# Patient Record
Sex: FEMALE | Race: WHITE | NOT HISPANIC OR LATINO | Employment: FULL TIME | URBAN - METROPOLITAN AREA
[De-identification: names, ages, dates, MRNs, and addresses within clinical notes are randomized per-mention and may not be internally consistent; named-entity substitution may affect disease eponyms.]

---

## 2020-01-23 ENCOUNTER — OFFICE VISIT (OUTPATIENT)
Dept: PEDIATRICS | Facility: CLINIC | Age: 20
End: 2020-01-23
Payer: COMMERCIAL

## 2020-01-23 ENCOUNTER — TELEPHONE (OUTPATIENT)
Dept: PEDIATRICS | Facility: CLINIC | Age: 20
End: 2020-01-23

## 2020-01-23 VITALS
DIASTOLIC BLOOD PRESSURE: 62 MMHG | WEIGHT: 115.2 LBS | BODY MASS INDEX: 19.67 KG/M2 | OXYGEN SATURATION: 97 % | HEART RATE: 95 BPM | HEIGHT: 64 IN | SYSTOLIC BLOOD PRESSURE: 118 MMHG | TEMPERATURE: 98.5 F

## 2020-01-23 DIAGNOSIS — F41.9 ANXIETY: ICD-10-CM

## 2020-01-23 DIAGNOSIS — Z23 NEED FOR PROPHYLACTIC VACCINATION AND INOCULATION AGAINST INFLUENZA: Primary | ICD-10-CM

## 2020-01-23 DIAGNOSIS — E10.9 TYPE 1 DIABETES, HBA1C GOAL < 8% (H): ICD-10-CM

## 2020-01-23 DIAGNOSIS — Z23 NEED FOR VACCINATION: ICD-10-CM

## 2020-01-23 LAB — HBA1C MFR BLD: 7.5 % (ref 0–5.6)

## 2020-01-23 PROCEDURE — 90732 PPSV23 VACC 2 YRS+ SUBQ/IM: CPT | Performed by: NURSE PRACTITIONER

## 2020-01-23 PROCEDURE — 90471 IMMUNIZATION ADMIN: CPT | Performed by: NURSE PRACTITIONER

## 2020-01-23 PROCEDURE — 83036 HEMOGLOBIN GLYCOSYLATED A1C: CPT | Performed by: NURSE PRACTITIONER

## 2020-01-23 PROCEDURE — 84443 ASSAY THYROID STIM HORMONE: CPT | Performed by: NURSE PRACTITIONER

## 2020-01-23 PROCEDURE — 99203 OFFICE O/P NEW LOW 30 MIN: CPT | Mod: 25 | Performed by: NURSE PRACTITIONER

## 2020-01-23 PROCEDURE — 90472 IMMUNIZATION ADMIN EACH ADD: CPT | Performed by: NURSE PRACTITIONER

## 2020-01-23 PROCEDURE — 80053 COMPREHEN METABOLIC PANEL: CPT | Performed by: NURSE PRACTITIONER

## 2020-01-23 PROCEDURE — 80061 LIPID PANEL: CPT | Performed by: NURSE PRACTITIONER

## 2020-01-23 PROCEDURE — 82043 UR ALBUMIN QUANTITATIVE: CPT | Performed by: NURSE PRACTITIONER

## 2020-01-23 PROCEDURE — 90686 IIV4 VACC NO PRSV 0.5 ML IM: CPT | Performed by: NURSE PRACTITIONER

## 2020-01-23 PROCEDURE — 90651 9VHPV VACCINE 2/3 DOSE IM: CPT | Performed by: NURSE PRACTITIONER

## 2020-01-23 PROCEDURE — 36415 COLL VENOUS BLD VENIPUNCTURE: CPT | Performed by: NURSE PRACTITIONER

## 2020-01-23 ASSESSMENT — MIFFLIN-ST. JEOR: SCORE: 1274.6

## 2020-01-23 NOTE — TELEPHONE ENCOUNTER
The pt is aware and scheduled for her upcoming appointment.   Sosa Flores on 1/23/2020 at 11:56 AM

## 2020-01-23 NOTE — TELEPHONE ENCOUNTER
Reason for Call:  Other - referral/appt    Detailed comments: Patient called requesting an urgent referral to [facility] for her type I diabetes.  Patient states the DMV sent her a letter stating they will revoke her license if she is not seen by 01/26/20.  Patient has not seen Dr Todd since 2011, but wondering if Dr. Todd is willing to place referral due to urgency?  Additionally, patient would like to re-establiish care with Dr. Todd if possible. If agreeable, referral to endocrinology can be faxed to [phone].  Please follow up with patient for scheduling and referral advisement.    Phone Number Patient can be reached at: Cell number on file:    Telephone Information:   Mobile [phone]       Best Time: asap    Can we leave a detailed message on this number? YES    Call taken on 1/23/2020 at 11:29 AM by Carlyn Garcias

## 2020-01-23 NOTE — LETTER
[facility]  [address] [address]                  [phone]   January 24, 2020    Margo Cruz  [address] [place] [address]      Dear Margo,    Here is a summary of your recent test results:    Your A1c is 7.5.     Cholesterol (LDL) is a bit high. We can discuss at your physical.     Your thyroid is normal.     Blood sugar was a bit low at 62. Please monitor carefully.    Your test results are enclosed.      Please contact me if you have any questions.           Thank you very much for choosing [facility]    Best regards,    Naomi Reynolds, YOLI        Results for orders placed or performed in visit on 01/23/20   Comprehensive metabolic panel     Status: Abnormal   Result Value Ref Range    Sodium 137 133 - 144 mmol/L    Potassium 3.8 3.4 - 5.3 mmol/L    Chloride 107 96 - 110 mmol/L    Carbon Dioxide 22 20 - 32 mmol/L    Anion Gap 8 3 - 14 mmol/L    Glucose 62 (L) 70 - 99 mg/dL    Urea Nitrogen 15 7 - 30 mg/dL    Creatinine 0.71 0.50 - 1.00 mg/dL    GFR Estimate >90 >60 mL/min/[1.73_m2]    GFR Estimate If Black >90 >60 mL/min/[1.73_m2]    Calcium 9.4 8.5 - 10.1 mg/dL    Bilirubin Total 0.7 0.2 - 1.3 mg/dL    Albumin 4.3 3.4 - 5.0 g/dL    Protein Total 8.0 6.8 - 8.8 g/dL    Alkaline Phosphatase 67 40 - 150 U/L    ALT 15 0 - 50 U/L    AST 12 0 - 35 U/L   Hemoglobin A1c     Status: Abnormal   Result Value Ref Range    Hemoglobin A1C 7.5 (H) 0 - 5.6 %   Lipid panel reflex to direct LDL Non-fasting     Status: Abnormal   Result Value Ref Range    Cholesterol 180 (H) <170 mg/dL    Triglycerides 79 <90 mg/dL    HDL Cholesterol 47 >45 mg/dL    LDL Cholesterol Calculated 117 (H) <110 mg/dL    Non HDL Cholesterol 133 (H) <120 mg/dL   TSH with free T4 reflex     Status: None   Result Value Ref Range    TSH 0.79 0.40 - 4.00 mU/L

## 2020-01-23 NOTE — PROGRESS NOTES
"Subjective     Margo Cruz is a 19 year old female who presents to clinic today for the following health issues:    History of Present Illness        Diabetes:   She presents for follow up of diabetes.  She is checking home blood glucose four or more times daily. She checks blood glucose before and after meals and at bedtime.  Blood glucose is sometimes over 200 and sometimes over 70. She is aware of hypoglycemia symptoms including shakiness, dizziness, lethargy and confusion. She is concerned about frequent infections. She is not experiencing numbness or burning in feet, excessive thirst, blurry vision, weight changes or redness, sores or blisters on feet. The patient has not had a diabetic eye exam in the last 12 months.         She eats 2-3 servings of fruits and vegetables daily.She consumes 0 sweetened beverage(s) daily.She exercises with enough effort to increase her heart rate 9 or less minutes per day.  She exercises with enough effort to increase her heart rate 3 or less days per week.   She is taking medications regularly.     New Patient/Transfer of Care  Referral to Endo.       Recently pregnant. Had an . Was only a few weeks along. Feeling well mentally and physically.    Hasn't been seen in over a year. Has been purchasing regular insulin OTC and managing her diabetes that way. Overall feeling well. Morning sugars are 130-180. After mealtime sugars as high as 300 rarely, but usually 180-200. Sugars were very high in pregnancy.    Basal:  12-6am: 1unit/hr  6am-12pm 1.25u/hr  12p-10p 0.55/hr  10p-12a 1.1u/hr    Units per carbs    12-10am  4.6 carbs per unit  10am-3pm 10 carbs/unit  3pm-12 8 carbs per unit.    Thinks she uses 100-200 units per day.     ROS: const/endo/cv/resp/derm/psych otherwise negative     OBJECTIVE:  /62 (BP Location: Right arm, Patient Position: Chair, Cuff Size: Adult Regular)   Pulse 95   Temp 98.5  F (36.9  C) (Tympanic)   Ht 1.613 m (5' 3.5\")   Wt 52.3 kg " (115 lb 3.2 oz)   SpO2 97%   BMI 20.09 kg/m    CONSTITUTIONAL: Alert, well-nourished, well-groomed, NAD  RESP: Lungs CTA. No wheeze, rhonchi, rales.  CV: HRRR S1 S2 No MRG. No peripheral edema  PSYCH: Bright affect. Appropriate mentation and speech.       ASSESSMENT/PLAN:  (Z23) Need for prophylactic vaccination and inoculation against influenza  (primary encounter diagnosis  Plan: INFLUENZA VACCINE IM > 6 MONTHS VALENT IIV4         [96930], Vaccine Administration, Initial         [69937]            (Z23) Need for vaccination  Plan: PPSV23, IM/SUBQ (2+ YRS) - [id number], HPV, IM        (9 - 26 YRS) - Gardasil 9, EA ADD'L VACCINE            (E10.9) Type 1 diabetes, HbA1c goal < 8% (H)  Comment: Fair control at 7.5, considering that she has been managing this without a provider for the last 18 months. Has been putting REGULAR insulin in her pump. See settings above. Denies having any lows. Was somewhat hyperglycemic in early pregnancy (terminated).   Plan: Albumin Random Urine Quantitative with Creat         Ratio, Comprehensive metabolic panel,         Hemoglobin A1c, Lipid panel reflex to direct         LDL Non-fasting, TSH with free T4 reflex,         ENDOCRINOLOGY ADULT REFERRAL, AMBULATORY ADULT         DIABETES EDUCATOR REFERRAL, CANCELED:         ENDOCRINOLOGY ADULT REFERRAL, CANCELED:         ENDOCRINOLOGY ADULT REFERRAL          -Referred to endo  -Will see diabetic ed tomorrow to get her switched over to the appropriate amount of rapid acting insulin  -Labs  -F/U with me asap.   -Discussed reasons to seek care urgently.       (F41.9) Anxiety  Comment: Mild. No SI/HI. No manic sx.   Plan: MENTAL HEALTH REFERRAL  - Adult; Outpatient         Treatment; Individual/Couples/Family/Group         Therapy/Health Psychology; [facility] [place]: [facility] [phone]; We will         contact you to schedule the appointment or         please call with any questions          -Start therapy  -F/U with  yuri Reynolds, KARTHIKEYAN-HERI.

## 2020-01-24 ENCOUNTER — TELEPHONE (OUTPATIENT)
Dept: EDUCATION SERVICES | Facility: CLINIC | Age: 20
End: 2020-01-24

## 2020-01-24 ENCOUNTER — ALLIED HEALTH/NURSE VISIT (OUTPATIENT)
Dept: EDUCATION SERVICES | Facility: CLINIC | Age: 20
End: 2020-01-24
Payer: COMMERCIAL

## 2020-01-24 DIAGNOSIS — E10.9 TYPE 1 DIABETES, HBA1C GOAL < 8% (H): Primary | ICD-10-CM

## 2020-01-24 LAB
ALBUMIN SERPL-MCNC: 4.3 G/DL (ref 3.4–5)
ALP SERPL-CCNC: 67 U/L (ref 40–150)
ALT SERPL W P-5'-P-CCNC: 15 U/L (ref 0–50)
ANION GAP SERPL CALCULATED.3IONS-SCNC: 8 MMOL/L (ref 3–14)
AST SERPL W P-5'-P-CCNC: 12 U/L (ref 0–35)
BILIRUB SERPL-MCNC: 0.7 MG/DL (ref 0.2–1.3)
BUN SERPL-MCNC: 15 MG/DL (ref 7–30)
CALCIUM SERPL-MCNC: 9.4 MG/DL (ref 8.5–10.1)
CHLORIDE SERPL-SCNC: 107 MMOL/L (ref 96–110)
CHOLEST SERPL-MCNC: 180 MG/DL
CO2 SERPL-SCNC: 22 MMOL/L (ref 20–32)
CREAT SERPL-MCNC: 0.71 MG/DL (ref 0.5–1)
CREAT UR-MCNC: 380 MG/DL
GFR SERPL CREATININE-BSD FRML MDRD: >90 ML/MIN/{1.73_M2}
GLUCOSE SERPL-MCNC: 62 MG/DL (ref 70–99)
HDLC SERPL-MCNC: 47 MG/DL
LDLC SERPL CALC-MCNC: 117 MG/DL
MICROALBUMIN UR-MCNC: 66 MG/L
MICROALBUMIN/CREAT UR: 17.29 MG/G CR (ref 0–25)
NONHDLC SERPL-MCNC: 133 MG/DL
POTASSIUM SERPL-SCNC: 3.8 MMOL/L (ref 3.4–5.3)
PROT SERPL-MCNC: 8 G/DL (ref 6.8–8.8)
SODIUM SERPL-SCNC: 137 MMOL/L (ref 133–144)
TRIGL SERPL-MCNC: 79 MG/DL
TSH SERPL DL<=0.005 MIU/L-ACNC: 0.79 MU/L (ref 0.4–4)

## 2020-01-24 PROCEDURE — G0108 DIAB MANAGE TRN  PER INDIV: HCPCS

## 2020-01-24 NOTE — PATIENT INSTRUCTIONS
Switch over to Novolog insulin in the pump. [place] BG's closely for first few hours after switching.    Try uploading pump to [facility] and then call or send us a message with your Username and Password. We can review pump settings and help with dose changes. Do this 1-2 weeks after switching.    Call [employer] to get a linking meter.    [place] [facility] for the [facility]:  For Your Diabetes Education and Nutrition Appointments Call:  [phone]   For Diabetes Education or Nutrition Related Questions:   Phone: [phone]  E-mail: [email or web address]  Fax: [phone]   If you need a medication refill please contact your pharmacy. Please allow 3 business days for your refills to be completed.

## 2020-01-24 NOTE — PROGRESS NOTES
Diabetes Self-Management Education & Support      Diabetes Self-Management Education & Support - Insulin Pump Review    SUBJECTIVE/OBJECTIVE  Presents for: Individual review  Accompanied by: Mother  Diabetes education in the past 24mo: No  Focus of Visit: Taking Medication, Insulin Pump  Diabetes type: Type 1  Disease course: Improving  How confident are you filling out medical forms by yourself:: Not Assessed  Cultural Influences/Ethnic Background:  American      Patient seen today for Insulin Pump Review:    Insulin Pump Information  Insulin Pump Type: Medtronic 523/723  Infusion Set: Medtronic  Medtronic Infusion Set: Silhouette    Insulin Pump Review  Insulin Pump Type: Medtronic 523/723  Taking other diabetes medications?: No  Problems taking diabetes medications regularly?: No  Diabetes medication side effects?: No  Changes made to pump settings: None  Education specific to insulin pump provided today: Other(Changing from NOVOLIN R to NOVOLOG in the pump.)    Healthy Eating  Healthy Eating Assessed Today: No  Cultural/Mandaen diet restrictions?: No  Meal planning: None  Meals include: Breakfast, Lunch, Dinner, Snacks  Beverages: Water, Coffee, Milk, Diet soda, Sports drinks  Has patient met with a dietitian in the past?: No    Being Active  Being Active Assessed Today: No  Barrier to exercise: Time, Access    Monitoring  Monitoring Assessed Today: Yes  Blood Glucose Meter: Reli-On  Home Glucose (Sugar) Monitorin+ times per day  Blood glucose trend: Fluctuating minimally  Low Glucose Range (mg/dL): <70  High Glucose Range (mg/dL): >200  Overall Range (mg/dL): 130-140                Taking Medications  Taking Medication Assessed Today: Yes  Current Treatments: Insulin Pump  Dose schedule: pre-breakfast, pre-lunch, pre-dinner  Given by: Patient  Injection/Infusion sites: Abdomen, Thighs  Problems taking diabetes medications regularly?: No  Diabetes medication side effects?: No  Treatment Compliance: Most of  the time    Problem Solving  Problem Solving Assessed Today: Yes  Hypoglycemia Frequency: Rarely  Hypoglycemia Treatment: Glucose (tablets or gel), Juice, Candy, Other food  Patient carries a carbohydrate source: Yes  Medical alert: Yes  Severe weather/disaster plan for diabetes management?: No  DKA prevention plan?: Yes  Sick day plan for diabetes management?: (P) No    Hypoglycemia symptoms  Confusion: Yes  Dizziness or Light-Headedness: No  Headaches: Yes  Hunger: No  Mood changes: Yes  Nervousness/Anxiety: Yes  Sleepiness: No  Speech difficulty: No  Sweats: Yes  Tremors: Yes    Hypoglycemia Complications  Blackouts: No  Hospitalization: No  Nocturnal hypoglycemia: Yes  Required assistance: No  Required glucagon injection: No  Seizures: No    Reducing Risks  Reducing Risks Assessed Today: No  CAD Risks: Family history, Hypertension, Stress  Has dilated eye exam at least once a year?: No  Sees dentist every 6 months?: No  Sees podiatrist (foot doctor)?: No    Healthy Coping  Healthy Coping Assessed Today: Yes  Emotional response to diabetes: Ready to learn, Confidence diabetes can be controlled, Concern for health and well-being  Informal Support system:: Family, Friends, Parent, Other  Stage of change: PREPARATION (Decided to change - considering how)  Difficulty affording diabetes management supplies?: No  Patient Activation Measure Survey Score:  JUAN C Score (Last Two) 1/23/2020   JUAN C Raw Score 37   Activation Score 79.2   JUAN C Level 4         ASSESSMENT  I met with Margo and her mom today to discuss changing over to Novolog insulin from the Novolin R insulin she has been using in her pump. She tells me that she has been using the Novolin R in her pump for 4-5 years. She recently was able to get on her mom's insurance. Both Margo's dad and step-dad have Type 1 Diabetes. We reviewed the insulin action time and the difference between the two types of insulin. She plans to do some extra BG testing when she makes  the switch in the next couple of days. She has never uploaded her pump at home, but we discussed how she could do this and how the diabetes educators could then review her reports remotely. She is interested in a new pump and maybe a CGM. She would like to wait on this until she is seen by Dr. Holly in April. I got her set-up with a new contour meter today (previuosly using Reli-On) and she will contact [employer] for a linking meter. I did review with her to watch her BG's closely during the switch in insulin. We discussed meeting again after her visit with Dr. Holly or earlier if needed. She will be following-up with Naomi Reynolds CNP in about 1 month.     INTERVENTION:   Diabetes knowledge and skills assessment:     Patient is knowledgeable in diabetes management concepts related to: Monitoring, Taking Medication, Problem Solving and Healthy Coping    Patient needs further education on the following diabetes management concepts: Healthy Eating, Being Active, Monitoring, Taking Medication, Problem Solving, Reducing Risks and Healthy Coping    Based on learning assessment above, most appropriate setting for further diabetes education would be: Group class or Individual setting.    Education provided today on:  AADE Self-Care Behaviors:  Monitoring: log and interpret results, individual blood glucose targets and frequency of monitoring  Taking Medication: action of prescribed medication, side effects of prescribed medications and when to take medications  Problem Solving: high blood glucose - causes, signs/symptoms, treatment and prevention, low blood glucose - causes, signs/symptoms, treatment and prevention and when to call health care provider    Opportunities for ongoing education and support in diabetes-self management were discussed.    Pt verbalized understanding of concepts discussed and recommendations provided today.       Education Materials Provided:  Contour Next One meter kit      PLAN  Switch  over to Novolog insulin in the pump. [place] BG's closely for first few hours after switching.    Try uploading pump to [employer] and then call or send us a message with your Username and Password. We can review pump settings and help with dose changes. Do this 1-2 weeks after switching.    Call [employer] to get a linking meter.    See Patient Instructions for co-developed, patient-stated behavior change goals.  AVS printed and provided to patient today. See Follow-Up section for recommended follow-up.    Юлия Segovia RN, CDE    Time Spent: 60 minutes  Encounter Type: Individual    Any diabetes medication dose changes were made via the CDE Protocol and Collaborative Practice Agreement with the patient's referring provider. A copy of this encounter was shared with the provider.

## 2020-02-16 ENCOUNTER — HEALTH MAINTENANCE LETTER (OUTPATIENT)
Age: 20
End: 2020-02-16

## 2020-03-30 ENCOUNTER — TELEPHONE (OUTPATIENT)
Dept: PEDIATRICS | Facility: CLINIC | Age: 20
End: 2020-03-30

## 2020-03-30 NOTE — TELEPHONE ENCOUNTER
LVM that appt is cancelled.  My Chart sent informing patient appt for 4/6/20 is CANCELLED.  Please call to reschedule.   Elena Rudolph, CMA

## 2020-06-04 ENCOUNTER — APPOINTMENT (OUTPATIENT)
Dept: GENERAL RADIOLOGY | Facility: CLINIC | Age: 20
End: 2020-06-04
Attending: EMERGENCY MEDICINE
Payer: COMMERCIAL

## 2020-06-04 ENCOUNTER — HOSPITAL ENCOUNTER (INPATIENT)
Facility: CLINIC | Age: 20
LOS: 1 days | Discharge: HOME | End: 2020-06-05
Attending: EMERGENCY MEDICINE | Admitting: INTERNAL MEDICINE
Payer: COMMERCIAL

## 2020-06-04 ENCOUNTER — TELEPHONE (OUTPATIENT)
Dept: PEDIATRICS | Facility: CLINIC | Age: 20
End: 2020-06-04

## 2020-06-04 DIAGNOSIS — E10.10 DIABETIC KETOACIDOSIS WITHOUT COMA ASSOCIATED WITH TYPE 1 DIABETES MELLITUS (H): ICD-10-CM

## 2020-06-04 PROBLEM — E11.10 DKA (DIABETIC KETOACIDOSES): Status: ACTIVE | Noted: 2020-06-04

## 2020-06-04 LAB
ALBUMIN SERPL-MCNC: 4.8 G/DL (ref 3.4–5)
ALBUMIN UR-MCNC: NEGATIVE MG/DL
ALP SERPL-CCNC: 99 U/L (ref 40–150)
ALT SERPL W P-5'-P-CCNC: 24 U/L (ref 0–50)
ANION GAP SERPL CALCULATED.3IONS-SCNC: 10 MMOL/L (ref 3–14)
ANION GAP SERPL CALCULATED.3IONS-SCNC: 15 MMOL/L (ref 3–14)
APPEARANCE UR: CLEAR
AST SERPL W P-5'-P-CCNC: 17 U/L (ref 0–35)
B-HCG FREE SERPL-ACNC: <5 IU/L
BASE DEFICIT BLDV-SCNC: 8.8 MMOL/L
BASOPHILS # BLD AUTO: 0.2 10E9/L (ref 0–0.2)
BASOPHILS NFR BLD AUTO: 0.8 %
BILIRUB SERPL-MCNC: 1.4 MG/DL (ref 0.2–1.3)
BILIRUB UR QL STRIP: NEGATIVE
BUN SERPL-MCNC: 17 MG/DL (ref 7–30)
BUN SERPL-MCNC: 23 MG/DL (ref 7–30)
CALCIUM SERPL-MCNC: 8.6 MG/DL (ref 8.5–10.1)
CALCIUM SERPL-MCNC: 9.9 MG/DL (ref 8.5–10.1)
CHLORIDE SERPL-SCNC: 100 MMOL/L (ref 96–110)
CHLORIDE SERPL-SCNC: 110 MMOL/L (ref 96–110)
CO2 SERPL-SCNC: 18 MMOL/L (ref 20–32)
CO2 SERPL-SCNC: 18 MMOL/L (ref 20–32)
COLOR UR AUTO: ABNORMAL
CREAT SERPL-MCNC: 0.62 MG/DL (ref 0.5–1)
CREAT SERPL-MCNC: 0.81 MG/DL (ref 0.5–1)
DIFFERENTIAL METHOD BLD: ABNORMAL
EOSINOPHIL # BLD AUTO: 0.7 10E9/L (ref 0–0.7)
EOSINOPHIL NFR BLD AUTO: 3.4 %
ERYTHROCYTE [DISTWIDTH] IN BLOOD BY AUTOMATED COUNT: 12 % (ref 10–15)
GFR SERPL CREATININE-BSD FRML MDRD: >90 ML/MIN/{1.73_M2}
GFR SERPL CREATININE-BSD FRML MDRD: >90 ML/MIN/{1.73_M2}
GLUCOSE BLDC GLUCOMTR-MCNC: 135 MG/DL (ref 70–99)
GLUCOSE BLDC GLUCOMTR-MCNC: 150 MG/DL (ref 70–99)
GLUCOSE BLDC GLUCOMTR-MCNC: 155 MG/DL (ref 70–99)
GLUCOSE BLDC GLUCOMTR-MCNC: 159 MG/DL (ref 70–99)
GLUCOSE BLDC GLUCOMTR-MCNC: 190 MG/DL (ref 70–99)
GLUCOSE BLDC GLUCOMTR-MCNC: 196 MG/DL (ref 70–99)
GLUCOSE BLDC GLUCOMTR-MCNC: 231 MG/DL (ref 70–99)
GLUCOSE BLDC GLUCOMTR-MCNC: 253 MG/DL (ref 70–99)
GLUCOSE BLDC GLUCOMTR-MCNC: 291 MG/DL (ref 70–99)
GLUCOSE BLDC GLUCOMTR-MCNC: 357 MG/DL (ref 70–99)
GLUCOSE BLDC GLUCOMTR-MCNC: 416 MG/DL (ref 70–99)
GLUCOSE BLDC GLUCOMTR-MCNC: 476 MG/DL (ref 70–99)
GLUCOSE BLDC GLUCOMTR-MCNC: 487 MG/DL (ref 70–99)
GLUCOSE SERPL-MCNC: 140 MG/DL (ref 70–99)
GLUCOSE SERPL-MCNC: 517 MG/DL (ref 70–99)
GLUCOSE UR STRIP-MCNC: >1000 MG/DL
HBA1C MFR BLD: 8.3 % (ref 0–5.6)
HCO3 BLDV-SCNC: 19 MMOL/L (ref 21–28)
HCT VFR BLD AUTO: 49.7 % (ref 35–47)
HGB BLD-MCNC: 16.4 G/DL (ref 11.7–15.7)
HGB UR QL STRIP: ABNORMAL
IMM GRANULOCYTES # BLD: 0.1 10E9/L (ref 0–0.4)
IMM GRANULOCYTES NFR BLD: 0.6 %
INTERPRETATION ECG - MUSE: NORMAL
KETONES BLD-SCNC: 3.8 MMOL/L (ref 0–0.6)
KETONES UR STRIP-MCNC: >150 MG/DL
LACTATE BLD-SCNC: 3.6 MMOL/L (ref 0.7–2)
LACTATE BLD-SCNC: 4.4 MMOL/L (ref 0.7–2)
LEUKOCYTE ESTERASE UR QL STRIP: NEGATIVE
LIPASE SERPL-CCNC: 62 U/L (ref 73–393)
LYMPHOCYTES # BLD AUTO: 4.2 10E9/L (ref 0.8–5.3)
LYMPHOCYTES NFR BLD AUTO: 20 %
MAGNESIUM SERPL-MCNC: 2 MG/DL (ref 1.6–2.3)
MCH RBC QN AUTO: 31.2 PG (ref 26.5–33)
MCHC RBC AUTO-ENTMCNC: 33 G/DL (ref 31.5–36.5)
MCV RBC AUTO: 95 FL (ref 78–100)
MONOCYTES # BLD AUTO: 0.8 10E9/L (ref 0–1.3)
MONOCYTES NFR BLD AUTO: 3.8 %
NEUTROPHILS # BLD AUTO: 15 10E9/L (ref 1.6–8.3)
NEUTROPHILS NFR BLD AUTO: 71.4 %
NITRATE UR QL: NEGATIVE
NRBC # BLD AUTO: 0 10*3/UL
NRBC BLD AUTO-RTO: 0 /100
O2/TOTAL GAS SETTING VFR VENT: ABNORMAL %
OSMOLALITY SERPL: 325 MMOL/KG (ref 275–295)
PCO2 BLDV: 44 MM HG (ref 40–50)
PH BLDV: 7.24 PH (ref 7.32–7.43)
PH UR STRIP: 5 PH (ref 5–7)
PHOSPHATE SERPL-MCNC: 3.8 MG/DL (ref 2.5–4.5)
PLATELET # BLD AUTO: 402 10E9/L (ref 150–450)
PO2 BLDV: 38 MM HG (ref 25–47)
POTASSIUM SERPL-SCNC: 4.1 MMOL/L (ref 3.4–5.3)
POTASSIUM SERPL-SCNC: 4.2 MMOL/L (ref 3.4–5.3)
PROT SERPL-MCNC: 9.1 G/DL (ref 6.8–8.8)
RBC # BLD AUTO: 5.25 10E12/L (ref 3.8–5.2)
RBC #/AREA URNS AUTO: 3 /HPF (ref 0–2)
SARS-COV-2 RNA SPEC QL NAA+PROBE: NOT DETECTED
SODIUM SERPL-SCNC: 133 MMOL/L (ref 133–144)
SODIUM SERPL-SCNC: 138 MMOL/L (ref 133–144)
SOURCE: ABNORMAL
SP GR UR STRIP: 1.03 (ref 1–1.03)
SPECIMEN SOURCE: NORMAL
SQUAMOUS #/AREA URNS AUTO: 3 /HPF (ref 0–1)
UROBILINOGEN UR STRIP-MCNC: NORMAL MG/DL (ref 0–2)
WBC # BLD AUTO: 21 10E9/L (ref 4–11)
WBC #/AREA URNS AUTO: 4 /HPF (ref 0–5)

## 2020-06-04 PROCEDURE — 82803 BLOOD GASES ANY COMBINATION: CPT | Performed by: EMERGENCY MEDICINE

## 2020-06-04 PROCEDURE — 80053 COMPREHEN METABOLIC PANEL: CPT | Performed by: EMERGENCY MEDICINE

## 2020-06-04 PROCEDURE — 84702 CHORIONIC GONADOTROPIN TEST: CPT

## 2020-06-04 PROCEDURE — 00000146 ZZHCL STATISTIC GLUCOSE BY METER IP

## 2020-06-04 PROCEDURE — 83735 ASSAY OF MAGNESIUM: CPT | Performed by: EMERGENCY MEDICINE

## 2020-06-04 PROCEDURE — 84100 ASSAY OF PHOSPHORUS: CPT | Performed by: EMERGENCY MEDICINE

## 2020-06-04 PROCEDURE — 99285 EMERGENCY DEPT VISIT HI MDM: CPT | Mod: 25

## 2020-06-04 PROCEDURE — 82010 KETONE BODYS QUAN: CPT | Performed by: EMERGENCY MEDICINE

## 2020-06-04 PROCEDURE — 83690 ASSAY OF LIPASE: CPT | Performed by: EMERGENCY MEDICINE

## 2020-06-04 PROCEDURE — 99223 1ST HOSP IP/OBS HIGH 75: CPT | Mod: AI | Performed by: PHYSICIAN ASSISTANT

## 2020-06-04 PROCEDURE — 25800030 ZZH RX IP 258 OP 636: Performed by: EMERGENCY MEDICINE

## 2020-06-04 PROCEDURE — 87635 SARS-COV-2 COVID-19 AMP PRB: CPT | Performed by: EMERGENCY MEDICINE

## 2020-06-04 PROCEDURE — 25000125 ZZHC RX 250: Performed by: PHYSICIAN ASSISTANT

## 2020-06-04 PROCEDURE — 25000131 ZZH RX MED GY IP 250 OP 636 PS 637: Performed by: INTERNAL MEDICINE

## 2020-06-04 PROCEDURE — 80048 BASIC METABOLIC PNL TOTAL CA: CPT | Performed by: INTERNAL MEDICINE

## 2020-06-04 PROCEDURE — 85025 COMPLETE CBC W/AUTO DIFF WBC: CPT | Performed by: EMERGENCY MEDICINE

## 2020-06-04 PROCEDURE — 83605 ASSAY OF LACTIC ACID: CPT | Performed by: EMERGENCY MEDICINE

## 2020-06-04 PROCEDURE — 36415 COLL VENOUS BLD VENIPUNCTURE: CPT | Performed by: INTERNAL MEDICINE

## 2020-06-04 PROCEDURE — 87040 BLOOD CULTURE FOR BACTERIA: CPT | Performed by: EMERGENCY MEDICINE

## 2020-06-04 PROCEDURE — 96365 THER/PROPH/DIAG IV INF INIT: CPT | Mod: 59

## 2020-06-04 PROCEDURE — 25000128 H RX IP 250 OP 636: Performed by: EMERGENCY MEDICINE

## 2020-06-04 PROCEDURE — 25000125 ZZHC RX 250: Performed by: EMERGENCY MEDICINE

## 2020-06-04 PROCEDURE — 93005 ELECTROCARDIOGRAM TRACING: CPT

## 2020-06-04 PROCEDURE — 81001 URINALYSIS AUTO W/SCOPE: CPT | Performed by: EMERGENCY MEDICINE

## 2020-06-04 PROCEDURE — 83930 ASSAY OF BLOOD OSMOLALITY: CPT | Performed by: EMERGENCY MEDICINE

## 2020-06-04 PROCEDURE — 71045 X-RAY EXAM CHEST 1 VIEW: CPT

## 2020-06-04 PROCEDURE — 96366 THER/PROPH/DIAG IV INF ADDON: CPT

## 2020-06-04 PROCEDURE — 25800030 ZZH RX IP 258 OP 636: Performed by: PHYSICIAN ASSISTANT

## 2020-06-04 PROCEDURE — 83036 HEMOGLOBIN GLYCOSYLATED A1C: CPT | Performed by: EMERGENCY MEDICINE

## 2020-06-04 PROCEDURE — 96361 HYDRATE IV INFUSION ADD-ON: CPT

## 2020-06-04 PROCEDURE — C9803 HOPD COVID-19 SPEC COLLECT: HCPCS

## 2020-06-04 PROCEDURE — 12000011 ZZH R&B MS OVERFLOW

## 2020-06-04 PROCEDURE — 96375 TX/PRO/DX INJ NEW DRUG ADDON: CPT

## 2020-06-04 RX ORDER — MAGNESIUM SULFATE HEPTAHYDRATE 40 MG/ML
4 INJECTION, SOLUTION INTRAVENOUS EVERY 4 HOURS PRN
Status: DISCONTINUED | OUTPATIENT
Start: 2020-06-04 | End: 2020-06-05 | Stop reason: HOSPADM

## 2020-06-04 RX ORDER — ONDANSETRON 2 MG/ML
4 INJECTION INTRAMUSCULAR; INTRAVENOUS EVERY 6 HOURS PRN
Status: DISCONTINUED | OUTPATIENT
Start: 2020-06-04 | End: 2020-06-05 | Stop reason: HOSPADM

## 2020-06-04 RX ORDER — ACETAMINOPHEN 325 MG/1
650 TABLET ORAL EVERY 4 HOURS PRN
Status: DISCONTINUED | OUTPATIENT
Start: 2020-06-04 | End: 2020-06-05 | Stop reason: HOSPADM

## 2020-06-04 RX ORDER — NALOXONE HYDROCHLORIDE 0.4 MG/ML
.1-.4 INJECTION, SOLUTION INTRAMUSCULAR; INTRAVENOUS; SUBCUTANEOUS
Status: DISCONTINUED | OUTPATIENT
Start: 2020-06-04 | End: 2020-06-05 | Stop reason: HOSPADM

## 2020-06-04 RX ORDER — POTASSIUM CL/LIDO/0.9 % NACL 10MEQ/0.1L
10 INTRAVENOUS SOLUTION, PIGGYBACK (ML) INTRAVENOUS ONCE
Status: COMPLETED | OUTPATIENT
Start: 2020-06-04 | End: 2020-06-04

## 2020-06-04 RX ORDER — MULTIVIT-MIN/IRON/FOLIC ACID/K 18-600-40
1000 CAPSULE ORAL DAILY
COMMUNITY
End: 2021-06-01

## 2020-06-04 RX ORDER — DEXTROSE MONOHYDRATE 25 G/50ML
25-50 INJECTION, SOLUTION INTRAVENOUS
Status: DISCONTINUED | OUTPATIENT
Start: 2020-06-04 | End: 2020-06-05

## 2020-06-04 RX ORDER — SODIUM CHLORIDE, SODIUM LACTATE, POTASSIUM CHLORIDE, CALCIUM CHLORIDE 600; 310; 30; 20 MG/100ML; MG/100ML; MG/100ML; MG/100ML
INJECTION, SOLUTION INTRAVENOUS CONTINUOUS
Status: DISCONTINUED | OUTPATIENT
Start: 2020-06-04 | End: 2020-06-05 | Stop reason: HOSPADM

## 2020-06-04 RX ORDER — POTASSIUM CHLORIDE 1.5 G/1.58G
20-40 POWDER, FOR SOLUTION ORAL
Status: DISCONTINUED | OUTPATIENT
Start: 2020-06-04 | End: 2020-06-05 | Stop reason: HOSPADM

## 2020-06-04 RX ORDER — LIDOCAINE 40 MG/G
CREAM TOPICAL
Status: DISCONTINUED | OUTPATIENT
Start: 2020-06-04 | End: 2020-06-05

## 2020-06-04 RX ORDER — POTASSIUM CHLORIDE 29.8 MG/ML
20 INJECTION INTRAVENOUS
Status: DISCONTINUED | OUTPATIENT
Start: 2020-06-04 | End: 2020-06-05 | Stop reason: HOSPADM

## 2020-06-04 RX ORDER — ONDANSETRON 2 MG/ML
INJECTION INTRAMUSCULAR; INTRAVENOUS
Status: DISCONTINUED
Start: 2020-06-04 | End: 2020-06-04 | Stop reason: HOSPADM

## 2020-06-04 RX ORDER — DEXTROSE MONOHYDRATE 100 MG/ML
INJECTION, SOLUTION INTRAVENOUS CONTINUOUS PRN
Status: DISCONTINUED | OUTPATIENT
Start: 2020-06-04 | End: 2020-06-05 | Stop reason: HOSPADM

## 2020-06-04 RX ORDER — IBUPROFEN 200 MG
600 TABLET ORAL EVERY 6 HOURS PRN
Status: DISCONTINUED | OUTPATIENT
Start: 2020-06-04 | End: 2020-06-05 | Stop reason: HOSPADM

## 2020-06-04 RX ORDER — NITROGLYCERIN 0.4 MG/1
0.4 TABLET SUBLINGUAL EVERY 5 MIN PRN
Status: DISCONTINUED | OUTPATIENT
Start: 2020-06-04 | End: 2020-06-05 | Stop reason: HOSPADM

## 2020-06-04 RX ORDER — ONDANSETRON 4 MG/1
4 TABLET, ORALLY DISINTEGRATING ORAL EVERY 6 HOURS PRN
Status: DISCONTINUED | OUTPATIENT
Start: 2020-06-04 | End: 2020-06-05 | Stop reason: HOSPADM

## 2020-06-04 RX ORDER — POTASSIUM CL/LIDO/0.9 % NACL 10MEQ/0.1L
10 INTRAVENOUS SOLUTION, PIGGYBACK (ML) INTRAVENOUS
Status: DISCONTINUED | OUTPATIENT
Start: 2020-06-04 | End: 2020-06-05 | Stop reason: HOSPADM

## 2020-06-04 RX ORDER — AMOXICILLIN 250 MG
1 CAPSULE ORAL 2 TIMES DAILY PRN
Status: DISCONTINUED | OUTPATIENT
Start: 2020-06-04 | End: 2020-06-05 | Stop reason: HOSPADM

## 2020-06-04 RX ORDER — NICOTINE POLACRILEX 4 MG
15-30 LOZENGE BUCCAL
Status: DISCONTINUED | OUTPATIENT
Start: 2020-06-04 | End: 2020-06-05

## 2020-06-04 RX ORDER — DEXTROSE MONOHYDRATE 25 G/50ML
25-50 INJECTION, SOLUTION INTRAVENOUS
Status: DISCONTINUED | OUTPATIENT
Start: 2020-06-04 | End: 2020-06-04

## 2020-06-04 RX ORDER — POTASSIUM CHLORIDE 1500 MG/1
20-40 TABLET, EXTENDED RELEASE ORAL
Status: DISCONTINUED | OUTPATIENT
Start: 2020-06-04 | End: 2020-06-05 | Stop reason: HOSPADM

## 2020-06-04 RX ORDER — LIDOCAINE 40 MG/G
CREAM TOPICAL
Status: DISCONTINUED | OUTPATIENT
Start: 2020-06-04 | End: 2020-06-05 | Stop reason: HOSPADM

## 2020-06-04 RX ORDER — POTASSIUM CHLORIDE 7.45 MG/ML
10 INJECTION INTRAVENOUS
Status: DISCONTINUED | OUTPATIENT
Start: 2020-06-04 | End: 2020-06-05 | Stop reason: HOSPADM

## 2020-06-04 RX ORDER — POLYETHYLENE GLYCOL 3350 17 G/17G
17 POWDER, FOR SOLUTION ORAL DAILY PRN
Status: DISCONTINUED | OUTPATIENT
Start: 2020-06-04 | End: 2020-06-05 | Stop reason: HOSPADM

## 2020-06-04 RX ORDER — AMOXICILLIN 250 MG
2 CAPSULE ORAL 2 TIMES DAILY PRN
Status: DISCONTINUED | OUTPATIENT
Start: 2020-06-04 | End: 2020-06-05 | Stop reason: HOSPADM

## 2020-06-04 RX ORDER — ONDANSETRON 2 MG/ML
4 INJECTION INTRAMUSCULAR; INTRAVENOUS ONCE
Status: COMPLETED | OUTPATIENT
Start: 2020-06-04 | End: 2020-06-04

## 2020-06-04 RX ORDER — PROCHLORPERAZINE MALEATE 5 MG
10 TABLET ORAL EVERY 6 HOURS PRN
Status: DISCONTINUED | OUTPATIENT
Start: 2020-06-04 | End: 2020-06-05 | Stop reason: HOSPADM

## 2020-06-04 RX ORDER — PROCHLORPERAZINE 25 MG
25 SUPPOSITORY, RECTAL RECTAL EVERY 12 HOURS PRN
Status: DISCONTINUED | OUTPATIENT
Start: 2020-06-04 | End: 2020-06-05 | Stop reason: HOSPADM

## 2020-06-04 RX ADMIN — Medication 10 MEQ: at 12:25

## 2020-06-04 RX ADMIN — Medication 8.5 UNITS/HR: at 22:20

## 2020-06-04 RX ADMIN — INSULIN ASPART: 100 INJECTION, SOLUTION INTRAVENOUS; SUBCUTANEOUS at 19:11

## 2020-06-04 RX ADMIN — SODIUM CHLORIDE, POTASSIUM CHLORIDE, SODIUM LACTATE AND CALCIUM CHLORIDE 1000 ML: 600; 310; 30; 20 INJECTION, SOLUTION INTRAVENOUS at 15:36

## 2020-06-04 RX ADMIN — SODIUM CHLORIDE, POTASSIUM CHLORIDE, SODIUM LACTATE AND CALCIUM CHLORIDE 250 ML: 600; 310; 30; 20 INJECTION, SOLUTION INTRAVENOUS at 12:06

## 2020-06-04 RX ADMIN — SODIUM CHLORIDE: 9 INJECTION, SOLUTION INTRAVENOUS at 11:57

## 2020-06-04 RX ADMIN — SODIUM CHLORIDE, POTASSIUM CHLORIDE, SODIUM LACTATE AND CALCIUM CHLORIDE 1000 ML: 600; 310; 30; 20 INJECTION, SOLUTION INTRAVENOUS at 10:52

## 2020-06-04 RX ADMIN — ONDANSETRON 4 MG: 2 INJECTION INTRAMUSCULAR; INTRAVENOUS at 10:52

## 2020-06-04 ASSESSMENT — ENCOUNTER SYMPTOMS
DIARRHEA: 1
FEVER: 0
COUGH: 0
HEADACHES: 1
SHORTNESS OF BREATH: 1
VOMITING: 1
MYALGIAS: 1
NAUSEA: 1

## 2020-06-04 ASSESSMENT — ACTIVITIES OF DAILY LIVING (ADL)
ADLS_ACUITY_SCORE: 10
ADLS_ACUITY_SCORE: 10

## 2020-06-04 NOTE — ED PROVIDER NOTES
History     Chief Complaint:  Nausea & Vomiting and Hyperglycemia    HPI   Margo Cruz is a 19 year old female with a history of type 1 diabetes with insulin pump as well as DKA  who presents with multiple symptoms.  The patient states that this morning she awoke with nausea and vomiting.  The patient states that she has had many episodes of emesis today.  She reports some mild diarrhea. The patient also reports a headache as well as chest pain, body aches and shortness of breath.  She states that she had one episode of urinary incontinence. The patient denies any fever, new cough, or chance of pregnancy. The patient states that her insulin pump is currently infusing and when she checked her blood sugar level at home it was 422.  She states that her symptoms feels similar to when she has experienced DKA before in the past. The patient states that her insulin pump itself is old but that she just got brand new generic brand insulin.     Allergies:  No Known Drug Allergies    Medications:    Novolog      Past Medical History:    Diabetes type 1   Celiac   DKA    Past Surgical History:    Upper endoscopy with biopsy     Family History:    Diabetes- father     Social History:  Smoking Status: Never Smoker - passive exposure mom smokes outside    Smokeless Tobacco: Never Used  Alcohol Use: Positive  Marital Status:  Single      Review of Systems   Constitutional: Negative for fever.   Respiratory: Positive for shortness of breath. Negative for cough.    Cardiovascular: Positive for chest pain.   Gastrointestinal: Positive for diarrhea, nausea and vomiting.   Musculoskeletal: Positive for myalgias.   Neurological: Positive for headaches.   All other systems reviewed and are negative.        Physical Exam     Patient Vitals for the past 24 hrs:   BP Temp Pulse Heart Rate Resp SpO2 Weight   06/04/20 1230 122/55 -- 59 -- -- 100 % --   06/04/20 1145 (!) 140/73 -- 106 112 -- 99 % --   06/04/20 1100 121/79 -- 75 76 16 99 %  --   06/04/20 1044 -- -- -- -- -- -- 55 kg (121 lb 4.1 oz)   06/04/20 1028 (!) 142/94 98.2  F (36.8  C) 111 -- 20 98 % 52.2 kg (115 lb)       Physical Exam  Gen: Uncomfortable appearing, retching  HEENT: Dry mucous membranes, no rhinorrhea  Neck: supple, no abnormal swelling  Lungs: No tachypnea lungs clear  CV: Tachycardic, no m/r/g, ppi  Abd: soft, no localizing tenderness palpation, nondistended, no rebound/masses/guarding/hsm  Ext: no peripheral edema  Skin: warm, dry, well perfused, no rashes/bruising/lesions on exposed skin  Neuro: alert, MAEE, no gross motor or sensory deficits, gait stable  Psych: Normal mood, normal affect      Emergency Department Course     ECG:  ECG taken at 1047, ECG read at 1047  Sinus rhythm with sinus arrhythmia  Left axis deviation  Incomplete right bundle branch block  abnormal ECG  Rate 89 bpm. IA interval 178 ms. QRS duration 100 ms. QT/QTc 372/452 ms. P-R-T axes 58 -44 57.    Imaging:  Radiology findings were communicated with the patient who voiced understanding of the findings.    Chest XR:  No acute airspace infiltrate.   Reading per radiology    Laboratory:  Laboratory findings were communicated with the patient  who voiced understanding of the findings.    Glucose by meter 1037: 487 (H)   Glucose by meter 1058: 517 (HH)   Glucose by meter 1148: 476 (H)   Glucose by meter 1235: 416 (H)   Glucose by meter 1304: 357 (H)     UA with micro: glucose >1000 (A) ketones >150 (H)  Blood small (A) RBC/HPF 3 (H) squamous epithelial/HPF 3 (H) o/w negative    CBC: WBC 21.0(H), HGB 16.4(L),     CMP: CO2 18 (L) anion gap 15 (H) bilirubin 1.4 (H) protein total 9.1 (H)glucose 517 (HH) o/w WNL (Creatinine 0.81)    Lipase: 62 (L)     Ketone Beta- hydroxybutyrate: 3.8 (HH)     Magnesium: 2.0     COVID-19 by PCR: pending     Hemoglobin A1c: 8.3 (H)     Phosphorus: 3.8     Osmolality: pending    Lactic acid 1057: 3.6 (H)    Lactic acid 1224: 4.4 (HH)     Blood gas venous: pH 7.24 (L)  bicarbonate 19 (L) o/w WNL     ISTAT HCG Quantitative: <5.0     Blood culture: pending X2     Procedures    Interventions:  1052 Zofran 4 mg IV   1052 LR 1 L IV   1157 insulin 1 unit/mL IV   1206  mL IV   1225 KCl 10 mEq IV     Emergency Department Course:     Nursing notes and vitals reviewed.    1034 I performed an exam of the patient as documented above.     1037 IV was inserted and blood was drawn for laboratory testing, results above.    1058 IV was inserted and blood was drawn for laboratory testing, results above.    1122 The patient provided a urine sample here in the emergency department. This was sent for laboratory testing, findings above.     1148 IV was inserted and blood was drawn for laboratory testing, results above.    1221 The patient was sent for a XR while in the emergency department, results above.     1224 IV was inserted and blood was drawn for laboratory testing, results above.    1236 I spoke with Dr. Michael of the Hospitalist service from [facility] regarding patient's presentation, findings, and plan of care.    Impression & Plan      Medical Decision Making:  Margo Cruz is a 19 year old female who presents to the emergency department today for evaluation of nausea vomiting abdominal pain glycemia in the setting of type I diabetic.  Found to have diabetic ketoacidosis.  No preceding symptoms concerning for concomitant infection.  Placed on insulin drip, IV fluids, potassium 4.1, EKG with normal intervals, antiemetics admit [place] [facility]    Diagnosis:    ICD-10-CM    1. Diabetic ketoacidosis without coma associated with type 1 diabetes mellitus (H)  E10.10 Glucose by meter     Glucose by meter     Disposition:   Findings and plan explained to the Patient who consents to admission. Discussed the patient with Dr. Vicente, who will admit the patient to a medical bed for further monitoring, evaluation, and treatment.    Scribe Disclosure:  IGeovanna, am serving as a scribe  at 10:33 AM on 6/4/2020 to document services personally performed by Alok Glasgow MD based on my observations and the provider's statements to me.  [facility] EMERGENCY DEPARTMENT       Alok Glasgow MD  06/04/20 [id number]

## 2020-06-04 NOTE — TELEPHONE ENCOUNTER
Please coordinate f/u with endo asap.    Please set up f/u with diabetic ed for ASAP as likely unable to get in with me.

## 2020-06-04 NOTE — ED NOTES
[facility]  ED Nurse Handoff Report    Margo Cruz is a 19 year old female   ED Chief complaint: Nausea & Vomiting and Hyperglycemia  . ED Diagnosis:   Final diagnoses:   Diabetic ketoacidosis without coma associated with type 1 diabetes mellitus (H)     Allergies:   Allergies   Allergen Reactions     No Known Drug Allergies        Code Status: Full Code  Activity level - Baseline/Home:  Independent. Activity Level - Current:   Stand by Assist. Lift room needed: No. Bariatric: No   Needed: No   Isolation: R/O Covid. Infection: Covid pending  Vital Signs:   Vitals:    06/04/20 1044 06/04/20 1100 06/04/20 1145 06/04/20 1230   BP:  121/79 (!) 140/73 122/55   Pulse:  75 106 59   Resp:  16     Temp:       SpO2:  99% 99% 100%   Weight: 55 kg (121 lb 4.1 oz)          Cardiac Rhythm:  ,      Pain level: 0-10 Pain Scale: 9(chest pain, body aches)  Patient confused: No. Patient Falls Risk: No.   Elimination Status: Has voided   Patient Report - Initial Complaint: Patient awoke with nausea, vomiting, headache, chest pain, body aches, and shortness of breath.  She is diabetic and has in insulin pump, which is currently infusing.  BG at home was 422.. Focused Assessment: Pt has insulin pump, turned off in ED due to MD asking her to turn it off. N/V, headache, heartburn, body aches.  Tests Performed: labs, CXR. Abnormal Results:   Labs Ordered and Resulted from Time of ED Arrival Up to the Time of Departure from the ED   CBC WITH PLATELETS DIFFERENTIAL - Abnormal; Notable for the following components:       Result Value    WBC 21.0 (*)     RBC Count 5.25 (*)     Hemoglobin 16.4 (*)     Hematocrit 49.7 (*)     Absolute Neutrophil 15.0 (*)     All other components within normal limits   COMPREHENSIVE METABOLIC PANEL - Abnormal; Notable for the following components:    Carbon Dioxide 18 (*)     Anion Gap 15 (*)     Glucose 517 (*)     Bilirubin Total 1.4 (*)     Protein Total 9.1 (*)     All other  components within normal limits   LIPASE - Abnormal; Notable for the following components:    Lipase 62 (*)     All other components within normal limits   LACTIC ACID WHOLE BLOOD - Abnormal; Notable for the following components:    Lactic Acid 3.6 (*)     All other components within normal limits   BLOOD GAS VENOUS - Abnormal; Notable for the following components:    Ph Venous 7.24 (*)     Bicarbonate Venous 19 (*)     All other components within normal limits   ROUTINE UA WITH MICROSCOPIC - Abnormal; Notable for the following components:    Glucose Urine >1000 (*)     Ketones Urine >150 (*)     Blood Urine Small (*)     RBC Urine 3 (*)     Squamous Epithelial /HPF Urine 3 (*)     All other components within normal limits   KETONE BETA-HYDROXYBUTYRATE QUANTITATIVE - Abnormal; Notable for the following components:    Ketone Quantitative 3.8 (*)     All other components within normal limits   HEMOGLOBIN A1C - Abnormal; Notable for the following components:    Hemoglobin A1C 8.3 (*)     All other components within normal limits   GLUCOSE BY METER - Abnormal; Notable for the following components:    Glucose 487 (*)     All other components within normal limits   GLUCOSE BY METER - Abnormal; Notable for the following components:    Glucose 476 (*)     All other components within normal limits   LACTIC ACID WHOLE BLOOD - Abnormal; Notable for the following components:    Lactic Acid 4.4 (*)     All other components within normal limits   GLUCOSE BY METER - Abnormal; Notable for the following components:    Glucose 416 (*)     All other components within normal limits   MAGNESIUM   COVID-19 VIRUS (CORONAVIRUS) BY PCR   PHOSPHORUS   OSMOLALITY   GLUCOSE MONITOR NURSING POCT   CARDIAC CONTINUOUS MONITORING   ISTAT HCG QUANTITATIVE PREGNANCY NURSING POCT   NOTIFY PHYSICIAN   PERIPHERAL IV CATHETER   IV ACCESS   ISTAT HCG QUANTITATIVE PREGNANCY POCT   GLUCOSE BY METER POCT   GLUCOSE BY METER POCT     XR Chest Port 1 View    Preliminary Result   IMPRESSION: No acute airspace infiltrate.        .   Treatments provided: See MAR  Family Comments: none  OBS brochure/video discussed/provided to patient:  N/A  ED Medications:   Medications   dextrose 5% and 0.45% NaCl infusion (has no administration in time range)   dextrose 50 % injection 25-50 mL (has no administration in time range)   potassium chloride 10 mEq in 100 mL intermittent infusion with 10 mg lidocaine (10 mEq Intravenous New Bag 6/4/20 1225)   insulin 1 unit/1 mL in NS (NovoLIN, HumuLIN Regular) drip -ED DKA algorithm ( Intravenous New Bag 6/4/20 1157)   ondansetron (ZOFRAN) injection 4 mg (4 mg Intravenous Given 6/4/20 1052)   lactated ringers BOLUS 1,000 mL (0 mLs Intravenous Stopped 6/4/20 1201)   lactated ringers BOLUS 250 mL (0 mLs Intravenous Stopped 6/4/20 1225)     Drips infusing:  Yes  For the majority of the shift, the patient's behavior Green. Interventions performed were none.    Sepsis treatment initiated: No       ED Nurse Name/Phone Number: Drea Jovel RN,   12:43 PM

## 2020-06-04 NOTE — ED TRIAGE NOTES
Patient awoke with nausea, vomiting, headache, chest pain, body aches, and shortness of breath.  She is diabetic and has in insulin pump, which is currently infusing.  BG at home was 422.

## 2020-06-04 NOTE — ED NOTES
[facility]  ED Nurse Handoff Report    Margo Cruz is a 19 year old female   ED Chief complaint: Nausea & Vomiting and Hyperglycemia  . ED Diagnosis:   Final diagnoses:   None     Allergies:   Allergies   Allergen Reactions     No Known Drug Allergies        Code Status: Full Code  Activity level - Baseline/Home:  Independent. Activity Level - Current:   Stand by Assist. Lift room needed: No. Bariatric: No   Needed: No   Isolation: No. Infection: Not Applicable.     Vital Signs:   Vitals:    06/04/20 1028 06/04/20 1044 06/04/20 1100 06/04/20 1145   BP: (!) 142/94  121/79 (!) 140/73   Pulse: 111  75 106   Resp: 20  16    Temp: 98.2  F (36.8  C)      SpO2: 98%  99% 99%   Weight: 52.2 kg (115 lb) 55 kg (121 lb 4.1 oz)         Cardiac Rhythm:  ,      Pain level: 0-10 Pain Scale: 9(chest pain, body aches)  Patient confused: No. Patient Falls Risk: Yes.   Elimination Status: Has voided   Patient Report - Initial Complaint: N/V headache, cp . Focused Assessment:    Tests Performed:   XR Chest Port 1 View    (Results Pending)     Labs Ordered and Resulted from Time of ED Arrival Up to the Time of Departure from the ED   CBC WITH PLATELETS DIFFERENTIAL - Abnormal; Notable for the following components:       Result Value    WBC 21.0 (*)     RBC Count 5.25 (*)     Hemoglobin 16.4 (*)     Hematocrit 49.7 (*)     Absolute Neutrophil 15.0 (*)     All other components within normal limits   COMPREHENSIVE METABOLIC PANEL - Abnormal; Notable for the following components:    Carbon Dioxide 18 (*)     Anion Gap 15 (*)     Glucose 517 (*)     Bilirubin Total 1.4 (*)     Protein Total 9.1 (*)     All other components within normal limits   LIPASE - Abnormal; Notable for the following components:    Lipase 62 (*)     All other components within normal limits   LACTIC ACID WHOLE BLOOD - Abnormal; Notable for the following components:    Lactic Acid 3.6 (*)     All other components within normal limits    BLOOD GAS VENOUS - Abnormal; Notable for the following components:    Ph Venous 7.24 (*)     Bicarbonate Venous 19 (*)     All other components within normal limits   ROUTINE UA WITH MICROSCOPIC - Abnormal; Notable for the following components:    Glucose Urine >1000 (*)     Ketones Urine >150 (*)     Blood Urine Small (*)     RBC Urine 3 (*)     Squamous Epithelial /HPF Urine 3 (*)     All other components within normal limits   KETONE BETA-HYDROXYBUTYRATE QUANTITATIVE - Abnormal; Notable for the following components:    Ketone Quantitative 3.8 (*)     All other components within normal limits   HEMOGLOBIN A1C - Abnormal; Notable for the following components:    Hemoglobin A1C 8.3 (*)     All other components within normal limits   GLUCOSE BY METER - Abnormal; Notable for the following components:    Glucose 487 (*)     All other components within normal limits   GLUCOSE BY METER - Abnormal; Notable for the following components:    Glucose 476 (*)     All other components within normal limits   MAGNESIUM   COVID-19 VIRUS (CORONAVIRUS) BY PCR   PHOSPHORUS   OSMOLALITY   GLUCOSE MONITOR NURSING POCT   CARDIAC CONTINUOUS MONITORING   ISTAT HCG QUANTITATIVE PREGNANCY NURSING POCT   NOTIFY PHYSICIAN   PERIPHERAL IV CATHETER   IV ACCESS   ISTAT HCG QUANTITATIVE PREGNANCY POCT   GLUCOSE BY METER POCT   GLUCOSE BY METER POCT     . Abnormal Results: see above.   Treatments provided: see mar  Family Comments: na  OBS brochure/video discussed/provided to patient:  N/A  ED Medications:   Medications   ondansetron (ZOFRAN) 2 MG/ML injection (has no administration in time range)   dextrose 5% and 0.45% NaCl infusion (has no administration in time range)   dextrose 50 % injection 25-50 mL (has no administration in time range)   potassium chloride 10 mEq in 100 mL intermittent infusion with 10 mg lidocaine (has no administration in time range)   insulin 1 unit/1 mL in NS (NovoLIN, HumuLIN Regular) drip -ED DKA algorithm (  Intravenous New Bag 6/4/20 1157)   lactated ringers BOLUS 250 mL (250 mLs Intravenous New Bag 6/4/20 1206)   ondansetron (ZOFRAN) injection 4 mg (4 mg Intravenous Given 6/4/20 1052)   lactated ringers BOLUS 1,000 mL (0 mLs Intravenous Stopped 6/4/20 1201)     Drips infusing:  Yes  For the majority of the shift, the patient's behavior Green. Interventions performed were see mar.    Sepsis treatment initiated:   Yes    Per the ED Provider, Time Zero for severe sepsis or septic shock is:  1121    3 Hour Severe Sepsis Bundle Completion:  1. Initial Lactic Acid Result:   Recent Labs   Lab Test 06/04/20  1057   LACT 3.6*     2. Blood Cultures before Antibiotics: No  3. Broad Spectrum Antibiotics Administered:     Anti-infectives (From now, onward)    None        4. 1000 ml of IV fluids have been given so far      6 Hour Severe Sepsis Bundle Completion:    1. Repeat Lactic Acid Level: Not drawn  2. Patient currently on Vasopressors =  No       ED Nurse Name/Phone Number: Kayla Mercedes RN,   12:09 PM

## 2020-06-04 NOTE — TELEPHONE ENCOUNTER
The pt is aware and scheduled for her Endo appointment but declined scheduling with a diab ed at this time.   Sosa Flores on 6/4/2020 at 3:53 PM

## 2020-06-04 NOTE — PLAN OF CARE
ICU End of Shift Summary.  For vital signs and complete assessments, please see documentation flowsheets.     Pertinent assessments: A&Ox4, very pleasant. VSS. Tele SR/ST. Afebrile. Denies pain, nausea, SOB. SBA to bathroom r/t lines only, otherwise independent. Cont. Bowel, bladder. Drinking well. Insulin gtt A1. POC reviewed with pt.   Major Shift Events: new admit.   Plan (Upcoming Events): continue to monitor closely   Discharge/Transfer Needs: tbd    Bedside Shift Report Completed : yes  Bedside Safety Check Completed: yes

## 2020-06-04 NOTE — H&P
History and Physical     Margo Cruz MRN# [id number]   YOB: 2000 Age: 19 year old      Date of Admission:  6/4/2020    Primary care provider: Luanne Todd          Assessment and Plan:   Margo Cruz is a 19 year old female with a PMH significant for DM I, celiac disease and marijuana use who presents with better blood sugar, nausea, vomiting and abdominal pain.    Patient was discussed with Dr. Glasgow, who was provider in ED. Chart review of ED work up was reviewed as well as chart review of Care Everywhere, previous visits and admissions.     #DKA  #Type 1 diabetes  Patient presents with feeling generally unwell for the last 3 days with nausea, vomiting and abdominal cramping that started today.  She recently started a new insulin in her pump that she cannot name for me.  She has noted her blood sugars have been in the 300-400 range and she has been urinating more frequently.  She is already feeling better after receiving IV fluids.  On admission temp is 98.2, heart rate 111 and pressure 142/94 breathing comfortably on room air.  Lab work is remarkable for a negative pregnancy test, lactic acid 3.6, glucose 517, quantitative ketones 3.8, A1c 8.3, elevated anion gap at 15 and VBG showing a pH of 7.24 with bicarbonate of 19.   She was given 1,250 ml of fluids and started on an insulin drip with improvement of symptoms.   -[facility] admit  -LR bolus  -Continue LR at 150 mL/h  -Monitor on tele  -Insulin drip ordered  -Replace electrolytes  -Zofran ordered for nausea    #COVID rule out  Due to complaints of shortness of breath COVID test was ordered but I have low suspicion that she has this.  -May be removed off isolation if this is negative    #Celiac disease  -We will order gluten-free diet    #Marijuana use  She reports regular use of marijuana without history of cyclic vomiting    #Likely right ankle sprain  She describes falling off her skateboard a couple of days ago injuring her right ankle.   It is swollen but she is still able to ambulate on it.  -Elevate and ice frequently  -Tylenol and ibuprofen as needed      Social: No concerns  Code: Discussed with patient and they have chosen full code  VTE prophylaxis: PCDs and ambulation  Disposition: [facility] admit                    Chief Complaint:   Abdominal pain, nausea and vomiting         History of Present Illness:   Margo Cruz is a 19 year old female who presents with nausea, vomiting and abdominal pain.  She states she has been feeling just generally unwell for the past 3 days.  She started vomiting this morning though.  She describes cramps in both her stomach and back with some small amount of diarrhea output.  She denies fever but has felt achy.  She denies shortness of breath and has a mild cough that she relates to smoking marijuana.  She has noticed increased urination with her elevated blood sugars.  She started a new insulin about a week ago.  She does not smoke cigarettes and does not drink alcohol daily.  She did fall off her skateboard recently injuring her right ankle which is swollen but she is still able to ambulate on it.             Past Medical History:     Past Medical History:   Diagnosis Date     Diabetes mellitus      Seborrheic infantile dermatitis                Past Surgical History:   No past surgical history on file.            Social History:     Social History     Socioeconomic History     Marital status: Single     Spouse name: Not on file     Number of children: Not on file     Years of education: Not on file     Highest education level: Not on file   Occupational History     Not on file   Social Needs     Financial resource strain: Not on file     Food insecurity     Worry: Not on file     Inability: Not on file     Transportation needs     Medical: Not on file     Non-medical: Not on file   Tobacco Use     Smoking status: Never Smoker     Smokeless tobacco: Never Used     Tobacco comment: mom smokes outside   Substance  and Sexual Activity     Alcohol use: No     Drug use: No     Sexual activity: Never   Lifestyle     Physical activity     Days per week: Not on file     Minutes per session: Not on file     Stress: Not on file   Relationships     Social connections     Talks on phone: Not on file     Gets together: Not on file     Attends Holiness service: Not on file     Active member of club or organization: Not on file     Attends meetings of clubs or organizations: Not on file     Relationship status: Not on file     Intimate partner violence     Fear of current or ex partner: Not on file     Emotionally abused: Not on file     Physically abused: Not on file     Forced sexual activity: Not on file   Other Topics Concern     Not on file   Social History Narrative     Not on file               Family History:     Family History   Problem Relation Age of Onset     Diabetes Father               Allergies:      Allergies   Allergen Reactions     No Known Drug Allergies                Medications:     Prior to Admission medications    Medication Sig Last Dose Taking? Auth Provider   blood glucose (CONTOUR NEXT TEST) test strip Use to test blood sugar 4-5 times daily or as directed.   Naomi Reynolds APRN CNP   blood glucose monitoring (TYE MICROLET) lancets Use to test blood sugar 4-5 times daily or as directed.   Naomi Reynolds APRN CNP   insulin aspart (NOVOLOG VIAL) 100 UNITS/ML vial Use up to 80 units daily in insulin pump. 90 day supply.   Naomi Reynolds APRN CNP              Review of Systems:   A Comprehensive greater than 10 system review of systems was carried out.  Pertinent positives and negatives are noted above.  Otherwise negative for contributory information.            Physical Exam:   Blood pressure 122/55, pulse 59, temperature 98.2  F (36.8  C), resp. rate 16, weight 55 kg (121 lb 4.1 oz), SpO2 100 %, not currently breastfeeding.    Due to the COVID pandemic physical exam was not  performed by me as she has a pending COVID swab.  Physical exam was performed by Dr. Glasgow in the emergency room on 6/4/2020.    I spoke with her over the iPad and she was alert and oriented and moving all limbs comfortably.  She was not complaining of any pain and breathing comfortably on room air.              Data:     Recent Labs   Lab 06/04/20  1058   WBC 21.0*   HGB 16.4*   HCT 49.7*   MCV 95        Recent Labs   Lab 06/04/20  1058      POTASSIUM 4.1   CHLORIDE 100   CO2 18*   ANIONGAP 15*   *   BUN 23   CR 0.81   GFRESTIMATED >90   GFRESTBLACK >90   BEN 9.9   MAG 2.0   PHOS 3.8   PROTTOTAL 9.1*   ALBUMIN 4.8   BILITOTAL 1.4*   ALKPHOS 99   AST 17   ALT 24     Recent Labs   Lab 06/04/20  1224 06/04/20  1057   LACT 4.4* 3.6*         Recent Results (from the past 24 hour(s))   XR Chest Port 1 View    Narrative    CHEST ONE VIEW PORTABLE   6/4/2020 12:22 PM     HISTORY: Diabetic ketoacidosis, cough.    COMPARISON: None available.      Impression    IMPRESSION: No acute airspace infiltrate.         Drea Rudolph PA-C    This patient was seen and discussed with Dr. Michael who agrees with the current plans as outlined above.

## 2020-06-04 NOTE — PHARMACY-ADMISSION MEDICATION HISTORY
Admission medication history interview status for this patient is complete. See [facility] admission navigator for allergy information, prior to admission medications and immunization status.     Medication history interview done via telephone during Covid-19 pandemic, indicate source(s): Patient  Medication history resources (including written lists, pill bottles, clinic record):epic list    Changes made to PTA medication list:  Added: Insulin pump and settings, Vitamin D  Deleted: -  Changed: -    Actions taken by pharmacist (provider contacted, etc):None     Additional medication history information: Pt indicated that she was switched to generic novolog insulin about 1 week ago. Her last episode for hospitalization for DKA was at diagnosis. Pt believes that her pump site got dislodged during sleep. Pt will have her friend bring in pump supplies later today.    Medication reconciliation/reorder completed by provider prior to medication history?  N   (Y/N)     For patients on insulin therapy: Y  (Y/N)  Do you have a baseline novolog pre-meal dose:   See pump settings  Do you eat three meals a day:  Yes and snacks  How many times do you check your blood glucose per day:  3-5 times per day  How many episodes of hypoglycemia do you have per week: 1-2  Do you have a Continuous glucose monitor (CGM) : N (remind pt that not approved for hospital use)  Any specific barriers to therapy? N  (cost, comfortable with injections, confident with current diabetes regimen?)      Prior to Admission medications    Medication Sig Last Dose Taking? Auth Provider   Insulin Aspart (INSULIN PUMP - OUTPATIENT) Inject Subcutaneous See Admin Instructions Type of pump: Medtronic  Type of insulin: Novolog (changed to generic about 1 week ago)  Basal rate(s)  0081-1395: 1 unit/hr  7909-1124: 1.1 units/hr  9094-6076: 0.55 units/hr  4573-4225: 1.1 units/hr  ISF: 35 (all day)  ICF (insulin to carb ratio)  9586-4374: 1unit/4.6g carbs  8408-0502: 1  unit/10g carbs  8376-2073: 1 unit/8g carbs  Active insulin time: 3 hrs  Target goal range:   Followed by endocrinology 6/4/2020 at Unknown time Yes Unknown, Entered By History   Vitamin D, Cholecalciferol, 25 MCG (1000 UT) TABS Take 1,000 Units by mouth daily Past Week at Unknown time Yes Unknown, Entered By History   blood glucose (CONTOUR NEXT TEST) test strip Use to test blood sugar 4-5 times daily or as directed.   Naomi Reynolds APRN CNP   blood glucose monitoring (TYE MICROLET) lancets Use to test blood sugar 4-5 times daily or as directed.   Naomi Reynolds APRN CNP   insulin aspart (NOVOLOG VIAL) 100 UNITS/ML vial Use up to 80 units daily in insulin pump. 90 day supply.   Naomi Reynolds APRN CNP

## 2020-06-05 ENCOUNTER — DOCUMENTATION ONLY (OUTPATIENT)
Dept: OTHER | Facility: CLINIC | Age: 20
End: 2020-06-05

## 2020-06-05 VITALS
WEIGHT: 121.25 LBS | RESPIRATION RATE: 18 BRPM | BODY MASS INDEX: 21.48 KG/M2 | DIASTOLIC BLOOD PRESSURE: 66 MMHG | SYSTOLIC BLOOD PRESSURE: 114 MMHG | OXYGEN SATURATION: 98 % | TEMPERATURE: 97.7 F | HEART RATE: 64 BPM | HEIGHT: 63 IN

## 2020-06-05 LAB
ALBUMIN SERPL-MCNC: 3.3 G/DL (ref 3.4–5)
ALP SERPL-CCNC: 69 U/L (ref 40–150)
ALT SERPL W P-5'-P-CCNC: 16 U/L (ref 0–50)
ANION GAP SERPL CALCULATED.3IONS-SCNC: 9 MMOL/L (ref 3–14)
AST SERPL W P-5'-P-CCNC: 14 U/L (ref 0–35)
BASOPHILS # BLD AUTO: 0.1 10E9/L (ref 0–0.2)
BASOPHILS NFR BLD AUTO: 0.8 %
BILIRUB SERPL-MCNC: 0.8 MG/DL (ref 0.2–1.3)
BUN SERPL-MCNC: 11 MG/DL (ref 7–30)
CALCIUM SERPL-MCNC: 8.1 MG/DL (ref 8.5–10.1)
CHLORIDE SERPL-SCNC: 110 MMOL/L (ref 96–110)
CO2 SERPL-SCNC: 19 MMOL/L (ref 20–32)
CREAT SERPL-MCNC: 0.64 MG/DL (ref 0.5–1)
DIFFERENTIAL METHOD BLD: ABNORMAL
EOSINOPHIL # BLD AUTO: 0.5 10E9/L (ref 0–0.7)
EOSINOPHIL NFR BLD AUTO: 3.7 %
ERYTHROCYTE [DISTWIDTH] IN BLOOD BY AUTOMATED COUNT: 12.2 % (ref 10–15)
GFR SERPL CREATININE-BSD FRML MDRD: >90 ML/MIN/{1.73_M2}
GLUCOSE BLDC GLUCOMTR-MCNC: 108 MG/DL (ref 70–99)
GLUCOSE BLDC GLUCOMTR-MCNC: 115 MG/DL (ref 70–99)
GLUCOSE BLDC GLUCOMTR-MCNC: 129 MG/DL (ref 70–99)
GLUCOSE BLDC GLUCOMTR-MCNC: 131 MG/DL (ref 70–99)
GLUCOSE BLDC GLUCOMTR-MCNC: 137 MG/DL (ref 70–99)
GLUCOSE BLDC GLUCOMTR-MCNC: 142 MG/DL (ref 70–99)
GLUCOSE BLDC GLUCOMTR-MCNC: 146 MG/DL (ref 70–99)
GLUCOSE BLDC GLUCOMTR-MCNC: 169 MG/DL (ref 70–99)
GLUCOSE BLDC GLUCOMTR-MCNC: 197 MG/DL (ref 70–99)
GLUCOSE BLDC GLUCOMTR-MCNC: 217 MG/DL (ref 70–99)
GLUCOSE BLDC GLUCOMTR-MCNC: 250 MG/DL (ref 70–99)
GLUCOSE BLDC GLUCOMTR-MCNC: 267 MG/DL (ref 70–99)
GLUCOSE BLDC GLUCOMTR-MCNC: 61 MG/DL (ref 70–99)
GLUCOSE BLDC GLUCOMTR-MCNC: 76 MG/DL (ref 70–99)
GLUCOSE SERPL-MCNC: 136 MG/DL (ref 70–99)
HCT VFR BLD AUTO: 40.3 % (ref 35–47)
HGB BLD-MCNC: 13.2 G/DL (ref 11.7–15.7)
IMM GRANULOCYTES # BLD: 0 10E9/L (ref 0–0.4)
IMM GRANULOCYTES NFR BLD: 0.2 %
LYMPHOCYTES # BLD AUTO: 4 10E9/L (ref 0.8–5.3)
LYMPHOCYTES NFR BLD AUTO: 33 %
MCH RBC QN AUTO: 30.8 PG (ref 26.5–33)
MCHC RBC AUTO-ENTMCNC: 32.8 G/DL (ref 31.5–36.5)
MCV RBC AUTO: 94 FL (ref 78–100)
MONOCYTES # BLD AUTO: 0.7 10E9/L (ref 0–1.3)
MONOCYTES NFR BLD AUTO: 5.7 %
NEUTROPHILS # BLD AUTO: 6.9 10E9/L (ref 1.6–8.3)
NEUTROPHILS NFR BLD AUTO: 56.6 %
NRBC # BLD AUTO: 0 10*3/UL
NRBC BLD AUTO-RTO: 0 /100
PLATELET # BLD AUTO: 281 10E9/L (ref 150–450)
POTASSIUM SERPL-SCNC: 3.9 MMOL/L (ref 3.4–5.3)
PROT SERPL-MCNC: 6.5 G/DL (ref 6.8–8.8)
RBC # BLD AUTO: 4.28 10E12/L (ref 3.8–5.2)
SODIUM SERPL-SCNC: 138 MMOL/L (ref 133–144)
WBC # BLD AUTO: 12.2 10E9/L (ref 4–11)

## 2020-06-05 PROCEDURE — 00000146 ZZHCL STATISTIC GLUCOSE BY METER IP

## 2020-06-05 PROCEDURE — 25000128 H RX IP 250 OP 636: Performed by: PHYSICIAN ASSISTANT

## 2020-06-05 PROCEDURE — 36415 COLL VENOUS BLD VENIPUNCTURE: CPT | Performed by: PHYSICIAN ASSISTANT

## 2020-06-05 PROCEDURE — 25800025 ZZH RX 258: Performed by: PHYSICIAN ASSISTANT

## 2020-06-05 PROCEDURE — 85025 COMPLETE CBC W/AUTO DIFF WBC: CPT | Performed by: PHYSICIAN ASSISTANT

## 2020-06-05 PROCEDURE — 99238 HOSP IP/OBS DSCHRG MGMT 30/<: CPT | Performed by: INTERNAL MEDICINE

## 2020-06-05 PROCEDURE — 80053 COMPREHEN METABOLIC PANEL: CPT | Performed by: PHYSICIAN ASSISTANT

## 2020-06-05 RX ORDER — NICOTINE POLACRILEX 4 MG
15-30 LOZENGE BUCCAL
Status: DISCONTINUED | OUTPATIENT
Start: 2020-06-05 | End: 2020-06-05 | Stop reason: HOSPADM

## 2020-06-05 RX ORDER — DEXTROSE MONOHYDRATE 25 G/50ML
25-50 INJECTION, SOLUTION INTRAVENOUS
Status: DISCONTINUED | OUTPATIENT
Start: 2020-06-05 | End: 2020-06-05 | Stop reason: HOSPADM

## 2020-06-05 RX ADMIN — DEXTROSE MONOHYDRATE 25 ML: 25 INJECTION, SOLUTION INTRAVENOUS at 02:15

## 2020-06-05 RX ADMIN — ONDANSETRON 4 MG: 2 INJECTION INTRAMUSCULAR; INTRAVENOUS at 06:18

## 2020-06-05 ASSESSMENT — ACTIVITIES OF DAILY LIVING (ADL)
ADLS_ACUITY_SCORE: 10

## 2020-06-05 NOTE — PROGRESS NOTES
CM aware of patient's admission for DKA. She has been using a new insulin pump. Insulin dripp stopped and patient's pump restarted. She is unfamiliar with new pump. BG now controlled. Patient has discharge orders in place. No additional information needed.     CM will continue to follow patient until discharge for any additional needs.     Leida Galeas RN, BSN, CPHN, CM  Inpatient Care Coordination  [facility]  [phone]

## 2020-06-05 NOTE — PLAN OF CARE
ICU End of Shift Summary.  For vital signs and complete assessments, please see documentation flowsheets.     Pertinent assessments:patient alert & oriented x 4, stable on feet, up in  Room, no complaints, on insulin drip, vss, cms intact abdullahi  Major Shift Events: insulin pump started with basal rate at 0845, insulin drip turned off at 09, had breakfast patient covered carb with pump  Plan (Upcoming Events): if accucheck stay in range, patient can be discharged to home  Discharge/Transfer Needs: tbd    Bedside Shift Report Completed : yes  Bedside Safety Check Completed: yes

## 2020-06-05 NOTE — PROVIDER NOTIFICATION
Covid results negative. Okay to discontinue isolation precautions per Dr. Hanna. Bedside RN updated.
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
## 2020-06-05 NOTE — PLAN OF CARE
ICU End of Shift Summary.  For vital signs and complete assessments, please see documentation flowsheets.     Pertinent assessments: Insulin gtt titrated all night; BS as low as 61  Major Shift Events: BS as low as 61, D50 given, continued to monitor and titrate gtt per protocol; Zofran x1 this AM for nausea; resting comfortably now  Plan (Upcoming Events): Continue to monitor  Discharge/Transfer Needs: discharge to to home    Bedside Shift Report Completed : yes  Bedside Safety Check Completed: yes

## 2020-06-05 NOTE — DISCHARGE SUMMARY
Admit Date:     06/04/2020   Discharge Date:     06/05/2020      FINAL DIAGNOSIS:  Mild diabetic ketoacidosis.      PAST MEDICAL HISTORY:   1.  Type 1 diabetes mellitus, normally well controlled.  The patient uses insulin pump.  She was diagnosed at 9 years of age and has had no recent admissions for diabetic ketoacidosis.   2.  History of celiac disease.      PRINCIPAL PROCEDURES THIS ADMISSION:   1.  Chest x-ray showing no acute findings.   2.  Insulin drip.   3.  IV fluid hydration:     4.  COVID-19 testing that was negative.      REASON FOR ADMISSION:  Please see dictated history.  In brief, Ms. Clemens is a 19-year-old female who presented to the hospital with elevated blood sugar, nausea, vomiting.  Please see dictated history and physical for details.      On presentation, the patient had hyperglycemia with blood sugars near the 500 range.  She was also acidotic with bicarbonate mildly low at 18.  The patient was admitted to the Hospitalist Service for mild DKA.      HOSPITAL COURSE:  Mild diabetic ketoacidosis:  The patient was treated with insulin drip and IV fluids.  DKA resolved.  It is thought that the likely problem with her injection sites from her insulin pump was kinked causing decreased insulin delivery.  After DKA resolved with insulin drip, insulin pump was resumed with good control of blood sugars prior to discharge from the hospital.  The patient was discharged home today after resumption of her insulin pump.  There were no dose changes made this admission.      DISCHARGE MEDICATIONS:   1.  Insulin pump.   2.  Vitamin D.      FOLLOWUP INSTRUCTIONS:  No specific followup is required after hospitalization unless blood sugars remain uncontrolled.  If that occurs, contact your endocrinologist.      I examined the patient on day of discharge.         ONIEL COY MD             D: 06/05/2020   T: 06/05/2020   MT: JOVI      Name:     BANDAR WELLINGTON   MRN:      [id number]        Account:         [id number]   :      2000           Admit Date:     2020                                  Discharge Date: 2020      Document: [id number]       cc: Luanne Todd MD

## 2020-06-05 NOTE — PLAN OF CARE
Pt ready for discharge. PIV removed. Discharge instructions reviewed. Pt has no questions. Last . Pt's mother will pick pt up.

## 2020-06-05 NOTE — PROGRESS NOTES
Pt seen and examined.  Feels well without complaints.  Insulin gtt stopped this AM and pump resumed at 0900.  Assuming adequate BS control with pump can discharge at noon today.  D/w pharmacy and bedside nurse along with patient.      Anticipate home today

## 2020-06-08 ENCOUNTER — TELEPHONE (OUTPATIENT)
Dept: PEDIATRICS | Facility: CLINIC | Age: 20
End: 2020-06-08

## 2020-06-08 NOTE — TELEPHONE ENCOUNTER
ED / Discharge Outreach Protocol    Patient Contact    Attempt # 1    Was call answered?  No.  Left message on voicemail with information to return call to clinic.    When pt calls back:    1. Has ENDO appt scheduled-6/22.   2. Schedule diab ed in meantime?  3. Check on BG readings since discharge  4. Pt has no-showed appts/not responded to [facility]- update MyChart info? Using mobile arik?  5. Schedule virtual follow up visit - who is PCP? SAE Vazquez or Dr. Todd?

## 2020-06-08 NOTE — TELEPHONE ENCOUNTER
Please contact patient for In-patient follow up.  [phone] (home)     Visit date: 060520  Diagnosis listed: Diabetic Ketoacidosis Without Coma Associated With Type 1 Diabetes Mellitus (H)  Number of visits in past 12 months: 1 ED / 1 IP

## 2020-06-08 NOTE — TELEPHONE ENCOUNTER
Spoke to pt. She does not want to make an appt with us or diabetic ed at this time. She will see Endo on 6/22. Did not have time to answer any other questions. Will call if she needs anything from us. Payton Jones RN on 6/8/2020 at 10:45 AM

## 2020-06-10 LAB
BACTERIA SPEC CULT: NO GROWTH
SPECIMEN SOURCE: NORMAL

## 2020-08-11 ENCOUNTER — VIRTUAL VISIT (OUTPATIENT)
Dept: ENDOCRINOLOGY | Facility: CLINIC | Age: 20
End: 2020-08-11
Payer: COMMERCIAL

## 2020-08-11 DIAGNOSIS — E10.9 TYPE 1 DIABETES, HBA1C GOAL < 8% (H): Primary | ICD-10-CM

## 2020-08-11 PROCEDURE — 99204 OFFICE O/P NEW MOD 45 MIN: CPT | Mod: 95 | Performed by: INTERNAL MEDICINE

## 2020-08-11 NOTE — PATIENT INSTRUCTIONS
[facility] & [place] locations   Dr Holly, Endocrinology Department      [facility]   [address]  [place] [address]  Appointment [phone]  Fax: [phone]  [place]: Monday and Tuesday         [facility]   [address]  [place] [address]  Appointment [phone]  Fax: [phone]  [place]: Wednesday and Thursday            Please check the cost coverage and copay with insurance before recommended tests, services and medications (especially if new medications are prescribed).     If ordered, please get blood work done 1 week prior to your next appointment so they will be available to Dr. Holly at your visit.    To provide the best diabetic care, please bring your blood glucose meter to each and every visit with your  Endocrinologist. Your blood glucose meter/insulin pump will be downloaded at every appointment.  Please arrive 15 minutes before your scheduled appointment. This will allow for your blood glucose meter/insulin pump  to be downloaded.  If you are wearing DEXCOM please bring  or sharing code from the Dexcom Clarity Appt so that it can be downloaded.  If you are using freestyle bob personal sensors please bring the reader.  If you are using TANDEM insulin pump please have your username and password to get info from Tandem website.      Check with [employer] about 630 G or 670 G pump.  Other option is Tandem insulin pump.  Check cost of Tandem insulin pump (that will go with Dexcom).  Your provider has referred you to Diabetes Education: For all [facility]:  Phone [phone]; Fax [phone]  Please call and make the appointment: for BG review, insulin titration and insulin pump information.  Labs and follow up in 3 months.    Recommend checking blood sugars before meals and at bedtime.    If Blood glucose are low more often-> 2-3 times/week- give us a  call.  The patient is advised to Make better food choices: reduce carbs, Reduce portion size, weight loss and exercise 3-4 times a week.  Discussed hypoglycemia signs and symptoms as well as management in detail.

## 2020-08-11 NOTE — LETTER
"    8/11/2020         RE: Margo Cruz  [address] [place] [address]        Dear Colleague,    Thank you for referring your patient, Margo Cruz, to the [facility]. Please see a copy of my visit note below.    THIS IS A VIDEO VISIT:    Phone call visit/virtual visit encounter:    Name of patient: Margo Cruz    Date of encounter: 8/11/2020    Time of start of video visit: 11:37    Video started: 11:45 ( was driving and asked me call her again in 5 min)    Video ended: 12:18    Time visit video ended: 12:37    Provider location: working from home/ [facility]    Patient location: patients home.    Mode of transmission: video/ Doximity    Verbal consent: obtained before starting visit. Pt is agreeable.      The patient has been notified of following:      \"This VIDEO visit will be conducted via a call between you and your physician/provider. We have found that certain health care needs can be provided without the need for a physical exam.  This service lets us provide the care you need with a short phone conversation.  If a prescription is necessary we can send it directly to your pharmacy.  If lab work is needed we can place an order for that and you can then stop by our lab to have the test done at a later time.     With new updates with corona virus patient might be billed as clinic visit.     If during the course of the call the physician/provider feels a telephone visit is not appropriate, you will not be charged for this service.\"      Past medical history, social history, family history, allergy and medications were reviewed and updated as appropriate.  Reviewed pertinent labs, notes, imaging studies personally.  In addition to clinic visit > 32 min were spent reviewing records from previous clinic, recenet hospitalization.    Endocrinology Clinic Note:  Name: Margo Cruz  Seen in consultation with Naomi QUEVEDO CNP for Diabetes.  HPI:  Margo Cruz is a 19 year " old female who presents for the evaluation/management of type 1 diabetes.   has a past medical history of Diabetes mellitus and Seborrheic infantile dermatitis.    Noted 6/2020 hospitalization for DKA-   It is thought that the likely problem with her injection sites from her insulin pump was kinked causing decreased insulin delivery.   Was followed by endocrinology at [facility] earlier.  Available records, labs and images from outside clinic were personally reviewed.    Is on insulin pump X 7 years.  Is interested in Dexcom and newer insulin pump.  Has more questions about it.  Is closely followed by BRAD Segovia.    Insulin Pump Information  Insulin Pump Type: Medtronic 523/723- Paradigm Reveal  Infusion Set: Medtronic  Medtronic Infusion Set: Silhouette  CGM: No  Insulin: using Insulin aspart in pump    Currently she is using Relion meter.  Is not using contour meter but needs test strips.     1. Type 1 DM:  Orginally diagnosed: 2011 (at age 9 years)  Current Regimen:   yes:     Diabetes Medication(s)     Insulin       Insulin Aspart (INSULIN PUMP - OUTPATIENT)    Inject Subcutaneous See Admin Instructions Type of pump: Medtronic  Type of insulin: Novolog (changed to generic about 1 week ago)  Basal rate(s)  6472-1809: 1 unit/hr  9568-8682: 1.1 units/hr  3532-9668: 0.55 units/hr  1538-4033: 1.1 units/hr  ISF: 35 (all day)  ICF (insulin to carb ratio)  3417-5004: 1unit/4.6g carbs  6881-6546: 1 unit/10g carbs  9920-3069: 1 unit/8g carbs  Active insulin time: 3 hrs  Target goal range:   Followed by endocrinology     insulin aspart (NOVOLOG VIAL) 100 UNITS/ML vial    Use up to 80 units daily in insulin pump. 90 day supply.          BS checks: 3-4 times  Average Meter Download: Blood glucose data reviewed personally. See nursing note from this encounter for details.  Few episodes of low Bg in the setting of overcorrection.  Exercise:  Last A1c: 8.4%  Symptoms of hypoglycemia (low blood sugar):    Using PUMP:  Current Regimen:   Insulin pump -   Time Rate (U/hr)   0000-  0.950   0600  1.0   1200  0.550   2200  1.00     Carbohydrate Ratio -    Time Ratio   0000-  4.6   1000  1500 10.0  8.0     Sensitivity   35   Active Insulin Time   hours   Basal   19 Units (38%)   Bolus   30 Units (62%)   Total Carbohydrates/day  190+60   Total Insulin/day   49+7   Average Blood Sugar  222+ 103                   DM Complications:   Complications:   Diabetes Complications  Description / Detail    Diabetic Retinopathy  No   CAD / PAD  No   Neuropathy  No   Nephropathy / Microalbuminuria  No  Lab Results   Component Value Date    UMALCR 17.29 01/23/2020         Gastroparesis  No   Hypoglycemia [place]  No          2. Hypertension:  Not on medication.  3. Hyperlipidemia: Not on medication.    PMH/PSH:  Past Medical History:   Diagnosis Date     Diabetes mellitus      Seborrheic infantile dermatitis      History reviewed. No pertinent surgical history.  Family Hx:  Family History   Problem Relation Age of Onset     Diabetes Father            DM1: Father.           Social Hx:  Social History     Socioeconomic History     Marital status: Single     Spouse name: Not on file     Number of children: Not on file     Years of education: Not on file     Highest education level: Not on file   Occupational History     Not on file   Social Needs     Financial resource strain: Not on file     Food insecurity     Worry: Not on file     Inability: Not on file     Transportation needs     Medical: Not on file     Non-medical: Not on file   Tobacco Use     Smoking status: Never Smoker     Smokeless tobacco: Never Used     Tobacco comment: mom smokes outside   Substance and Sexual Activity     Alcohol use: No     Drug use: No     Sexual activity: Never   Lifestyle     Physical activity     Days per week: Not on file     Minutes per session: Not on file     Stress: Not on file   Relationships     Social connections     Talks on phone: Not on  file     Gets together: Not on file     Attends Judaism service: Not on file     Active member of club or organization: Not on file     Attends meetings of clubs or organizations: Not on file     Relationship status: Not on file     Intimate partner violence     Fear of current or ex partner: Not on file     Emotionally abused: Not on file     Physically abused: Not on file     Forced sexual activity: Not on file   Other Topics Concern     Not on file   Social History Narrative     Not on file          MEDICATIONS:  has a current medication list which includes the following prescription(s): blood glucose, blood glucose monitoring, insulin aspart, insulin aspart, and vitamin d (cholecalciferol).    ROS     ROS: 10 point ROS neg other than the symptoms noted above in the HPI.    Physical Exam   VS: LMP 08/10/2020   Breastfeeding No   GENERAL: healthy, alert and no distress  EYES: Eyes grossly normal to inspection, conjunctivae and sclerae normal  RESP: no audible wheeze, cough, or visible cyanosis.  No visible retractions or increased work of breathing.  Able to speak fully in complete sentences.  NEURO: Cranial nerves grossly intact, mentation intact and speech normal  PSYCH: mentation appears normal, affect normal/bright, judgement and insight intact, normal speech and appearance well-groomed      LABS:  A1c:  Lab Results   Component Value Date    A1C 8.3 06/04/2020    A1C 7.5 01/23/2020    A1C 7.3 04/23/2015    A1C 7.9 06/18/2014    A1C 7.4 10/11/2013         BMP:   Creatinine   Date Value Ref Range Status   06/05/2020 0.64 0.50 - 1.00 mg/dL Final       Urine Micro:  Lab Results   Component Value Date    MICROL 66 01/23/2020     No results found for: MICROALBUMIN      LFTs/Lipids:  Recent Labs   Lab Test 01/23/20  1538 04/23/15   CHOL 180* 171   HDL 47 42   * 118   TRIG 79 114       Liver Function Studies -   Recent Labs   Lab Test 06/05/20  0537   PROTTOTAL 6.5*   ALBUMIN 3.3*   BILITOTAL 0.8   ALKPHOS  69   AST 14   ALT 16         TFTs:  Lab Results   Component Value Date    TSH 0.79 01/23/2020           Blood Glucose and pump data/ Meter reviewed.     All pertinent notes, labs, and images personally reviewed by me.       Glucometer/ insulin pump (if applicable)/ CGM data (if applicable) downloaded, Personally reviewed and interpreted.  All Blood sugar data reviewed personally and interpreted as well as discussed with pt.  See nursing note from 8/11/2020 for details of BG/CGM log.  Where applicable   All past medical, social and Family history reviewed and updated in Epic.    A/P  Ms.Alyssa JENNI Cruz is a 19 year old here for the evaluation/management of diabetes:    1. DM1 - Under Poor control.  A1c 8.4%.  No known complications from Diabetes.  Using 2 different meters.  Few episodes of low BG likely in the setting of over correction.  Basal < bolus.  Plan:  Discussed diagnosis, pathophysiology, management and treatment options of condition with pt.  I also discussed importance of strict blood sugar control to prevent complications associated with uncontrolled diabetes.  She is not comfortable in increasing  Basal rate at night ( reports h/o nocturanl hypoglycemia)  She is also not comfortable to increase basal rate > 0.1 at a given time.  She needs close follow up and careful titration.  Recommend CDE follow up in 2 weeks for BG review and different insulin pump infomration.  Labs and follow up in 3 months  Encouraged to use Contour meter.  Check with [employer] if she is eligible for pump upgrade though she is more interested in Tandem + dexcom use.    Time Rate (U/hr)   0000-  0.950   0600  1.0-->1.1   1200  0.550--> 0.6   2200  1.00       2. Hypertension -not on medication       3. Hyperlipidemia - Not on medication.  FLP in acceptable range.   4. Prevention  Ophthalmology- recommend annually.  ASA- NA 2/2 to age.  Smoking- No    Most Recent Immunizations   Administered Date(s) Administered     Comvax  (HIB/HepB) 01/06/2003     DTAP (<7y) 02/09/2006     DTaP, Unspecified 08/30/2013     HPV9 01/23/2020     Influenza (IIV3) PF 12/04/2006     Influenza Vaccine IM > 6 months Valent IIV4 01/23/2020     MMR 02/09/2006     Meningococcal (Menactra ) 08/30/2013     Meningococcal (Menveo ) 08/30/2013     Pneumococcal (PCV 7) 01/06/2003     Pneumococcal 23 valent 01/23/2020     Poliovirus, inactivated (IPV) 02/09/2006     TDAP Vaccine (Adacel) 08/30/2013     Varicella 08/30/2013         Recommend checking blood sugars before meals and at bedtime.    If Blood glucose are low more often-> 2-3 times/week- give us a call.  The patient is advised to Make better food choices: reduce carbs, Reduce portion size, weight loss and exercise 3-4 times a week.  Discussed hypoglycemia signs and symptoms as well as management in detail.      There is some variability among people, most will usually develop symptoms suggestive of hypoglycemia when blood glucose levels are lowered to the mid 60's. The first set of symptoms are called adrenergic. Patients may experience any of the following nervousness, sweating, intense hunger, trembling, weakness, palpitations, and difficulty speaking. When BS fall below 50 the patient is unable to talk and take oral therapy.  Would recommend Glucagon emergency kit for the patient and education for family and friends around the patient.   The acute management of hypoglycemia involves the rapid delivery of a source of easily absorbed sugar. Regular soda, juice, lifesavers, table sugar, are good options. 15 grams of glucose is the dose that is given, followed by an assessment of symptoms and a blood glucose check if possible. If after 10 minutes there is no improvement, another 10-15 grams should be given. This can be repeated up to three times. The equivalency of 10-15 grams of glucose (approximate servings) are: Four lifesavers, 4 teaspoons of sugar, or 1/2 cup or 4 oz of juice or regular  pop.    Follow-up:  3 months.    Karlee Holly M.D  Endocrinology  [facility]/[place] [place]  CC: Luanne Todd      All questions were answered.  The patient indicates understanding of the above issues and agrees with the plan set forth.     Disclaimer: This note consists of symbols derived from keyboarding, dictation and/or voice recognition software. As a result, there may be errors in the script that have gone undetected. Please consider this when interpreting information found in this chart.    Addendum to above note and clinic visit:    Labs reviewed.    See result note/telephone encounter.              Again, thank you for allowing me to participate in the care of your patient.        Sincerely,        Karlee Holly MD

## 2020-08-11 NOTE — PROGRESS NOTES
"THIS IS A VIDEO VISIT:    Phone call visit/virtual visit encounter:    Name of patient: Margo Cruz    Date of encounter: 8/11/2020    Time of start of video visit: 11:37    Video started: 11:45 ( was driving and asked me call her again in 5 min)    Video ended: 12:18    Time visit video ended: 12:37    Provider location: working from home/ [facility]    Patient location: patients home.    Mode of transmission: video/ Doximity    Verbal consent: obtained before starting visit. Pt is agreeable.      The patient has been notified of following:      \"This VIDEO visit will be conducted via a call between you and your physician/provider. We have found that certain health care needs can be provided without the need for a physical exam.  This service lets us provide the care you need with a short phone conversation.  If a prescription is necessary we can send it directly to your pharmacy.  If lab work is needed we can place an order for that and you can then stop by our lab to have the test done at a later time.     With new updates with corona virus patient might be billed as clinic visit.     If during the course of the call the physician/provider feels a telephone visit is not appropriate, you will not be charged for this service.\"      Past medical history, social history, family history, allergy and medications were reviewed and updated as appropriate.  Reviewed pertinent labs, notes, imaging studies personally.  In addition to clinic visit > 32 min were spent reviewing records from previous clinic, recenet hospitalization.    Endocrinology Clinic Note:  Name: Margo Cruz  Seen in consultation with Naomi QUEVEDO CNP for Diabetes.  HPI:  Margo Cruz is a 19 year old female who presents for the evaluation/management of type 1 diabetes.   has a past medical history of Diabetes mellitus and Seborrheic infantile dermatitis.    Noted 6/2020 hospitalization for DKA-   It is thought that the likely problem " with her injection sites from her insulin pump was kinked causing decreased insulin delivery.   Was followed by endocrinology at [facility] earlier.  Available records, labs and images from outside clinic were personally reviewed.    Is on insulin pump X 7 years.  Is interested in Dexcom and newer insulin pump.  Has more questions about it.  Is closely followed by BRAD Segovia.    Insulin Pump Information  Insulin Pump Type: Medtronic 523/723- Paradigm Reveal  Infusion Set: Medtronic  Medtronic Infusion Set: Silhouette  CGM: No  Insulin: using Insulin aspart in pump    Currently she is using Relion meter.  Is not using contour meter but needs test strips.     1. Type 1 DM:  Orginally diagnosed: 2011 (at age 9 years)  Current Regimen:   yes:     Diabetes Medication(s)     Insulin       Insulin Aspart (INSULIN PUMP - OUTPATIENT)    Inject Subcutaneous See Admin Instructions Type of pump: Medtronic  Type of insulin: Novolog (changed to generic about 1 week ago)  Basal rate(s)  7527-8666: 1 unit/hr  7350-9241: 1.1 units/hr  7166-2641: 0.55 units/hr  4448-2842: 1.1 units/hr  ISF: 35 (all day)  ICF (insulin to carb ratio)  2677-5173: 1unit/4.6g carbs  2473-8301: 1 unit/10g carbs  3715-2490: 1 unit/8g carbs  Active insulin time: 3 hrs  Target goal range:   Followed by endocrinology     insulin aspart (NOVOLOG VIAL) 100 UNITS/ML vial    Use up to 80 units daily in insulin pump. 90 day supply.          BS checks: 3-4 times  Average Meter Download: Blood glucose data reviewed personally. See nursing note from this encounter for details.  Few episodes of low Bg in the setting of overcorrection.  Exercise:  Last A1c: 8.4%  Symptoms of hypoglycemia (low blood sugar):   Using PUMP:  Current Regimen:   Insulin pump -   Time Rate (U/hr)   0000-  0.950   0600  1.0   1200  0.550   2200  1.00     Carbohydrate Ratio -    Time Ratio   0000-  4.6   1000  1500 10.0  8.0     Sensitivity   35   Active Insulin Time   hours    Basal   19 Units (38%)   Bolus   30 Units (62%)   Total Carbohydrates/day  190+60   Total Insulin/day   49+7   Average Blood Sugar  222+ 103                   DM Complications:   Complications:   Diabetes Complications  Description / Detail    Diabetic Retinopathy  No   CAD / PAD  No   Neuropathy  No   Nephropathy / Microalbuminuria  No  Lab Results   Component Value Date    UMALCR 17.29 01/23/2020         Gastroparesis  No   Hypoglycemia [place]  No          2. Hypertension:  Not on medication.  3. Hyperlipidemia: Not on medication.    PMH/PSH:  Past Medical History:   Diagnosis Date     Diabetes mellitus      Seborrheic infantile dermatitis      History reviewed. No pertinent surgical history.  Family Hx:  Family History   Problem Relation Age of Onset     Diabetes Father            DM1: Father.           Social Hx:  Social History     Socioeconomic History     Marital status: Single     Spouse name: Not on file     Number of children: Not on file     Years of education: Not on file     Highest education level: Not on file   Occupational History     Not on file   Social Needs     Financial resource strain: Not on file     Food insecurity     Worry: Not on file     Inability: Not on file     Transportation needs     Medical: Not on file     Non-medical: Not on file   Tobacco Use     Smoking status: Never Smoker     Smokeless tobacco: Never Used     Tobacco comment: mom smokes outside   Substance and Sexual Activity     Alcohol use: No     Drug use: No     Sexual activity: Never   Lifestyle     Physical activity     Days per week: Not on file     Minutes per session: Not on file     Stress: Not on file   Relationships     Social connections     Talks on phone: Not on file     Gets together: Not on file     Attends Hoahaoism service: Not on file     Active member of club or organization: Not on file     Attends meetings of clubs or organizations: Not on file     Relationship status: Not on file     Intimate  partner violence     Fear of current or ex partner: Not on file     Emotionally abused: Not on file     Physically abused: Not on file     Forced sexual activity: Not on file   Other Topics Concern     Not on file   Social History Narrative     Not on file          MEDICATIONS:  has a current medication list which includes the following prescription(s): blood glucose, blood glucose monitoring, insulin aspart, insulin aspart, and vitamin d (cholecalciferol).    ROS     ROS: 10 point ROS neg other than the symptoms noted above in the HPI.    Physical Exam   VS: LMP 08/10/2020   Breastfeeding No   GENERAL: healthy, alert and no distress  EYES: Eyes grossly normal to inspection, conjunctivae and sclerae normal  RESP: no audible wheeze, cough, or visible cyanosis.  No visible retractions or increased work of breathing.  Able to speak fully in complete sentences.  NEURO: Cranial nerves grossly intact, mentation intact and speech normal  PSYCH: mentation appears normal, affect normal/bright, judgement and insight intact, normal speech and appearance well-groomed      LABS:  A1c:  Lab Results   Component Value Date    A1C 8.3 06/04/2020    A1C 7.5 01/23/2020    A1C 7.3 04/23/2015    A1C 7.9 06/18/2014    A1C 7.4 10/11/2013         BMP:   Creatinine   Date Value Ref Range Status   06/05/2020 0.64 0.50 - 1.00 mg/dL Final       Urine Micro:  Lab Results   Component Value Date    MICROL 66 01/23/2020     No results found for: MICROALBUMIN      LFTs/Lipids:  Recent Labs   Lab Test 01/23/20  1538 04/23/15   CHOL 180* 171   HDL 47 42   * 118   TRIG 79 114       Liver Function Studies -   Recent Labs   Lab Test 06/05/20  0537   PROTTOTAL 6.5*   ALBUMIN 3.3*   BILITOTAL 0.8   ALKPHOS 69   AST 14   ALT 16         TFTs:  Lab Results   Component Value Date    TSH 0.79 01/23/2020           Blood Glucose and pump data/ Meter reviewed.     All pertinent notes, labs, and images personally reviewed by me.       Glucometer/ insulin  pump (if applicable)/ CGM data (if applicable) downloaded, Personally reviewed and interpreted.  All Blood sugar data reviewed personally and interpreted as well as discussed with pt.  See nursing note from 8/11/2020 for details of BG/CGM log.  Where applicable   All past medical, social and Family history reviewed and updated in [facility].    A/P  Ms.Alyssa JENNI Cruz is a 19 year old here for the evaluation/management of diabetes:    1. DM1 - Under Poor control.  A1c 8.4%.  No known complications from Diabetes.  Using 2 different meters.  Few episodes of low BG likely in the setting of over correction.  Basal < bolus.  Plan:  Discussed diagnosis, pathophysiology, management and treatment options of condition with pt.  I also discussed importance of strict blood sugar control to prevent complications associated with uncontrolled diabetes.  She is not comfortable in increasing  Basal rate at night ( reports h/o nocturanl hypoglycemia)  She is also not comfortable to increase basal rate > 0.1 at a given time.  She needs close follow up and careful titration.  Recommend CDE follow up in 2 weeks for BG review and different insulin pump infomration.  Labs and follow up in 3 months  Encouraged to use Contour meter.  Check with [employer] if she is eligible for pump upgrade though she is more interested in Tandem + dexcom use.    Time Rate (U/hr)   0000-  0.950   0600  1.0-->1.1   1200  0.550--> 0.6   2200  1.00       2. Hypertension -not on medication       3. Hyperlipidemia - Not on medication.  FLP in acceptable range.   4. Prevention  Ophthalmology- recommend annually.  ASA- NA 2/2 to age.  Smoking- No    Most Recent Immunizations   Administered Date(s) Administered     Comvax (HIB/HepB) 01/06/2003     DTAP (<7y) 02/09/2006     DTaP, Unspecified 08/30/2013     HPV9 01/23/2020     Influenza (IIV3) PF 12/04/2006     Influenza Vaccine IM > 6 months Valent IIV4 01/23/2020     MMR 02/09/2006     Meningococcal (Menactra )  08/30/2013     Meningococcal (Menveo ) 08/30/2013     Pneumococcal (PCV 7) 01/06/2003     Pneumococcal 23 valent 01/23/2020     Poliovirus, inactivated (IPV) 02/09/2006     TDAP Vaccine (Adacel) 08/30/2013     Varicella 08/30/2013         Recommend checking blood sugars before meals and at bedtime.    If Blood glucose are low more often-> 2-3 times/week- give us a call.  The patient is advised to Make better food choices: reduce carbs, Reduce portion size, weight loss and exercise 3-4 times a week.  Discussed hypoglycemia signs and symptoms as well as management in detail.      There is some variability among people, most will usually develop symptoms suggestive of hypoglycemia when blood glucose levels are lowered to the mid 60's. The first set of symptoms are called adrenergic. Patients may experience any of the following nervousness, sweating, intense hunger, trembling, weakness, palpitations, and difficulty speaking. When BS fall below 50 the patient is unable to talk and take oral therapy.  Would recommend Glucagon emergency kit for the patient and education for family and friends around the patient.   The acute management of hypoglycemia involves the rapid delivery of a source of easily absorbed sugar. Regular soda, juice, lifesavers, table sugar, are good options. 15 grams of glucose is the dose that is given, followed by an assessment of symptoms and a blood glucose check if possible. If after 10 minutes there is no improvement, another 10-15 grams should be given. This can be repeated up to three times. The equivalency of 10-15 grams of glucose (approximate servings) are: Four lifesavers, 4 teaspoons of sugar, or 1/2 cup or 4 oz of juice or regular pop.    Follow-up:  3 months.    Karlee Holly M.D  Endocrinology  [facility]  CC: Luanne Todd      All questions were answered.  The patient indicates understanding of the above issues and agrees with the plan set forth.     Disclaimer:  This note consists of symbols derived from keyboarding, dictation and/or voice recognition software. As a result, there may be errors in the script that have gone undetected. Please consider this when interpreting information found in this chart.    Addendum to above note and clinic visit:    Labs reviewed.    See result note/telephone encounter.

## 2020-11-16 ENCOUNTER — HEALTH MAINTENANCE LETTER (OUTPATIENT)
Age: 20
End: 2020-11-16

## 2020-11-16 ENCOUNTER — TELEPHONE (OUTPATIENT)
Dept: ENDOCRINOLOGY | Facility: CLINIC | Age: 20
End: 2020-11-16

## 2020-11-16 NOTE — TELEPHONE ENCOUNTER
Diabetes Education Scheduling Outreach #1:    Call to patient to schedule. Left message with phone number to call to schedule.    Plan for 2nd outreach attempt within 1 week.    Margot Sawyer  [facility]  Diabetes and Nutrition Scheduling

## 2020-12-09 NOTE — TELEPHONE ENCOUNTER
Diabetes Education Scheduling Outreach #2:    Call to patient to schedule. Call rang and rang. Then it stopped ringing. No answer. No voicemail.    Margot [facility]  Diabetes and Nutrition Scheduling

## 2020-12-19 ENCOUNTER — TELEPHONE (OUTPATIENT)
Dept: ENDOCRINOLOGY | Facility: CLINIC | Age: 20
End: 2020-12-19

## 2020-12-19 ENCOUNTER — NURSE TRIAGE (OUTPATIENT)
Dept: NURSING | Facility: CLINIC | Age: 20
End: 2020-12-19

## 2020-12-19 DIAGNOSIS — E10.9 TYPE 1 DIABETES, HBA1C GOAL < 8% (H): Primary | ICD-10-CM

## 2020-12-19 NOTE — TELEPHONE ENCOUNTER
"Patient states her insulin pump is not working as of this morning and has an appointment on 12/28/20 to get a new one.  Requesting insulin orders until gets new pump.  States she has \"short acting insulin\" and needs \"long acting insulin\".  Prefers insulin pen but does have syringes.  [facility] paged on call provider, Dr. DENIA Sheldon, via Smart Web at 2:37PM to contact FNA at [phone].  Dr. Sheldon returned call and will send prescriptions to preferred pharmacy as listed in chart.  Would like patient to take first dose of glargine and do correction scale when picks up prescriptions.  Also would like FNA to review correction scale with patient as follows:  Check blood glucose and subtract 100 from result; divide result by 18 and that result will be how much of the Novolog to administer.  Also asking that on call provider for Dr. JENNY Holly be notified of above in case would like to make changes.  FNA contacted patient with above information and reviewed correction scale with patient.  Patient requesting to review insulin to carb ratio and FNA provided information from ED notes of 6/4/20.  [facility] paged on call provider for Dr. JENNY Holly via page , Ivet, at 3:43PM; page will be sent to on call provider, Dr. KAREN Quinonez, to review chart and follow up with patient at contact number listed in chart as needed.  "

## 2020-12-19 NOTE — TELEPHONE ENCOUNTER
Returned call and went to voicemail.   From chart notes appears patient got a Rx already for lantus after her pump broke.   LM for patient to call back care line if any other issues occur.   It also appears she had some questions regarding her short acting doses. From her last visit with Dr Holly...  ISF: 35 (all day)  ICF (insulin to carb ratio)  9456-5892: 1unit/4.6g carbs  7866-7410: 1 unit/10g carbs  4249-9631: 1 unit/8g carbs    Tristan Quinonez MD on 12/19/2020 at 5:58 PM

## 2021-01-11 ENCOUNTER — TELEPHONE (OUTPATIENT)
Dept: PEDIATRICS | Facility: CLINIC | Age: 21
End: 2021-01-11

## 2021-01-11 NOTE — TELEPHONE ENCOUNTER
Patient called today.    Patient needs a new order for pump on diatetes machine.    States broke.    States contacted [employer] for a replacement and [employer] needs an order from patient's provider Naomi Reynolds CNP at [facility].    [employer] number is [phone] (M-F 8am-6pm)    Would like mailed to [facility] for pickup or mailed to patients home.    Please contact patient.    Thank you.    Central Scheduling  Reyna CATES

## 2021-01-12 NOTE — TELEPHONE ENCOUNTER
Spoke with Ramu at [employer] (ph# [phone]), they will fax over order for new insulin pump, await fax.

## 2021-01-12 NOTE — TELEPHONE ENCOUNTER
Left message for [employer] to call back to find out what is needed to get insulin pump replaced, await call back.

## 2021-01-13 ENCOUNTER — TELEPHONE (OUTPATIENT)
Dept: ENDOCRINOLOGY | Facility: CLINIC | Age: 21
End: 2021-01-13

## 2021-01-13 ENCOUNTER — MEDICAL CORRESPONDENCE (OUTPATIENT)
Dept: HEALTH INFORMATION MANAGEMENT | Facility: CLINIC | Age: 21
End: 2021-01-13

## 2021-01-13 NOTE — TELEPHONE ENCOUNTER
Forms/paperwork reviewed, completed and signed.  Please fax or send the papers as requested, document in chart and close the encounter.    Thank you.    Karlee Holly MD

## 2021-01-13 NOTE — TELEPHONE ENCOUNTER
Form was faxed and sent to scanning.      Cammie Flores, [facility]  [place]

## 2021-01-13 NOTE — TELEPHONE ENCOUNTER
CMN for replacement pump.    LAST OFFICE/VIRTUAL VISIT:  08/11/20    FUTURE OFFICE/VIRTUAL VISIT:  None    Lab Results   Component Value Date    A1C 8.3 06/04/2020    A1C 7.5 01/23/2020    A1C 7.3 04/23/2015    A1C 7.9 06/18/2014    A1C 7.4 10/11/2013         Cammie Flores CMA  [facility]  [place]

## 2021-01-14 NOTE — TELEPHONE ENCOUNTER
Spoke with [employer], they received needed paperwork to ship out new pump to patient from Dr. Holly, this writer left for patient that pump is being shipped out today and she should receive it in 3 business days, chart closed.

## 2021-01-30 ENCOUNTER — TRANSFERRED RECORDS (OUTPATIENT)
Dept: HEALTH INFORMATION MANAGEMENT | Facility: CLINIC | Age: 21
End: 2021-01-30

## 2021-01-30 DIAGNOSIS — E10.9 TYPE 1 DIABETES, HBA1C GOAL < 8% (H): ICD-10-CM

## 2021-02-02 NOTE — TELEPHONE ENCOUNTER
Endo staff- can you please take a look into this?  She is on insulin pump.  Does she need vials? Or pens?  Please check with pt and pend accordingly.  Please pend the orders with correct quantity, select pharmacy and then send for signature. Also associate with correct diagnosis.    Thank you.    Karlee Holyl MD

## 2021-02-02 NOTE — TELEPHONE ENCOUNTER
Routing refill request to provider for review/approval because:  Labs not current:    Lab Test 06/04/20  1058   A1C 8.3*

## 2021-02-02 NOTE — TELEPHONE ENCOUNTER
I left a message for the patient to return our call.     Cammie Flores, RAYMUNDO  [facility]  [place]

## 2021-04-04 ENCOUNTER — HEALTH MAINTENANCE LETTER (OUTPATIENT)
Age: 21
End: 2021-04-04

## 2021-05-07 ENCOUNTER — VIRTUAL VISIT (OUTPATIENT)
Dept: PEDIATRICS | Facility: CLINIC | Age: 21
End: 2021-05-07
Payer: COMMERCIAL

## 2021-05-07 DIAGNOSIS — R11.2 NAUSEA AND VOMITING, INTRACTABILITY OF VOMITING NOT SPECIFIED, UNSPECIFIED VOMITING TYPE: Primary | ICD-10-CM

## 2021-05-07 DIAGNOSIS — E10.65 TYPE 1 DIABETES MELLITUS WITH HYPERGLYCEMIA (H): ICD-10-CM

## 2021-05-07 PROCEDURE — 99214 OFFICE O/P EST MOD 30 MIN: CPT | Mod: 95 | Performed by: NURSE PRACTITIONER

## 2021-05-07 NOTE — PROGRESS NOTES
Margo is a 20 year old who is being evaluated via a billable video visit.      How would you like to obtain your AVS? MyChart  If the video visit is dropped, the invitation should be resent by: Text to cell phone: [phone]  Will anyone else be joining your video visit? No      Video Start Time: 11:40 AM    Assessment & Plan     Nausea and vomiting, intractability of vomiting not specified, unspecified vomiting type  - Suspect symptoms related to gastroenteritis, though must consider DKA and Covid. Advised needs to rule out Covid as she reports cough with addition of vomiting. I offered to order and she declined and has free testing site near her home. Reviewed quarantine instructions. Letter written to excuse recent absences from work  - Symptoms showing improvement over past 24 hours. Reviewed bland diet and advance as tolerate  - No fever or point abdominal tenderness to suggest appendicitis    Type 1 diabetes mellitus with hyperglycemia (H)  - DKA 1 year ago. Low threshold for her being seen, could be seen in UC if symptoms not continuing to show improvement or glucoses rising.   - Reviewed indications for being seen in clinic versus UC versus ED and she voiced understanding      SAE Rogers CNP  [facility] [place]    Subjective   Margo is a 20 year old who presents for the following health issues     HPI     Abdominal/Flank Pain  Onset/Duration: few days  Description:   Character: vomiting   Location: middle to lower regions  Radiation: None  Intensity: mild, moderate  Progression of Symptoms:  intermittent  Accompanying Signs & Symptoms:  Fever/Chills: no  Gas/Bloating: YES- slight cramping   Nausea: no  Vomitting: YES  Diarrhea: no  Constipation: YES   Dysuria or Hematuria: no  History:   Trauma: no  Previous similar pain: no  Previous tests done: none  Precipitating factors:   Does the pain change with:     Food: no    Bowel Movement: no    Urination: no   Other factors:   no  Therapies tried and outcome: None  No LMP recorded. (Menstrual status: IUD).    Developed N/V 2 days ago  Last emesis yesterday afternoon  No change in stools. Last BM 2 days ago, normal  No fevers  Has cramping in bilateral abdomen  She had a smoothie this morning and had some nausea afterwards, no emesis  Blood sugars have been a bit higher since she hasn't felt well  150 this morning, 300 yesterday  States she developed cough after throwing up, thought from dry heaving     Hospitalized for DKA June 2020  Does not feel that ill this time  Abdominal cramping is mild  Nausea better today      Review of Systems   Constitutional, HEENT, cardiovascular, pulmonary, gi and gu systems are negative, except as otherwise noted.      Objective           Vitals:  No vitals were obtained today due to virtual visit.    Physical Exam   GENERAL: Healthy, alert and no distress  EYES: Eyes grossly normal to inspection.  No discharge or erythema, or obvious scleral/conjunctival abnormalities.  RESP: No audible wheeze, cough, or visible cyanosis.  No visible retractions or increased work of breathing.    SKIN: Visible skin clear. No significant rash, abnormal pigmentation or lesions.  NEURO: Cranial nerves grossly intact.  Mentation and speech appropriate for age.  PSYCH: Mentation appears normal, affect normal/bright, judgement and insight intact, normal speech and appearance well-groomed.          Patient unable to log in for video visit. Done via phone. Conservation 12 minutes

## 2021-05-07 NOTE — LETTER
[facility]  [address]  [address]  [place] [address]  Phone: [phone]  Fax: [phone]    May 7, 2021        Margo Cruz  [address] [place] [address]          To whom it may concern:    RE: Margo Cruz    Patient was treated today at our clinic. Please excuse her absences from work 5/5/21-5/7/21. She will be tested for Covid and can return to work if test is negative.     Please contact me for questions or concerns.      Sincerely,        SAE Rogers CNP

## 2021-06-01 ENCOUNTER — OFFICE VISIT (OUTPATIENT)
Dept: PEDIATRICS | Facility: CLINIC | Age: 21
End: 2021-06-01
Payer: COMMERCIAL

## 2021-06-01 VITALS
WEIGHT: 103 LBS | DIASTOLIC BLOOD PRESSURE: 74 MMHG | HEIGHT: 63 IN | HEART RATE: 99 BPM | BODY MASS INDEX: 18.25 KG/M2 | OXYGEN SATURATION: 97 % | RESPIRATION RATE: 16 BRPM | SYSTOLIC BLOOD PRESSURE: 116 MMHG | TEMPERATURE: 98.6 F

## 2021-06-01 DIAGNOSIS — R45.851 SUICIDAL IDEATION: ICD-10-CM

## 2021-06-01 DIAGNOSIS — F32.A DEPRESSION, UNSPECIFIED DEPRESSION TYPE: ICD-10-CM

## 2021-06-01 DIAGNOSIS — F41.9 ANXIETY: Primary | ICD-10-CM

## 2021-06-01 PROCEDURE — 99214 OFFICE O/P EST MOD 30 MIN: CPT | Performed by: NURSE PRACTITIONER

## 2021-06-01 PROCEDURE — 96127 BRIEF EMOTIONAL/BEHAV ASSMT: CPT | Performed by: NURSE PRACTITIONER

## 2021-06-01 RX ORDER — HYDROXYZINE HYDROCHLORIDE 25 MG/1
25 TABLET, FILM COATED ORAL
Qty: 30 TABLET | Refills: 0 | Status: SHIPPED | OUTPATIENT
Start: 2021-06-01 | End: 2022-03-10

## 2021-06-01 RX ORDER — ESCITALOPRAM OXALATE 10 MG/1
TABLET ORAL
Qty: 90 TABLET | Refills: 0 | Status: SHIPPED | OUTPATIENT
Start: 2021-06-01 | End: 2021-08-31

## 2021-06-01 ASSESSMENT — ANXIETY QUESTIONNAIRES
2. NOT BEING ABLE TO STOP OR CONTROL WORRYING: NEARLY EVERY DAY
GAD7 TOTAL SCORE: 19
IF YOU CHECKED OFF ANY PROBLEMS ON THIS QUESTIONNAIRE, HOW DIFFICULT HAVE THESE PROBLEMS MADE IT FOR YOU TO DO YOUR WORK, TAKE CARE OF THINGS AT HOME, OR GET ALONG WITH OTHER PEOPLE: VERY DIFFICULT
6. BECOMING EASILY ANNOYED OR IRRITABLE: NEARLY EVERY DAY
1. FEELING NERVOUS, ANXIOUS, OR ON EDGE: NEARLY EVERY DAY
5. BEING SO RESTLESS THAT IT IS HARD TO SIT STILL: SEVERAL DAYS
7. FEELING AFRAID AS IF SOMETHING AWFUL MIGHT HAPPEN: NEARLY EVERY DAY
3. WORRYING TOO MUCH ABOUT DIFFERENT THINGS: NEARLY EVERY DAY

## 2021-06-01 ASSESSMENT — PATIENT HEALTH QUESTIONNAIRE - PHQ9
5. POOR APPETITE OR OVEREATING: NEARLY EVERY DAY
SUM OF ALL RESPONSES TO PHQ QUESTIONS 1-9: 23

## 2021-06-01 ASSESSMENT — MIFFLIN-ST. JEOR: SCORE: 1206.33

## 2021-06-01 NOTE — PROGRESS NOTES
Assessment & Plan   Anxiety  Depression, unspecified depression type  Suicidal ideation  Seems depression>anxiety, but both significant/severe. Also passive suicidal ideation but reassures me she feels safe and has no plans.  Interested in starting meds today: will try lexapro with slow dose increase.Hydroxyzine for sleep (she is hesitant to take during the day). Discussed possible s/e, proper use of meds and black box warning of increased suicidality (she feels she could talk to her parents if this worsened) To also establish with therapist. Follow-up in 6 weeks, sooner if needed.   - hydrOXYzine (ATARAX) 25 MG tablet; Take 1 tablet (25 mg) by mouth nightly as needed for anxiety (sleep)  - escitalopram (LEXAPRO) 10 MG tablet; Take 1 tablet (10 mg) by mouth daily for 7 days, THEN 2 tablets (20 mg) daily.  - MENTAL HEALTH REFERRAL  - Adult; Outpatient Treatment; Individual/Couples/Family/Group Therapy/Health Psychology; Other: [facility] [phone]; We will contact you to schedule the appointment or please call with any questions      Prescription drug management  No LOS data to display   28 Time in the room discussing with patient and mother and also spent doing chart review, history and exam, documentation and further activities per the note       Depression Screening Follow Up    PHQ 6/1/2021   PHQ-9 Total Score 23   Q9: Thoughts of better off dead/self-harm past 2 weeks More than half the days     Last PHQ-9 6/1/2021   1.  Little interest or pleasure in doing things 2   2.  Feeling down, depressed, or hopeless 3   3.  Trouble falling or staying asleep, or sleeping too much 3   4.  Feeling tired or having little energy 3   5.  Poor appetite or overeating 3   6.  Feeling bad about yourself 3   7.  Trouble concentrating 2   8.  Moving slowly or restless 2   Q9: Thoughts of better off dead/self-harm past 2 weeks 2   PHQ-9 Total Score 23   Difficulty at work, home, or with people Somewhat difficult          No flowsheet data found.      Follow Up      Follow Up Actions Taken  Patient to follow up with PCP.  Clinic staff to schedule appointment if able.  Mental Health Referral placed    Discussed the following ways the patient can remain in a safe environment:  be around others  Patient Instructions   -Start Lexapro 10 mg (1 tab) for the next week, then increase to 2 tabs (20 mg)  -Take the hydroxyzine as needed for sleep  -Schedule with the therapist  -Work on self-care  -Follow-up in 6-8 weeks  -Schedule with Dr. Holly for your diabetes      Return in about 6 weeks (around 7/13/2021) for Routine Visit.    Ольга Bustos NP  [facility] KESHA Aragon is a 20 year old who presents for the following health issues  accompanied by her mother:    HPI     Abnormal Mood Symptoms  Onset/Duration: few years  Description:   Depression (if yes, do PHQ-9): YES  Anxiety (if yes, do JUSTICE-7): YES  Accompanying Signs & Symptoms:  Still participating in activities that you used to enjoy: YES- some  Fatigue: YES  Irritability: YES  Difficulty concentrating: YES  Changes in appetite: YES  Problems with sleep: YES  Heart racing/beating fast: YES  Abnormally elevated, expansive, or irritable mood: no  Persistently increased activity or energy: YES  Thoughts of hurting yourself or others: YES- pinches herself when anxious   History:  Recent stress or major life event: YES  Prior depression or anxiety: None  Family history of depression or anxiety: YES  Alcohol/drug use: YES  Difficulty sleeping: YES  Precipitating or alleviating factors: None  Therapies tried and outcome: none and individual therapy  PHQ 6/1/2021   PHQ-9 Total Score 23   Q9: Thoughts of better off dead/self-harm past 2 weeks More than half the days     JUSTICE-7 SCORE 6/1/2021   Total Score 19     Has dealt with anxiety and depression for years, felt she could always deal with this/overcome it.  Had worked with therapist in the past  "after her brother passed away, somewhat helpful.  No previous meds, not currently doing therapy  Mood recently worsened after bad breakup, was in relationship for 6 years. Also had miscarriage last year.   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 Work is a good distraction for her.  Tried college, dropped out/wasnt interested in school  Has friends but usually wants to go home instead of spending time with friends  Spends a lot of time in bed. Doesn't want to get out of bed in the morning. Troubles falling asleep. Wakes early  Occasional panic attacks, sometimes vomits with this  Mind races  No active plans of suicide or self harm but sometimes feels would be easier.  Denies alcohol use. Occasional marijuana use, doesn't think this is a problem.     Review of Systems   Otherwise ROS is negative except as stated above.        Objective    /74 (BP Location: Right arm, Patient Position: Chair, Cuff Size: Adult Regular)   Pulse 99   Temp 98.6  F (37  C) (Tympanic)   Resp 16   Ht 1.6 m (5' 3\")   Wt 46.7 kg (103 lb)   SpO2 97%   BMI 18.25 kg/m    Body mass index is 18.25 kg/m .  Physical Exam   GENERAL: healthy, alert and no distress  PSYCH: mentation appears normal and affect flat              "

## 2021-06-01 NOTE — PROGRESS NOTES
"    {PROVIDER CHARTING PREFERENCE:397241}    Subjective   Margo is a 20 year old who presents for the following health issues     HPI     Diabetes Follow-up      How often are you checking your blood sugar? { :031137}    What concerns do you have today about your diabetes? { :031358::\"None\"}     Do you have any of these symptoms? (Select all that apply)  { :721460}    Have you had a diabetic eye exam in the last 12 months? { :564589}        BP Readings from Last 2 Encounters:   06/05/20 114/66   01/23/20 118/62     Hemoglobin A1C (%)   Date Value   06/04/2020 8.3 (H)   01/23/2020 7.5 (H)     LDL Cholesterol Calculated (mg/dL)   Date Value   01/23/2020 117 (H)   04/23/2015 118       {Reference  Diabetes Management Resources :738577}    {Reference  Diabetes Log - 7 days :417766}      How many servings of fruits and vegetables do you eat daily?  { :601769}    On average, how many sweetened beverages do you drink each day (Examples: soda, juice, sweet tea, etc.  Do NOT count diet or artificially sweetened beverages)?   { 1-11:814728}    How many days per week do you exercise enough to make your heart beat faster? { :074890}    How many minutes a day do you exercise enough to make your heart beat faster? { :651268}    How many days per week do you miss taking your medication? {0-7 :931769}    {additonal problems for provider to add (Optional):558995}    Review of Systems   {ROS COMP (Optional):450032}      Objective    There were no vitals taken for this visit.  There is no height or weight on file to calculate BMI.  Physical Exam   {Exam List (Optional):474422}    {Diagnostic Test Results (Optional):122607}    {AMBULATORY ATTESTATION (Optional):817038}        "

## 2021-06-01 NOTE — PATIENT INSTRUCTIONS
-Start Lexapro 10 mg (1 tab) for the next week, then increase to 2 tabs (20 mg)  -Take the hydroxyzine as needed for sleep  -Schedule with the therapist  -Work on self-care  -Follow-up in 6-8 weeks  -Schedule with Dr. Holly for your diabetes

## 2021-06-02 ASSESSMENT — ANXIETY QUESTIONNAIRES: GAD7 TOTAL SCORE: 19

## 2021-07-24 ENCOUNTER — HEALTH MAINTENANCE LETTER (OUTPATIENT)
Age: 21
End: 2021-07-24

## 2021-08-29 DIAGNOSIS — F32.A DEPRESSION, UNSPECIFIED DEPRESSION TYPE: ICD-10-CM

## 2021-08-29 DIAGNOSIS — F41.9 ANXIETY: ICD-10-CM

## 2021-08-29 NOTE — LETTER
Dear Margo,         We are concerned about your health. We received a refill request for your escitalopram (LEXAPRO) 20 MG tablet. Your care team did refill your medication for a short term refill but for future refills please complete the followin) Complete the included questionnaire. You can send it back in the envelope provided  2) Are you still doing counseling? Please respond to this message via [employer] or call one of the numbers included in the message.  3) Schedule a follow up / annual physical. To schedule, please call [phone] or call my personal extension listed at the bottom of this message or schedule via [employer]     Let us know if you have further questions or concerns. Thank you for choosing [facility]!         Sincerely,        Tristan Albarado, EMT at 1:51 PM on 2021   [facility] Health Guide   [phone]

## 2021-08-31 RX ORDER — ESCITALOPRAM OXALATE 20 MG/1
20 TABLET ORAL DAILY
Qty: 30 TABLET | Refills: 0 | Status: SHIPPED | OUTPATIENT
Start: 2021-08-31 | End: 2022-03-10

## 2021-08-31 NOTE — TELEPHONE ENCOUNTER
Please contact pt to update PHQ9, make sure is doing counseling and schedule follow-up and physical

## 2021-08-31 NOTE — TELEPHONE ENCOUNTER
Routing refill request to provider for review/approval because:  Elevated PHQ-9    Ratna Ramirez, RN   [facility]  -- Triage Nurse

## 2021-09-14 NOTE — TELEPHONE ENCOUNTER
Called pt. Attempt #2. No answer LVM to call clinic back or respond to [employer] message. Pt did not read [employer] message at the time of this documentation.     If pt calls back:  Update PHQ-9  Ask if pt is doing counseling  Schedule follow up/physical    Tristan Albarado, EMT at 10:58 AM on September 14, 2021   Clinic Health Guide   [phone]

## 2021-09-18 ENCOUNTER — HEALTH MAINTENANCE LETTER (OUTPATIENT)
Age: 21
End: 2021-09-18

## 2021-09-24 NOTE — TELEPHONE ENCOUNTER
Called pt. Attempt #3. No answer LVM to call clinic back or respond to [employer] message. Pt did not read [employer] message at the time of this documentation. Also sent letter. No further outreaches will be made at this time.     If pt calls back:  Update PHQ-9  Ask if pt is doing counseling  Schedule follow up/physical    Tristan Albarado, EMT at 1:49 PM on September 24, 2021   Clinic Health Guide   [phone]

## 2021-10-26 ENCOUNTER — HOSPITAL ENCOUNTER (INPATIENT)
Facility: CLINIC | Age: 21
LOS: 4 days | Discharge: HOME | End: 2021-10-30
Attending: PEDIATRICS | Admitting: HOSPITALIST
Payer: COMMERCIAL

## 2021-10-26 ENCOUNTER — APPOINTMENT (OUTPATIENT)
Dept: GENERAL RADIOLOGY | Facility: CLINIC | Age: 21
End: 2021-10-26
Attending: EMERGENCY MEDICINE
Payer: COMMERCIAL

## 2021-10-26 ENCOUNTER — HOSPITAL ENCOUNTER (EMERGENCY)
Facility: CLINIC | Age: 21
Discharge: ANOTHER HOSPITAL | End: 2021-10-26
Attending: EMERGENCY MEDICINE | Admitting: EMERGENCY MEDICINE
Payer: COMMERCIAL

## 2021-10-26 VITALS
HEIGHT: 63 IN | WEIGHT: 120 LBS | BODY MASS INDEX: 21.26 KG/M2 | OXYGEN SATURATION: 98 % | DIASTOLIC BLOOD PRESSURE: 72 MMHG | SYSTOLIC BLOOD PRESSURE: 126 MMHG | RESPIRATION RATE: 16 BRPM | TEMPERATURE: 96.9 F | HEART RATE: 112 BPM

## 2021-10-26 DIAGNOSIS — R65.10 SIRS (SYSTEMIC INFLAMMATORY RESPONSE SYNDROME) (H): ICD-10-CM

## 2021-10-26 DIAGNOSIS — R79.89 ELEVATED PROCALCITONIN: ICD-10-CM

## 2021-10-26 DIAGNOSIS — E10.10 DIABETIC KETOACIDOSIS WITHOUT COMA ASSOCIATED WITH TYPE 1 DIABETES MELLITUS (H): ICD-10-CM

## 2021-10-26 DIAGNOSIS — L02.91 CUTANEOUS ABSCESS, UNSPECIFIED SITE: ICD-10-CM

## 2021-10-26 DIAGNOSIS — E87.1 HYPONATREMIA: ICD-10-CM

## 2021-10-26 DIAGNOSIS — N28.9 RENAL INSUFFICIENCY: ICD-10-CM

## 2021-10-26 DIAGNOSIS — R11.2 NON-INTRACTABLE VOMITING WITH NAUSEA, UNSPECIFIED VOMITING TYPE: Primary | ICD-10-CM

## 2021-10-26 LAB
ALBUMIN SERPL-MCNC: 3.6 G/DL (ref 3.4–5)
ALBUMIN SERPL-MCNC: 5.1 G/DL (ref 3.4–5)
ALBUMIN UR-MCNC: 30 MG/DL
ALP SERPL-CCNC: 102 U/L (ref 40–150)
ALP SERPL-CCNC: 179 U/L (ref 40–150)
ALT SERPL W P-5'-P-CCNC: 26 U/L (ref 0–50)
ALT SERPL W P-5'-P-CCNC: 39 U/L (ref 0–50)
ANION GAP SERPL CALCULATED.3IONS-SCNC: 16 MMOL/L (ref 3–14)
ANION GAP SERPL CALCULATED.3IONS-SCNC: 26 MMOL/L (ref 3–14)
ANION GAP SERPL CALCULATED.3IONS-SCNC: 35 MMOL/L (ref 3–14)
APPEARANCE UR: CLEAR
AST SERPL W P-5'-P-CCNC: 15 U/L (ref 0–45)
AST SERPL W P-5'-P-CCNC: 36 U/L (ref 0–45)
BASE EXCESS BLDA CALC-SCNC: -15.9 MMOL/L (ref -9–1.8)
BASOPHILS # BLD AUTO: 0.1 10E3/UL (ref 0–0.2)
BASOPHILS # BLD AUTO: 0.3 10E3/UL (ref 0–0.2)
BASOPHILS NFR BLD AUTO: 0 %
BASOPHILS NFR BLD AUTO: 1 %
BILIRUB DIRECT SERPL-MCNC: 0.2 MG/DL (ref 0–0.2)
BILIRUB SERPL-MCNC: 0.8 MG/DL (ref 0.2–1.3)
BILIRUB SERPL-MCNC: 0.9 MG/DL (ref 0.2–1.3)
BILIRUB UR QL STRIP: NEGATIVE
BUN SERPL-MCNC: 14 MG/DL (ref 7–30)
BUN SERPL-MCNC: 23 MG/DL (ref 7–30)
BUN SERPL-MCNC: 27 MG/DL (ref 7–30)
CALCIUM SERPL-MCNC: 8.1 MG/DL (ref 8.5–10.1)
CALCIUM SERPL-MCNC: 8.5 MG/DL (ref 8.5–10.1)
CALCIUM SERPL-MCNC: 9.8 MG/DL (ref 8.5–10.1)
CHLORIDE BLD-SCNC: 100 MMOL/L (ref 94–109)
CHLORIDE BLD-SCNC: 109 MMOL/L (ref 94–109)
CHLORIDE BLD-SCNC: 89 MMOL/L (ref 94–109)
CO2 SERPL-SCNC: 10 MMOL/L (ref 20–32)
CO2 SERPL-SCNC: 6 MMOL/L (ref 20–32)
CO2 SERPL-SCNC: 7 MMOL/L (ref 20–32)
COLOR UR AUTO: ABNORMAL
CREAT SERPL-MCNC: 0.66 MG/DL (ref 0.52–1.04)
CREAT SERPL-MCNC: 0.82 MG/DL (ref 0.52–1.04)
CREAT SERPL-MCNC: 1.1 MG/DL (ref 0.52–1.04)
EOSINOPHIL # BLD AUTO: 0 10E3/UL (ref 0–0.7)
EOSINOPHIL # BLD AUTO: 0.1 10E3/UL (ref 0–0.7)
EOSINOPHIL NFR BLD AUTO: 0 %
EOSINOPHIL NFR BLD AUTO: 0 %
ERYTHROCYTE [DISTWIDTH] IN BLOOD BY AUTOMATED COUNT: 12 % (ref 10–15)
ERYTHROCYTE [DISTWIDTH] IN BLOOD BY AUTOMATED COUNT: 12.1 % (ref 10–15)
GFR SERPL CREATININE-BSD FRML MDRD: 72 ML/MIN/1.73M2
GFR SERPL CREATININE-BSD FRML MDRD: >90 ML/MIN/1.73M2
GFR SERPL CREATININE-BSD FRML MDRD: >90 ML/MIN/1.73M2
GLUCOSE BLD-MCNC: 274 MG/DL (ref 70–99)
GLUCOSE BLD-MCNC: 523 MG/DL (ref 70–99)
GLUCOSE BLD-MCNC: 761 MG/DL (ref 70–99)
GLUCOSE BLDC GLUCOMTR-MCNC: 166 MG/DL (ref 70–99)
GLUCOSE BLDC GLUCOMTR-MCNC: 225 MG/DL (ref 70–99)
GLUCOSE BLDC GLUCOMTR-MCNC: 245 MG/DL (ref 70–99)
GLUCOSE BLDC GLUCOMTR-MCNC: 250 MG/DL (ref 70–99)
GLUCOSE BLDC GLUCOMTR-MCNC: 262 MG/DL (ref 70–99)
GLUCOSE BLDC GLUCOMTR-MCNC: 263 MG/DL (ref 70–99)
GLUCOSE BLDC GLUCOMTR-MCNC: 308 MG/DL (ref 70–99)
GLUCOSE BLDC GLUCOMTR-MCNC: 382 MG/DL (ref 70–99)
GLUCOSE BLDC GLUCOMTR-MCNC: 488 MG/DL (ref 70–99)
GLUCOSE BLDC GLUCOMTR-MCNC: 558 MG/DL (ref 70–99)
GLUCOSE BLDC GLUCOMTR-MCNC: >600 MG/DL (ref 70–99)
GLUCOSE BLDC GLUCOMTR-MCNC: >600 MG/DL (ref 70–99)
GLUCOSE UR STRIP-MCNC: >=1000 MG/DL
HBA1C MFR BLD: 10.3 % (ref 0–5.6)
HCG SERPL QL: NEGATIVE
HCO3 BLD-SCNC: 9 MMOL/L (ref 21–28)
HCO3 BLDV-SCNC: 8 MMOL/L (ref 21–28)
HCT VFR BLD AUTO: 41.6 % (ref 35–47)
HCT VFR BLD AUTO: 57.5 % (ref 35–47)
HGB BLD-MCNC: 14.4 G/DL (ref 11.7–15.7)
HGB BLD-MCNC: 18.3 G/DL (ref 11.7–15.7)
HGB UR QL STRIP: NEGATIVE
HYALINE CASTS: 1 /LPF
IMM GRANULOCYTES # BLD: 0.3 10E3/UL
IMM GRANULOCYTES # BLD: 0.9 10E3/UL
IMM GRANULOCYTES NFR BLD: 1 %
IMM GRANULOCYTES NFR BLD: 2 %
KETONES BLD-SCNC: 4.4 MMOL/L (ref 0–0.6)
KETONES BLD-SCNC: 6.6 MMOL/L (ref 0–0.6)
KETONES UR STRIP-MCNC: >150 MG/DL
LACTATE BLD-SCNC: 7.2 MMOL/L
LACTATE SERPL-SCNC: 0.7 MMOL/L (ref 0.7–2)
LEUKOCYTE ESTERASE UR QL STRIP: NEGATIVE
LYMPHOCYTES # BLD AUTO: 2.6 10E3/UL (ref 0.8–5.3)
LYMPHOCYTES # BLD AUTO: 3.6 10E3/UL (ref 0.8–5.3)
LYMPHOCYTES NFR BLD AUTO: 9 %
LYMPHOCYTES NFR BLD AUTO: 9 %
MAGNESIUM SERPL-MCNC: 1.7 MG/DL (ref 1.6–2.3)
MAGNESIUM SERPL-MCNC: 2.8 MG/DL (ref 1.6–2.3)
MCH RBC QN AUTO: 30.8 PG (ref 26.5–33)
MCH RBC QN AUTO: 31.4 PG (ref 26.5–33)
MCHC RBC AUTO-ENTMCNC: 31.8 G/DL (ref 31.5–36.5)
MCHC RBC AUTO-ENTMCNC: 34.6 G/DL (ref 31.5–36.5)
MCV RBC AUTO: 91 FL (ref 78–100)
MCV RBC AUTO: 97 FL (ref 78–100)
MONOCYTES # BLD AUTO: 1.3 10E3/UL (ref 0–1.3)
MONOCYTES # BLD AUTO: 2.5 10E3/UL (ref 0–1.3)
MONOCYTES NFR BLD AUTO: 4 %
MONOCYTES NFR BLD AUTO: 6 %
MUCOUS THREADS #/AREA URNS LPF: PRESENT /LPF
NEUTROPHILS # BLD AUTO: 23.9 10E3/UL (ref 1.6–8.3)
NEUTROPHILS # BLD AUTO: 34.1 10E3/UL (ref 1.6–8.3)
NEUTROPHILS NFR BLD AUTO: 82 %
NEUTROPHILS NFR BLD AUTO: 86 %
NITRATE UR QL: NEGATIVE
NRBC # BLD AUTO: 0 10E3/UL
NRBC # BLD AUTO: 0 10E3/UL
NRBC BLD AUTO-RTO: 0 /100
NRBC BLD AUTO-RTO: 0 /100
O2/TOTAL GAS SETTING VFR VENT: 21 %
OXYHGB MFR BLD: 97 % (ref 92–100)
PCO2 BLD: 22 MM HG (ref 35–45)
PCO2 BLDV: 30 MM HG (ref 40–50)
PH BLD: 7.24 [PH] (ref 7.35–7.45)
PH BLDV: 7.04 [PH] (ref 7.32–7.43)
PH UR STRIP: 5 [PH] (ref 5–7)
PHOSPHATE SERPL-MCNC: 1.6 MG/DL (ref 2.5–4.5)
PHOSPHATE SERPL-MCNC: 7.8 MG/DL (ref 2.5–4.5)
PLATELET # BLD AUTO: 298 10E3/UL (ref 150–450)
PLATELET # BLD AUTO: 464 10E3/UL (ref 150–450)
PO2 BLD: 101 MM HG (ref 80–105)
PO2 BLDV: 41 MM HG (ref 25–47)
POTASSIUM BLD-SCNC: 3.7 MMOL/L (ref 3.4–5.3)
POTASSIUM BLD-SCNC: 4.3 MMOL/L (ref 3.4–5.3)
POTASSIUM BLD-SCNC: 5.2 MMOL/L (ref 3.4–5.3)
POTASSIUM BLD-SCNC: 5.5 MMOL/L (ref 3.4–5.3)
PROCALCITONIN SERPL-MCNC: 1.17 NG/ML
PROT SERPL-MCNC: 10.3 G/DL (ref 6.8–8.8)
PROT SERPL-MCNC: 7.2 G/DL (ref 6.8–8.8)
RBC # BLD AUTO: 4.58 10E6/UL (ref 3.8–5.2)
RBC # BLD AUTO: 5.94 10E6/UL (ref 3.8–5.2)
RBC URINE: 1 /HPF
SAO2 % BLDV: 56 % (ref 94–100)
SARS-COV-2 RNA RESP QL NAA+PROBE: NEGATIVE
SODIUM SERPL-SCNC: 131 MMOL/L (ref 133–144)
SODIUM SERPL-SCNC: 132 MMOL/L (ref 133–144)
SODIUM SERPL-SCNC: 135 MMOL/L (ref 133–144)
SP GR UR STRIP: 1.02 (ref 1–1.03)
SQUAMOUS EPITHELIAL: 1 /HPF
UROBILINOGEN UR STRIP-MCNC: NORMAL MG/DL
WBC # BLD AUTO: 28.2 10E3/UL (ref 4–11)
WBC # BLD AUTO: 41.3 10E3/UL (ref 4–11)
WBC URINE: 2 /HPF

## 2021-10-26 PROCEDURE — 87635 SARS-COV-2 COVID-19 AMP PRB: CPT | Performed by: EMERGENCY MEDICINE

## 2021-10-26 PROCEDURE — 87040 BLOOD CULTURE FOR BACTERIA: CPT | Performed by: HOSPITALIST

## 2021-10-26 PROCEDURE — 99207 PR NOT IN PERSON INPATIENT CONSULT STATISTICAL MARKER: CPT | Performed by: HOSPITALIST

## 2021-10-26 PROCEDURE — 258N000003 HC RX IP 258 OP 636: Performed by: EMERGENCY MEDICINE

## 2021-10-26 PROCEDURE — 84132 ASSAY OF SERUM POTASSIUM: CPT | Performed by: HOSPITALIST

## 2021-10-26 PROCEDURE — 82805 BLOOD GASES W/O2 SATURATION: CPT | Performed by: HOSPITALIST

## 2021-10-26 PROCEDURE — 87040 BLOOD CULTURE FOR BACTERIA: CPT | Performed by: EMERGENCY MEDICINE

## 2021-10-26 PROCEDURE — 85025 COMPLETE CBC W/AUTO DIFF WBC: CPT | Performed by: HOSPITALIST

## 2021-10-26 PROCEDURE — 250N000011 HC RX IP 250 OP 636: Performed by: HOSPITALIST

## 2021-10-26 PROCEDURE — 84450 TRANSFERASE (AST) (SGOT): CPT | Performed by: HOSPITALIST

## 2021-10-26 PROCEDURE — 83036 HEMOGLOBIN GLYCOSYLATED A1C: CPT | Performed by: EMERGENCY MEDICINE

## 2021-10-26 PROCEDURE — 71045 X-RAY EXAM CHEST 1 VIEW: CPT

## 2021-10-26 PROCEDURE — 84145 PROCALCITONIN (PCT): CPT | Performed by: EMERGENCY MEDICINE

## 2021-10-26 PROCEDURE — 83735 ASSAY OF MAGNESIUM: CPT | Performed by: HOSPITALIST

## 2021-10-26 PROCEDURE — 96376 TX/PRO/DX INJ SAME DRUG ADON: CPT

## 2021-10-26 PROCEDURE — 85025 COMPLETE CBC W/AUTO DIFF WBC: CPT | Performed by: EMERGENCY MEDICINE

## 2021-10-26 PROCEDURE — 96365 THER/PROPH/DIAG IV INF INIT: CPT | Mod: 59

## 2021-10-26 PROCEDURE — 84703 CHORIONIC GONADOTROPIN ASSAY: CPT | Performed by: EMERGENCY MEDICINE

## 2021-10-26 PROCEDURE — 258N000001 HC RX 258: Performed by: HOSPITALIST

## 2021-10-26 PROCEDURE — 96367 TX/PROPH/DG ADDL SEQ IV INF: CPT

## 2021-10-26 PROCEDURE — 82803 BLOOD GASES ANY COMBINATION: CPT

## 2021-10-26 PROCEDURE — 83605 ASSAY OF LACTIC ACID: CPT | Performed by: PEDIATRICS

## 2021-10-26 PROCEDURE — 84075 ASSAY ALKALINE PHOSPHATASE: CPT | Performed by: HOSPITALIST

## 2021-10-26 PROCEDURE — 93005 ELECTROCARDIOGRAM TRACING: CPT

## 2021-10-26 PROCEDURE — 36415 COLL VENOUS BLD VENIPUNCTURE: CPT | Performed by: HOSPITALIST

## 2021-10-26 PROCEDURE — 99223 1ST HOSP IP/OBS HIGH 75: CPT | Mod: AI | Performed by: HOSPITALIST

## 2021-10-26 PROCEDURE — 250N000011 HC RX IP 250 OP 636: Performed by: EMERGENCY MEDICINE

## 2021-10-26 PROCEDURE — 83930 ASSAY OF BLOOD OSMOLALITY: CPT | Performed by: HOSPITALIST

## 2021-10-26 PROCEDURE — 96375 TX/PRO/DX INJ NEW DRUG ADDON: CPT

## 2021-10-26 PROCEDURE — 200N000001 HC R&B ICU

## 2021-10-26 PROCEDURE — 36415 COLL VENOUS BLD VENIPUNCTURE: CPT | Performed by: EMERGENCY MEDICINE

## 2021-10-26 PROCEDURE — 82248 BILIRUBIN DIRECT: CPT | Performed by: HOSPITALIST

## 2021-10-26 PROCEDURE — 84132 ASSAY OF SERUM POTASSIUM: CPT | Performed by: EMERGENCY MEDICINE

## 2021-10-26 PROCEDURE — 81001 URINALYSIS AUTO W/SCOPE: CPT | Performed by: EMERGENCY MEDICINE

## 2021-10-26 PROCEDURE — 258N000001 HC RX 258: Performed by: EMERGENCY MEDICINE

## 2021-10-26 PROCEDURE — 80053 COMPREHEN METABOLIC PANEL: CPT | Performed by: EMERGENCY MEDICINE

## 2021-10-26 PROCEDURE — 258N000002 HC RX IP 258 OP 250: Performed by: HOSPITALIST

## 2021-10-26 PROCEDURE — 96366 THER/PROPH/DIAG IV INF ADDON: CPT

## 2021-10-26 PROCEDURE — 250N000012 HC RX MED GY IP 250 OP 636 PS 637: Performed by: HOSPITALIST

## 2021-10-26 PROCEDURE — 84100 ASSAY OF PHOSPHORUS: CPT | Performed by: EMERGENCY MEDICINE

## 2021-10-26 PROCEDURE — 83735 ASSAY OF MAGNESIUM: CPT | Performed by: EMERGENCY MEDICINE

## 2021-10-26 PROCEDURE — 80048 BASIC METABOLIC PNL TOTAL CA: CPT | Performed by: EMERGENCY MEDICINE

## 2021-10-26 PROCEDURE — 250N000009 HC RX 250: Performed by: EMERGENCY MEDICINE

## 2021-10-26 PROCEDURE — 258N000003 HC RX IP 258 OP 636: Performed by: HOSPITALIST

## 2021-10-26 PROCEDURE — 99285 EMERGENCY DEPT VISIT HI MDM: CPT | Mod: 25

## 2021-10-26 PROCEDURE — 84100 ASSAY OF PHOSPHORUS: CPT | Performed by: HOSPITALIST

## 2021-10-26 PROCEDURE — 82010 KETONE BODYS QUAN: CPT | Performed by: EMERGENCY MEDICINE

## 2021-10-26 PROCEDURE — 96361 HYDRATE IV INFUSION ADD-ON: CPT

## 2021-10-26 PROCEDURE — 87086 URINE CULTURE/COLONY COUNT: CPT | Performed by: EMERGENCY MEDICINE

## 2021-10-26 PROCEDURE — C9803 HOPD COVID-19 SPEC COLLECT: HCPCS

## 2021-10-26 PROCEDURE — 82010 KETONE BODYS QUAN: CPT | Performed by: HOSPITALIST

## 2021-10-26 RX ORDER — DEXTROSE MONOHYDRATE 25 G/50ML
25-50 INJECTION, SOLUTION INTRAVENOUS
Status: DISCONTINUED | OUTPATIENT
Start: 2021-10-26 | End: 2021-10-29

## 2021-10-26 RX ORDER — METOCLOPRAMIDE HYDROCHLORIDE 5 MG/ML
10 INJECTION INTRAMUSCULAR; INTRAVENOUS ONCE
Status: COMPLETED | OUTPATIENT
Start: 2021-10-26 | End: 2021-10-26

## 2021-10-26 RX ORDER — SODIUM CHLORIDE AND POTASSIUM CHLORIDE 150; 450 MG/100ML; MG/100ML
INJECTION, SOLUTION INTRAVENOUS CONTINUOUS
Status: DISCONTINUED | OUTPATIENT
Start: 2021-10-26 | End: 2021-10-28

## 2021-10-26 RX ORDER — SODIUM CHLORIDE, SODIUM LACTATE, POTASSIUM CHLORIDE, CALCIUM CHLORIDE 600; 310; 30; 20 MG/100ML; MG/100ML; MG/100ML; MG/100ML
INJECTION, SOLUTION INTRAVENOUS ONCE
Status: COMPLETED | OUTPATIENT
Start: 2021-10-26 | End: 2021-10-26

## 2021-10-26 RX ORDER — SODIUM CHLORIDE 450 MG/100ML
INJECTION, SOLUTION INTRAVENOUS CONTINUOUS
Status: DISCONTINUED | OUTPATIENT
Start: 2021-10-26 | End: 2021-10-26

## 2021-10-26 RX ORDER — DIPHENHYDRAMINE HYDROCHLORIDE 50 MG/ML
25 INJECTION INTRAMUSCULAR; INTRAVENOUS ONCE
Status: COMPLETED | OUTPATIENT
Start: 2021-10-26 | End: 2021-10-26

## 2021-10-26 RX ORDER — ONDANSETRON 2 MG/ML
4 INJECTION INTRAMUSCULAR; INTRAVENOUS ONCE
Status: DISCONTINUED | OUTPATIENT
Start: 2021-10-26 | End: 2021-10-26

## 2021-10-26 RX ORDER — HYDROMORPHONE HYDROCHLORIDE 1 MG/ML
0.5 INJECTION, SOLUTION INTRAMUSCULAR; INTRAVENOUS; SUBCUTANEOUS
Status: DISCONTINUED | OUTPATIENT
Start: 2021-10-26 | End: 2021-10-26 | Stop reason: HOSPADM

## 2021-10-26 RX ORDER — HYDROMORPHONE HYDROCHLORIDE 1 MG/ML
0.5 INJECTION, SOLUTION INTRAMUSCULAR; INTRAVENOUS; SUBCUTANEOUS ONCE
Status: DISCONTINUED | OUTPATIENT
Start: 2021-10-26 | End: 2021-10-26

## 2021-10-26 RX ORDER — ONDANSETRON 2 MG/ML
4 INJECTION INTRAMUSCULAR; INTRAVENOUS ONCE
Status: COMPLETED | OUTPATIENT
Start: 2021-10-26 | End: 2021-10-26

## 2021-10-26 RX ORDER — HYDROMORPHONE HYDROCHLORIDE 1 MG/ML
0.5 INJECTION, SOLUTION INTRAMUSCULAR; INTRAVENOUS; SUBCUTANEOUS ONCE
Status: COMPLETED | OUTPATIENT
Start: 2021-10-26 | End: 2021-10-26

## 2021-10-26 RX ORDER — DEXTROSE MONOHYDRATE 25 G/50ML
25-50 INJECTION, SOLUTION INTRAVENOUS
Status: DISCONTINUED | OUTPATIENT
Start: 2021-10-26 | End: 2021-10-26 | Stop reason: HOSPADM

## 2021-10-26 RX ORDER — NICOTINE POLACRILEX 4 MG
15-30 LOZENGE BUCCAL
Status: DISCONTINUED | OUTPATIENT
Start: 2021-10-26 | End: 2021-10-29

## 2021-10-26 RX ORDER — DEXTROSE MONOHYDRATE, SODIUM CHLORIDE, AND POTASSIUM CHLORIDE 50; 1.49; 4.5 G/1000ML; G/1000ML; G/1000ML
INJECTION, SOLUTION INTRAVENOUS CONTINUOUS
Status: DISCONTINUED | OUTPATIENT
Start: 2021-10-26 | End: 2021-10-28

## 2021-10-26 RX ORDER — HYDROXYZINE HYDROCHLORIDE 25 MG/1
25 TABLET, FILM COATED ORAL
Status: DISCONTINUED | OUTPATIENT
Start: 2021-10-26 | End: 2021-10-30 | Stop reason: HOSPADM

## 2021-10-26 RX ORDER — CEFAZOLIN SODIUM 1 G/50ML
1250 SOLUTION INTRAVENOUS
Status: DISCONTINUED | OUTPATIENT
Start: 2021-10-27 | End: 2021-10-27 | Stop reason: DRUGHIGH

## 2021-10-26 RX ORDER — ESCITALOPRAM OXALATE 10 MG/1
20 TABLET ORAL DAILY
Status: DISCONTINUED | OUTPATIENT
Start: 2021-10-26 | End: 2021-10-30 | Stop reason: HOSPADM

## 2021-10-26 RX ORDER — ONDANSETRON 2 MG/ML
8 INJECTION INTRAMUSCULAR; INTRAVENOUS ONCE
Status: COMPLETED | OUTPATIENT
Start: 2021-10-26 | End: 2021-10-26

## 2021-10-26 RX ADMIN — SODIUM CHLORIDE, POTASSIUM CHLORIDE, SODIUM LACTATE AND CALCIUM CHLORIDE: 600; 310; 30; 20 INJECTION, SOLUTION INTRAVENOUS at 13:27

## 2021-10-26 RX ADMIN — SODIUM CHLORIDE, POTASSIUM CHLORIDE, SODIUM LACTATE AND CALCIUM CHLORIDE 2000 ML: 600; 310; 30; 20 INJECTION, SOLUTION INTRAVENOUS at 11:16

## 2021-10-26 RX ADMIN — HYDROMORPHONE HYDROCHLORIDE 0.5 MG: 1 INJECTION, SOLUTION INTRAMUSCULAR; INTRAVENOUS; SUBCUTANEOUS at 11:18

## 2021-10-26 RX ADMIN — TAZOBACTAM SODIUM AND PIPERACILLIN SODIUM 4.5 G: 500; 4 INJECTION, SOLUTION INTRAVENOUS at 12:42

## 2021-10-26 RX ADMIN — METOCLOPRAMIDE HYDROCHLORIDE 10 MG: 5 INJECTION INTRAMUSCULAR; INTRAVENOUS at 13:55

## 2021-10-26 RX ADMIN — DEXTROSE AND SODIUM CHLORIDE: 5; 450 INJECTION, SOLUTION INTRAVENOUS at 23:07

## 2021-10-26 RX ADMIN — HYDROMORPHONE HYDROCHLORIDE 0.5 MG: 1 INJECTION, SOLUTION INTRAMUSCULAR; INTRAVENOUS; SUBCUTANEOUS at 13:51

## 2021-10-26 RX ADMIN — DIPHENHYDRAMINE HYDROCHLORIDE 25 MG: 50 INJECTION INTRAMUSCULAR; INTRAVENOUS at 13:55

## 2021-10-26 RX ADMIN — SODIUM CHLORIDE 10 UNITS/HR: 9 INJECTION, SOLUTION INTRAVENOUS at 21:10

## 2021-10-26 RX ADMIN — DEXTROSE AND SODIUM CHLORIDE: 5; 450 INJECTION, SOLUTION INTRAVENOUS at 18:23

## 2021-10-26 RX ADMIN — INSULIN HUMAN 5.5 UNITS/HR: 100 INJECTION, SOLUTION PARENTERAL at 13:33

## 2021-10-26 RX ADMIN — ENOXAPARIN SODIUM 40 MG: 40 INJECTION SUBCUTANEOUS at 21:26

## 2021-10-26 RX ADMIN — ONDANSETRON 4 MG: 2 INJECTION INTRAMUSCULAR; INTRAVENOUS at 13:50

## 2021-10-26 RX ADMIN — VANCOMYCIN HYDROCHLORIDE 1250 MG: 5 INJECTION, POWDER, LYOPHILIZED, FOR SOLUTION INTRAVENOUS at 12:42

## 2021-10-26 RX ADMIN — SODIUM CHLORIDE: 4.5 INJECTION, SOLUTION INTRAVENOUS at 21:16

## 2021-10-26 RX ADMIN — ONDANSETRON 8 MG: 2 INJECTION INTRAMUSCULAR; INTRAVENOUS at 11:18

## 2021-10-26 RX ADMIN — TAZOBACTAM SODIUM AND PIPERACILLIN SODIUM 3.38 G: 375; 3 INJECTION, SOLUTION INTRAVENOUS at 21:15

## 2021-10-26 ASSESSMENT — ACTIVITIES OF DAILY LIVING (ADL)
ADLS_ACUITY_SCORE: 5
ADLS_ACUITY_SCORE: 9
ADLS_ACUITY_SCORE: 9

## 2021-10-26 ASSESSMENT — ENCOUNTER SYMPTOMS
VOMITING: 1
BACK PAIN: 1
DIARRHEA: 0
COUGH: 0
FEVER: 0
SORE THROAT: 0
DYSURIA: 0

## 2021-10-26 ASSESSMENT — MIFFLIN-ST. JEOR: SCORE: 1283.45

## 2021-10-26 NOTE — ED PROVIDER NOTES
History     Chief Complaint:  Hyperglycemia     The history is provided by the patient and a parent.      Margo Cruz is a 20 year old female with history of type I diabetes mellitus who presents with concerns for hyperglycemia and vomiting which began approximately 8.5 hours ago. Her blood glucose has been elevated since the onset of the vomiting but the exact levels are unknown. She reports that there has been some spotted blood in the vomit as well. Here in the ED, she states that she is experiencing some back pain. Her mother reports that Margo has been hospitalized here in the past for diabetic emergencies; most recently one year ago. She has had back pain when she has had DKA in the past. Margo denies diarrhea, dysuria, rash, fever, cough, sore throat. No recent medication changes. The site of her insulin pump was changed yesterday, most recently.    Review of Systems   Constitutional: Negative for fever.   HENT: Negative for sore throat.    Respiratory: Negative for cough.    Gastrointestinal: Positive for vomiting. Negative for diarrhea.        Hematemesis +   Genitourinary: Negative for dysuria.   Musculoskeletal: Positive for back pain.   Skin: Negative for rash.   All other systems reviewed and are negative.    Allergies:  Gluten    Medications:  Lexapro  Hydroxyzine  Insulin     Past Medical History:     Type I diabetes mellitus  Seborrheic infantile dermatitis  Celiac sprue     Family History:    Father: diabetes mellitus    Social History:  Presents with her mother.  Presents via car.    Physical Exam     Patient Vitals for the past 24 hrs:   BP Temp Temp src Pulse Resp SpO2 Height Weight   10/26/21 1430 126/63 -- -- 120 21 98 % -- --   10/26/21 1245 130/80 -- -- 118 24 100 % -- --   10/26/21 1215 130/72 -- -- (!) 122 18 100 % -- --   10/26/21 1200 127/87 -- -- 114 21 100 % -- --   10/26/21 1145 124/65 -- -- 112 25 100 % -- --   10/26/21 1130 122/73 -- -- 101 26 100 % -- --   10/26/21 1053 (!)  Spoke with patient today over the phone regarding her concern of having diarrhea. She was recently treated for lymphocytic colitis in December 2019 with Budenoside. The diarrhea completely resolved with budesonide.  Two weeks ago the diarrhea returned. She reports having 2BMs/ day, sometimes stool is watery sometimes stool is loose but she has not had a good solid BM for a long time. No blood in stool or black stool.  She did have some lower abdominal cramping which happened twice last week with bowel movement yet no blood in stool seen.     Will check stool for infection, will also check CBC. Further plan follows lab results. Patient understood and agreed.      "133/90 96.9  F (36.1  C) Temporal (!) 138 (!) 36 100 % 1.6 m (5' 3\") 54.4 kg (120 lb)       Physical Exam    Constitutional:  Ill appearing  female  Eyes:    Conjunctiva normal  Neck:    Supple, no meningismus.     CV:     Tachycardic, regular rhythm.      No murmurs, rubs or gallops.     No lower extremity edema.  PULM:    Clear to auscultation bilateral.       No respiratory distress although tachypneic.      Good air exchange.     No rales or wheezing.  ABD:    Soft, non-distended.       Mild-moderate tenderness throughout the abdomen.     Bowel sounds normal.     No rebound, guarding or rigidity.     No CVA tenderness.      No hepatosplenomegaly.  MSK:     No gross deformity to all four extremities.   LYMPH:   No cervical lymphadenopathy.  NEURO:   Alert.  Good muscular tone, no atrophy.   Skin:    Warm, dry and intact.    Psych:    Mood is depressed and affect is appropriate.      Emergency Department Course     ECG  ECG taken at 1121, ECG read at 1133  Sinus tachycardia  Right atrial enlargement  Left axis deviation  Pulmonary disease pattern  Possible inferior infarct, age undetermined  Abnormal ECG  Rate 118 bpm. WA interval 140 ms. QRS duration 90 ms. QT/QTc 364/510 ms. P-R-T axes 77 -72 70.     Imaging:  XR Chest Port 1 View   Final Result   IMPRESSION: AP view of the chest was obtained. Cardiomediastinal   silhouette is within normal limits. No suspicious focal pulmonary   opacities. No significant pleural effusion or pneumothorax.       YOVANY MEJIA MD            SYSTEM ID:  [id number]        Report per radiology    Laboratory:  Labs Ordered and Resulted from Time of ED Arrival Up to the Time of Departure from the ED   COMPREHENSIVE METABOLIC PANEL - Abnormal; Notable for the following components:       Result Value    Sodium 131 (*)     Chloride 89 (*)     Carbon Dioxide (CO2) 7 (*)     Anion Gap 35 (*)     Creatinine 1.10 (*)     Glucose 761 (*)     Alkaline Phosphatase 179 (*)     Protein " Total 10.3 (*)     Albumin 5.1 (*)     All other components within normal limits   MAGNESIUM - Abnormal; Notable for the following components:    Magnesium 2.8 (*)     All other components within normal limits   KETONE BETA-HYDROXYBUTYRATE QUANTITATIVE, RAPID - Abnormal; Notable for the following components:    Ketone (Beta-Hydroxybutyrate) Quantitative 6.6 (*)     All other components within normal limits   GLUCOSE BY METER - Abnormal; Notable for the following components:    GLUCOSE BY METER POCT >600 (*)     All other components within normal limits   ROUTINE UA WITH MICROSCOPIC REFLEX TO CULTURE - Abnormal; Notable for the following components:    Glucose Urine >=1000 (*)     Ketones Urine >150 (*)     Protein Albumin Urine 30  (*)     Mucus Urine Present (*)     All other components within normal limits    Narrative:     Urine Culture not indicated   CBC WITH PLATELETS AND DIFFERENTIAL - Abnormal; Notable for the following components:    WBC Count 41.3 (*)     RBC Count 5.94 (*)     Hemoglobin 18.3 (*)     Hematocrit 57.5 (*)     Platelet Count 464 (*)     Absolute Neutrophils 34.1 (*)     Absolute Monocytes 2.5 (*)     Absolute Basophils 0.3 (*)     Absolute Immature Granulocytes 0.9 (*)     All other components within normal limits   ISTAT GASES LACTATE VENOUS POCT - Abnormal; Notable for the following components:    Lactic Acid POCT 7.2 (*)     Bicarbonate Venous POCT 8 (*)     O2 Sat, Venous POCT 56 (*)     pCO2V Venous POCT 30 (*)     pH Venous POCT 7.04 (*)     All other components within normal limits   PHOSPHORUS - Abnormal; Notable for the following components:    Phosphorus 7.8 (*)     All other components within normal limits   GLUCOSE BY METER - Abnormal; Notable for the following components:    GLUCOSE BY METER POCT >600 (*)     All other components within normal limits   GLUCOSE BY METER - Abnormal; Notable for the following components:    GLUCOSE BY METER POCT 488 (*)     All other components  within normal limits   HCG QUALITATIVE PREGNANCY - Normal   COVID-19 VIRUS (CORONAVIRUS) BY PCR - Normal    Narrative:     Testing was performed using the antolin  SARS-CoV-2 & Influenza A/B Assay on the antolin  Irene  System.  This test should be ordered for the detection of SARS-COV-2 in individuals who meet SARS-CoV-2 clinical and/or epidemiological criteria. Test performance is unknown in asymptomatic patients.  This test is for in vitro diagnostic use under the FDA EUA for laboratories certified under CLIA to perform moderate and/or high complexity testing. This test has not been FDA cleared or approved.  A negative test does not rule out the presence of PCR inhibitors in the specimen or target RNA in concentration below the limit of detection for the assay. The possibility of a false negative should be considered if the patient's recent exposure or clinical presentation suggests COVID-19.  [facility] are certified under the Clinical Laboratory Improvement Amendments of 1988 (CLIA-88) as qualified to perform moderate and/or high complexity laboratory testing.   POTASSIUM - Normal   GLUCOSE MONITOR NURSING POCT   GLUCOSE MONITOR NURSING POCT   PROCALCITONIN   HEMOGLOBIN A1C   BASIC METABOLIC PANEL   PERIPHERAL IV CATHETER   ISTAT CG4 GASES LACTATE BLAISE NURSING POCT   CARDIAC CONTINUOUS MONITORING   IV ACCESS   ISTAT CG4 GASES LACTATE BLAISE NURSING POCT   NOTIFY PHYSICIAN   BLOOD CULTURE   BLOOD CULTURE   CBC WITH PLATELETS & DIFFERENTIAL    Narrative:     The following orders were created for panel order CBC with platelets differential.  Procedure                               Abnormality         Status                     ---------                               -----------         ------                     CBC with platelets and d...[[id number]]  Abnormal            Final result                 Please view results for these tests on the individual orders.      Emergency Department  Course:  Reviewed:  I reviewed nursing notes, vitals, past medical history and Care Everywhere    Assessments:  1059 I obtained history and examined the patient as noted above.   1139 I rechecked the patient and reassessed her.  1214 I rechecked the patient and explained findings.  1319 I rechecked the patient.  1449 I reviewed second iStat VBG, and second BMP was ordered.    Consults:  1445 I spoke with Dr. Denise with [facility] ICU regarding the patient's presentation, findings here in the ED, and plan of care.    Interventions:  1116 Lactated ringers 2 L IV  1118 Zofran 8 mg IV  1118 Dilaudid 0.5 mg IV  1242 Vancomycin 1,250 mg IV  1242 Zosyn 4.5 g IV  1327 Lactated ringers IV infusion  1333 Insulin 5.5 units/hr IV  1350 Zofran 4 mg IV  1351 Dilaudid 0.5 mg IV  1355 Reglan 10 mg IV  1355 Benadryl 25 mg IV    Disposition:  The patient was transferred to [facility] ICU via EMS. Dr. Denise accepted the patient for transfer.     Impression & Plan     Medical Decision Makin-year-old female with history of type 1 diabetes on insulin pump presented to the ED with intractable nausea, vomiting and abdominal pain.  History and examination was most consistent with DKA.  This was confirmed by laboratory studies.  Patient was given 2 L of IV fluid followed by continuous infusion of LR.  Once potassium was confirmed to be within normal limits, patient was initiated on an insulin drip.  Her insulin pump was discontinued.  She is tachycardic but otherwise hemodynamically stable.    The exact cause of the DKA is uncertain as she reports that she has been compliant with her medications.  She had no focal infectious findings on examination but there was marked leukocytosis of 41,000 and elevated lactate at 7.  Patient was given broad-spectrum antibiotics in event of serious bacterial infection.  Work-up reveals a reassuring urinalysis, no pneumonia, no clinical signs of soft tissue  infection.  Blood and urine cultures pending.  Leukocytosis may be related to hemoconcentration and stress response.  Repeat VBG reveals improving pH and lactate although still abnormal.      She wiill require admission to the intensive care unit.  Unfortunately no ICU beds available at [facility] or in the metro area.  There is an ICU bed at [place].  Mother and patient comfortable with transfer to [place] given a limited ICU availability.  Patient graciously accepted by ICU provider.    Critical Care Time: was 60 minutes for this patient excluding procedures    Diagnosis:    ICD-10-CM    1. Diabetic ketoacidosis without coma associated with type 1 diabetes mellitus (H)  E10.10    2. Renal insufficiency  N28.9    3. Hyponatremia  E87.1        Scribe Disclosure:  I, Bernarda Tovar, am serving as a scribe at 10:57 AM on 10/26/2021 to document services personally performed by Jason Phillips MD based on my observations and the provider's statements to me.      Jason Phillips MD  10/26/21 [id number]

## 2021-10-26 NOTE — ED TRIAGE NOTES
A&O x4.  ABC's intact.      Pt arrives with c/o feeling like she is DKA, which she has been in before vomiting and not feeling well. Type 1 diabetic and has an insulin pump.     Blood Glucose in Triage- Critical high.

## 2021-10-27 ENCOUNTER — APPOINTMENT (OUTPATIENT)
Dept: ULTRASOUND IMAGING | Facility: CLINIC | Age: 21
End: 2021-10-27
Attending: PEDIATRICS
Payer: COMMERCIAL

## 2021-10-27 PROBLEM — R10.9 FLANK PAIN: Status: ACTIVE | Noted: 2021-10-27

## 2021-10-27 PROBLEM — E87.0: Status: ACTIVE | Noted: 2021-10-27

## 2021-10-27 PROBLEM — E10.69: Status: ACTIVE | Noted: 2021-10-27

## 2021-10-27 PROBLEM — R65.10 SIRS (SYSTEMIC INFLAMMATORY RESPONSE SYNDROME) (H): Status: ACTIVE | Noted: 2021-10-27

## 2021-10-27 PROBLEM — E10.65: Status: ACTIVE | Noted: 2021-10-27

## 2021-10-27 PROBLEM — R79.89 ELEVATED PROCALCITONIN: Status: ACTIVE | Noted: 2021-10-27

## 2021-10-27 PROBLEM — R10.32 LLQ ABDOMINAL PAIN: Status: ACTIVE | Noted: 2021-10-27

## 2021-10-27 PROBLEM — R11.2 NAUSEA WITH VOMITING: Status: ACTIVE | Noted: 2021-10-27

## 2021-10-27 PROBLEM — E83.39 HYPOPHOSPHATEMIA: Status: ACTIVE | Noted: 2021-10-27

## 2021-10-27 PROBLEM — Z96.41 INSULIN PUMP STATUS: Status: ACTIVE | Noted: 2021-10-27

## 2021-10-27 PROBLEM — Z97.5 IUD (INTRAUTERINE DEVICE) IN PLACE: Status: ACTIVE | Noted: 2021-10-27

## 2021-10-27 LAB
ANION GAP SERPL CALCULATED.3IONS-SCNC: 3 MMOL/L (ref 3–14)
ANION GAP SERPL CALCULATED.3IONS-SCNC: 4 MMOL/L (ref 3–14)
ANION GAP SERPL CALCULATED.3IONS-SCNC: 5 MMOL/L (ref 3–14)
ANION GAP SERPL CALCULATED.3IONS-SCNC: 6 MMOL/L (ref 3–14)
ANION GAP SERPL CALCULATED.3IONS-SCNC: 9 MMOL/L (ref 3–14)
ATRIAL RATE - MUSE: 118 BPM
BACTERIA UR CULT: NORMAL
BASE EXCESS BLDV CALC-SCNC: -6.2 MMOL/L (ref -7.7–1.9)
BASE EXCESS BLDV CALC-SCNC: -8 MMOL/L (ref -7.7–1.9)
BUN SERPL-MCNC: 10 MG/DL (ref 7–30)
BUN SERPL-MCNC: 10 MG/DL (ref 7–30)
BUN SERPL-MCNC: 7 MG/DL (ref 7–30)
BUN SERPL-MCNC: 8 MG/DL (ref 7–30)
BUN SERPL-MCNC: 8 MG/DL (ref 7–30)
CALCIUM SERPL-MCNC: 7.6 MG/DL (ref 8.5–10.1)
CALCIUM SERPL-MCNC: 7.9 MG/DL (ref 8.5–10.1)
CALCIUM SERPL-MCNC: 8.1 MG/DL (ref 8.5–10.1)
CALCIUM SERPL-MCNC: 8.1 MG/DL (ref 8.5–10.1)
CALCIUM SERPL-MCNC: 8.3 MG/DL (ref 8.5–10.1)
CHLORIDE BLD-SCNC: 115 MMOL/L (ref 94–109)
CHLORIDE BLD-SCNC: 116 MMOL/L (ref 94–109)
CHLORIDE BLD-SCNC: 117 MMOL/L (ref 94–109)
CO2 SERPL-SCNC: 11 MMOL/L (ref 20–32)
CO2 SERPL-SCNC: 17 MMOL/L (ref 20–32)
CO2 SERPL-SCNC: 18 MMOL/L (ref 20–32)
CO2 SERPL-SCNC: 19 MMOL/L (ref 20–32)
CO2 SERPL-SCNC: 19 MMOL/L (ref 20–32)
CREAT SERPL-MCNC: 0.56 MG/DL (ref 0.52–1.04)
CREAT SERPL-MCNC: 0.65 MG/DL (ref 0.52–1.04)
CREAT SERPL-MCNC: 0.67 MG/DL (ref 0.52–1.04)
CREAT SERPL-MCNC: 0.67 MG/DL (ref 0.52–1.04)
CREAT SERPL-MCNC: 0.72 MG/DL (ref 0.52–1.04)
DIASTOLIC BLOOD PRESSURE - MUSE: NORMAL MMHG
GFR SERPL CREATININE-BSD FRML MDRD: >90 ML/MIN/1.73M2
GLUCOSE BLD-MCNC: 134 MG/DL (ref 70–99)
GLUCOSE BLD-MCNC: 163 MG/DL (ref 70–99)
GLUCOSE BLD-MCNC: 168 MG/DL (ref 70–99)
GLUCOSE BLD-MCNC: 177 MG/DL (ref 70–99)
GLUCOSE BLD-MCNC: 177 MG/DL (ref 70–99)
GLUCOSE BLDC GLUCOMTR-MCNC: 123 MG/DL (ref 70–99)
GLUCOSE BLDC GLUCOMTR-MCNC: 132 MG/DL (ref 70–99)
GLUCOSE BLDC GLUCOMTR-MCNC: 136 MG/DL (ref 70–99)
GLUCOSE BLDC GLUCOMTR-MCNC: 137 MG/DL (ref 70–99)
GLUCOSE BLDC GLUCOMTR-MCNC: 139 MG/DL (ref 70–99)
GLUCOSE BLDC GLUCOMTR-MCNC: 144 MG/DL (ref 70–99)
GLUCOSE BLDC GLUCOMTR-MCNC: 145 MG/DL (ref 70–99)
GLUCOSE BLDC GLUCOMTR-MCNC: 153 MG/DL (ref 70–99)
GLUCOSE BLDC GLUCOMTR-MCNC: 155 MG/DL (ref 70–99)
GLUCOSE BLDC GLUCOMTR-MCNC: 160 MG/DL (ref 70–99)
GLUCOSE BLDC GLUCOMTR-MCNC: 165 MG/DL (ref 70–99)
GLUCOSE BLDC GLUCOMTR-MCNC: 166 MG/DL (ref 70–99)
GLUCOSE BLDC GLUCOMTR-MCNC: 169 MG/DL (ref 70–99)
GLUCOSE BLDC GLUCOMTR-MCNC: 177 MG/DL (ref 70–99)
GLUCOSE BLDC GLUCOMTR-MCNC: 178 MG/DL (ref 70–99)
GLUCOSE BLDC GLUCOMTR-MCNC: 181 MG/DL (ref 70–99)
GLUCOSE BLDC GLUCOMTR-MCNC: 182 MG/DL (ref 70–99)
GLUCOSE BLDC GLUCOMTR-MCNC: 185 MG/DL (ref 70–99)
GLUCOSE BLDC GLUCOMTR-MCNC: 187 MG/DL (ref 70–99)
GLUCOSE BLDC GLUCOMTR-MCNC: 190 MG/DL (ref 70–99)
GLUCOSE BLDC GLUCOMTR-MCNC: 193 MG/DL (ref 70–99)
GLUCOSE BLDC GLUCOMTR-MCNC: 199 MG/DL (ref 70–99)
HCO3 BLDV-SCNC: 18 MMOL/L (ref 21–28)
HCO3 BLDV-SCNC: 19 MMOL/L (ref 21–28)
HOLD SPECIMEN: NORMAL
HOLD SPECIMEN: NORMAL
INTERPRETATION ECG - MUSE: NORMAL
KETONES BLD-SCNC: 0.3 MMOL/L (ref 0–0.6)
KETONES BLD-SCNC: 0.4 MMOL/L (ref 0–0.6)
KETONES BLD-SCNC: 0.7 MMOL/L (ref 0–0.6)
KETONES BLD-SCNC: 1.4 MMOL/L (ref 0–0.6)
KETONES BLD-SCNC: 1.5 MMOL/L (ref 0–0.6)
LACTATE BLD-SCNC: 3.5 MMOL/L
LACTATE SERPL-SCNC: 0.5 MMOL/L (ref 0.7–2)
O2/TOTAL GAS SETTING VFR VENT: 21 %
O2/TOTAL GAS SETTING VFR VENT: 21 %
OSMOLALITY SERPL: 305 MMOL/KG (ref 275–295)
P AXIS - MUSE: 77 DEGREES
PCO2 BLDV: 35 MM HG (ref 40–50)
PCO2 BLDV: 35 MM HG (ref 40–50)
PCO2 BLDV: <15 MM HG (ref 40–50)
PH BLDV: 7.12 [PH] (ref 7.32–7.43)
PH BLDV: 7.31 [PH] (ref 7.32–7.43)
PH BLDV: 7.34 [PH] (ref 7.32–7.43)
PHOSPHATE SERPL-MCNC: 1.2 MG/DL (ref 2.5–4.5)
PHOSPHATE SERPL-MCNC: 1.6 MG/DL (ref 2.5–4.5)
PO2 BLDV: 35 MM HG (ref 25–47)
PO2 BLDV: 74 MM HG (ref 25–47)
PO2 BLDV: 79 MM HG (ref 25–47)
POTASSIUM BLD-SCNC: 3.5 MMOL/L (ref 3.4–5.3)
POTASSIUM BLD-SCNC: 3.9 MMOL/L (ref 3.4–5.3)
POTASSIUM BLD-SCNC: 3.9 MMOL/L (ref 3.4–5.3)
POTASSIUM BLD-SCNC: 4 MMOL/L (ref 3.4–5.3)
POTASSIUM BLD-SCNC: 4.1 MMOL/L (ref 3.4–5.3)
POTASSIUM BLD-SCNC: 4.4 MMOL/L (ref 3.4–5.3)
PR INTERVAL - MUSE: 140 MS
QRS DURATION - MUSE: 90 MS
QT - MUSE: 364 MS
QTC - MUSE: 510 MS
R AXIS - MUSE: -72 DEGREES
SARS-COV-2 RNA RESP QL NAA+PROBE: NEGATIVE
SODIUM SERPL-SCNC: 136 MMOL/L (ref 133–144)
SODIUM SERPL-SCNC: 138 MMOL/L (ref 133–144)
SODIUM SERPL-SCNC: 138 MMOL/L (ref 133–144)
SODIUM SERPL-SCNC: 139 MMOL/L (ref 133–144)
SODIUM SERPL-SCNC: 140 MMOL/L (ref 133–144)
SYSTOLIC BLOOD PRESSURE - MUSE: NORMAL MMHG
T AXIS - MUSE: 70 DEGREES
VENTRICULAR RATE- MUSE: 118 BPM

## 2021-10-27 PROCEDURE — 250N000011 HC RX IP 250 OP 636

## 2021-10-27 PROCEDURE — 82010 KETONE BODYS QUAN: CPT | Performed by: HOSPITALIST

## 2021-10-27 PROCEDURE — 250N000011 HC RX IP 250 OP 636: Performed by: HOSPITALIST

## 2021-10-27 PROCEDURE — 250N000011 HC RX IP 250 OP 636: Performed by: PEDIATRICS

## 2021-10-27 PROCEDURE — 250N000013 HC RX MED GY IP 250 OP 250 PS 637: Performed by: HOSPITALIST

## 2021-10-27 PROCEDURE — 258N000003 HC RX IP 258 OP 636: Performed by: PEDIATRICS

## 2021-10-27 PROCEDURE — 200N000001 HC R&B ICU

## 2021-10-27 PROCEDURE — 84100 ASSAY OF PHOSPHORUS: CPT | Performed by: HOSPITALIST

## 2021-10-27 PROCEDURE — 84100 ASSAY OF PHOSPHORUS: CPT | Performed by: PEDIATRICS

## 2021-10-27 PROCEDURE — 83605 ASSAY OF LACTIC ACID: CPT | Performed by: HOSPITALIST

## 2021-10-27 PROCEDURE — 36415 COLL VENOUS BLD VENIPUNCTURE: CPT | Performed by: HOSPITALIST

## 2021-10-27 PROCEDURE — 80048 BASIC METABOLIC PNL TOTAL CA: CPT | Performed by: PEDIATRICS

## 2021-10-27 PROCEDURE — 99233 SBSQ HOSP IP/OBS HIGH 50: CPT | Performed by: PEDIATRICS

## 2021-10-27 PROCEDURE — 36415 COLL VENOUS BLD VENIPUNCTURE: CPT | Performed by: PEDIATRICS

## 2021-10-27 PROCEDURE — 76770 US EXAM ABDO BACK WALL COMP: CPT

## 2021-10-27 PROCEDURE — 258N000003 HC RX IP 258 OP 636: Performed by: HOSPITALIST

## 2021-10-27 PROCEDURE — 82803 BLOOD GASES ANY COMBINATION: CPT | Performed by: PEDIATRICS

## 2021-10-27 PROCEDURE — 250N000012 HC RX MED GY IP 250 OP 636 PS 637: Performed by: HOSPITALIST

## 2021-10-27 PROCEDURE — 87635 SARS-COV-2 COVID-19 AMP PRB: CPT | Performed by: HOSPITALIST

## 2021-10-27 PROCEDURE — 250N000013 HC RX MED GY IP 250 OP 250 PS 637: Performed by: PEDIATRICS

## 2021-10-27 PROCEDURE — 82010 KETONE BODYS QUAN: CPT | Performed by: PEDIATRICS

## 2021-10-27 PROCEDURE — 80048 BASIC METABOLIC PNL TOTAL CA: CPT | Performed by: HOSPITALIST

## 2021-10-27 PROCEDURE — 250N000009 HC RX 250: Performed by: PEDIATRICS

## 2021-10-27 RX ORDER — CALCIUM CARBONATE 500 MG/1
1000 TABLET, CHEWABLE ORAL 3 TIMES DAILY PRN
Status: DISCONTINUED | OUTPATIENT
Start: 2021-10-27 | End: 2021-10-30 | Stop reason: HOSPADM

## 2021-10-27 RX ORDER — METOCLOPRAMIDE HYDROCHLORIDE 5 MG/ML
5 INJECTION INTRAMUSCULAR; INTRAVENOUS EVERY 6 HOURS PRN
Status: DISCONTINUED | OUTPATIENT
Start: 2021-10-27 | End: 2021-10-30 | Stop reason: HOSPADM

## 2021-10-27 RX ORDER — PROCHLORPERAZINE 25 MG
25 SUPPOSITORY, RECTAL RECTAL EVERY 12 HOURS PRN
Status: DISCONTINUED | OUTPATIENT
Start: 2021-10-27 | End: 2021-10-30 | Stop reason: HOSPADM

## 2021-10-27 RX ORDER — SUCRALFATE ORAL 1 G/10ML
1 SUSPENSION ORAL
Status: DISCONTINUED | OUTPATIENT
Start: 2021-10-27 | End: 2021-10-28

## 2021-10-27 RX ORDER — ONDANSETRON 2 MG/ML
4 INJECTION INTRAMUSCULAR; INTRAVENOUS EVERY 6 HOURS PRN
Status: DISCONTINUED | OUTPATIENT
Start: 2021-10-27 | End: 2021-10-30 | Stop reason: HOSPADM

## 2021-10-27 RX ORDER — VANCOMYCIN HYDROCHLORIDE 1 G/200ML
1000 INJECTION, SOLUTION INTRAVENOUS EVERY 12 HOURS
Status: DISCONTINUED | OUTPATIENT
Start: 2021-10-27 | End: 2021-10-28

## 2021-10-27 RX ORDER — PROCHLORPERAZINE MALEATE 5 MG
10 TABLET ORAL EVERY 6 HOURS PRN
Status: DISCONTINUED | OUTPATIENT
Start: 2021-10-27 | End: 2021-10-30 | Stop reason: HOSPADM

## 2021-10-27 RX ADMIN — POTASSIUM CHLORIDE, DEXTROSE MONOHYDRATE AND SODIUM CHLORIDE: 150; 5; 450 INJECTION, SOLUTION INTRAVENOUS at 00:03

## 2021-10-27 RX ADMIN — ONDANSETRON 4 MG: 2 INJECTION INTRAMUSCULAR; INTRAVENOUS at 16:03

## 2021-10-27 RX ADMIN — SODIUM PHOSPHATE, MONOBASIC, MONOHYDRATE AND SODIUM PHOSPHATE, DIBASIC, ANHYDROUS 15 MMOL: 276; 142 INJECTION, SOLUTION INTRAVENOUS at 21:24

## 2021-10-27 RX ADMIN — ONDANSETRON 4 MG: 2 INJECTION INTRAMUSCULAR; INTRAVENOUS at 00:34

## 2021-10-27 RX ADMIN — POTASSIUM CHLORIDE, DEXTROSE MONOHYDRATE AND SODIUM CHLORIDE: 150; 5; 450 INJECTION, SOLUTION INTRAVENOUS at 21:06

## 2021-10-27 RX ADMIN — TAZOBACTAM SODIUM AND PIPERACILLIN SODIUM 3.38 G: 375; 3 INJECTION, SOLUTION INTRAVENOUS at 23:01

## 2021-10-27 RX ADMIN — POTASSIUM CHLORIDE, DEXTROSE MONOHYDRATE AND SODIUM CHLORIDE: 150; 5; 450 INJECTION, SOLUTION INTRAVENOUS at 16:03

## 2021-10-27 RX ADMIN — SUCRALFATE 1 G: 1 SUSPENSION ORAL at 13:24

## 2021-10-27 RX ADMIN — SODIUM PHOSPHATE, MONOBASIC, MONOHYDRATE AND SODIUM PHOSPHATE, DIBASIC, ANHYDROUS 15 MMOL: 276; 142 INJECTION, SOLUTION INTRAVENOUS at 10:07

## 2021-10-27 RX ADMIN — VANCOMYCIN HYDROCHLORIDE 1250 MG: 1 INJECTION, POWDER, LYOPHILIZED, FOR SOLUTION INTRAVENOUS at 00:03

## 2021-10-27 RX ADMIN — PROCHLORPERAZINE EDISYLATE 10 MG: 5 INJECTION INTRAMUSCULAR; INTRAVENOUS at 22:06

## 2021-10-27 RX ADMIN — TAZOBACTAM SODIUM AND PIPERACILLIN SODIUM 3.38 G: 375; 3 INJECTION, SOLUTION INTRAVENOUS at 17:20

## 2021-10-27 RX ADMIN — SODIUM CHLORIDE 4 UNITS/HR: 9 INJECTION, SOLUTION INTRAVENOUS at 05:56

## 2021-10-27 RX ADMIN — TAZOBACTAM SODIUM AND PIPERACILLIN SODIUM 3.38 G: 375; 3 INJECTION, SOLUTION INTRAVENOUS at 11:11

## 2021-10-27 RX ADMIN — TAZOBACTAM SODIUM AND PIPERACILLIN SODIUM 3.38 G: 375; 3 INJECTION, SOLUTION INTRAVENOUS at 05:01

## 2021-10-27 RX ADMIN — POTASSIUM CHLORIDE, DEXTROSE MONOHYDRATE AND SODIUM CHLORIDE: 150; 5; 450 INJECTION, SOLUTION INTRAVENOUS at 10:45

## 2021-10-27 RX ADMIN — ESCITALOPRAM OXALATE 20 MG: 10 TABLET ORAL at 09:24

## 2021-10-27 RX ADMIN — VANCOMYCIN HYDROCHLORIDE 1000 MG: 1 INJECTION, SOLUTION INTRAVENOUS at 13:24

## 2021-10-27 RX ADMIN — POTASSIUM CHLORIDE, DEXTROSE MONOHYDRATE AND SODIUM CHLORIDE: 150; 5; 450 INJECTION, SOLUTION INTRAVENOUS at 05:44

## 2021-10-27 RX ADMIN — ENOXAPARIN SODIUM 40 MG: 40 INJECTION SUBCUTANEOUS at 20:03

## 2021-10-27 RX ADMIN — SODIUM CHLORIDE 4 UNITS/HR: 9 INJECTION, SOLUTION INTRAVENOUS at 23:25

## 2021-10-27 ASSESSMENT — ACTIVITIES OF DAILY LIVING (ADL)
ADLS_ACUITY_SCORE: 7
ADLS_ACUITY_SCORE: 5
ADLS_ACUITY_SCORE: 9
ADLS_ACUITY_SCORE: 5
ADLS_ACUITY_SCORE: 7

## 2021-10-27 NOTE — PROGRESS NOTES
Patient was somnolent and lethargic upon arrival to ICU. AOx4, flat affect, COLBY, PERRLA, tracking, LSC auscultation, abdomen soft/tender in LLQ, denies pain/numbness/tingling, intermittent nausea w/o vomiting - NPO ok for ice, no diarrhea, making urine, afebrile T, ST/SR, normotensive, no difficulty breathing or SOB. No skin issues, pale, warm, pulses palpable. Steady on feet. On insulin gtt and D5/0.45 NS c/ KCL. Vanco and zosyn for elevated WBC. Q2 potassium x4 per DKA protocol, Q4 BMP and ketones.

## 2021-10-27 NOTE — PROGRESS NOTES
[facility]    Medicine Progress Note - Hospitalist Service       Date of Admission:  10/26/2021    Assessment & Plan         20-year-old woman with uncontrolled type 1 diabetes normally treated with insulin pump admitted yesterday due to severe diabetic ketoacidosis.  There continues to be concern for possible bacterial infection and sepsis which may have served as trigger for DKA.  Malfunction of insulin pump is not suspected so far.  DKA is gradually resolving and she is clinically improving.  She is newly hypophosphatemic today likely due to electrolyte shifts from DKA.  Systemic inflammatory response syndrome is improving and lactic acidosis has resolved.  Lactic acidosis may have been due to sepsis, but is also probably due to diabetes mellitus.  Culture results are pending.  Clinical source for infection is not yet evident, but presentation with acute flank pain and nausea with vomiting raises concern for possible pyelonephritis.  Her chronic celiac sprue is stable clinically.    Principal Problem:    DKA, type 1 (H)  Active Problems:    SIRS (systemic inflammatory response syndrome) (H)-tachycardia, leukocytosis    Elevated procalcitonin    Diabetes mellitus type 1 with hyperosmolarity (H)    Lactic acidosis    Hypophosphatemia    Flank pain    Nausea with vomiting    Celiac sprue    Insulin pump status    IUD (intrauterine device) in place    Reviewed criteria for patient use of her insulin pump during hospitalization with the patient and her mother today, see completed form in chart, anticipate patient will be able to resume use of her insulin pump once medically appropriate    Continue IV insulin infusion per DKA protocol along with IV fluids today in the ICU until DKA has completely resolved, do not anticipate transition to use of her insulin pump or alternative subcutaneous insulin treatments until early tomorrow    Add prandial dosing of subcutaneous NovoLog as she starts  to advance diet today using carb ratios that she normally uses with her insulin pump    Continue empiric parenteral antibiotics with vancomycin and Zosyn while awaiting culture results, follow serial WBC and procalcitonin  Check renal ultrasound today looking for radiographic signs of possible pyelonephritis    Replace phosphorus according to standard protocol    Anticipate patient will follow-up as an outpatient with endocrinology and diabetic nurse educator and they indicate that they are in the process of switching her diabetic care to [facility] in [place]       Diet: NPO for Medical/Clinical Reasons Except for: Ice Chips    DVT Prophylaxis: Enoxaparin (Lovenox) SQ  Johnson Catheter: Not present  Central Lines: None  Code Status: Full Code      Disposition Plan   Expected discharge:  2 days recommended to prior living arrangement once antibiotic plan established and DKA has resolved.     The patient's care was discussed with the Bedside Nurse, Patient and Patient's mother.  Total floor time 55 minutes today including 35 minutes spent in direct face-to-face counseling and discussion with the patient and her mother today from 11:50 AM till 12:25 PM with particular emphasis on evaluation of her capacity to manage her insulin pump on her own.    Chico Denise MD  Hospitalist Service  [facility]  Securely message with the Vocera Web Console (learn more here)  Text page via [employer] Paging/Directory        Clinically Significant Risk Factors Present on Admission               ______________________________________________________________________    Interval History   She is starting to feel better.  Nausea has subsided and she is no longer having any flank pain.  She denies any abdominal pain.  She feels thirsty although does not have much appetite yet.  She has some heartburn and sore throat that she attributes to repeated vomiting.  She has not had fever.  Tachycardia has  resolved and blood pressure has been stable.  Respiratory status has been stable with normal oxygenation.  Urine output has been adequate.  She denies any dysuria, frequency, urgency, or incontinence.  She has a chronic IUD that was placed about 2 years ago and no longer has any menstrual bleeding.  She reports that she suddenly became ill in the early morning on October 26 awaking at about 2:30 AM with a combination of bilateral severe flank pain and nausea with vomiting that was unrelenting.  She had symptoms that reminded her of DKA, so she called her family and was transported to an outside emergency room.  At that time, her mother reports that the patient seemed to be behaving normally.  They have not had concerns for confusion.  Patient has mild headache today.    Data reviewed today: I reviewed all medications, new labs and imaging results over the last 24 hours. I personally reviewed cardiac monitor which has demonstrated sinus tachycardia and normal sinus rhythm but no dysrhythmias.    Physical Exam   Vital Signs: Temp: 98.2  F (36.8  C) Temp src: Oral BP: 105/67 Pulse: 81   Resp: 21 SpO2: 98 % O2 Device: None (Room air)    Weight: 114 lbs 9.6 oz  General Appearance: Tired appearing young woman without signs of acute distress  Respiratory: Normal respiratory effort, clear lungs  Cardiovascular: Regular rate and rhythm, good radial pulse, brisk capillary refill  GI: Hypoactive bowel sounds, nondistended abdomen, soft, no abdominal tenderness  Skin: Somewhat pale color, no rash  Other: No midline back or CVA tenderness    Data   Recent Labs   Lab 10/27/21  1209 10/27/21  1110 10/27/21  1022 10/27/21  0917 10/27/21  0806 10/27/21  0558 10/27/21  0536 10/26/21  2209 10/26/21  2139 10/26/21  2138 10/26/21  1212 10/26/21  1109   WBC  --   --   --   --   --   --   --   --  28.2*  --   --  41.3*   HGB  --   --   --   --   --   --   --   --  14.4  --   --  18.3*   MCV  --   --   --   --   --   --   --   --  91  --    --  97   PLT  --   --   --   --   --   --   --   --  298  --   --  464*   NA  --   --  138  --  139  --  140   < >  --  135   < > 131*   POTASSIUM  --   --  4.0  --  4.1  --  3.9   < >  --  3.7   < > 4.3   CHLORIDE  --   --  116*  --  117*  --  116*   < >  --  109   < > 89*   CO2  --   --  17*  --  19*  --  18*   < >  --  10*   < > 7*   BUN  --   --  8  --  8  --  10   < >  --  14   < > 27   CR  --   --  0.67  --  0.72  --  0.56   < >  --  0.66   < > 1.10*   ANIONGAP  --   --  5  --  3  --  6   < >  --  16*   < > 35*   BEN  --   --  8.1*  --  8.3*  --  8.1*   < >  --  8.1*   < > 9.8   * 190* 163*   < > 134*   < > 177*   < >  --  274*   < > 761*   ALBUMIN  --   --   --   --   --   --   --   --   --  3.6  --  5.1*   PROTTOTAL  --   --   --   --   --   --   --   --   --  7.2  --  10.3*   BILITOTAL  --   --   --   --   --   --   --   --   --  0.8  --  0.9   ALKPHOS  --   --   --   --   --   --   --   --   --  102  --  179*   ALT  --   --   --   --   --   --   --   --   --  26  --  39   AST  --   --   --   --   --   --   --   --   --  15  --  36    < > = values in this interval not displayed.     Blood sugars ranged 155-225 overnight, hemoglobin A1c was 10.3    Serum osmolality at admission was 305 with 295 the upper limit of normal    Lactic acid 0.5 this morning, was 0.7 last night, and had been 7.2 in the ER    Phosphorus 1.6 today    Ketones remain elevated this morning at 1.4 with 0.6 the upper limit of normal    Venous blood gas pH 7.31 with PCO2 35 and bicarbonate 18 this morning    Medications     0.45% sodium chloride + KCl 20 mEq/L Stopped (10/26/21 3534)     dextrose 5% and 0.45% NaCl + KCl 20 mEq/L 200 mL/hr at 10/27/21 1045     insulin (regular) 4 Units/hr (10/27/21 1210)       enoxaparin ANTICOAGULANT  40 mg Subcutaneous Q24H     escitalopram  20 mg Oral Daily     piperacillin-tazobactam  3.375 g Intravenous Q6H     sodium phosphate  15 mmol Intravenous Once     vancomycin  1,000 mg Intravenous Q12H

## 2021-10-27 NOTE — H&P
[facility]    History and Physical - Hospitalist Service       Date of Admission:  10/26/2021    Assessment & Plan      Margo Cruz is a 20 year old female admitted on 10/26/2021. She has a history of diabetes mellitus type 1, celiac disease and marijuana use presented with nausea and vomiting and abdominal discomfort.  The patient presented to the emergency where she was found to be in DKA and leukocytosis.  Since there was no bed in the other hospital the patient was transferred here to be admitted to the ICU for DKA with metabolic acidosis.    DKA  Type 1 diabetes  Metabolic acidosis  Electrolyte abnormalities  Patient is very lethargic not saying much.  Did receive IV fluids blood sugar 250  We will continue half-normal saline at 200 cc an hour switch to D5 half-normal saline at 200 cc an hour once blood sugar is less than 200.  We will start the patient on insulin drip.  Continue as per protocol.  Check electrolytes, beta hydroxybutyrate, CBC, every 4 hours including now.  Watch patient's potassium and add potassium if needed.  Admit to ICU, monitor on telemetry  Zofran as needed nausea.    COVID-19 negative    Leukocytosis of 41k white count with a left shift  Patient was briefly started on antibiotics of vancomycin and Zosyn in the emergency.  Given high white count I will continue this  We will follow blood cultures I will reorder this as well.  UA was negative, chest x-ray was negative.    History of celiac disease  Order gluten-free diet, was able to currently get n.p.o. okay with ice chips    Marijuana use  She reports regular use of marijuana with a history of cyclical vomiting           Diet: NPO for Medical/Clinical Reasons Except for: Ice Chips    DVT Prophylaxis: Enoxaparin (Lovenox) SQ  Johnson Catheter: Not present  Central Lines: None  Code Status:  Full    Clinically Significant Risk Factors Present on Admission        # Hyperkalemia: K = 5.5 mmol/L (Ref range:  3.4 - 5.3 mmol/L) on admission, will monitor as appropriate  # Hyponatremia: Na = 132 mmol/L (Ref range: 133 - 144 mmol/L) on admission, will monitor as appropriate           Disposition Plan   Expected discharge:      The patient's care was discussed with the Bedside Nurse.    Jose M Day MD  [facility]  Securely message with the Vocera Web Console (learn more here)  Text page via [employer] Paging/Directory        ______________________________________________________________________    Chief Complaint   Nausea and vomiting, lethargic    History is obtained from the electronic health record and emergency physician    History of Present Illness   Margo Cruz is a 20 year old female who .  The patient states that she was not feeling well for the last 2 to 3 days.  Presented to the emergency with nausea vomiting and abdominal pain and not feeling well and lethargic and weakness.  She has noticed elevated blood sugar, in the emergency the patient's blood sugar was in the 700s, white count 41K, lactate 7 potassium 5.5 sodium 135, chest x-ray negative, UA clear, Covid negative.  The patient was given insulin and IV fluid boluses with some improvement in the blood patient's blood sugar.  Patient was also given vancomycin and Zosyn given very high white count.  No signs of infection.  Given 41,000 WBC patient blood cultures were taken and started on antibiotics as well.  Could not get any review of system as the patient was very lethargic.  During my exam the patient is moaning and moving but not answering questions she seems very lethargic.  Patient's current blood sugar was 250, have reordered labs.    Review of Systems    Review of systems not obtained due to patient factors - confusion and lethargic and weak    Past Medical History    I have reviewed this patient's medical history and updated it with pertinent information if needed.   Past Medical History:   Diagnosis Date     Diabetes  mellitus      Seborrheic infantile dermatitis    Celiac disease  Marijuana abuse    Past Surgical History   I have reviewed this patient's surgical history and updated it with pertinent information if needed.  No past surgical history on file.    Social History   I have reviewed this patient's social history and updated it with pertinent information if needed.  Social History     Tobacco Use     Smoking status: Never Smoker     Smokeless tobacco: Never Used     Tobacco comment: mom smokes outside   Substance Use Topics     Alcohol use: No     Drug use: No       Family History   I have reviewed this patient's family history and updated it with pertinent information if needed.  Family History   Problem Relation Age of Onset     Diabetes Father        Prior to Admission Medications   Prior to Admission Medications   Prescriptions Last Dose Informant Patient Reported? Taking?   Insulin Aspart (INSULIN PUMP - OUTPATIENT)   Yes No   Sig: Inject Subcutaneous See Admin Instructions Type of pump: Medtronic  Type of insulin: Novolog (changed to generic about 1 week ago)  Basal rate(s)  0745-4538: 1 unit/hr  0615-6031: 1.1 units/hr  4761-5710: 0.55 units/hr  0339-4382: 1.1 units/hr  ISF: 35 (all day)  ICF (insulin to carb ratio)  3888-8094: 1unit/4.6g carbs  7042-4069: 1 unit/10g carbs  4931-2665: 1 unit/8g carbs  Active insulin time: 3 hrs  Target goal range:   Followed by endocrinology   blood glucose (CONTOUR NEXT TEST) test strip   No No   Sig: Use to test blood sugar 5-6 times daily or as directed.   blood glucose monitoring (TYE MICROLET) lancets   No No   Sig: Use to test blood sugar 4-5 times daily or as directed.   escitalopram (LEXAPRO) 20 MG tablet   No No   Sig: Take 1 tablet (20 mg) by mouth daily   hydrOXYzine (ATARAX) 25 MG tablet   No No   Sig: Take 1 tablet (25 mg) by mouth nightly as needed for anxiety (sleep)   insulin aspart (NOVOLOG PEN) 100 UNIT/ML pen   No No   Sig: Use 3x/day with meals (carb  correction scale). Also correction scale with correction factor 1:18   insulin aspart (NOVOLOG VIAL) 100 UNITS/ML vial   No No   Sig: Use up to 80 units daily in insulin pump. 90 day supply.   insulin glargine (LANTUS PEN) 100 UNIT/ML pen   No No   Sig: Inject 20 Units Subcutaneous 2 times daily      Facility-Administered Medications: None     Allergies   Allergies   Allergen Reactions     No Known Drug Allergies        Physical Exam   Vital Signs: Temp: 97.1  F (36.2  C) Temp src: Oral BP: 112/66 Pulse: 110   Resp: 20 SpO2: 97 % O2 Device: None (Room air)    Weight: 114 lbs 9.6 oz    General: Weak and lethargic, somnolent, maintaining airways, moving her head back and forth but not in distress  HENT:  Normocephalic.  Dry oral mucosa  Respiratory:  Lungs are clear to auscultation, Respirations are non-labored.    Cardiovascular: Sinus tachycardia, Regular rhythm.    Gastrointestinal:  Soft, Non-tender, Non-distended, Normal bowel sounds.    Neurologic: Lethargic and somnolent with weakness generalized    Data   Data reviewed today: I reviewed all medications, new labs and imaging results over the last 24 hours. I personally reviewed .    Recent Labs   Lab 10/26/21  2018 10/26/21  1809 10/26/21  1725 10/26/21  1518 10/26/21  1455 10/26/21  1339 10/26/21  1212 10/26/21  1109   WBC  --   --   --   --   --   --   --  41.3*   HGB  --   --   --   --   --   --   --  18.3*   MCV  --   --   --   --   --   --   --  97   PLT  --   --   --   --   --   --   --  464*   NA  --   --   --   --  132*  --   --  131*   POTASSIUM  --   --   --   --  5.5*  --  5.2 4.3   CHLORIDE  --   --   --   --  100  --   --  89*   CO2  --   --   --   --  6*  --   --  7*   BUN  --   --   --   --  23  --   --  27   CR  --   --   --   --  0.82  --   --  1.10*   ANIONGAP  --   --   --   --  26*  --   --  35*   BEN  --   --   --   --  8.5  --   --  9.8   * 245* 262*   < > 523*   < >  --  761*   ALBUMIN  --   --   --   --   --   --   --  5.1*    PROTTOTAL  --   --   --   --   --   --   --  10.3*   BILITOTAL  --   --   --   --   --   --   --  0.9   ALKPHOS  --   --   --   --   --   --   --  179*   ALT  --   --   --   --   --   --   --  39   AST  --   --   --   --   --   --   --  36    < > = values in this interval not displayed.     Recent Results (from the past 24 hour(s))   XR Chest Port 1 View    Narrative    CHEST PORTABLE ONE VIEW   10/26/2021 12:06 PM     HISTORY: DKA, leukocytosis.    COMPARISON: Chest x-ray on 6/4/2020.      Impression    IMPRESSION: AP view of the chest was obtained. Cardiomediastinal  silhouette is within normal limits. No suspicious focal pulmonary  opacities. No significant pleural effusion or pneumothorax.     YOVANY MEJIA MD         SYSTEM ID:  [id number]       Visit/Communication Style   Virtual (Video) communication was used to evaluate Margo.  Margo consented to the use of video communication: Patient cannot give any consent given her clinical status patient very lethargic and weak primary RN at bedside  Video START time: 09:45 pm EST, 10/26/2021  Video STOP time: 10:00pm EST, 10/26/2021   Patient's location: [facility]   Provider's location during the visit: [facility] Tele-medicine site

## 2021-10-27 NOTE — PLAN OF CARE
Problem: Diabetic Ketoacidosis  Goal: Fluid and Electrolyte Balance with Absence of Ketosis  Intervention: Monitor and Manage Ketoacidosis  Recent Flowsheet Documentation  Taken 10/27/2021 1700 by Onur Olmos RN  Glycemic Management:   blood glucose monitored   insulin infusion adjusted  Taken 10/27/2021 1200 by Onur Olmos RN  Glycemic Management:   blood glucose monitored   insulin infusion adjusted  Taken 10/27/2021 0818 by Onur Olmos RN  Glycemic Management:   blood glucose monitored   insulin infusion adjusted   Patient lethargic, but orientated x4.  Slept most of day.  VSS. Complaints of chest/throat pain this am, resolved after carafate.  Intermittent nausea.  Zofran x1 with no relief.  Insulin infusion Algorithm 2.  -227.  NSR.  Phosphorus replaced.  Onur Olmos RN

## 2021-10-27 NOTE — PHARMACY-VANCOMYCIN DOSING SERVICE
"Pharmacy Vancomycin Initial Note  Date of Service 2021  Patient's  2000  20 year old, female    Indication: Sepsis    Current estimated CrCl = Estimated Creatinine Clearance: 89.8 mL/min (based on SCr of 0.82 mg/dL).    Creatinine for last 3 days  10/26/2021: 11:09 AM Creatinine 1.10 mg/dL;  2:55 PM Creatinine 0.82 mg/dL    Recent Vancomycin Level(s) for last 3 days  No results found for requested labs within last 72 hours.      Vancomycin IV Administrations (past 72 hours)                   vancomycin 1250 mg in 0.9% NaCl 250 mL intermittent infusion 1,250 mg (mg) 1,250 mg New Bag 10/26/21 1242                Nephrotoxins and other renal medications (From now, onward)    Start     Dose/Rate Route Frequency Ordered Stop    10/27/21 0000  vancomycin (VANCOCIN) 1,250 mg in sodium chloride 0.9 % 250 mL intermittent infusion      1,250 mg  over 90 Minutes Intravenous EVERY 18 HOURS 10/26/21 2136      10/26/21 2100  piperacillin-tazobactam (ZOSYN) infusion 3.375 g     Note to Pharmacy: For [facility], [facility] and [facility]: For Zosyn-naive patients, use the \"Zosyn initial dose + extended infusion\" order panel.    3.375 g  100 mL/hr over 30 Minutes Intravenous EVERY 8 HOURS 10/26/21 2050            Contrast Orders - past 72 hours (72h ago, onward)    None        Loading dose: N/A  Regimen: 1250 mg IV every 18 hours.  Start time: 21:34 on 10/26/2021  Exposure target: AUC24 (range)400-600 mg/L.hr   AUC24,ss: 467 mg/L.hr  Probability of AUC24 > 400: 66 %  Ctrough,ss: 12.1 mg/L  Probability of Ctrough,ss > 20: 17 %  Probability of nephrotoxicity (Lodise TORITO ): 7 %            Plan:  1. Start vancomycin  1250 mg IV q18h.   2. Vancomycin monitoring method: AUC  3. Vancomycin therapeutic monitoring goal: 400-600 mg*h/L  4. Pharmacy will check vancomycin levels as appropriate in 1-3 Days.    5. Serum creatinine levels will be ordered daily for the first week of therapy and at least twice weekly for subsequent weeks.  "     Marcos Maier, [facility]

## 2021-10-28 LAB
ANION GAP SERPL CALCULATED.3IONS-SCNC: 6 MMOL/L (ref 3–14)
BASE EXCESS BLDV CALC-SCNC: -1.6 MMOL/L (ref -7.7–1.9)
BASE EXCESS BLDV CALC-SCNC: -4.5 MMOL/L (ref -7.7–1.9)
BUN SERPL-MCNC: 3 MG/DL (ref 7–30)
CALCIUM SERPL-MCNC: 7.7 MG/DL (ref 8.5–10.1)
CHLORIDE BLD-SCNC: 119 MMOL/L (ref 94–109)
CO2 SERPL-SCNC: 19 MMOL/L (ref 20–32)
CREAT SERPL-MCNC: 0.63 MG/DL (ref 0.52–1.04)
GFR SERPL CREATININE-BSD FRML MDRD: >90 ML/MIN/1.73M2
GLUCOSE BLD-MCNC: 157 MG/DL (ref 70–99)
GLUCOSE BLDC GLUCOMTR-MCNC: 131 MG/DL (ref 70–99)
GLUCOSE BLDC GLUCOMTR-MCNC: 132 MG/DL (ref 70–99)
GLUCOSE BLDC GLUCOMTR-MCNC: 134 MG/DL (ref 70–99)
GLUCOSE BLDC GLUCOMTR-MCNC: 137 MG/DL (ref 70–99)
GLUCOSE BLDC GLUCOMTR-MCNC: 145 MG/DL (ref 70–99)
GLUCOSE BLDC GLUCOMTR-MCNC: 160 MG/DL (ref 70–99)
GLUCOSE BLDC GLUCOMTR-MCNC: 162 MG/DL (ref 70–99)
GLUCOSE BLDC GLUCOMTR-MCNC: 165 MG/DL (ref 70–99)
GLUCOSE BLDC GLUCOMTR-MCNC: 171 MG/DL (ref 70–99)
GLUCOSE BLDC GLUCOMTR-MCNC: 172 MG/DL (ref 70–99)
GLUCOSE BLDC GLUCOMTR-MCNC: 185 MG/DL (ref 70–99)
GLUCOSE BLDC GLUCOMTR-MCNC: 186 MG/DL (ref 70–99)
GLUCOSE BLDC GLUCOMTR-MCNC: 207 MG/DL (ref 70–99)
GLUCOSE BLDC GLUCOMTR-MCNC: 214 MG/DL (ref 70–99)
GLUCOSE BLDC GLUCOMTR-MCNC: 271 MG/DL (ref 70–99)
GLUCOSE BLDC GLUCOMTR-MCNC: 291 MG/DL (ref 70–99)
GLUCOSE BLDC GLUCOMTR-MCNC: 318 MG/DL (ref 70–99)
GLUCOSE BLDC GLUCOMTR-MCNC: 355 MG/DL (ref 70–99)
GLUCOSE BLDC GLUCOMTR-MCNC: 370 MG/DL (ref 70–99)
GLUCOSE BLDC GLUCOMTR-MCNC: 370 MG/DL (ref 70–99)
HCO3 BLDV-SCNC: 19 MMOL/L (ref 21–28)
HCO3 BLDV-SCNC: 21 MMOL/L (ref 21–28)
KETONES BLD-SCNC: 0.1 MMOL/L (ref 0–0.6)
KETONES BLD-SCNC: 2.1 MMOL/L (ref 0–0.6)
MRSA DNA SPEC QL NAA+PROBE: NEGATIVE
O2/TOTAL GAS SETTING VFR VENT: 21 %
O2/TOTAL GAS SETTING VFR VENT: 21 %
PCO2 BLDV: 31 MM HG (ref 40–50)
PCO2 BLDV: 32 MM HG (ref 40–50)
PH BLDV: 7.4 [PH] (ref 7.32–7.43)
PH BLDV: 7.45 [PH] (ref 7.32–7.43)
PHOSPHATE SERPL-MCNC: 1.7 MG/DL (ref 2.5–4.5)
PHOSPHATE SERPL-MCNC: 2 MG/DL (ref 2.5–4.5)
PHOSPHATE SERPL-MCNC: 2.4 MG/DL (ref 2.5–4.5)
PO2 BLDV: 104 MM HG (ref 25–47)
PO2 BLDV: 98 MM HG (ref 25–47)
POTASSIUM BLD-SCNC: 3.5 MMOL/L (ref 3.4–5.3)
PROCALCITONIN SERPL-MCNC: 1.7 NG/ML
SA TARGET DNA: NEGATIVE
SODIUM SERPL-SCNC: 144 MMOL/L (ref 133–144)
VANCOMYCIN SERPL-MCNC: 9.8 MG/L
WBC # BLD AUTO: 11.4 10E3/UL (ref 4–11)

## 2021-10-28 PROCEDURE — 87641 MR-STAPH DNA AMP PROBE: CPT | Performed by: PEDIATRICS

## 2021-10-28 PROCEDURE — 200N000001 HC R&B ICU

## 2021-10-28 PROCEDURE — 80048 BASIC METABOLIC PNL TOTAL CA: CPT | Performed by: PEDIATRICS

## 2021-10-28 PROCEDURE — 250N000013 HC RX MED GY IP 250 OP 250 PS 637: Performed by: HOSPITALIST

## 2021-10-28 PROCEDURE — 250N000011 HC RX IP 250 OP 636: Performed by: PEDIATRICS

## 2021-10-28 PROCEDURE — 82010 KETONE BODYS QUAN: CPT | Performed by: PEDIATRICS

## 2021-10-28 PROCEDURE — 250N000011 HC RX IP 250 OP 636: Performed by: HOSPITALIST

## 2021-10-28 PROCEDURE — 99233 SBSQ HOSP IP/OBS HIGH 50: CPT | Performed by: PEDIATRICS

## 2021-10-28 PROCEDURE — 250N000011 HC RX IP 250 OP 636

## 2021-10-28 PROCEDURE — 250N000009 HC RX 250: Performed by: PEDIATRICS

## 2021-10-28 PROCEDURE — 85048 AUTOMATED LEUKOCYTE COUNT: CPT | Performed by: PEDIATRICS

## 2021-10-28 PROCEDURE — 258N000003 HC RX IP 258 OP 636: Performed by: HOSPITALIST

## 2021-10-28 PROCEDURE — 84132 ASSAY OF SERUM POTASSIUM: CPT | Performed by: PEDIATRICS

## 2021-10-28 PROCEDURE — 84100 ASSAY OF PHOSPHORUS: CPT | Performed by: PEDIATRICS

## 2021-10-28 PROCEDURE — 82803 BLOOD GASES ANY COMBINATION: CPT | Performed by: PEDIATRICS

## 2021-10-28 PROCEDURE — 258N000003 HC RX IP 258 OP 636: Performed by: PEDIATRICS

## 2021-10-28 PROCEDURE — 250N000013 HC RX MED GY IP 250 OP 250 PS 637: Performed by: PEDIATRICS

## 2021-10-28 PROCEDURE — 80202 ASSAY OF VANCOMYCIN: CPT

## 2021-10-28 PROCEDURE — 36415 COLL VENOUS BLD VENIPUNCTURE: CPT | Performed by: PEDIATRICS

## 2021-10-28 PROCEDURE — 84145 PROCALCITONIN (PCT): CPT | Performed by: PEDIATRICS

## 2021-10-28 RX ORDER — CEFTRIAXONE 2 G/1
2 INJECTION, POWDER, FOR SOLUTION INTRAMUSCULAR; INTRAVENOUS EVERY 24 HOURS
Status: DISCONTINUED | OUTPATIENT
Start: 2021-10-28 | End: 2021-10-29

## 2021-10-28 RX ADMIN — CEFTRIAXONE SODIUM 2 G: 2 INJECTION, POWDER, FOR SOLUTION INTRAMUSCULAR; INTRAVENOUS at 15:05

## 2021-10-28 RX ADMIN — VANCOMYCIN HYDROCHLORIDE 1000 MG: 1 INJECTION, SOLUTION INTRAVENOUS at 01:05

## 2021-10-28 RX ADMIN — ESCITALOPRAM OXALATE 20 MG: 10 TABLET ORAL at 11:13

## 2021-10-28 RX ADMIN — POTASSIUM CHLORIDE, DEXTROSE MONOHYDRATE AND SODIUM CHLORIDE: 150; 5; 450 INJECTION, SOLUTION INTRAVENOUS at 12:52

## 2021-10-28 RX ADMIN — ENOXAPARIN SODIUM 40 MG: 40 INJECTION SUBCUTANEOUS at 21:42

## 2021-10-28 RX ADMIN — TAZOBACTAM SODIUM AND PIPERACILLIN SODIUM 3.38 G: 375; 3 INJECTION, SOLUTION INTRAVENOUS at 11:14

## 2021-10-28 RX ADMIN — SODIUM PHOSPHATE, MONOBASIC, MONOHYDRATE 9 MMOL: 276; 142 INJECTION, SOLUTION INTRAVENOUS at 23:59

## 2021-10-28 RX ADMIN — TAZOBACTAM SODIUM AND PIPERACILLIN SODIUM 3.38 G: 375; 3 INJECTION, SOLUTION INTRAVENOUS at 04:57

## 2021-10-28 RX ADMIN — SODIUM PHOSPHATE, MONOBASIC, MONOHYDRATE AND SODIUM PHOSPHATE, DIBASIC, ANHYDROUS 15 MMOL: 276; 142 INJECTION, SOLUTION INTRAVENOUS at 12:51

## 2021-10-28 RX ADMIN — POTASSIUM CHLORIDE, DEXTROSE MONOHYDRATE AND SODIUM CHLORIDE: 150; 5; 450 INJECTION, SOLUTION INTRAVENOUS at 02:10

## 2021-10-28 RX ADMIN — POTASSIUM CHLORIDE, DEXTROSE MONOHYDRATE AND SODIUM CHLORIDE: 150; 5; 450 INJECTION, SOLUTION INTRAVENOUS at 07:19

## 2021-10-28 ASSESSMENT — ACTIVITIES OF DAILY LIVING (ADL)
ADLS_ACUITY_SCORE: 5

## 2021-10-28 NOTE — PLAN OF CARE
Major shift events: transitioned off of insulin drip  Neuro: alert and oriented. Sleeps between cares  CMS: intact, denies any changes  Pulmonary: lung sounds clear throughout on room air  CV: SR blood pressures stable  GI: very poor appetite, reported diarrhea x2  : adequate urine output  Skin: intact  Lines/Tubes/Drains: PIV x2, insulin pump  Drips: insulin, phosphorus    Plan: check blood sugars hourly until 2300 to watch glucose trend. Labs will be drawn at 2300, notify MD of any abnormals. May start sub-q novolog for coverage.

## 2021-10-28 NOTE — PROGRESS NOTES
[facility]    Medicine Progress Note - Hospitalist Service       Date of Admission:  10/26/2021    Assessment & Plan              20-year-old woman with uncontrolled type 1 diabetes normally treated with insulin pump admitted due to severe diabetic ketoacidosis.  She was also hyperosmolar.  There continues to be concern for possible bacterial infection and sepsis which may have served as trigger for DKA.  Malfunction of insulin pump is not suspected so far.  DKA has resolved and she is clinically improving.  As DKA resolved, she became moderately hypophosphatemic likely due to electrolyte shifts from DKA, and hypophosphatemia persists and may contribute to ongoing symptoms suspicious for ileus.  Systemic inflammatory response syndrome is resolving and lactic acidosis has resolved.  Lactic acidosis may have been due to sepsis, but is also probably due to diabetes mellitus.  Culture results are negative so far and clinical source for infection has not been evident.  Results of renal ultrasound were essentially normal arguing against acute pyelonephritis.  Nevertheless, her otherwise unexplained DKA with initial WBC 41,000, lactic acid 7.2 and elevated procalcitonin remain worrisome for bacterial infection as possible trigger.  Her chronic celiac sprue is stable clinically.    Principal Problem:    DKA, type 1 (H)  Active Problems:    SIRS (systemic inflammatory response syndrome) (H)-tachycardia, leukocytosis    Elevated procalcitonin    Diabetes mellitus type 1 with hyperosmolarity (H)    Lactic acidosis    Hypophosphatemia    Celiac sprue    Insulin pump status    Flank pain    Nausea with vomiting    IUD (intrauterine device) in place      Criteria for patient use of her insulin pump during hospitalization had been reviewed with the patient yesterday, see completed form in chart, resume use of her insulin pump today now that DKA has resolved and will use her normal insulin pump  settings    Discontinue IV insulin infusion and IV fluids as she starts her insulin pump    Discontinue prandial dosing of subcutaneous NovoLog as she resumes use of her insulin pump    Monitor ketones, acid-base status, and electrolytes closely over the next 24 hours while restarting insulin pump because it is not completely clear whether insulin pump malfunction could have been the trigger for DKA and if there is concern for evolving DKA may need to switch to subcutaneous insulin therapy until such time as her insulin pump can be better investigated as that service is not available on an inpatient basis at this hospital    Continue empiric parenteral antibiotics but switch to Rocephin today, continue to follow serial WBC and procalcitonin, anticipate recommending that she complete a course of antibiotic treatment after discharge empirically for bacterial infection    Continue to replace phosphorus according to standard protocol    Anticipate outpatient follow-up with endocrinology and diabetic nurse educator after discharge, they indicate intent to switch her diabetic care to [facility] in [place] where her father who is a diabetic is currently receiving his diabetic care and they are in the process of making those follow-up arrangements       Diet: Combination Diet Gluten Free Diet; Consistent Carb 60 Grams CHO per Meal Diet    DVT Prophylaxis: Enoxaparin (Lovenox) SQ  Johnson Catheter: Not present  Central Lines: None  Code Status: Full Code      Disposition Plan   Expected discharge: 10/30/2021   recommended to prior living arrangement once DKA remains resolved after transition to insulin pump or if necessary switch to subcutaneous insulin therapy and after antibiotic plan has been established, phosphorus level has normalized, and she is able to tolerate adequate oral intake.     The patient's care was discussed with the Bedside Nurse, Care Coordinator/, Patient and Patient's  mother.    Chico Denise MD  Hospitalist Service  [facility]  Securely message with the [employer] Web Console (learn more here)  Text page via [employer] Paging/Directory        Clinically Significant Risk Factors Present on Admission               ______________________________________________________________________    Interval History   She continues to feel tired.  She is not nauseated but really does not have any appetite.  She has tolerated a liquid diet without much difficulty.  She has not had any diarrhea.  She denies any abdominal pain.  She has been afebrile and hemodynamically stable.  Oxygenation is normal.  Urine output has been good.  She is feeling better enough to resume use of her insulin pump today according to the way she normally uses it at home.    Data reviewed today: I reviewed all medications, new labs and imaging results over the last 24 hours. I personally reviewed cardiac monitor which has not demonstrated any dysrhythmias.    Physical Exam   Vital Signs: Temp: 98.2  F (36.8  C) Temp src: Oral BP: 115/64 Pulse: 81   Resp: 13 SpO2: 97 % O2 Device: None (Room air)    Weight: 114 lbs 3.2 oz  General Appearance: No acute distress resting in bed, appears tired  Respiratory: Normal respiratory effort, easily speaking full sentences  Cardiovascular: Normal capillary refill  GI: Hypoactive bowel sounds, nondistended abdomen, soft, no significant abdominal tenderness  Skin: No rash  Other: Alert and oriented    Data   Recent Labs   Lab 10/28/21  1336 10/28/21  1223 10/28/21  1113 10/28/21  0654 10/28/21  0616 10/27/21  1411 10/27/21  1353 10/27/21  1110 10/27/21  1022 10/27/21  0917 10/27/21  0806 10/26/21  2209 10/26/21  2139 10/26/21  2138 10/26/21  1212 10/26/21  1109   WBC  --   --   --   --  11.4*  --   --   --   --   --   --   --  28.2*  --   --  41.3*   HGB  --   --   --   --   --   --   --   --   --   --   --   --  14.4  --   --  18.3*   MCV  --   --   --   --    --   --   --   --   --   --   --   --  91  --   --  97   PLT  --   --   --   --   --   --   --   --   --   --   --   --  298  --   --  464*   NA  --   --   --   --   --   --  138  --  138  --  139   < >  --  135   < > 131*   POTASSIUM  --   --   --   --   --   --  3.5  --  4.0  --  4.1   < >  --  3.7   < > 4.3   CHLORIDE  --   --   --   --   --   --  115*  --  116*  --  117*   < >  --  109   < > 89*   CO2  --   --   --   --   --   --  19*  --  17*  --  19*   < >  --  10*   < > 7*   BUN  --   --   --   --   --   --  7  --  8  --  8   < >  --  14   < > 27   CR  --   --   --   --   --   --  0.67  --  0.67  --  0.72   < >  --  0.66   < > 1.10*   ANIONGAP  --   --   --   --   --   --  4  --  5  --  3   < >  --  16*   < > 35*   BEN  --   --   --   --   --   --  7.9*  --  8.1*  --  8.3*   < >  --  8.1*   < > 9.8   * 132* 145*   < >  --    < > 168*   < > 163*   < > 134*   < >  --  274*   < > 761*   ALBUMIN  --   --   --   --   --   --   --   --   --   --   --   --   --  3.6  --  5.1*   PROTTOTAL  --   --   --   --   --   --   --   --   --   --   --   --   --  7.2  --  10.3*   BILITOTAL  --   --   --   --   --   --   --   --   --   --   --   --   --  0.8  --  0.9   ALKPHOS  --   --   --   --   --   --   --   --   --   --   --   --   --  102  --  179*   ALT  --   --   --   --   --   --   --   --   --   --   --   --   --  26  --  39   AST  --   --   --   --   --   --   --   --   --   --   --   --   --  15  --  36    < > = values in this interval not displayed.     Ketones negative this morning  Phosphorus 1.7 this morning, improved from 1.2 overnight    Venous Blood Gas  Recent Labs   Lab 10/28/21  0616 10/27/21  1353 10/27/21  1022 10/26/21  2154 10/26/21  1119   PHV 7.40 7.34 7.31*  --   --    PCO2V 32* 35* 35*  --   --    PO2V 98* 35 74*  --   --    HCO3V 19* 19* 18*  --  8*   NIMA -4.5 -6.2 -8.0*  --   --    O2PER 21 21 21 21  --      Procalcitonin 1.70 today, increased from 1.17 on October 26 at admission  Urine  culture was negative  All 4 blood cultures continue to be negative so far    Renal ultrasound yesterday was essentially normal aside from mild distention of the left renal pelvis of unclear clinical significance    Medications     insulin basal rate for inpatient ambulatory pump         cefTRIAXone  2 g Intravenous Q24H     enoxaparin ANTICOAGULANT  40 mg Subcutaneous Q24H     escitalopram  20 mg Oral Daily     insulin aspart   Device See Admin Instructions     insulin bolus from AMBULATORY PUMP   Subcutaneous TID AC     [START ON 10/29/2021] insulin bolus from AMBULATORY PUMP   Subcutaneous QAM AC     [START ON 10/29/2021] insulin bolus from AMBULATORY PUMP   Subcutaneous Daily with lunch     insulin bolus from AMBULATORY PUMP   Subcutaneous Daily with supper     sodium phosphate  15 mmol Intravenous Once

## 2021-10-28 NOTE — PROGRESS NOTES
Antimicrobial Stewardship Team Note    Antimicrobial Stewardship Program - A joint venture between [facility] and  Physicians to optimize antibiotic management.  NOT a formal consult - Restricted Antimicrobial Review     Patient: Margo Cruz  MRN: [id number]  Allergies: No known drug allergies    Brief Summary: Margo Cruz is a 19 yo F with a PMH of T1DM, celiac disease, and regular marijuana use with history of cyclical vomiting who was admitted to [facility] on 10/26/21 for DKA.    HPI: Patient presented to an OSH ED earlier on 10/26 with nausea, vomiting, abdominal pain, and not feeling well with lethargy and weakness over the prior 2-3 days. Patient reported that her blood glucose had been elevated since the onset of vomiting. She also endorsed some spotted blood in her vomit and bilateral flank pain. She denied diarrhea, dysuria, rash, fever, cough, and sore throat. Patient's mother reports that she has had back/flank pain when she has had DKA in the past. Patient's ambulatory insulin pump appeared to be functioning normally. In the ED, patient was tachycardic but otherwise hemodynamically stable, afebrile, satting well on room air, had a WBC 41.3, procalcitonin 1.17, lactate 7.2, and blood glucose of 761. UA was collected that was unremarkable, 10/26 urine culture with urogenital virgen, 10/26 blood cultures with no growth to date x1 days, COVID negative. Chest x-ray on 10/26 was unremarkable and patient did not have any clinical signs of SSTI. Given significant leukocytosis, concern for an infectious source and so patient was started on empiric IV vancomycin and Zosyn, as well as an insulin drip and fluids, prior to transfer to [facility] ICU.    On 10/26 at [facility], patient's WBC began to downtrend quickly (28.2 later in the day), lactic acidosis and tachycardia resolved, and patient remained afebrile and hemodynamically stable. On 10/27, patient reported that her nausea,  flank pain, and abdominal pain had subsided. She is not having any diarrhea. A renal ultrasound was obtained and showed mild prominence of the left renal pelvis that could be due to obstructive process distal to this location although no liz left hydronephrosis is seen. A left ureteral jet is identified indicating no complete obstruction of the left urinary collecting system. Today, patient remains afebrile and hemodynamically stable, WBC 11.4, and continues on vancomycin and Zosyn. Of note, patient was admitted for DKA in June 2020, and her presenting WBC 21 during this episode.         Active Anti-infective Medications   (From admission, onward)                 Start     Stop    10/27/21 0000  vancomycin (VANCOCIN) injection  1,250 mg,   Intravenous,   EVERY 18 HOURS     Sepsis        --    10/26/21 2100  piperacillin-tazobactam  3.375 g,   Intravenous,   100 mL/hr,   EVERY 8 HOURS     DKA with Leukocytosis 41k and lactic acidosis        --                  Assessment: DKA with leukocytosis 2/2 possible unclear infectious source  Patient presented with nausea, vomiting, and abdominal pain found to be in DKA with a significant leukocytosis. Concern for possible infectious component given the degree of WBC elevation on presentation, although no clear source has been identified. Chest imaging is negative with no signs of a respiratory infection, no concerns for SSTI, no diarrhea to suggest C. difficle infection, no persistent abdominal pain to suggest an intra-abdominal process. Patient did report flank pain, which is concerning for possible pyelonephritis, although her UA and urine culture were negative, and renal ultrasound did not show any hydronephrosis. Additionally, patient reported bilateral flank pain, and family endorses that patient has had back/flank pain with prior DKA episodes, making pyelonephritis seem less likely. DKA can cause a leukocytosis, although not typically to the degree that this patient  presented with. Additionally, patient has presented with a leukocytosis during her last admission for DKA, but it was not as elevated as her WBC was on this admission, suggesting a possible infectious process. It is interesting, though, that the patient's WBC decreased so quickly after only one dose of vancomycin and Zosyn. Although a source is unclear at this time, patient likely does not warrant such broad coverage with vancomycin and Zosyn. Patient does not have a history of MRSA nor does she have other risk factors for MRSA infection, such as recent IV antibiotic use, so vancomycin can be discontinued. It is a little bit more difficult to narrow Zosyn as we don't know the possible infectious source, however, patient does not have risk factors for Pseudomonas either. Zosyn can be narrowed to ceftriaxone to cover for possible urinary bacteria, as this seems to be the source highest on the differential. If no source is identified in the next 24 hours, cultures remain negative, and patient continues to improve, can consider stopping antibiotics.    Recommendations:  Stop vancomycin and Zosyn  Start ceftriaxone 2 g every 24 hours  Consider stopping antibiotics after an additional 24 hours if cultures remain negative, no source is identified, and patient continues to improve    Discussed with ID Staff Zoraida Saunders MD, and Idalia Roca, PharmD, MPH, BCIDP     Ratna Gallegos, PGY-2 ID Pharmacy Resident  Pager: [phone]    Vital Signs/Clinical Features:  Vitals         10/26 0700  -  10/27 0659 10/27 0700  -  10/28 0659 10/28 0700  -  10/28 1150   Most Recent    Temp ( F) 97.1 -  97.4    97.2 -  98.7      97.6     97.6 (36.4)    Pulse 88 -  124    65 -  104    73 -  76     73    Resp 8 -  23    9 -  35    10 -  22     22    BP 96/52 -  112/66    88/53 -  123/89    108/60 -  115/77     115/77    SpO2 (%) 97 -  99    95 -  100      97     97            Labs  Estimated Creatinine Clearance: 109.5 mL/min (based  on SCr of 0.67 mg/dL).  Recent Labs   Lab Test 10/26/21  2138 10/27/21  0200 10/27/21  0536 10/27/21  0806 10/27/21  1022 10/27/21  1353   CR 0.66 0.65 0.56 0.72 0.67 0.67       Recent Labs   Lab Test 06/04/20  1058 06/05/20  0537 10/26/21  1109 10/26/21  2139 10/28/21  0616   WBC 21.0* 12.2* 41.3* 28.2* 11.4*   ANEU 15.0* 6.9  --   --   --    ALYM 4.2 4.0  --   --   --    ISMA 0.8 0.7  --   --   --    AEOS 0.7 0.5  --   --   --    HGB 16.4* 13.2 18.3* 14.4  --    HCT 49.7* 40.3 57.5* 41.6  --    MCV 95 94 97 91  --     281 464* 298  --        Recent Labs   Lab Test 01/23/20  1538 06/04/20  1058 06/05/20  0537 10/26/21  1109 10/26/21  2138   BILITOTAL 0.7 1.4* 0.8 0.9 0.8   ALKPHOS 67 99 69 179* 102   ALBUMIN 4.3 4.8 3.3* 5.1* 3.6   AST 12 17 14 36 15   ALT 15 24 16 39 26       Recent Labs   Lab Test 06/04/20  1057 06/04/20  1224 10/26/21  1109 10/26/21  1119 10/26/21  1448 10/26/21  2139 10/27/21  0536 10/28/21  0616   PCAL  --   --  1.17*  --   --   --   --  1.70*   LACT 3.6* 4.4*  --  7.2* 3.5* 0.7 0.5*  --        Recent Labs   Lab Test 10/28/21  1048   VANCOMYCIN 9.8       Culture Results:  7-Day Micro Results       Procedure Component Value Units Date/Time    MRSA MSSA PCR, Nasal Swab     Order Status: Sent Lab Status: No result     Specimen: Swab     Blood Culture Hand, Right [[id number]]  (Normal) Collected: 10/26/21 2144    Order Status: Completed Lab Status: Preliminary result Updated: 10/28/21 0431    Specimen: Blood from Hand, Right      Culture No growth after 1 day    Blood Culture Hand, Left [[id number]]  (Normal) Collected: 10/26/21 2139    Order Status: Completed Lab Status: Preliminary result Updated: 10/28/21 0431    Specimen: Blood from Hand, Left      Culture No growth after 1 day    Blood Culture Peripheral Blood [[id number]]  (Normal) Collected: 10/26/21 1238    Order Status: Completed Lab Status: Preliminary result Updated: 10/27/21 1546    Specimen: Peripheral Blood      Culture  No growth after 1 day    Urine Culture [[id number]] Collected: 10/26/21 1238    Order Status: Completed Lab Status: Final result Updated: 10/27/21 2128    Specimen: Urine, Midstream      Culture <10,000 CFU/mL Mixture of urogenital virgen    Blood Culture Arm, Left [[id number]]  (Normal) Collected: 10/26/21 1212    Order Status: Completed Lab Status: Preliminary result Updated: 10/27/21 1546    Specimen: Blood from Arm, Left      Culture No growth after 1 day            Recent Labs   Lab Test 06/04/20  1122 10/26/21  1238   URINEPH 5.0 5.0   NITRITE Negative Negative   LEUKEST Negative Negative   WBCU 4 2                         Imaging: US Renal Complete    Result Date: 10/27/2021  RENAL ULTRASOUND   10/27/2021 1:27 PM HISTORY: DKA, SIRS/sepsis, bilateral flank pain, nausea/vomiting, question pyelonephritis. COMPARISON: None. FINDINGS:  The kidneys are normal in size and cortical thickness.  No renal masses are seen.  Left renal pelvis is mildly distended measuring up to approximately 1.6 cm. No liz hydronephrosis is noted. Right renal pelvis is normal in appearance. Kidneys are otherwise normal in appearance. The visualized portions of the urinary bladder appear normal. Prevoid urinary bladder volume is 230 mL. No postvoid imaging was performed. Bilateral ureteral jets are present.      IMPRESSION:  1. Mild prominence of the left renal pelvis could be due to obstructive process distal to this location although no liz left hydronephrosis is seen. A left ureteral jet is identified indicating no complete obstruction of the left urinary collecting system 2. Otherwise negative renal and urinary bladder ultrasound. PRETTY LYNCH MD   SYSTEM ID:  [id number]    XR Chest Port 1 View    Result Date: 10/26/2021  CHEST PORTABLE ONE VIEW   10/26/2021 12:06 PM HISTORY: DKA, leukocytosis. COMPARISON: Chest x-ray on 6/4/2020.     IMPRESSION: AP view of the chest was obtained. Cardiomediastinal silhouette is within normal  limits. No suspicious focal pulmonary opacities. No significant pleural effusion or pneumothorax. YOVANY MEJIA MD   SYSTEM ID:  [id number]

## 2021-10-29 LAB
ANION GAP SERPL CALCULATED.3IONS-SCNC: 3 MMOL/L (ref 3–14)
ANION GAP SERPL CALCULATED.3IONS-SCNC: 4 MMOL/L (ref 3–14)
ANION GAP SERPL CALCULATED.3IONS-SCNC: 5 MMOL/L (ref 3–14)
ANION GAP SERPL CALCULATED.3IONS-SCNC: 7 MMOL/L (ref 3–14)
BASE EXCESS BLDV CALC-SCNC: 2.8 MMOL/L (ref -7.7–1.9)
BUN SERPL-MCNC: 4 MG/DL (ref 7–30)
BUN SERPL-MCNC: 5 MG/DL (ref 7–30)
C DIFF TOX B STL QL: NEGATIVE
CALCIUM SERPL-MCNC: 8 MG/DL (ref 8.5–10.1)
CALCIUM SERPL-MCNC: 8 MG/DL (ref 8.5–10.1)
CALCIUM SERPL-MCNC: 8.3 MG/DL (ref 8.5–10.1)
CALCIUM SERPL-MCNC: 8.5 MG/DL (ref 8.5–10.1)
CHLORIDE BLD-SCNC: 109 MMOL/L (ref 94–109)
CHLORIDE BLD-SCNC: 109 MMOL/L (ref 94–109)
CHLORIDE BLD-SCNC: 110 MMOL/L (ref 94–109)
CHLORIDE BLD-SCNC: 112 MMOL/L (ref 94–109)
CO2 SERPL-SCNC: 23 MMOL/L (ref 20–32)
CO2 SERPL-SCNC: 26 MMOL/L (ref 20–32)
CO2 SERPL-SCNC: 26 MMOL/L (ref 20–32)
CO2 SERPL-SCNC: 27 MMOL/L (ref 20–32)
CREAT SERPL-MCNC: 0.39 MG/DL (ref 0.52–1.04)
CREAT SERPL-MCNC: 0.44 MG/DL (ref 0.52–1.04)
CREAT SERPL-MCNC: 0.5 MG/DL (ref 0.52–1.04)
CREAT SERPL-MCNC: 0.52 MG/DL (ref 0.52–1.04)
GFR SERPL CREATININE-BSD FRML MDRD: >90 ML/MIN/1.73M2
GLUCOSE BLD-MCNC: 105 MG/DL (ref 70–99)
GLUCOSE BLD-MCNC: 188 MG/DL (ref 70–99)
GLUCOSE BLD-MCNC: 229 MG/DL (ref 70–99)
GLUCOSE BLD-MCNC: 273 MG/DL (ref 70–99)
GLUCOSE BLDC GLUCOMTR-MCNC: 105 MG/DL (ref 70–99)
GLUCOSE BLDC GLUCOMTR-MCNC: 116 MG/DL (ref 70–99)
GLUCOSE BLDC GLUCOMTR-MCNC: 141 MG/DL (ref 70–99)
GLUCOSE BLDC GLUCOMTR-MCNC: 144 MG/DL (ref 70–99)
GLUCOSE BLDC GLUCOMTR-MCNC: 148 MG/DL (ref 70–99)
GLUCOSE BLDC GLUCOMTR-MCNC: 179 MG/DL (ref 70–99)
GLUCOSE BLDC GLUCOMTR-MCNC: 199 MG/DL (ref 70–99)
GLUCOSE BLDC GLUCOMTR-MCNC: 209 MG/DL (ref 70–99)
GLUCOSE BLDC GLUCOMTR-MCNC: 211 MG/DL (ref 70–99)
GLUCOSE BLDC GLUCOMTR-MCNC: 217 MG/DL (ref 70–99)
GLUCOSE BLDC GLUCOMTR-MCNC: 222 MG/DL (ref 70–99)
GLUCOSE BLDC GLUCOMTR-MCNC: 226 MG/DL (ref 70–99)
GLUCOSE BLDC GLUCOMTR-MCNC: 255 MG/DL (ref 70–99)
GLUCOSE BLDC GLUCOMTR-MCNC: 274 MG/DL (ref 70–99)
GLUCOSE BLDC GLUCOMTR-MCNC: 289 MG/DL (ref 70–99)
GLUCOSE BLDC GLUCOMTR-MCNC: 318 MG/DL (ref 70–99)
HCO3 BLDV-SCNC: 26 MMOL/L (ref 21–28)
KETONES BLD-SCNC: 0.1 MMOL/L (ref 0–0.6)
O2/TOTAL GAS SETTING VFR VENT: 21 %
OSMOLALITY SERPL: 287 MMOL/KG (ref 275–295)
PCO2 BLDV: 37 MM HG (ref 40–50)
PH BLDV: 7.47 [PH] (ref 7.32–7.43)
PHOSPHATE SERPL-MCNC: 2.3 MG/DL (ref 2.5–4.5)
PLATELET # BLD AUTO: 173 10E3/UL (ref 150–450)
PO2 BLDV: 75 MM HG (ref 25–47)
POTASSIUM BLD-SCNC: 3.4 MMOL/L (ref 3.4–5.3)
POTASSIUM BLD-SCNC: 3.5 MMOL/L (ref 3.4–5.3)
POTASSIUM BLD-SCNC: 3.6 MMOL/L (ref 3.4–5.3)
POTASSIUM BLD-SCNC: 3.8 MMOL/L (ref 3.4–5.3)
POTASSIUM BLD-SCNC: 3.8 MMOL/L (ref 3.4–5.3)
POTASSIUM BLD-SCNC: 4.2 MMOL/L (ref 3.4–5.3)
PROCALCITONIN SERPL-MCNC: 0.39 NG/ML
PROCALCITONIN SERPL-MCNC: 0.61 NG/ML
SODIUM SERPL-SCNC: 139 MMOL/L (ref 133–144)
SODIUM SERPL-SCNC: 140 MMOL/L (ref 133–144)
SODIUM SERPL-SCNC: 140 MMOL/L (ref 133–144)
SODIUM SERPL-SCNC: 142 MMOL/L (ref 133–144)
WBC # BLD AUTO: 8.1 10E3/UL (ref 4–11)

## 2021-10-29 PROCEDURE — 99233 SBSQ HOSP IP/OBS HIGH 50: CPT | Performed by: FAMILY MEDICINE

## 2021-10-29 PROCEDURE — 84145 PROCALCITONIN (PCT): CPT | Performed by: FAMILY MEDICINE

## 2021-10-29 PROCEDURE — 250N000013 HC RX MED GY IP 250 OP 250 PS 637: Performed by: FAMILY MEDICINE

## 2021-10-29 PROCEDURE — 200N000001 HC R&B ICU

## 2021-10-29 PROCEDURE — 250N000011 HC RX IP 250 OP 636: Performed by: PEDIATRICS

## 2021-10-29 PROCEDURE — 82803 BLOOD GASES ANY COMBINATION: CPT | Performed by: PEDIATRICS

## 2021-10-29 PROCEDURE — 250N000013 HC RX MED GY IP 250 OP 250 PS 637: Performed by: HOSPITALIST

## 2021-10-29 PROCEDURE — 85048 AUTOMATED LEUKOCYTE COUNT: CPT | Performed by: PEDIATRICS

## 2021-10-29 PROCEDURE — 258N000003 HC RX IP 258 OP 636: Performed by: INTERNAL MEDICINE

## 2021-10-29 PROCEDURE — 36415 COLL VENOUS BLD VENIPUNCTURE: CPT | Performed by: INTERNAL MEDICINE

## 2021-10-29 PROCEDURE — 36415 COLL VENOUS BLD VENIPUNCTURE: CPT | Performed by: HOSPITALIST

## 2021-10-29 PROCEDURE — 82310 ASSAY OF CALCIUM: CPT | Performed by: FAMILY MEDICINE

## 2021-10-29 PROCEDURE — 250N000011 HC RX IP 250 OP 636: Performed by: FAMILY MEDICINE

## 2021-10-29 PROCEDURE — 84100 ASSAY OF PHOSPHORUS: CPT | Performed by: FAMILY MEDICINE

## 2021-10-29 PROCEDURE — 80048 BASIC METABOLIC PNL TOTAL CA: CPT | Performed by: FAMILY MEDICINE

## 2021-10-29 PROCEDURE — 84145 PROCALCITONIN (PCT): CPT | Performed by: PEDIATRICS

## 2021-10-29 PROCEDURE — 83930 ASSAY OF BLOOD OSMOLALITY: CPT | Performed by: PEDIATRICS

## 2021-10-29 PROCEDURE — 85049 AUTOMATED PLATELET COUNT: CPT | Performed by: HOSPITALIST

## 2021-10-29 PROCEDURE — 36415 COLL VENOUS BLD VENIPUNCTURE: CPT | Performed by: FAMILY MEDICINE

## 2021-10-29 PROCEDURE — 80048 BASIC METABOLIC PNL TOTAL CA: CPT | Performed by: INTERNAL MEDICINE

## 2021-10-29 PROCEDURE — 82010 KETONE BODYS QUAN: CPT | Performed by: PEDIATRICS

## 2021-10-29 PROCEDURE — 87493 C DIFF AMPLIFIED PROBE: CPT | Performed by: INTERNAL MEDICINE

## 2021-10-29 RX ORDER — CEFDINIR 300 MG/1
300 CAPSULE ORAL EVERY 12 HOURS SCHEDULED
Status: DISCONTINUED | OUTPATIENT
Start: 2021-10-30 | End: 2021-10-30 | Stop reason: HOSPADM

## 2021-10-29 RX ORDER — DEXTROSE MONOHYDRATE 25 G/50ML
25-50 INJECTION, SOLUTION INTRAVENOUS
Status: DISCONTINUED | OUTPATIENT
Start: 2021-10-29 | End: 2021-10-30 | Stop reason: HOSPADM

## 2021-10-29 RX ORDER — NICOTINE POLACRILEX 4 MG
15-30 LOZENGE BUCCAL
Status: DISCONTINUED | OUTPATIENT
Start: 2021-10-29 | End: 2021-10-30 | Stop reason: HOSPADM

## 2021-10-29 RX ORDER — DEXTROSE MONOHYDRATE, SODIUM CHLORIDE, AND POTASSIUM CHLORIDE 50; 1.49; 4.5 G/1000ML; G/1000ML; G/1000ML
INJECTION, SOLUTION INTRAVENOUS CONTINUOUS
Status: DISCONTINUED | OUTPATIENT
Start: 2021-10-29 | End: 2021-10-29

## 2021-10-29 RX ORDER — POTASSIUM CHLORIDE 1500 MG/1
20 TABLET, EXTENDED RELEASE ORAL ONCE
Status: COMPLETED | OUTPATIENT
Start: 2021-10-29 | End: 2021-10-29

## 2021-10-29 RX ADMIN — POTASSIUM CHLORIDE, DEXTROSE MONOHYDRATE AND SODIUM CHLORIDE: 150; 5; 450 INJECTION, SOLUTION INTRAVENOUS at 02:12

## 2021-10-29 RX ADMIN — CEFTRIAXONE SODIUM 2 G: 2 INJECTION, POWDER, FOR SOLUTION INTRAMUSCULAR; INTRAVENOUS at 14:41

## 2021-10-29 RX ADMIN — POTASSIUM CHLORIDE, DEXTROSE MONOHYDRATE AND SODIUM CHLORIDE: 150; 5; 450 INJECTION, SOLUTION INTRAVENOUS at 09:06

## 2021-10-29 RX ADMIN — ENOXAPARIN SODIUM 40 MG: 40 INJECTION SUBCUTANEOUS at 20:41

## 2021-10-29 RX ADMIN — POTASSIUM & SODIUM PHOSPHATES POWDER PACK 280-160-250 MG 1 PACKET: 280-160-250 PACK at 20:42

## 2021-10-29 RX ADMIN — ESCITALOPRAM OXALATE 20 MG: 10 TABLET ORAL at 09:06

## 2021-10-29 RX ADMIN — POTASSIUM CHLORIDE 20 MEQ: 1500 TABLET, EXTENDED RELEASE ORAL at 09:06

## 2021-10-29 RX ADMIN — POTASSIUM & SODIUM PHOSPHATES POWDER PACK 280-160-250 MG 1 PACKET: 280-160-250 PACK at 14:41

## 2021-10-29 ASSESSMENT — ACTIVITIES OF DAILY LIVING (ADL)
ADLS_ACUITY_SCORE: 3
ADLS_ACUITY_SCORE: 5
ADLS_ACUITY_SCORE: 5
ADLS_ACUITY_SCORE: 3

## 2021-10-29 NOTE — PROVIDER NOTIFICATION
Pt reports having diarrhea 4x today moderate amounts.  Pt is afebrile but elevated WBC and p-wendy, pt on antibiotic therapy.  Dr. Scruggs updated via text page.    Orders received for c-diff testing

## 2021-10-29 NOTE — PLAN OF CARE
A/Ox4. VSS. Was transitioned off insulin drip today and was restarted on home insulin pump at 1215. Pt has been managing her own pump and giving herself insulin as she would at home. Did give self 2.4units with lunch meal, rechecked blood sugar at 1430 and was 226. Pt stated at home she would give herself more insulin, so gave herself and additional 1.6units at this time. Lung sounds clear, bowel sounds active. Pt reports still having loose stools, but Cdiff was negative. Adequate urine output. Skin intact. Replaced potassium and phosphorus, potassium will be rechecked 10/30 am and phos will be replaced and rechecked on 10/31 am. Tolerating a regular carb gluten free diet, some nausea with eating, but declined any medication for this. Mom at bedside. Will continue to monitor.

## 2021-10-29 NOTE — PROGRESS NOTES
[facility]    Medicine Progress Note - Hospitalist Service       Date of Admission:  10/26/2021    Assessment & Plan         Patient is a 20-year-old female with known uncontrolled type 1 diabetes on an insulin pump at baseline with hemoglobin A1c during this stay of 10.3 who presented with severe diabetic ketoacidosis and hyperosmolar reaction.  There was high level of concern for bacterial infection and sepsis given white blood cell count of 41,000, lactic acid of 7.2, and elevated procalcitonin of 1.17 which worsened despite treatment of DKA up to a peak of 1.70 before now starting to improve.  Patient was started on broad-spectrum antibiotics including Zosyn and vancomycin and admitted to the ICU for management of DKA.  She has had progressive improvement and resolution of her DKA along with improvement of all of her infectious labs, although work-up has not revealed any obvious etiology for infection.  Antibiotics have been narrowed to Rocephin per antibiotic stewardship recommendations and patient continues to clinically improve.  Attempted to transition patient to her home insulin pump on 10/28 however blood sugars increased rapidly into the 300 range and patient once more became ketotic.  Her insulin pump tubing was removed and it was found to be not functioning appropriately with concern for purulent discharge coming from the skin noted by nursing staff upon removal of the tubing.  She once more has been placed on DKA protocol insulin drip with ketones normalizing and blood sugars once more under control this morning.  Patient's mother has brought in a new set of insulin pump tubing.  We will once more attempt reinitiation of home insulin regimen and monitor closely overnight with hopeful discharge on oral antibiotics and with continuation of home insulin pump tomorrow morning.    Principal Problem:    DKA, type 1 (H)    Assessment: Resolved with DKA protocol management  however blood sugars once more increased following transition patient's home insulin pump with concerns for pump tubing not appropriately functioning and allowing for possible infection at the source of the insulin pump site under the skin.  Now on IV insulin drip with DKA once more resolved    Plan: We will proceed with transition to home insulin pump with new tubing kit and monitor blood sugars closely, repeat blood testing this evening to ensure ongoing resolution of DKA.    Active Problems:    SIRS (systemic inflammatory response syndrome) (H)-tachycardia, leukocytosis    Assessment: Suspected sepsis based on clinical presentation and lab work on initial presentation.  White blood cell count is now normalized, procalcitonin trending downward, lactic acid has normalized.  No obvious source of infection has been identified but there was possible abscess at the site of the recent insulin pump insertion site for the tubing    Plan: Continue with IV Rocephin today, transition to oral Omnicef tomorrow and patient will complete a 10-day antibiotic course going forward      Elevated procalcitonin    Assessment: Thought secondary to bacterial infection of unclear source, possible abscess as above.  Is now trending downward rapidly    Plan: Continue with Rocephin and transition to Omnicef as above      Lactic acidosis    Assessment: Present initially and may be secondary to infection versus DKA versus combination of both.  Lactic acid is now normalized    Plan: No further routine monitoring is needed      Hypophosphatemia    Assessment: Present at baseline, likely secondary to nutritional depletion    Plan: Continue to supplement as appropriate      Flank pain    Assessment: Present initially but investigation did not show any concern for pyelonephritis or other renal impairment.  Patient has had flank pain as part of her DKA episodes in the past and symptoms have now resolved as DKA resolved    Plan: Continue to monitor  for ongoing resolution      Nausea with vomiting    Assessment: Present initially and thought secondary to DKA, now overall resolved with patient only having minimal nausea upon eating for the first time but she feels this was secondary to her eating too much after not eating for many days    Plan: Continue with as needed antiemetics, monitor for ongoing resolution      Diabetes mellitus type 1 with hyperosmolarity (H) with Insulin pump at baseline    Assessment: On an insulin pump with hemoglobin A1c 10.3    Plan: Proceed with plan as above.  Patient is planning on establishing care with a Kasia Silver endocrinologist in [place] following discharge      Celiac sprue    Assessment: Present at baseline, no acute flare    Plan: Continue appropriate diet, outpatient follow-up      IUD (intrauterine device) in place    Assessment: Noted    Plan: No acute intervention is needed       Diet: Combination Diet Gluten Free Diet; Consistent Carb 60 Grams CHO per Meal Diet    DVT Prophylaxis: Enoxaparin (Lovenox) SQ  Johnson Catheter: Not present  Central Lines: None  Code Status: Full Code      Disposition Plan   Expected discharge: 10/30/2021   recommended to prior living arrangement once transition to home insulin pump goes smoothly, patient continues to improve on Rocephin antibiotic.     The patient's care was discussed with the Bedside Nurse, Care Coordinator/, Patient and Patient's Family.    Magaly Kong MD  Hospitalist Service  [facility]  Securely message with the Vocera Web Console (learn more here)  Text page via [employer] Paging/Directory        Clinically Significant Risk Factors Present on Admission               ______________________________________________________________________    Interval History   Patient has remained vitally stable overnight. Patient did attempt transition to home insulin pump but with escalation rapidly of blood sugars and repeat  elevation of ketones with resolution following discontinuation of home pump and restarting of IV insulin. On removal of the tubing for the home pump it was noted that patient did appear to have an small abscess with purulent discharge following needle removal but without any surrounding erythema concerning for cellulitis. Culture was not obtained. Patient did have nausea x1 after eating a large meal which she feels was secondary to the volume of food she ate after not eating for so many days. Denies any current nausea and is feeling hungry for lunch. Mom has brought a new tubing kit this morning. No new patient concerns. No new nursing concerns.    Data reviewed today: I reviewed all medications, new labs and imaging results over the last 24 hours.    Physical Exam   Vital Signs: Temp: 98.3  F (36.8  C) Temp src: Oral BP: 119/88 Pulse: 80   Resp: 12 SpO2: 100 % O2 Device: None (Room air)    Weight: 114 lbs 3.2 oz  Constitutional: awake, alert, cooperative, no apparent distress, and appears stated age  Respiratory: No increased work of breathing, good air exchange, clear to auscultation bilaterally, no crackles or wheezing  Cardiovascular: Normal apical impulse, regular rate and rhythm, normal S1 and S2, no S3 or S4, and no murmur noted  GI: Bowel sounds present, abdomen is soft and nondistended, nontender to palpation  Skin: no redness, warmth, or swelling and no rashes  Musculoskeletal: no lower extremity pitting edema present  Neurologic: Awake, alert, oriented to name, place and time and situation    Data   Recent Labs   Lab 10/29/21  1813 10/29/21  1650 10/29/21  1433 10/29/21  1319 10/29/21  1221 10/29/21  1058 10/29/21  1015 10/29/21  0759 10/29/21  0705 10/28/21  0654 10/28/21  0616 10/26/21  2209 10/26/21  2139 10/26/21  2138 10/26/21  1212 10/26/21  1109 10/26/21  1109   WBC  --   --   --   --   --   --   --   --  8.1  --  11.4*  --  28.2*  --   --    < > 41.3*   HGB  --   --   --   --   --   --   --   --    --   --   --   --  14.4  --   --   --  18.3*   MCV  --   --   --   --   --   --   --   --   --   --   --   --  91  --   --   --  97   PLT  --   --   --   --   --   --   --   --  173  --   --   --  298  --   --   --  464*     --   --   --   --   --  140  --  142   < > 144   < >  --  135   < >  --  131*   POTASSIUM 3.8  --   --  4.2  --   --  3.6   < > 3.4   < > 3.5   < >  --  3.7   < >  --  4.3   CHLORIDE 110*  --   --   --   --   --  109  --  112*   < > 119*   < >  --  109   < >  --  89*   CO2 26  --   --   --   --   --  26  --  27   < > 19*   < >  --  10*   < >  --  7*   BUN 5*  --   --   --   --   --  5*  --  4*   < > 3*   < >  --  14   < >  --  27   CR 0.39*  --   --   --   --   --  0.44*  --  0.50*   < > 0.63   < >  --  0.66   < >  --  1.10*   ANIONGAP 4  --   --   --   --   --  5  --  3   < > 6   < >  --  16*   < >  --  35*   BEN 8.5  --   --   --   --   --  8.0*  --  8.0*   < > 7.7*   < >  --  8.1*   < >  --  9.8   * 179* 226*  --    < >   < > 273*   < > 105*   < > 157*   < >  --  274*   < >  --  761*   ALBUMIN  --   --   --   --   --   --   --   --   --   --   --   --   --  3.6  --   --  5.1*   PROTTOTAL  --   --   --   --   --   --   --   --   --   --   --   --   --  7.2  --   --  10.3*   BILITOTAL  --   --   --   --   --   --   --   --   --   --   --   --   --  0.8  --   --  0.9   ALKPHOS  --   --   --   --   --   --   --   --   --   --   --   --   --  102  --   --  179*   ALT  --   --   --   --   --   --   --   --   --   --   --   --   --  26  --   --  39   AST  --   --   --   --   --   --   --   --   --   --   --   --   --  15  --   --  36    < > = values in this interval not displayed.

## 2021-10-29 NOTE — PROGRESS NOTES
"Insulin pump removed at 2350.  Writer asked when the pump site was inserted and she stated, \"the day before I came in.\"  Roughly inserted 10/25.  Site was removed.  Site skin is slightly bruised. There is a small bubble of pus from the site.  Appeared to be a small opaque substance, maybe pus, that is in the cannula.  This may possibly be obstructing insulin flow.  "

## 2021-10-29 NOTE — PROVIDER NOTIFICATION
"Text page to Dr. Boyer: critical lab: Ketones 2.1. VB.45/31/104/21 on RA. blood sugars still uncontrolled on ambulatory pump, last sugar 318 (previously 291 and 370). Dr. Denise requested hourly blood sugars until 2300 lab draw, please advise how you would like to proceed with overnight management.     Response: \"Please help me take a verbal to restart insulin drip\"  Kiran Boyer - 10:59 pm   Also please let the patient know to stop the insulin pump we will deal with this tomorrow I'll let doctor Dilan know about this    Charge RN updated.      "

## 2021-10-29 NOTE — PROGRESS NOTES
Neuro: alert oriented.  CV: SR, BP WNL  Pulm: LS clear, RA  GI: Loose stool x 1 sent for CDIFF, no or minimal nausea, Zofran declined  : Voiding well.  Insulin gtt is Alg. 2.  Glucoses trending down.  Denies pain  Offers no complaints.

## 2021-10-29 NOTE — PROGRESS NOTES
Patient's home insulin pump has been reinitiated and blood sugars have been in the 140-220 range and responding appropriately to patient's typical home regimen without concern that it is malfunctioning again.  Will transition patient to med/surg status for tonight, monitor blood sugars at bedtime, 0200 and in the morning.  Transition to oral Omnicef tomorrow morning with anticipated discharge home after breakfast tomorrow as long as her blood sugars remain stable and she can tolerate the oral antibiotic without difficulty.    Electronically Signed:  Magaly Kong MD

## 2021-10-30 VITALS
TEMPERATURE: 98 F | SYSTOLIC BLOOD PRESSURE: 116 MMHG | OXYGEN SATURATION: 97 % | WEIGHT: 114.2 LBS | DIASTOLIC BLOOD PRESSURE: 86 MMHG | RESPIRATION RATE: 16 BRPM | HEART RATE: 76 BPM | BODY MASS INDEX: 20.23 KG/M2

## 2021-10-30 LAB
ANION GAP SERPL CALCULATED.3IONS-SCNC: 4 MMOL/L (ref 3–14)
BUN SERPL-MCNC: 7 MG/DL (ref 7–30)
CALCIUM SERPL-MCNC: 8.4 MG/DL (ref 8.5–10.1)
CHLORIDE BLD-SCNC: 109 MMOL/L (ref 94–109)
CO2 SERPL-SCNC: 28 MMOL/L (ref 20–32)
CREAT SERPL-MCNC: 0.55 MG/DL (ref 0.52–1.04)
GFR SERPL CREATININE-BSD FRML MDRD: >90 ML/MIN/1.73M2
GLUCOSE BLD-MCNC: 130 MG/DL (ref 70–99)
GLUCOSE BLDC GLUCOMTR-MCNC: 129 MG/DL (ref 70–99)
GLUCOSE BLDC GLUCOMTR-MCNC: 144 MG/DL (ref 70–99)
KETONES BLD-SCNC: 0.3 MMOL/L (ref 0–0.6)
POTASSIUM BLD-SCNC: 3.6 MMOL/L (ref 3.4–5.3)
SODIUM SERPL-SCNC: 141 MMOL/L (ref 133–144)

## 2021-10-30 PROCEDURE — 36415 COLL VENOUS BLD VENIPUNCTURE: CPT | Performed by: FAMILY MEDICINE

## 2021-10-30 PROCEDURE — 99239 HOSP IP/OBS DSCHRG MGMT >30: CPT | Performed by: FAMILY MEDICINE

## 2021-10-30 PROCEDURE — 82010 KETONE BODYS QUAN: CPT | Performed by: FAMILY MEDICINE

## 2021-10-30 PROCEDURE — 82374 ASSAY BLOOD CARBON DIOXIDE: CPT | Performed by: FAMILY MEDICINE

## 2021-10-30 PROCEDURE — 250N000013 HC RX MED GY IP 250 OP 250 PS 637: Performed by: FAMILY MEDICINE

## 2021-10-30 RX ORDER — ONDANSETRON 4 MG/1
4 TABLET, ORALLY DISINTEGRATING ORAL EVERY 6 HOURS PRN
Qty: 10 TABLET | Refills: 0 | Status: SHIPPED | OUTPATIENT
Start: 2021-10-30 | End: 2022-06-01

## 2021-10-30 RX ORDER — CEFDINIR 300 MG/1
300 CAPSULE ORAL EVERY 12 HOURS
Qty: 14 CAPSULE | Refills: 0 | Status: SHIPPED | OUTPATIENT
Start: 2021-10-30 | End: 2021-11-06

## 2021-10-30 RX ADMIN — CEFDINIR 300 MG: 300 CAPSULE ORAL at 08:07

## 2021-10-30 RX ADMIN — POTASSIUM & SODIUM PHOSPHATES POWDER PACK 280-160-250 MG 1 PACKET: 280-160-250 PACK at 08:07

## 2021-10-30 RX ADMIN — ESCITALOPRAM OXALATE 20 MG: 10 TABLET ORAL at 08:07

## 2021-10-30 ASSESSMENT — ACTIVITIES OF DAILY LIVING (ADL)
ADLS_ACUITY_SCORE: 3

## 2021-10-30 NOTE — DISCHARGE SUMMARY
[facility]  Hospitalist Discharge Summary      Date of Admission:  10/26/2021  Date of Discharge:  10/30/2021  Discharging Provider: Magaly Kong MD  Discharge Diagnoses   Principal Problem:    DKA, type 1 (H)  Active Problems:    Celiac sprue    Insulin pump status    SIRS (systemic inflammatory response syndrome) (H)-tachycardia, leukocytosis    Elevated procalcitonin    Diabetes mellitus type 1 with hyperosmolarity (H)    Lactic acidosis    Hypophosphatemia    Flank pain    Nausea with vomiting    IUD (intrauterine device) in place    Follow-ups Needed After Discharge   Follow-up Appointments     Follow-up and recommended labs and tests       Follow up with primary care provider, Naomi Reynolds, within 7   days for hospital follow- up.    Please establish care with an endocrinologist as planned going forward.           Discharge Disposition   Discharged to home  Condition at discharge: Stable    Hospital Course           Patient is a 20-year-old female with known uncontrolled type 1 diabetes on an insulin pump at baseline with hemoglobin A1c during this stay of 10.3 who presented with severe diabetic ketoacidosis and hyperosmolar reaction.  There was high level of concern for bacterial infection and sepsis given white blood cell count of 41,000, lactic acid of 7.2, and elevated procalcitonin of 1.17 which worsened despite treatment of DKA up to a peak of 1.70 before now starting to improve.  Patient was started on broad-spectrum antibiotics including Zosyn and vancomycin and admitted to the ICU for management of DKA.  She has had progressive improvement and resolution of her DKA along with improvement of all of her infectious labs, although work-up has not revealed any obvious etiology for infection.  Antibiotics have been narrowed to Rocephin per antibiotic stewardship recommendations and patient continues to clinically improve.  Attempted to transition  patient to her home insulin pump on 10/28 however blood sugars increased rapidly into the 300 range and patient once more became ketotic.  Her insulin pump tubing was removed and it was found to be not functioning appropriately with concern for purulent discharge coming from the skin noted by nursing staff upon removal of the tubing.  She was more has been placed on DKA protocol insulin drip with ketones normalizing and blood sugars once more under control. Home insulin pump with new tubing kit was restarted on 10/29 with insulin pump functioning appropriately and blood sugars in the 1 20-1 80 range. Patient has transition to oral Omnicef with adequate tolerance and ongoing clinical improvement. She is discharged home in stable condition with close clinical follow-up.    Principal Problem:    DKA, type 1 (H)    Assessment: Resolved with DKA protocol management however blood sugars once more increased following transition patient's home insulin pump with concerns for pump tubing not appropriately functioning and allowing for possible infection at the source of the insulin pump site under the skin. Now home insulin pump is functioning appropriately with new tubing kit    Plan: Patient to discharge home with ongoing management of her home insulin pump as previously recommended. She will establish care with endocrinology in the near future as she was previously planning.    Active Problems:    SIRS (systemic inflammatory response syndrome) (H)-tachycardia, leukocytosis    Assessment: Suspected sepsis based on clinical presentation and lab work on initial presentation.  White blood cell count is now normalized, procalcitonin trending downward, lactic acid has normalized.  No obvious source of infection has been identified but there was possible abscess at the site of the recent insulin pump insertion site for the tubing    Plan: Patient will discharged with ongoing Omnicef for total of 10-day antibiotic course  completion.      Elevated procalcitonin    Assessment: Thought secondary to bacterial infection of unclear source, possible abscess as above.  Is now trending downward rapidly at time of discharge    Plan: Discharged with Omnicef as above      Lactic acidosis    Assessment: Present initially and may be secondary to infection versus DKA versus combination of both.  Lactic acid is now normalized    Plan: No further routine monitoring is needed following discharge      Hypophosphatemia    Assessment: Present at baseline, likely secondary to nutritional depletion and has been replaced during this hospital stay    Plan: Could consider repeat testing in several weeks to ensure normalization with adequate oral intake.      Flank pain    Assessment: Present initially but investigation did not show any concern for pyelonephritis or other renal impairment.  Patient has had flank pain as part of her DKA episodes in the past and symptoms have now resolved as DKA resolved    Plan: No further evaluation needed at time of discharge      Nausea with vomiting    Assessment: Present initially and thought secondary to DKA, now resolved    Plan: Discharged with small quantity of Zofran in case patient has significant nausea going forward      Diabetes mellitus type 1 with hyperosmolarity (H) with Insulin pump at baseline    Assessment: On an insulin pump with hemoglobin A1c 10.3    Plan: Proceed with plan as above at time of discharge.  Patient is planning on establishing care with a Kasia Silver endocrinologist in [place] following discharge      Celiac sprue    Assessment: Present at baseline, no acute flare    Plan: Continue appropriate diet, outpatient follow-up as previously recommended      IUD (intrauterine device) in place    Assessment: Noted    Plan: No acute intervention is needed      Consultations This Hospital Stay   PHARMACY TO DOSE VANCO    Code Status   Full Code    Time Spent on this Encounter   Magaly BUNCH  Germaine Kong MD, personally saw the patient today and spent greater than 30 minutes discharging this patient.       Magaly Kong MD  [facility]ENS[facility]  [address] [facility]   [place] [address]  Phone: [phone]  ______________________________________________________________________    Physical Exam   Vital Signs: Temp: 98.3  F (36.8  C) Temp src: Oral BP: 120/85 Pulse: 77   Resp: 16 SpO2: 98 % O2 Device: None (Room air)    Weight: 114 lbs 3.2 oz  Constitutional: awake, alert, cooperative, no apparent distress, and appears stated age  Respiratory: No increased work of breathing, good air exchange, clear to auscultation bilaterally, no crackles or wheezing  Cardiovascular: Normal apical impulse, regular rate and rhythm, normal S1 and S2, no S3 or S4, and no murmur noted  GI: Bowel sounds are present, abdomen soft and nondistended, nontender to palpation  Skin: no redness, warmth, or swelling and no rashes  Musculoskeletal: no lower extremity pitting edema present  Neurologic: Awake, alert, oriented to name, place and situation       Primary Care Physician   Naomi Reynolds    Discharge Orders      Reason for your hospital stay    1.  Diabetic ketoacidosis - likely caused by a combination of your insulin pump tubing malfunctioning and an underlying infection.  Ketoacidosis is now resolved and your insulin pump is functioning well.  2.  Infection of unknown source - may have been related to the small abscess and infection noted in your skin as your pump tubing was removed.  Signs of infection continue to improve.  Please continue the Omnicef (cefdinir) antibiotic as you go home to ensure this infection completely resolves.     Follow-up and recommended labs and tests     Follow up with primary care provider, Naomi Reynolds, within 7 days for hospital follow- up.    Please establish care with an endocrinologist as planned going forward.     Activity    Your  activity upon discharge: activity as tolerated     Diet    Follow this diet upon discharge:   Combination Diet Gluten Free Diet; Consistent Carb 60 Grams CHO per Meal Diet       Significant Results and Procedures   Most Recent 3 CBC's:Recent Labs   Lab Test 10/29/21  0705 10/28/21  0616 10/26/21  2139 10/26/21  1109 10/26/21  1109 06/05/20  0537   WBC 8.1 11.4* 28.2*   < > 41.3* 12.2*   HGB  --   --  14.4  --  18.3* 13.2   MCV  --   --  91  --  97 94     --  298  --  464* 281    < > = values in this interval not displayed.     Most Recent 3 BMP's:Recent Labs   Lab Test 10/30/21  0542 10/30/21  0236 10/29/21  2045 10/29/21  1813 10/29/21  1650 10/29/21  1433 10/29/21  1319 10/29/21  1058 10/29/21  1015     --   --  140  --   --   --   --  140   POTASSIUM 3.6  --   --  3.8  --   --  4.2   < > 3.6   CHLORIDE 109  --   --  110*  --   --   --   --  109   CO2 28  --   --  26  --   --   --   --  26   BUN 7  --   --  5*  --   --   --   --  5*   CR 0.55  --   --  0.39*  --   --   --   --  0.44*   ANIONGAP 4  --   --  4  --   --   --   --  5   BEN 8.4*  --   --  8.5  --   --   --   --  8.0*   * 129* 144* 188*   < >   < >  --    < > 273*    < > = values in this interval not displayed.   ,   Results for orders placed or performed during the hospital encounter of 10/26/21   US Renal Complete    Narrative    RENAL ULTRASOUND   10/27/2021 1:27 PM     HISTORY: DKA, SIRS/sepsis, bilateral flank pain, nausea/vomiting,  question pyelonephritis.    COMPARISON: None.    FINDINGS:  The kidneys are normal in size and cortical thickness.  No  renal masses are seen.  Left renal pelvis is mildly distended  measuring up to approximately 1.6 cm. No liz hydronephrosis is  noted. Right renal pelvis is normal in appearance. Kidneys are  otherwise normal in appearance.    The visualized portions of the urinary bladder appear normal. Prevoid  urinary bladder volume is 230 mL. No postvoid imaging was performed.   Bilateral  ureteral jets are present.        Impression    IMPRESSION:    1. Mild prominence of the left renal pelvis could be due to  obstructive process distal to this location although no liz left  hydronephrosis is seen. A left ureteral jet is identified indicating  no complete obstruction of the left urinary collecting system  2. Otherwise negative renal and urinary bladder ultrasound.     PRETTY LYNCH MD         SYSTEM ID:  [id number]       Discharge Medications   Current Discharge Medication List      START taking these medications    Details   cefdinir (OMNICEF) 300 MG capsule Take 1 capsule (300 mg) by mouth every 12 hours for 7 days  Qty: 14 capsule, Refills: 0    Associated Diagnoses: SIRS (systemic inflammatory response syndrome) (H); Elevated procalcitonin; Cutaneous abscess, unspecified site      ondansetron (ZOFRAN-ODT) 4 MG ODT tab Take 1 tablet (4 mg) by mouth every 6 hours as needed for nausea  Qty: 10 tablet, Refills: 0    Associated Diagnoses: Non-intractable vomiting with nausea, unspecified vomiting type         CONTINUE these medications which have NOT CHANGED    Details   blood glucose (CONTOUR NEXT TEST) test strip Use to test blood sugar 5-6 times daily or as directed.  Qty: 150 each, Refills: 4    Associated Diagnoses: Type 1 diabetes, HbA1c goal < 8% (H)      blood glucose monitoring (TYE MICROLET) lancets Use to test blood sugar 4-5 times daily or as directed.  Qty: 100 each, Refills: 4    Associated Diagnoses: Type 1 diabetes, HbA1c goal < 8% (H)      escitalopram (LEXAPRO) 20 MG tablet Take 1 tablet (20 mg) by mouth daily  Qty: 30 tablet, Refills: 0    Comments: Due for follow-up  Associated Diagnoses: Anxiety; Depression, unspecified depression type      hydrOXYzine (ATARAX) 25 MG tablet Take 1 tablet (25 mg) by mouth nightly as needed for anxiety (sleep)  Qty: 30 tablet, Refills: 0    Associated Diagnoses: Anxiety; Depression, unspecified depression type      Insulin Aspart (INSULIN PUMP -  OUTPATIENT) Inject Subcutaneous See Admin Instructions Type of pump: Medtronic  Type of insulin: Novolog (changed to generic about 1 week ago)  Basal rate(s)  0688-8244: 1 unit/hr  3821-0026: 1.1 units/hr  1132-7614: 0.55 units/hr  4690-8838: 1.1 units/hr  ISF: 35 (all day)  ICF (insulin to carb ratio)  6207-4780: 1unit/4.6g carbs  2278-4848: 1 unit/10g carbs  6806-6949: 1 unit/8g carbs  Active insulin time: 3 hrs  Target goal range:   Followed by endocrinology      insulin aspart (NOVOLOG PEN) 100 UNIT/ML pen Use 3x/day with meals (carb correction scale). Also correction scale with correction factor 1:18  Qty: 15 mL, Refills: 0    Associated Diagnoses: Type 1 diabetes, HbA1c goal < 8% (H)      insulin aspart (NOVOLOG VIAL) 100 UNITS/ML vial Use up to 80 units daily in insulin pump. 90 day supply.  Qty: 70 mL, Refills: 4    Associated Diagnoses: Type 1 diabetes, HbA1c goal < 8% (H)      insulin glargine (LANTUS PEN) 100 UNIT/ML pen Inject 20 Units Subcutaneous 2 times daily  Qty: 12 mL, Refills: 0    Comments: If Lantus is not covered by insurance, may substitute Basaglar at same dose and frequency.    Associated Diagnoses: Type 1 diabetes, HbA1c goal < 8% (H)           Allergies   Allergies   Allergen Reactions     No Known Drug Allergies

## 2021-10-30 NOTE — PLAN OF CARE
Problem: Diabetic Ketoacidosis  Goal: Fluid and Electrolyte Balance with Absence of Ketosis  Outcome: Improving     Problem: Electrolyte Imbalance  Goal: Electrolyte Balance  Outcome: Improving     Problem: Adult Inpatient Plan of Care  Goal: Plan of Care Review  Outcome: Improving  Goal: Patient-Specific Goal (Individualized)  Description: Patient able to tolerate regular diet with no nausea by 10/28/2021.  Outcome: Improving  Goal: Absence of Hospital-Acquired Illness or Injury  Outcome: Improving  Intervention: Identify and Manage Fall Risk  Recent Flowsheet Documentation  Taken 10/29/2021 2300 by Kaveh Del Rio RN  Safety Promotion/Fall Prevention:   nonskid shoes/slippers when out of bed   room near nurse's station  Intervention: Prevent Skin Injury  Recent Flowsheet Documentation  Taken 10/29/2021 2300 by Kaveh Del Rio RN  Body Position: position changed independently  Intervention: Prevent and Manage VTE (Venous Thromboembolism) Risk  Recent Flowsheet Documentation  Taken 10/29/2021 2300 by Kaveh Del Rio RN  VTE Prevention/Management: anticoagulant therapy maintained  Goal: Optimal Comfort and Wellbeing  Outcome: Improving  Goal: Readiness for Transition of Care  Outcome: Improving     Blood glucose levels remain stable this shift.   Pt has been up independent and remains vitally stable.   /85 (BP Location: Left arm)   Pulse 77   Temp 98.3  F (36.8  C) (Oral)   Resp 16   Wt 51.8 kg (114 lb 3.2 oz)   SpO2 98%   BMI 20.23 kg/m    No complaints of nausea, pain, and or any other complaints.  Will continue to monitor blood glucose levels, and follow plan of care.

## 2021-10-31 LAB
BACTERIA BLD CULT: NO GROWTH
BACTERIA BLD CULT: NO GROWTH

## 2021-11-01 ENCOUNTER — PATIENT OUTREACH (OUTPATIENT)
Dept: CARE COORDINATION | Facility: CLINIC | Age: 21
End: 2021-11-01

## 2021-11-01 DIAGNOSIS — Z71.89 OTHER SPECIFIED COUNSELING: ICD-10-CM

## 2021-11-01 LAB
BACTERIA BLD CULT: NO GROWTH
BACTERIA BLD CULT: NO GROWTH

## 2021-11-01 NOTE — PROGRESS NOTES
Clinic Care Coordination Contact  [facility]/Voicemail    Clinical Data: Care Coordinator Outreach  Reason for referral: TCM outreach  Outreach attempted x 1.  Left message on patient's voicemail with call back information and requested return call.  Plan:. Care Coordinator will try to reach patient again in 1-2 business days.    Jessie Galeas   Community Health Worker   [facility]

## 2021-11-02 NOTE — PROGRESS NOTES
Clinic Care Coordination Contact  [facility]/Voicemail    Clinical Data: Care Coordinator Outreach  Reason for referral: TCM outreach  Outreach attempted x 2.  Left message on patient's voicemail with call back information and requested return call.  Plan Care Coordinator will do no further outreaches at this time.    Jessie Galeas   Community Health Worker   Connected Care Resource [facility]

## 2022-01-11 ENCOUNTER — APPOINTMENT (OUTPATIENT)
Dept: CT IMAGING | Facility: CLINIC | Age: 22
End: 2022-01-11
Attending: EMERGENCY MEDICINE
Payer: COMMERCIAL

## 2022-01-11 ENCOUNTER — APPOINTMENT (OUTPATIENT)
Dept: GENERAL RADIOLOGY | Facility: CLINIC | Age: 22
End: 2022-01-11
Attending: INTERNAL MEDICINE
Payer: COMMERCIAL

## 2022-01-11 ENCOUNTER — APPOINTMENT (OUTPATIENT)
Dept: GENERAL RADIOLOGY | Facility: CLINIC | Age: 22
End: 2022-01-11
Attending: EMERGENCY MEDICINE
Payer: COMMERCIAL

## 2022-01-11 ENCOUNTER — HOSPITAL ENCOUNTER (INPATIENT)
Facility: CLINIC | Age: 22
LOS: 4 days | Discharge: HOME | End: 2022-01-15
Attending: EMERGENCY MEDICINE | Admitting: INTERNAL MEDICINE
Payer: COMMERCIAL

## 2022-01-11 DIAGNOSIS — U07.1 INFECTION DUE TO 2019 NOVEL CORONAVIRUS: ICD-10-CM

## 2022-01-11 DIAGNOSIS — E10.10 DIABETIC KETOACIDOSIS WITHOUT COMA ASSOCIATED WITH TYPE 1 DIABETES MELLITUS (H): Primary | ICD-10-CM

## 2022-01-11 DIAGNOSIS — R65.20 SEVERE SEPSIS (H): ICD-10-CM

## 2022-01-11 DIAGNOSIS — E87.5 HYPERKALEMIA: ICD-10-CM

## 2022-01-11 DIAGNOSIS — R11.2 NAUSEA AND VOMITING, INTRACTABILITY OF VOMITING NOT SPECIFIED, UNSPECIFIED VOMITING TYPE: ICD-10-CM

## 2022-01-11 DIAGNOSIS — E10.9 TYPE 1 DIABETES, HBA1C GOAL < 8% (H): ICD-10-CM

## 2022-01-11 DIAGNOSIS — N17.9 AKI (ACUTE KIDNEY INJURY) (H): ICD-10-CM

## 2022-01-11 DIAGNOSIS — E87.20 LACTIC ACIDOSIS: ICD-10-CM

## 2022-01-11 DIAGNOSIS — A41.9 SEVERE SEPSIS (H): ICD-10-CM

## 2022-01-11 DIAGNOSIS — E08.10 DIABETIC KETOACIDOSIS WITHOUT COMA ASSOCIATED WITH DIABETES MELLITUS DUE TO UNDERLYING CONDITION (H): ICD-10-CM

## 2022-01-11 DIAGNOSIS — E87.29 HIGH ANION GAP METABOLIC ACIDOSIS: ICD-10-CM

## 2022-01-11 DIAGNOSIS — K52.9 COLITIS: ICD-10-CM

## 2022-01-11 DIAGNOSIS — E83.41 HYPERMAGNESEMIA: ICD-10-CM

## 2022-01-11 PROBLEM — E11.10 DKA (DIABETIC KETOACIDOSIS) (H): Status: ACTIVE | Noted: 2022-01-11

## 2022-01-11 LAB
ALBUMIN SERPL-MCNC: 4.1 G/DL (ref 3.4–5)
ALBUMIN UR-MCNC: 20 MG/DL
ALP SERPL-CCNC: 190 U/L (ref 40–150)
ALT SERPL W P-5'-P-CCNC: 43 U/L (ref 0–50)
AMPHETAMINES UR QL SCN: ABNORMAL
ANION GAP SERPL CALCULATED.3IONS-SCNC: 16 MMOL/L (ref 3–14)
ANION GAP SERPL CALCULATED.3IONS-SCNC: 18 MMOL/L (ref 3–14)
ANION GAP SERPL CALCULATED.3IONS-SCNC: 20 MMOL/L (ref 3–14)
ANION GAP SERPL CALCULATED.3IONS-SCNC: 21 MMOL/L (ref 3–14)
ANION GAP SERPL CALCULATED.3IONS-SCNC: 22 MMOL/L (ref 3–14)
ANION GAP SERPL CALCULATED.3IONS-SCNC: 22 MMOL/L (ref 3–14)
ANION GAP SERPL CALCULATED.3IONS-SCNC: 26 MMOL/L (ref 3–14)
ANION GAP SERPL CALCULATED.3IONS-SCNC: 31 MMOL/L (ref 3–14)
APAP SERPL-MCNC: <2 MG/L (ref 10–30)
APPEARANCE UR: CLEAR
AST SERPL W P-5'-P-CCNC: 46 U/L (ref 0–45)
ATRIAL RATE - MUSE: 117 BPM
ATRIAL RATE - MUSE: 124 BPM
ATRIAL RATE - MUSE: 127 BPM
BACTERIA #/AREA URNS HPF: ABNORMAL /HPF
BARBITURATES UR QL: ABNORMAL
BASE EXCESS BLDV CALC-SCNC: -11.6 MMOL/L (ref -7.7–1.9)
BASE EXCESS BLDV CALC-SCNC: -12.7 MMOL/L (ref -7.7–1.9)
BASE EXCESS BLDV CALC-SCNC: -16 MMOL/L (ref -7.7–1.9)
BASOPHILS # BLD AUTO: 0.2 10E3/UL (ref 0–0.2)
BASOPHILS # BLD MANUAL: 0 10E3/UL (ref 0–0.2)
BASOPHILS # BLD MANUAL: 0.7 10E3/UL (ref 0–0.2)
BASOPHILS NFR BLD AUTO: 1 %
BASOPHILS NFR BLD MANUAL: 0 %
BASOPHILS NFR BLD MANUAL: 1 %
BENZODIAZ UR QL: ABNORMAL
BILIRUB SERPL-MCNC: 0.5 MG/DL (ref 0.2–1.3)
BILIRUB UR QL STRIP: NEGATIVE
BUN SERPL-MCNC: 19 MG/DL (ref 7–30)
BUN SERPL-MCNC: 23 MG/DL (ref 7–30)
BUN SERPL-MCNC: 26 MG/DL (ref 7–30)
BUN SERPL-MCNC: 28 MG/DL (ref 7–30)
BUN SERPL-MCNC: 28 MG/DL (ref 7–30)
BUN SERPL-MCNC: 30 MG/DL (ref 7–30)
BUN SERPL-MCNC: 32 MG/DL (ref 7–30)
BUN SERPL-MCNC: 33 MG/DL (ref 7–30)
CALCIUM SERPL-MCNC: 7.8 MG/DL (ref 8.5–10.1)
CALCIUM SERPL-MCNC: 8 MG/DL (ref 8.5–10.1)
CALCIUM SERPL-MCNC: 8.4 MG/DL (ref 8.5–10.1)
CALCIUM SERPL-MCNC: 8.7 MG/DL (ref 8.5–10.1)
CALCIUM SERPL-MCNC: 8.8 MG/DL (ref 8.5–10.1)
CALCIUM SERPL-MCNC: 8.9 MG/DL (ref 8.5–10.1)
CALCIUM SERPL-MCNC: 9 MG/DL (ref 8.5–10.1)
CALCIUM SERPL-MCNC: 9.1 MG/DL (ref 8.5–10.1)
CANNABINOIDS UR QL SCN: ABNORMAL
CHLORIDE BLD-SCNC: 102 MMOL/L (ref 94–109)
CHLORIDE BLD-SCNC: 108 MMOL/L (ref 94–109)
CHLORIDE BLD-SCNC: 109 MMOL/L (ref 94–109)
CHLORIDE BLD-SCNC: 110 MMOL/L (ref 94–109)
CHLORIDE BLD-SCNC: 111 MMOL/L (ref 94–109)
CHLORIDE BLD-SCNC: 113 MMOL/L (ref 94–109)
CHLORIDE BLD-SCNC: 116 MMOL/L (ref 94–109)
CHLORIDE BLD-SCNC: 90 MMOL/L (ref 94–109)
CO2 SERPL-SCNC: 10 MMOL/L (ref 20–32)
CO2 SERPL-SCNC: 13 MMOL/L (ref 20–32)
CO2 SERPL-SCNC: 4 MMOL/L (ref 20–32)
CO2 SERPL-SCNC: 5 MMOL/L (ref 20–32)
CO2 SERPL-SCNC: 7 MMOL/L (ref 20–32)
CO2 SERPL-SCNC: 9 MMOL/L (ref 20–32)
COCAINE UR QL: ABNORMAL
COLOR UR AUTO: ABNORMAL
CREAT SERPL-MCNC: 0.82 MG/DL (ref 0.52–1.04)
CREAT SERPL-MCNC: 0.89 MG/DL (ref 0.52–1.04)
CREAT SERPL-MCNC: 0.94 MG/DL (ref 0.52–1.04)
CREAT SERPL-MCNC: 0.96 MG/DL (ref 0.52–1.04)
CREAT SERPL-MCNC: 0.99 MG/DL (ref 0.52–1.04)
CREAT SERPL-MCNC: 0.99 MG/DL (ref 0.52–1.04)
CREAT SERPL-MCNC: 1.01 MG/DL (ref 0.52–1.04)
CREAT SERPL-MCNC: 1.29 MG/DL (ref 0.52–1.04)
D DIMER PPP FEU-MCNC: <0.27 UG/ML FEU (ref 0–0.5)
DIASTOLIC BLOOD PRESSURE - MUSE: NORMAL MMHG
EOSINOPHIL # BLD AUTO: 0.1 10E3/UL (ref 0–0.7)
EOSINOPHIL # BLD MANUAL: 0 10E3/UL (ref 0–0.7)
EOSINOPHIL # BLD MANUAL: 0 10E3/UL (ref 0–0.7)
EOSINOPHIL NFR BLD AUTO: 0 %
EOSINOPHIL NFR BLD MANUAL: 0 %
EOSINOPHIL NFR BLD MANUAL: 0 %
ERYTHROCYTE [DISTWIDTH] IN BLOOD BY AUTOMATED COUNT: 11.9 % (ref 10–15)
ERYTHROCYTE [DISTWIDTH] IN BLOOD BY AUTOMATED COUNT: 12.1 % (ref 10–15)
ERYTHROCYTE [DISTWIDTH] IN BLOOD BY AUTOMATED COUNT: 12.3 % (ref 10–15)
ETHANOL SERPL-MCNC: <0.01 G/DL
FLUAV RNA SPEC QL NAA+PROBE: NEGATIVE
FLUBV RNA RESP QL NAA+PROBE: NEGATIVE
GFR SERPL CREATININE-BSD FRML MDRD: 60 ML/MIN/1.73M2
GFR SERPL CREATININE-BSD FRML MDRD: 81 ML/MIN/1.73M2
GFR SERPL CREATININE-BSD FRML MDRD: 83 ML/MIN/1.73M2
GFR SERPL CREATININE-BSD FRML MDRD: 83 ML/MIN/1.73M2
GFR SERPL CREATININE-BSD FRML MDRD: 86 ML/MIN/1.73M2
GFR SERPL CREATININE-BSD FRML MDRD: 88 ML/MIN/1.73M2
GFR SERPL CREATININE-BSD FRML MDRD: >90 ML/MIN/1.73M2
GFR SERPL CREATININE-BSD FRML MDRD: >90 ML/MIN/1.73M2
GLUCOSE BLD-MCNC: 1194 MG/DL (ref 70–99)
GLUCOSE BLD-MCNC: 254 MG/DL (ref 70–99)
GLUCOSE BLD-MCNC: 319 MG/DL (ref 70–99)
GLUCOSE BLD-MCNC: 397 MG/DL (ref 70–99)
GLUCOSE BLD-MCNC: 474 MG/DL (ref 70–99)
GLUCOSE BLD-MCNC: 571 MG/DL (ref 70–99)
GLUCOSE BLD-MCNC: 575 MG/DL (ref 70–99)
GLUCOSE BLD-MCNC: 706 MG/DL (ref 70–99)
GLUCOSE BLD-MCNC: 807 MG/DL (ref 70–99)
GLUCOSE BLD-MCNC: 977 MG/DL (ref 70–99)
GLUCOSE BLDC GLUCOMTR-MCNC: 193 MG/DL (ref 70–99)
GLUCOSE BLDC GLUCOMTR-MCNC: 197 MG/DL (ref 70–99)
GLUCOSE BLDC GLUCOMTR-MCNC: 210 MG/DL (ref 70–99)
GLUCOSE BLDC GLUCOMTR-MCNC: 214 MG/DL (ref 70–99)
GLUCOSE BLDC GLUCOMTR-MCNC: 219 MG/DL (ref 70–99)
GLUCOSE BLDC GLUCOMTR-MCNC: 221 MG/DL (ref 70–99)
GLUCOSE BLDC GLUCOMTR-MCNC: 222 MG/DL (ref 70–99)
GLUCOSE BLDC GLUCOMTR-MCNC: 233 MG/DL (ref 70–99)
GLUCOSE BLDC GLUCOMTR-MCNC: 252 MG/DL (ref 70–99)
GLUCOSE BLDC GLUCOMTR-MCNC: 254 MG/DL (ref 70–99)
GLUCOSE BLDC GLUCOMTR-MCNC: 270 MG/DL (ref 70–99)
GLUCOSE BLDC GLUCOMTR-MCNC: 293 MG/DL (ref 70–99)
GLUCOSE BLDC GLUCOMTR-MCNC: 339 MG/DL (ref 70–99)
GLUCOSE BLDC GLUCOMTR-MCNC: 357 MG/DL (ref 70–99)
GLUCOSE BLDC GLUCOMTR-MCNC: >600 MG/DL (ref 70–99)
GLUCOSE UR STRIP-MCNC: >=1000 MG/DL
GRANULAR CAST: 4 /LPF
HBA1C MFR BLD: 9.9 % (ref 0–5.6)
HCG SERPL QL: NEGATIVE
HCO3 BLDV-SCNC: 11 MMOL/L (ref 21–28)
HCO3 BLDV-SCNC: 13 MMOL/L (ref 21–28)
HCO3 BLDV-SCNC: 14 MMOL/L (ref 21–28)
HCO3 BLDV-SCNC: 5 MMOL/L (ref 21–28)
HCO3 BLDV-SCNC: 5 MMOL/L (ref 21–28)
HCO3 BLDV-SCNC: 6 MMOL/L (ref 21–28)
HCT VFR BLD AUTO: 43.3 % (ref 35–47)
HCT VFR BLD AUTO: 45.6 % (ref 35–47)
HCT VFR BLD AUTO: 55.2 % (ref 35–47)
HGB BLD-MCNC: 14.1 G/DL (ref 11.7–15.7)
HGB BLD-MCNC: 14.4 G/DL (ref 11.7–15.7)
HGB BLD-MCNC: 15.9 G/DL (ref 11.7–15.7)
HGB UR QL STRIP: ABNORMAL
HOLD SPECIMEN: NORMAL
HYALINE CASTS: 1 /LPF
IMM GRANULOCYTES # BLD: 1.3 10E3/UL
IMM GRANULOCYTES NFR BLD: 3 %
INTERPRETATION ECG - MUSE: NORMAL
KETONES BLD-SCNC: 4.7 MMOL/L (ref 0–0.6)
KETONES BLD-SCNC: 5.8 MMOL/L (ref 0–0.6)
KETONES BLD-SCNC: 6.1 MMOL/L (ref 0–0.6)
KETONES BLD-SCNC: 6.1 MMOL/L (ref 0–0.6)
KETONES BLD-SCNC: 6.6 MMOL/L (ref 0–0.6)
KETONES UR STRIP-MCNC: 80 MG/DL
LACTATE BLD-SCNC: 3 MMOL/L
LACTATE BLD-SCNC: 4 MMOL/L
LACTATE BLD-SCNC: 4.4 MMOL/L
LEUKOCYTE ESTERASE UR QL STRIP: NEGATIVE
LYMPHOCYTES # BLD AUTO: 4.4 10E3/UL (ref 0.8–5.3)
LYMPHOCYTES # BLD MANUAL: 12.7 10E3/UL (ref 0.8–5.3)
LYMPHOCYTES # BLD MANUAL: 6 10E3/UL (ref 0.8–5.3)
LYMPHOCYTES NFR BLD AUTO: 10 %
LYMPHOCYTES NFR BLD MANUAL: 10 %
LYMPHOCYTES NFR BLD MANUAL: 19 %
MAGNESIUM SERPL-MCNC: 3.4 MG/DL (ref 1.6–2.3)
MCH RBC QN AUTO: 31.4 PG (ref 26.5–33)
MCH RBC QN AUTO: 31.7 PG (ref 26.5–33)
MCH RBC QN AUTO: 31.8 PG (ref 26.5–33)
MCHC RBC AUTO-ENTMCNC: 28.8 G/DL (ref 31.5–36.5)
MCHC RBC AUTO-ENTMCNC: 30.9 G/DL (ref 31.5–36.5)
MCHC RBC AUTO-ENTMCNC: 33.3 G/DL (ref 31.5–36.5)
MCV RBC AUTO: 103 FL (ref 78–100)
MCV RBC AUTO: 109 FL (ref 78–100)
MCV RBC AUTO: 95 FL (ref 78–100)
METAMYELOCYTES # BLD MANUAL: 1.3 10E3/UL
METAMYELOCYTES NFR BLD MANUAL: 2 %
MONOCYTES # BLD AUTO: 4.2 10E3/UL (ref 0–1.3)
MONOCYTES # BLD MANUAL: 3.6 10E3/UL (ref 0–1.3)
MONOCYTES # BLD MANUAL: 8.7 10E3/UL (ref 0–1.3)
MONOCYTES NFR BLD AUTO: 10 %
MONOCYTES NFR BLD MANUAL: 13 %
MONOCYTES NFR BLD MANUAL: 6 %
MYELOCYTES # BLD MANUAL: 0.6 10E3/UL
MYELOCYTES # BLD MANUAL: 0.7 10E3/UL
MYELOCYTES NFR BLD MANUAL: 1 %
MYELOCYTES NFR BLD MANUAL: 1 %
NEUTROPHILS # BLD AUTO: 33.1 10E3/UL (ref 1.6–8.3)
NEUTROPHILS # BLD MANUAL: 42.8 10E3/UL (ref 1.6–8.3)
NEUTROPHILS # BLD MANUAL: 49.7 10E3/UL (ref 1.6–8.3)
NEUTROPHILS NFR BLD AUTO: 76 %
NEUTROPHILS NFR BLD MANUAL: 64 %
NEUTROPHILS NFR BLD MANUAL: 83 %
NITRATE UR QL: NEGATIVE
NRBC # BLD AUTO: 0 10E3/UL
NRBC BLD AUTO-RTO: 0 /100
O2/TOTAL GAS SETTING VFR VENT: 0 %
OPIATES UR QL SCN: ABNORMAL
OSMOLALITY SERPL: 355 MMOL/KG (ref 275–295)
P AXIS - MUSE: 63 DEGREES
P AXIS - MUSE: 64 DEGREES
P AXIS - MUSE: 86 DEGREES
PCO2 BLDV: 18 MM HG (ref 40–50)
PCO2 BLDV: 19 MM HG (ref 40–50)
PCO2 BLDV: 24 MM HG (ref 40–50)
PCO2 BLDV: 27 MM HG (ref 40–50)
PCO2 BLDV: 29 MM HG (ref 40–50)
PCO2 BLDV: 30 MM HG (ref 40–50)
PH BLDV: 7 [PH] (ref 7.32–7.43)
PH BLDV: 7.02 [PH] (ref 7.32–7.43)
PH BLDV: 7.02 [PH] (ref 7.32–7.43)
PH BLDV: 7.2 [PH] (ref 7.32–7.43)
PH BLDV: 7.25 [PH] (ref 7.32–7.43)
PH BLDV: 7.27 [PH] (ref 7.32–7.43)
PH UR STRIP: 5 [PH] (ref 5–7)
PHOSPHATE SERPL-MCNC: 6.9 MG/DL (ref 2.5–4.5)
PLAT MORPH BLD: ABNORMAL
PLAT MORPH BLD: ABNORMAL
PLATELET # BLD AUTO: 317 10E3/UL (ref 150–450)
PLATELET # BLD AUTO: 364 10E3/UL (ref 150–450)
PLATELET # BLD AUTO: 472 10E3/UL (ref 150–450)
PO2 BLDV: 17 MM HG (ref 25–47)
PO2 BLDV: 19 MM HG (ref 25–47)
PO2 BLDV: 25 MM HG (ref 25–47)
PO2 BLDV: 33 MM HG (ref 25–47)
PO2 BLDV: 44 MM HG (ref 25–47)
PO2 BLDV: 65 MM HG (ref 25–47)
POTASSIUM BLD-SCNC: 4 MMOL/L (ref 3.4–5.3)
POTASSIUM BLD-SCNC: 4.3 MMOL/L (ref 3.4–5.3)
POTASSIUM BLD-SCNC: 4.7 MMOL/L (ref 3.4–5.3)
POTASSIUM BLD-SCNC: 5.3 MMOL/L (ref 3.4–5.3)
POTASSIUM BLD-SCNC: 5.4 MMOL/L (ref 3.4–5.3)
POTASSIUM BLD-SCNC: 5.4 MMOL/L (ref 3.4–5.3)
POTASSIUM BLD-SCNC: 5.7 MMOL/L (ref 3.4–5.3)
POTASSIUM BLD-SCNC: 6.1 MMOL/L (ref 3.4–5.3)
PR INTERVAL - MUSE: 144 MS
PR INTERVAL - MUSE: 148 MS
PR INTERVAL - MUSE: 160 MS
PROCALCITONIN SERPL-MCNC: 78.9 NG/ML
PROT SERPL-MCNC: 8.7 G/DL (ref 6.8–8.8)
QRS DURATION - MUSE: 102 MS
QRS DURATION - MUSE: 108 MS
QRS DURATION - MUSE: 88 MS
QT - MUSE: 294 MS
QT - MUSE: 304 MS
QT - MUSE: 346 MS
QTC - MUSE: 427 MS
QTC - MUSE: 436 MS
QTC - MUSE: 482 MS
R AXIS - MUSE: -14 DEGREES
R AXIS - MUSE: -31 DEGREES
R AXIS - MUSE: 2 DEGREES
RBC # BLD AUTO: 4.44 10E6/UL (ref 3.8–5.2)
RBC # BLD AUTO: 4.54 10E6/UL (ref 3.8–5.2)
RBC # BLD AUTO: 5.06 10E6/UL (ref 3.8–5.2)
RBC MORPH BLD: ABNORMAL
RBC MORPH BLD: ABNORMAL
RBC URINE: 1 /HPF
SALICYLATES SERPL-MCNC: 5 MG/DL
SAO2 % BLDV: 15 % (ref 94–100)
SAO2 % BLDV: 60 % (ref 94–100)
SAO2 % BLDV: 81 % (ref 94–100)
SARS-COV-2 RNA RESP QL NAA+PROBE: POSITIVE
SODIUM SERPL-SCNC: 126 MMOL/L (ref 133–144)
SODIUM SERPL-SCNC: 132 MMOL/L (ref 133–144)
SODIUM SERPL-SCNC: 137 MMOL/L (ref 133–144)
SODIUM SERPL-SCNC: 138 MMOL/L (ref 133–144)
SODIUM SERPL-SCNC: 138 MMOL/L (ref 133–144)
SODIUM SERPL-SCNC: 139 MMOL/L (ref 133–144)
SODIUM SERPL-SCNC: 141 MMOL/L (ref 133–144)
SODIUM SERPL-SCNC: 142 MMOL/L (ref 133–144)
SP GR UR STRIP: 1.02 (ref 1–1.03)
SQUAMOUS EPITHELIAL: 1 /HPF
SYSTOLIC BLOOD PRESSURE - MUSE: NORMAL MMHG
T AXIS - MUSE: 69 DEGREES
T AXIS - MUSE: 69 DEGREES
T AXIS - MUSE: 70 DEGREES
TROPONIN I SERPL HS-MCNC: 6 NG/L
UROBILINOGEN UR STRIP-MCNC: NORMAL MG/DL
VENTRICULAR RATE- MUSE: 117 BPM
VENTRICULAR RATE- MUSE: 124 BPM
VENTRICULAR RATE- MUSE: 127 BPM
WBC # BLD AUTO: 43.4 10E3/UL (ref 4–11)
WBC # BLD AUTO: 59.9 10E3/UL (ref 4–11)
WBC # BLD AUTO: 66.8 10E3/UL (ref 4–11)
WBC URINE: 7 /HPF

## 2022-01-11 PROCEDURE — 82947 ASSAY GLUCOSE BLOOD QUANT: CPT | Performed by: INTERNAL MEDICINE

## 2022-01-11 PROCEDURE — 82310 ASSAY OF CALCIUM: CPT | Performed by: INTERNAL MEDICINE

## 2022-01-11 PROCEDURE — 71045 X-RAY EXAM CHEST 1 VIEW: CPT

## 2022-01-11 PROCEDURE — 250N000009 HC RX 250: Performed by: EMERGENCY MEDICINE

## 2022-01-11 PROCEDURE — 82947 ASSAY GLUCOSE BLOOD QUANT: CPT | Performed by: EMERGENCY MEDICINE

## 2022-01-11 PROCEDURE — 250N000011 HC RX IP 250 OP 636: Performed by: EMERGENCY MEDICINE

## 2022-01-11 PROCEDURE — 87040 BLOOD CULTURE FOR BACTERIA: CPT | Performed by: EMERGENCY MEDICINE

## 2022-01-11 PROCEDURE — C9113 INJ PANTOPRAZOLE SODIUM, VIA: HCPCS | Performed by: INTERNAL MEDICINE

## 2022-01-11 PROCEDURE — 84100 ASSAY OF PHOSPHORUS: CPT | Performed by: EMERGENCY MEDICINE

## 2022-01-11 PROCEDURE — 70450 CT HEAD/BRAIN W/O DYE: CPT

## 2022-01-11 PROCEDURE — 36415 COLL VENOUS BLD VENIPUNCTURE: CPT | Performed by: INTERNAL MEDICINE

## 2022-01-11 PROCEDURE — 82077 ASSAY SPEC XCP UR&BREATH IA: CPT | Performed by: EMERGENCY MEDICINE

## 2022-01-11 PROCEDURE — 84484 ASSAY OF TROPONIN QUANT: CPT | Performed by: EMERGENCY MEDICINE

## 2022-01-11 PROCEDURE — 82010 KETONE BODYS QUAN: CPT | Performed by: INTERNAL MEDICINE

## 2022-01-11 PROCEDURE — 258N000003 HC RX IP 258 OP 636: Performed by: EMERGENCY MEDICINE

## 2022-01-11 PROCEDURE — 96368 THER/DIAG CONCURRENT INF: CPT

## 2022-01-11 PROCEDURE — 85027 COMPLETE CBC AUTOMATED: CPT | Performed by: EMERGENCY MEDICINE

## 2022-01-11 PROCEDURE — 83735 ASSAY OF MAGNESIUM: CPT | Performed by: EMERGENCY MEDICINE

## 2022-01-11 PROCEDURE — 36415 COLL VENOUS BLD VENIPUNCTURE: CPT | Performed by: EMERGENCY MEDICINE

## 2022-01-11 PROCEDURE — 82010 KETONE BODYS QUAN: CPT | Performed by: EMERGENCY MEDICINE

## 2022-01-11 PROCEDURE — 80179 DRUG ASSAY SALICYLATE: CPT | Performed by: EMERGENCY MEDICINE

## 2022-01-11 PROCEDURE — 87636 SARSCOV2 & INF A&B AMP PRB: CPT | Performed by: EMERGENCY MEDICINE

## 2022-01-11 PROCEDURE — 96376 TX/PRO/DX INJ SAME DRUG ADON: CPT

## 2022-01-11 PROCEDURE — 999N000065 XR ABDOMEN PORT 1 VIEWS

## 2022-01-11 PROCEDURE — 258N000002 HC RX IP 258 OP 250: Performed by: INTERNAL MEDICINE

## 2022-01-11 PROCEDURE — 99233 SBSQ HOSP IP/OBS HIGH 50: CPT | Performed by: INTERNAL MEDICINE

## 2022-01-11 PROCEDURE — 85018 HEMOGLOBIN: CPT | Performed by: INTERNAL MEDICINE

## 2022-01-11 PROCEDURE — 83930 ASSAY OF BLOOD OSMOLALITY: CPT | Performed by: EMERGENCY MEDICINE

## 2022-01-11 PROCEDURE — 81001 URINALYSIS AUTO W/SCOPE: CPT | Performed by: EMERGENCY MEDICINE

## 2022-01-11 PROCEDURE — 82803 BLOOD GASES ANY COMBINATION: CPT

## 2022-01-11 PROCEDURE — 96375 TX/PRO/DX INJ NEW DRUG ADDON: CPT

## 2022-01-11 PROCEDURE — 83605 ASSAY OF LACTIC ACID: CPT

## 2022-01-11 PROCEDURE — C9803 HOPD COVID-19 SPEC COLLECT: HCPCS

## 2022-01-11 PROCEDURE — 258N000001 HC RX 258: Performed by: INTERNAL MEDICINE

## 2022-01-11 PROCEDURE — 250N000011 HC RX IP 250 OP 636: Performed by: INTERNAL MEDICINE

## 2022-01-11 PROCEDURE — 96365 THER/PROPH/DIAG IV INF INIT: CPT | Mod: 59

## 2022-01-11 PROCEDURE — 74177 CT ABD & PELVIS W/CONTRAST: CPT

## 2022-01-11 PROCEDURE — 84145 PROCALCITONIN (PCT): CPT | Performed by: EMERGENCY MEDICINE

## 2022-01-11 PROCEDURE — 96366 THER/PROPH/DIAG IV INF ADDON: CPT

## 2022-01-11 PROCEDURE — 83036 HEMOGLOBIN GLYCOSYLATED A1C: CPT | Performed by: EMERGENCY MEDICINE

## 2022-01-11 PROCEDURE — 82803 BLOOD GASES ANY COMBINATION: CPT | Performed by: INTERNAL MEDICINE

## 2022-01-11 PROCEDURE — 82947 ASSAY GLUCOSE BLOOD QUANT: CPT | Performed by: OTOLARYNGOLOGY

## 2022-01-11 PROCEDURE — 84703 CHORIONIC GONADOTROPIN ASSAY: CPT | Performed by: INTERNAL MEDICINE

## 2022-01-11 PROCEDURE — 250N000009 HC RX 250: Performed by: INTERNAL MEDICINE

## 2022-01-11 PROCEDURE — 99222 1ST HOSP IP/OBS MODERATE 55: CPT | Performed by: SURGERY

## 2022-01-11 PROCEDURE — 96367 TX/PROPH/DG ADDL SEQ IV INF: CPT

## 2022-01-11 PROCEDURE — 85379 FIBRIN DEGRADATION QUANT: CPT | Performed by: EMERGENCY MEDICINE

## 2022-01-11 PROCEDURE — 96361 HYDRATE IV INFUSION ADD-ON: CPT

## 2022-01-11 PROCEDURE — 93005 ELECTROCARDIOGRAM TRACING: CPT | Mod: 76

## 2022-01-11 PROCEDURE — 93005 ELECTROCARDIOGRAM TRACING: CPT

## 2022-01-11 PROCEDURE — 99285 EMERGENCY DEPT VISIT HI MDM: CPT | Mod: 25

## 2022-01-11 PROCEDURE — 85027 COMPLETE CBC AUTOMATED: CPT | Performed by: HOSPITALIST

## 2022-01-11 PROCEDURE — 80053 COMPREHEN METABOLIC PANEL: CPT | Performed by: EMERGENCY MEDICINE

## 2022-01-11 PROCEDURE — 200N000001 HC R&B ICU

## 2022-01-11 PROCEDURE — 99223 1ST HOSP IP/OBS HIGH 75: CPT | Performed by: INTERNAL MEDICINE

## 2022-01-11 PROCEDURE — 80143 DRUG ASSAY ACETAMINOPHEN: CPT | Performed by: EMERGENCY MEDICINE

## 2022-01-11 PROCEDURE — 80307 DRUG TEST PRSMV CHEM ANLYZR: CPT | Performed by: EMERGENCY MEDICINE

## 2022-01-11 PROCEDURE — 258N000002 HC RX IP 258 OP 250: Performed by: EMERGENCY MEDICINE

## 2022-01-11 RX ORDER — NICOTINE POLACRILEX 4 MG
15-30 LOZENGE BUCCAL
Status: DISCONTINUED | OUTPATIENT
Start: 2022-01-11 | End: 2022-01-15 | Stop reason: HOSPADM

## 2022-01-11 RX ORDER — ACETAMINOPHEN 325 MG/1
650 TABLET ORAL EVERY 4 HOURS PRN
Status: DISCONTINUED | OUTPATIENT
Start: 2022-01-11 | End: 2022-01-15 | Stop reason: HOSPADM

## 2022-01-11 RX ORDER — ONDANSETRON 2 MG/ML
4 INJECTION INTRAMUSCULAR; INTRAVENOUS EVERY 6 HOURS PRN
Status: DISCONTINUED | OUTPATIENT
Start: 2022-01-11 | End: 2022-01-15 | Stop reason: HOSPADM

## 2022-01-11 RX ORDER — ONDANSETRON 2 MG/ML
4 INJECTION INTRAMUSCULAR; INTRAVENOUS ONCE
Status: COMPLETED | OUTPATIENT
Start: 2022-01-11 | End: 2022-01-11

## 2022-01-11 RX ORDER — DEXTROSE MONOHYDRATE 25 G/50ML
25-50 INJECTION, SOLUTION INTRAVENOUS
Status: DISCONTINUED | OUTPATIENT
Start: 2022-01-11 | End: 2022-01-11

## 2022-01-11 RX ORDER — PROCHLORPERAZINE 25 MG
25 SUPPOSITORY, RECTAL RECTAL EVERY 12 HOURS PRN
Status: DISCONTINUED | OUTPATIENT
Start: 2022-01-11 | End: 2022-01-15 | Stop reason: HOSPADM

## 2022-01-11 RX ORDER — VANCOMYCIN HYDROCHLORIDE 125 MG/1
125 CAPSULE ORAL 4 TIMES DAILY
Status: DISCONTINUED | OUTPATIENT
Start: 2022-01-11 | End: 2022-01-11

## 2022-01-11 RX ORDER — PROCHLORPERAZINE MALEATE 10 MG
10 TABLET ORAL EVERY 6 HOURS PRN
Status: DISCONTINUED | OUTPATIENT
Start: 2022-01-11 | End: 2022-01-15 | Stop reason: HOSPADM

## 2022-01-11 RX ORDER — NICOTINE POLACRILEX 4 MG
15-30 LOZENGE BUCCAL
Status: DISCONTINUED | OUTPATIENT
Start: 2022-01-11 | End: 2022-01-11

## 2022-01-11 RX ORDER — SODIUM CHLORIDE 450 MG/100ML
INJECTION, SOLUTION INTRAVENOUS CONTINUOUS
Status: DISCONTINUED | OUTPATIENT
Start: 2022-01-11 | End: 2022-01-11

## 2022-01-11 RX ORDER — FENTANYL CITRATE 50 UG/ML
50 INJECTION, SOLUTION INTRAMUSCULAR; INTRAVENOUS ONCE
Status: COMPLETED | OUTPATIENT
Start: 2022-01-11 | End: 2022-01-11

## 2022-01-11 RX ORDER — ONDANSETRON 4 MG/1
4 TABLET, ORALLY DISINTEGRATING ORAL EVERY 6 HOURS PRN
Status: DISCONTINUED | OUTPATIENT
Start: 2022-01-11 | End: 2022-01-15 | Stop reason: HOSPADM

## 2022-01-11 RX ORDER — CALCIUM GLUCONATE 94 MG/ML
1 INJECTION, SOLUTION INTRAVENOUS ONCE
Status: COMPLETED | OUTPATIENT
Start: 2022-01-11 | End: 2022-01-11

## 2022-01-11 RX ORDER — DEXTROSE MONOHYDRATE 25 G/50ML
25-50 INJECTION, SOLUTION INTRAVENOUS
Status: DISCONTINUED | OUTPATIENT
Start: 2022-01-11 | End: 2022-01-15 | Stop reason: HOSPADM

## 2022-01-11 RX ORDER — IOPAMIDOL 755 MG/ML
500 INJECTION, SOLUTION INTRAVASCULAR ONCE
Status: COMPLETED | OUTPATIENT
Start: 2022-01-11 | End: 2022-01-11

## 2022-01-11 RX ORDER — SODIUM CHLORIDE 450 MG/100ML
INJECTION, SOLUTION INTRAVENOUS CONTINUOUS
Status: DISCONTINUED | OUTPATIENT
Start: 2022-01-11 | End: 2022-01-12

## 2022-01-11 RX ADMIN — TAZOBACTAM SODIUM AND PIPERACILLIN SODIUM 3.38 G: 375; 3 INJECTION, SOLUTION INTRAVENOUS at 22:28

## 2022-01-11 RX ADMIN — Medication 3 UNITS/HR: at 19:43

## 2022-01-11 RX ADMIN — SODIUM CHLORIDE: 4.5 INJECTION, SOLUTION INTRAVENOUS at 06:14

## 2022-01-11 RX ADMIN — ENOXAPARIN SODIUM 40 MG: 40 INJECTION SUBCUTANEOUS at 10:14

## 2022-01-11 RX ADMIN — TAZOBACTAM SODIUM AND PIPERACILLIN SODIUM 3.38 G: 375; 3 INJECTION, SOLUTION INTRAVENOUS at 16:07

## 2022-01-11 RX ADMIN — SODIUM CHLORIDE 1000 ML: 9 INJECTION, SOLUTION INTRAVENOUS at 02:34

## 2022-01-11 RX ADMIN — SODIUM CHLORIDE 1000 ML: 9 INJECTION, SOLUTION INTRAVENOUS at 05:28

## 2022-01-11 RX ADMIN — IOPAMIDOL 57 ML: 755 INJECTION, SOLUTION INTRAVENOUS at 04:09

## 2022-01-11 RX ADMIN — CALCIUM GLUCONATE 1 G: 98 INJECTION, SOLUTION INTRAVENOUS at 04:37

## 2022-01-11 RX ADMIN — ONDANSETRON 4 MG: 2 INJECTION INTRAMUSCULAR; INTRAVENOUS at 02:01

## 2022-01-11 RX ADMIN — PANTOPRAZOLE SODIUM 40 MG: 40 INJECTION, POWDER, FOR SOLUTION INTRAVENOUS at 16:56

## 2022-01-11 RX ADMIN — DEXTROSE AND SODIUM CHLORIDE: 5; 450 INJECTION, SOLUTION INTRAVENOUS at 17:02

## 2022-01-11 RX ADMIN — SODIUM BICARBONATE 50 MEQ: 84 INJECTION, SOLUTION INTRAVENOUS at 06:24

## 2022-01-11 RX ADMIN — TOPICAL ANESTHETIC 0.5 ML: 200 SPRAY DENTAL; PERIODONTAL at 10:13

## 2022-01-11 RX ADMIN — Medication: at 09:24

## 2022-01-11 RX ADMIN — TAZOBACTAM SODIUM AND PIPERACILLIN SODIUM 4.5 G: 500; 4 INJECTION, SOLUTION INTRAVENOUS at 03:51

## 2022-01-11 RX ADMIN — SODIUM CHLORIDE 1000 ML: 9 INJECTION, SOLUTION INTRAVENOUS at 02:01

## 2022-01-11 RX ADMIN — SODIUM CHLORIDE: 4.5 INJECTION, SOLUTION INTRAVENOUS at 04:36

## 2022-01-11 RX ADMIN — TAZOBACTAM SODIUM AND PIPERACILLIN SODIUM 3.38 G: 375; 3 INJECTION, SOLUTION INTRAVENOUS at 10:13

## 2022-01-11 RX ADMIN — FENTANYL CITRATE 50 MCG: 50 INJECTION, SOLUTION INTRAMUSCULAR; INTRAVENOUS at 05:29

## 2022-01-11 RX ADMIN — Medication 5.5 UNITS/HR: at 03:19

## 2022-01-11 RX ADMIN — Medication 5.5 UNITS/HR: at 06:54

## 2022-01-11 RX ADMIN — SODIUM BICARBONATE: 84 INJECTION, SOLUTION INTRAVENOUS at 06:46

## 2022-01-11 RX ADMIN — SODIUM CHLORIDE 51 ML: 9 INJECTION, SOLUTION INTRAVENOUS at 04:09

## 2022-01-11 ASSESSMENT — ACTIVITIES OF DAILY LIVING (ADL)
ADLS_ACUITY_SCORE: 12
ADLS_ACUITY_SCORE: 22
ADLS_ACUITY_SCORE: 26
ADLS_ACUITY_SCORE: 24
ADLS_ACUITY_SCORE: 12
ADLS_ACUITY_SCORE: 12
ADLS_ACUITY_SCORE: 26
ADLS_ACUITY_SCORE: 12
ADLS_ACUITY_SCORE: 26
ADLS_ACUITY_SCORE: 12
ADLS_ACUITY_SCORE: 16
ADLS_ACUITY_SCORE: 12
ADLS_ACUITY_SCORE: 22
ADLS_ACUITY_SCORE: 12
ADLS_ACUITY_SCORE: 22
ADLS_ACUITY_SCORE: 12
ADLS_ACUITY_SCORE: 22
ADLS_ACUITY_SCORE: 12

## 2022-01-11 ASSESSMENT — ENCOUNTER SYMPTOMS
DIARRHEA: 1
ACTIVITY CHANGE: 1
FATIGUE: 1
VOMITING: 1
BACK PAIN: 1
NAUSEA: 1

## 2022-01-11 ASSESSMENT — MIFFLIN-ST. JEOR: SCORE: 1253.49

## 2022-01-11 NOTE — PLAN OF CARE
ICU End of Shift Summary.  For vital signs and complete assessments, please see documentation flowsheets.     Pertinent assessments: Lethargic, disoriented to time. Quiet and withdrawn. BG improving. Remains tachycardic 110-120's. BP stable. T max 99.7 axillary. C/o throat discomfort related to NG tube. LIS with dark brown output. Incontinent of urine upon arrival from ED. PW in place. No BM.  Major Shift Events: Transferred from ED. Labs improving. ID and Surgery consulted while pt in ED  Plan (Upcoming Events): Continue to treat DKA and monitor labs. Continue with IV antibiotics. ID following.  Discharge/Transfer Needs: TBD    Bedside Shift Report Completed : Y  Bedside Safety Check Completed: Y

## 2022-01-11 NOTE — ED NOTES
Care Management Note    Additional Information:  Patient has [place] PCP.         Mercedes Baires

## 2022-01-11 NOTE — CONSULTS
[facility] Surgery Consultation    Margo Cruz MRN# [id number]   Age: 21 year old YOB: 2000     Date of Admission:  1/11/2022    Reason for consult: Abdominal pain, epigastric       Requesting physician: Ayaan       Level of consult: Consult, follow and place orders           Assessment and Plan:   Assessment:   Gastric dilitation - likely multifactorial (gastroparesis, stress, possible SMA syndrome)  Patient Active Problem List    Diagnosis Date Noted     DKA (diabetic ketoacidosis) (H) 01/11/2022     Priority: Medium     Hypermagnesemia 01/11/2022     Priority: Medium     LESLIE (acute kidney injury) (H) 01/11/2022     Priority: Medium     High anion gap metabolic acidosis 01/11/2022     Priority: Medium     Insulin pump status 10/27/2021     Priority: Medium     SIRS (systemic inflammatory response syndrome) (H)-tachycardia, leukocytosis 10/27/2021     Priority: Medium     Elevated procalcitonin 10/27/2021     Priority: Medium     Diabetes mellitus type 1 with hyperosmolarity (H) 10/27/2021     Priority: Medium     Lactic acidosis 10/27/2021     Priority: Medium     Hypophosphatemia 10/27/2021     Priority: Medium     Flank pain 10/27/2021     Priority: Medium     Nausea with vomiting 10/27/2021     Priority: Medium     IUD (intrauterine device) in place 10/27/2021     Priority: Medium     DKA, type 1 (H) 10/26/2021     Priority: Medium     Celiac sprue 07/30/2012     Priority: Medium     Type 1 diabetes, HbA1c goal < 8% (H) 02/06/2011     Priority: Medium     Hospitalized with DKA, 2/11 at diagnosis.  [facility]  Started on pump 4/2013.           Plan:   NG decompression of stomach - ordered, discussed with RN  Consider addition of antibiotics for possibility of C diff (given pre-admission diarrhea)  Medical management of DKA, COVID infection            Chief Complaint:   N,V,D     History is obtained from the patient, electronic health record and emergency department  physician         History of Present Illness:   This patient is a 21 year old female with a significant past medical history of DM1 who presents with the following condition requiring a hospital admission: DKA, gatsric dilitation. Presents with 2 days of N,V,D and insulin pump failure. Was admitted 10/21 with DKA (WBC at that time was 41,000). CT abdomen shows a dilated stomach and duodenum.          Past Medical History:     Past Medical History:   Diagnosis Date     Diabetes mellitus      Seborrheic infantile dermatitis              Past Surgical History:   No past surgical history on file.          Social History:     Social History     Tobacco Use     Smoking status: Never Smoker     Smokeless tobacco: Never Used     Tobacco comment: mom smokes outside   Substance Use Topics     Alcohol use: No             Family History:     Family History   Problem Relation Age of Onset     Diabetes Father              Immunizations:     VACCINE/DOSE   Diptheria   DPT   DTAP   HBIG   Hepatitis A   Hepatitis B   HIB   Influenza   Measles   Meningococcal   MMR   Mumps   Pneumococcal   Polio   Rubella   Small Pox   TDAP   Varicella   Zoster             Allergies:     Allergies   Allergen Reactions     No Known Drug Allergies              Medications:     Current Facility-Administered Medications   Medication     0.45% sodium chloride infusion     acetaminophen (TYLENOL) tablet 650 mg    Or     acetaminophen (TYLENOL) solution 650 mg     dextrose 5% and 0.45% NaCl infusion     glucose gel 15-30 g    Or     dextrose 50 % injection 25-50 mL    Or     glucagon injection 1 mg     enoxaparin ANTICOAGULANT (LOVENOX) injection 40 mg     insulin 1 unit/1mL in saline (NovoLIN, HumuLIN Regular) drip - DKA algorithm     ondansetron (ZOFRAN-ODT) ODT tab 4 mg    Or     ondansetron (ZOFRAN) injection 4 mg     Patient is already receiving anticoagulation with heparin, enoxaparin (LOVENOX), warfarin (COUMADIN)  or other anticoagulant medication      piperacillin-tazobactam (ZOSYN) infusion 3.375 g     prochlorperazine (COMPAZINE) injection 10 mg    Or     prochlorperazine (COMPAZINE) tablet 10 mg    Or     prochlorperazine (COMPAZINE) suppository 25 mg     Current Outpatient Medications   Medication Sig     blood glucose (CONTOUR NEXT TEST) test strip Use to test blood sugar 5-6 times daily or as directed.     blood glucose monitoring (TYE MICROLET) lancets Use to test blood sugar 4-5 times daily or as directed.     escitalopram (LEXAPRO) 20 MG tablet Take 1 tablet (20 mg) by mouth daily     hydrOXYzine (ATARAX) 25 MG tablet Take 1 tablet (25 mg) by mouth nightly as needed for anxiety (sleep)     Insulin Aspart (INSULIN PUMP - OUTPATIENT) Inject Subcutaneous See Admin Instructions Type of pump: [employer]  Type of insulin: Novolog (changed to generic about 1 week ago)  Basal rate(s)  9896-4915: 1 unit/hr  8620-6578: 1.1 units/hr  6190-4506: 0.55 units/hr  7735-9258: 1.1 units/hr  ISF: 35 (all day)  ICF (insulin to carb ratio)  1540-8244: 1unit/4.6g carbs  5839-6648: 1 unit/10g carbs  7247-9268: 1 unit/8g carbs  Active insulin time: 3 hrs  Target goal range:   Followed by endocrinology     insulin aspart (NOVOLOG PEN) 100 UNIT/ML pen Use 3x/day with meals (carb correction scale). Also correction scale with correction factor 1:18     insulin aspart (NOVOLOG VIAL) 100 UNITS/ML vial Use up to 80 units daily in insulin pump. 90 day supply.     insulin glargine (LANTUS PEN) 100 UNIT/ML pen Inject 20 Units Subcutaneous 2 times daily     ondansetron (ZOFRAN-ODT) 4 MG ODT tab Take 1 tablet (4 mg) by mouth every 6 hours as needed for nausea             Review of Systems:   CV: NEGATIVE for chest pain, palpitations or peripheral edema  C: NEGATIVE for fever, chills, change in weight  E/M: NEGATIVE for ear, mouth and throat problems  R: POS for cough           Physical Exam:   All vitals have been reviewed  Patient Vitals for the past 24 hrs:   BP Temp Pulse  Resp SpO2 Weight   01/11/22 0818 93/62 -- 120 28 -- --   01/11/22 0800 98/64 -- (!) 122 (!) 36 (!) 79 % --   01/11/22 0645 104/70 -- (!) 128 21 98 % --   01/11/22 0630 102/74 -- (!) 130 25 98 % --   01/11/22 0615 104/76 -- (!) 128 16 96 % --   01/11/22 0600 108/73 -- 117 24 98 % --   01/11/22 0545 99/68 -- 117 22 -- --   01/11/22 0530 110/78 -- (!) 121 29 90 % --   01/11/22 0515 113/78 -- (!) 122 (!) 33 98 % --   01/11/22 0500 120/78 -- (!) 122 30 100 % --   01/11/22 0445 (!) 142/70 -- (!) 123 29 100 % --   01/11/22 0430 (!) 156/77 -- (!) 126 25 100 % --   01/11/22 0345 106/65 -- 114 -- 100 % --   01/11/22 0330 -- -- -- -- 100 % --   01/11/22 0315 100/56 -- 117 -- -- --   01/11/22 0300 113/58 -- 117 -- -- --   01/11/22 0249 -- -- -- -- -- 52.2 kg (115 lb)   01/11/22 0245 112/56 -- 110 -- 100 % --   01/11/22 0230 -- -- -- -- 100 % --   01/11/22 0215 117/68 -- 120 -- 100 % --   01/11/22 0145 110/75 98  F (36.7  C) (!) 125 26 100 % --     No intake or output data in the 24 hours ending 01/11/22 0833  Sleepy    Neck:   skin normal and no stridor     Chest / Breast:   Nl resp effort     Abdomen:   firm, Mild/mod distended, mild/min tenderness noted diffusely (had had pain medication), voluntary guarding absent and no masses palpated             Data:   All laboratory data reviewed  Results for orders placed or performed during the hospital encounter of 01/11/22   CT Abdomen Pelvis w Contrast     Status: None    Narrative    EXAM: CT ABDOMEN PELVIS W CONTRAST  LOCATION: [facility]  DATE/TIME: 1/11/2022 4:15 AM    INDICATION: Abdominal pain, acute, nonlocalized.  COMPARISON: None.  TECHNIQUE: CT scan of the abdomen and pelvis was performed following injection of IV contrast. Multiplanar reformats were obtained. Dose reduction techniques were used.  CONTRAST: 57 mL Isovue-370.    FINDINGS:   LOWER CHEST: Normal.    HEPATOBILIARY: Fatty liver.    PANCREAS: Normal.    SPLEEN: Normal.    ADRENAL GLANDS:  Normal.    KIDNEYS/BLADDER: Normal.    BOWEL: Fluid distended stomach and proximal duodenum with transition point seen at the duodenal sweep, in a very thin patient with SMA takeoff at acute angle, correlate to exclude SMA syndrome.    Normal appendix. Prostate: Versus mural thickening. No diverticulitis.    LYMPH NODES: Normal.    VASCULATURE: The aorta and branch vessels are widely patent including the superior mesenteric artery, celiac artery, inferior mesenteric artery and renal arteries. Patent portal and hepatic veins.    PELVIC ORGANS: IUD.    MUSCULOSKELETAL: Normal.      Impression    IMPRESSION:   1.  Fluid distended stomach and proximal duodenum with transition point seen at the duodenal sweep, correlate to exclude SMA syndrome.  2.  Widely patent aorta and branch vessels including the superior mesenteric artery.  3.  Decompressed press colon versus mural thickening, correlate to exclude colitis.  4.  Fatty liver.  5.  Normal appendix. No colitis, or diverticulitis.  6.  IUD.              Head CT w/o contrast     Status: None    Narrative    EXAM: CT HEAD W/O CONTRAST  LOCATION: [facility]  DATE/TIME: 1/11/2022 4:03 AM    INDICATION: Delirium  COMPARISON: None.  TECHNIQUE: Routine CT Head without IV contrast. Multiplanar reformats. Dose reduction techniques were used.    FINDINGS:  INTRACRANIAL CONTENTS: No intracranial hemorrhage, extraaxial collection, or mass effect.  No CT evidence of acute infarct. Normal parenchymal attenuation. Normal ventricles and sulci.     VISUALIZED ORBITS/SINUSES/MASTOIDS: No intraorbital abnormality. Mild mucosal thickening in the left sphenoid sinus. No middle ear or mastoid effusion.    BONES/SOFT TISSUES: No acute abnormality.      Impression    IMPRESSION:  1.  No acute intracranial pathology.   XR Chest 1 View     Status: None    Narrative    EXAM: XR CHEST 1 VIEW  LOCATION: [facility]  DATE/TIME: 1/11/2022 4:17  AM    INDICATION: sepsis  COMPARISON: 10/26/2021      Impression    IMPRESSION: Negative chest.   Comprehensive metabolic panel     Status: Abnormal   Result Value Ref Range    Sodium 126 (L) 133 - 144 mmol/L    Potassium 5.7 (H) 3.4 - 5.3 mmol/L    Chloride 90 (L) 94 - 109 mmol/L    Carbon Dioxide (CO2) 5 (LL) 20 - 32 mmol/L    Anion Gap 31 (H) 3 - 14 mmol/L    Urea Nitrogen 33 (H) 7 - 30 mg/dL    Creatinine 1.29 (H) 0.52 - 1.04 mg/dL    Calcium 9.1 8.5 - 10.1 mg/dL    Glucose 1,194 (HH) 70 - 99 mg/dL    Alkaline Phosphatase 190 (H) 40 - 150 U/L    AST 46 (H) 0 - 45 U/L    ALT 43 0 - 50 U/L    Protein Total 8.7 6.8 - 8.8 g/dL    Albumin 4.1 3.4 - 5.0 g/dL    Bilirubin Total 0.5 0.2 - 1.3 mg/dL    GFR Estimate 60 (L) >60 mL/min/1.73m2   Glucose by meter     Status: Abnormal   Result Value Ref Range    GLUCOSE BY METER POCT >600 (HH) 70 - 99 mg/dL   CBC with platelets and differential     Status: Abnormal   Result Value Ref Range    WBC Count 66.8 (HH) 4.0 - 11.0 10e3/uL    RBC Count 5.06 3.80 - 5.20 10e6/uL    Hemoglobin 15.9 (H) 11.7 - 15.7 g/dL    Hematocrit 55.2 (H) 35.0 - 47.0 %     (H) 78 - 100 fL    MCH 31.4 26.5 - 33.0 pg    MCHC 28.8 (L) 31.5 - 36.5 g/dL    RDW 12.3 10.0 - 15.0 %    Platelet Count 472 (H) 150 - 450 10e3/uL   Extra Red Top Tube     Status: None   Result Value Ref Range    Hold Specimen JIC    Ketone Beta-Hydroxybutyrate Quantitative     Status: Abnormal   Result Value Ref Range    Ketone (Beta-Hydroxybutyrate) Quantitative 4.7 (HH) 0.0 - 0.6 mmol/L   Acetaminophen level     Status: Abnormal   Result Value Ref Range    Acetaminophen <2 (L) 10 - 30 mg/L   Salicylate level     Status: Normal   Result Value Ref Range    Salicylate 5 <20 mg/dL   Alcohol level blood     Status: Normal   Result Value Ref Range    Alcohol ethyl <0.01 <=0.01 g/dL   UA with Microscopic reflex to Culture     Status: Abnormal    Specimen: Urine, Catheter   Result Value Ref Range    Color Urine Straw Colorless,  Straw, Light Yellow, Yellow    Appearance Urine Clear Clear    Glucose Urine >=1000 (A) Negative mg/dL    Bilirubin Urine Negative Negative    Ketones Urine 80  (A) Negative mg/dL    Specific Gravity Urine 1.023 1.003 - 1.035    Blood Urine Trace (A) Negative    pH Urine 5.0 5.0 - 7.0    Protein Albumin Urine 20  (A) Negative mg/dL    Urobilinogen Urine Normal Normal, 2.0 mg/dL    Nitrite Urine Negative Negative    Leukocyte Esterase Urine Negative Negative    Bacteria Urine Few (A) None Seen /HPF    RBC Urine 1 <=2 /HPF    WBC Urine 7 (H) <=5 /HPF    Squamous Epithelials Urine 1 <=1 /HPF    Hyaline Casts Urine 1 <=2 /LPF    Granular Casts Urine 4 (H) None Seen /LPF    Narrative    Urine Culture not indicated   Troponin I (now)     Status: Normal   Result Value Ref Range    Troponin I High Sensitivity 6 <54 ng/L   D dimer quantitative     Status: Normal   Result Value Ref Range    D-Dimer Quantitative <0.27 0.00 - 0.50 ug/mL FEU    Narrative    This D-dimer assay is intended for use in conjunction with a clinical pretest probability assessment model to exclude pulmonary embolism (PE) and deep venous thrombosis (DVT) in outpatients suspected of PE or DVT. The cut-off value is 0.50 ug/mL FEU.   Drug abuse screen 1 urine (ED)     Status: Abnormal   Result Value Ref Range    Amphetamines Urine Screen Negative Screen Negative    Barbiturates Urine Screen Negative Screen Negative    Benzodiazepines Urine Screen Negative Screen Negative    Cannabinoids Urine Screen Positive (A) Screen Negative    Cocaine Urine Screen Negative Screen Negative    Opiates Urine Screen Negative Screen Negative   iStat Gases (lactate) venous, POCT     Status: Abnormal   Result Value Ref Range    Lactic Acid POCT 4.4 (HH) <=2.0 mmol/L    Bicarbonate Venous POCT 5 (LL) 21 - 28 mmol/L    O2 Sat, Venous POCT 81 (L) 94 - 100 %    pCO2V Venous POCT 19 (LL) 40 - 50 mm Hg    pH Venous POCT 7.02 (LL) 7.32 - 7.43    pO2 Venous POCT 65 (H) 25 - 47 mm Hg    Basic metabolic panel     Status: Abnormal   Result Value Ref Range    Sodium 132 (L) 133 - 144 mmol/L    Potassium 6.1 (HH) 3.4 - 5.3 mmol/L    Chloride 102 94 - 109 mmol/L    Carbon Dioxide (CO2) 4 (LL) 20 - 32 mmol/L    Anion Gap 26 (H) 3 - 14 mmol/L    Urea Nitrogen 32 (H) 7 - 30 mg/dL    Creatinine 0.99 0.52 - 1.04 mg/dL    Calcium 7.8 (L) 8.5 - 10.1 mg/dL    Glucose 977 (HH) 70 - 99 mg/dL    GFR Estimate 83 >60 mL/min/1.73m2   Phosphorus     Status: Abnormal   Result Value Ref Range    Phosphorus 6.9 (H) 2.5 - 4.5 mg/dL   Magnesium     Status: Abnormal   Result Value Ref Range    Magnesium 3.4 (H) 1.6 - 2.3 mg/dL   Hemoglobin A1c     Status: Abnormal   Result Value Ref Range    Hemoglobin A1C 9.9 (H) 0.0 - 5.6 %   CBC with platelets and differential     Status: Abnormal   Result Value Ref Range    WBC Count 59.9 (HH) 4.0 - 11.0 10e3/uL    RBC Count 4.44 3.80 - 5.20 10e6/uL    Hemoglobin 14.1 11.7 - 15.7 g/dL    Hematocrit 45.6 35.0 - 47.0 %     (H) 78 - 100 fL    MCH 31.8 26.5 - 33.0 pg    MCHC 30.9 (L) 31.5 - 36.5 g/dL    RDW 12.1 10.0 - 15.0 %    Platelet Count 364 150 - 450 10e3/uL   Ketone Beta-Hydroxybutyrate Quantitative     Status: Abnormal   Result Value Ref Range    Ketone (Beta-Hydroxybutyrate) Quantitative 4.7 (HH) 0.0 - 0.6 mmol/L   iStat Gases (lactate) venous, POCT     Status: Abnormal   Result Value Ref Range    Lactic Acid POCT 4.0 (HH) <=2.0 mmol/L    Bicarbonate Venous POCT 5 (LL) 21 - 28 mmol/L    O2 Sat, Venous POCT 60 (L) 94 - 100 %    pCO2V Venous POCT 18 (LL) 40 - 50 mm Hg    pH Venous POCT 7.02 (LL) 7.32 - 7.43    pO2 Venous POCT 44 25 - 47 mm Hg   Manual Differential     Status: Abnormal   Result Value Ref Range    % Neutrophils 64 %    % Lymphocytes 19 %    % Monocytes 13 %    % Eosinophils 0 %    % Basophils 1 %    % Metamyelocytes 2 %    % Myelocytes 1 %    Absolute Neutrophils 42.8 (H) 1.6 - 8.3 10e3/uL    Absolute Lymphocytes 12.7 (H) 0.8 - 5.3 10e3/uL    Absolute  Monocytes 8.7 (H) 0.0 - 1.3 10e3/uL    Absolute Eosinophils 0.0 0.0 - 0.7 10e3/uL    Absolute Basophils 0.7 (H) 0.0 - 0.2 10e3/uL    Absolute Metamyelocytes 1.3 (H) <=0.0 10e3/uL    Absolute Myelocytes 0.7 (H) <=0.0 10e3/uL    RBC Morphology Confirmed RBC Indices     Platelet Assessment  Automated Count Confirmed. Platelet morphology is normal.     Automated Count Confirmed. Platelet morphology is normal.   Glucose     Status: Abnormal   Result Value Ref Range    Glucose 807 (HH) 70 - 99 mg/dL   Ketone Beta-Hydroxybutyrate Quantitative     Status: Abnormal   Result Value Ref Range    Ketone (Beta-Hydroxybutyrate) Quantitative 4.7 (HH) 0.0 - 0.6 mmol/L   Manual Differential     Status: Abnormal   Result Value Ref Range    % Neutrophils 83 %    % Lymphocytes 10 %    % Monocytes 6 %    % Eosinophils 0 %    % Basophils 0 %    % Myelocytes 1 %    Absolute Neutrophils 49.7 (H) 1.6 - 8.3 10e3/uL    Absolute Lymphocytes 6.0 (H) 0.8 - 5.3 10e3/uL    Absolute Monocytes 3.6 (H) 0.0 - 1.3 10e3/uL    Absolute Eosinophils 0.0 0.0 - 0.7 10e3/uL    Absolute Basophils 0.0 0.0 - 0.2 10e3/uL    Absolute Myelocytes 0.6 (H) <=0.0 10e3/uL    RBC Morphology Confirmed RBC Indices     Platelet Assessment  Automated Count Confirmed. Platelet morphology is normal.     Automated Count Confirmed. Platelet morphology is normal.   iStat Gases (lactate) venous, POCT     Status: Abnormal   Result Value Ref Range    Lactic Acid POCT 3.0 (H) <=2.0 mmol/L    Bicarbonate Venous POCT 6 (LL) 21 - 28 mmol/L    O2 Sat, Venous POCT 15 (L) 94 - 100 %    pCO2V Venous POCT 24 (L) 40 - 50 mm Hg    pH Venous POCT 7.00 (LL) 7.32 - 7.43    pO2 Venous POCT 19 (L) 25 - 47 mm Hg   Basic metabolic panel (BMP)     Status: Abnormal   Result Value Ref Range    Sodium 137 133 - 144 mmol/L    Potassium 5.3 3.4 - 5.3 mmol/L    Chloride 110 (H) 94 - 109 mmol/L    Carbon Dioxide (CO2) 5 (LL) 20 - 32 mmol/L    Anion Gap 22 (H) 3 - 14 mmol/L    Urea Nitrogen 30 7 - 30 mg/dL     Creatinine 0.96 0.52 - 1.04 mg/dL    Calcium 8.0 (L) 8.5 - 10.1 mg/dL    Glucose 706 (HH) 70 - 99 mg/dL    GFR Estimate 86 >60 mL/min/1.73m2   Symptomatic; Unknown Influenza A/B & SARS-CoV2 (COVID-19) Virus PCR Multiplex Nasopharyngeal     Status: Abnormal    Specimen: Nasopharyngeal; Swab   Result Value Ref Range    Influenza A PCR Negative Negative    Influenza B PCR Negative Negative    SARS CoV2 PCR Positive (A) Negative    Narrative    Testing was performed using the antolin SARS-CoV-2 & Influenza A/B Assay on the antolin [employer] System. This test should be ordered for the detection of SARS-CoV-2 and influenza viruses in individuals who meet clinical and/or epidemiological criteria. Test performance is unknown in asymptomatic patients. This test is for in vitro diagnostic use under the FDA EUA for laboratories certified under CLIA to perform moderate and/or high complexity testing. This test has not been FDA cleared or approved. A negative result does not rule out the presence of PCR inhibitors in the specimen or target RNA in concentration below the limit of detection for the assay. If only one viral target is positive but coinfection with multiple targets is suspected, the sample should be re-tested with another FDA cleared, approved or authorized test, if coinfection would change clinical management. [facility] are certified under the Clinical Laboratory Improvement Amendments of 1988 (CLIA-88) as  qualified to perform moderate and/or high complexity laboratory testing.   Glucose     Status: Abnormal   Result Value Ref Range    Glucose 571 (HH) 70 - 99 mg/dL   Ketone Beta-Hydroxybutyrate Quantitative     Status: Abnormal   Result Value Ref Range    Ketone (Beta-Hydroxybutyrate) Quantitative 6.1 (HH) 0.0 - 0.6 mmol/L   Blood gas venous     Status: Abnormal   Result Value Ref Range    pH Venous 7.20 (L) 7.32 - 7.43    pCO2 Venous 27 (L) 40 - 50 mm Hg    pO2 Venous 17 (L) 25 - 47 mm Hg     Bicarbonate Venous 11 (L) 21 - 28 mmol/L    Base Excess/Deficit (+/-) -16.0 (L) -7.7 - 1.9 mmol/L    FIO2 0    EKG 12 lead     Status: None   Result Value Ref Range    Systolic Blood Pressure  mmHg    Diastolic Blood Pressure  mmHg    Ventricular Rate 117 BPM    Atrial Rate 117 BPM    NH Interval 148 ms    QRS Duration 108 ms     ms    QTc 482 ms    P Axis 63 degrees    R AXIS -31 degrees    T Axis 69 degrees    Interpretation ECG       Sinus tachycardia  Left axis deviation  Abnormal ECG  When compared with ECG of 26-OCT-2021 11:21,  Questionable change in QRS duration  Borderline criteria for Inferior infarct are no longer Present     EKG 12 lead     Status: None   Result Value Ref Range    Systolic Blood Pressure  mmHg    Diastolic Blood Pressure  mmHg    Ventricular Rate 124 BPM    Atrial Rate 124 BPM    NH Interval 144 ms    QRS Duration 102 ms     ms    QTc 436 ms    P Axis 86 degrees    R AXIS -14 degrees    T Axis 69 degrees    Interpretation ECG       Sinus tachycardia  Otherwise normal ECG  When compared with ECG of 11-JAN-2022 02:17, (unconfirmed)  No significant change was found     EKG 12-lead, tracing only - DKA     Status: None   Result Value Ref Range    Systolic Blood Pressure  mmHg    Diastolic Blood Pressure  mmHg    Ventricular Rate 127 BPM    Atrial Rate 127 BPM    NH Interval 160 ms    QRS Duration 88 ms     ms    QTc 427 ms    P Axis 64 degrees    R AXIS 2 degrees    T Axis 70 degrees    Interpretation ECG       Sinus tachycardia  Otherwise normal ECG  When compared with ECG of 11-JAN-2022 04:31,  No significant change was found  Confirmed by - EMERGENCY ROOM, PHYSICIAN ([id number]),  PATRICK FLORES ([id number]) on 1/11/2022 7:48:33 AM     CBC + differential     Status: Abnormal    Narrative    The following orders were created for panel order CBC + differential.  Procedure                               Abnormality         Status                     ---------                                -----------         ------                     CBC with platelets and d...[[id number]]  Abnormal            Final result               Manual Differential[[id number]]          Abnormal            Final result                 Please view results for these tests on the individual orders.   [place] Draw     Status: None    Narrative    The following orders were created for panel order [place] Draw.  Procedure                               Abnormality         Status                     ---------                               -----------         ------                     Extra Red Top Tube[[id number]]                               Final result                 Please view results for these tests on the individual orders.   Urine Drugs of Abuse Screen     Status: Abnormal    Narrative    The following orders were created for panel order Urine Drugs of Abuse Screen.  Procedure                               Abnormality         Status                     ---------                               -----------         ------                     Drug abuse screen 1 urin...[[id number]]  Abnormal            Final result                 Please view results for these tests on the individual orders.   CBC + differential     Status: Abnormal    Narrative    The following orders were created for panel order CBC + differential.  Procedure                               Abnormality         Status                     ---------                               -----------         ------                     CBC with platelets and d...[[id number]]  Abnormal            Final result               Manual Differential[[id number]]          Abnormal            Final result                 Please view results for these tests on the individual orders.     CT scan of the abdomen:   Appendix: normal  Colon: decompressed, slight mural thickening due to decompression but no colitis (reviewed with another radiologist)  Small bowel: nl  dilated stomach  Nl  nasculature  CT scan interpreted by radiologist        Attestation:  I have reviewed today's vital signs, notes, medications, labs and imaging.  Amount of time performed on this consult: 60 minutes.    Kam Wilson MD

## 2022-01-11 NOTE — PROGRESS NOTES
Updated Dr. Kuo via web based paging about critical result of Ketones 6.1, which was elevated from previous value of 4.7. Continuing fluids, insulin and close monitoring

## 2022-01-11 NOTE — PROGRESS NOTES
[facility]    Hospitalist Progress Note  Name: Margo Cruz    MRN: [id number]  Provider: Doris Kuo MD  Date of Service: 01/11/2022    Assessment & Plan   Summary of Stay: Margo Cruz is a 21 year old female who was admitted on 1/11/2022 for severe DKA, lactic acidosis possible severe sepsis and COVID-19 infection.    Past medical history is significant for insulin diabetes mellitus type 1, history of DKA, MDD, anxiety and celiac sprue.      Severe DKA in IDDM type I, lactic acidosis:   -Patient has an insulin pump.    -History of DKA 6/20/2020 and 10/20/2021, at that time likely pump failure and possible sepsis.    -Presenting with 2 days of nausea, vomiting, diarrhea, myalgias, headaches and has had some sick family members.    -itially profoundly acidotic initially with pH 7.02, PCO2 19, PO2 65, HCO3 5 with anion gap 26, ketones 4.7, and lactic acid 4.4 in the setting of a blood glucose of 1,194.  She thinks her pump has been working, but perhaps she has been rationing her insulin.  Her A1c is 9.9.    -Has received 3 L IV normal saline and started on insulin drip at about 3:30 AM.  Bicarbonate remains 5 and pH is still 7.0.  Given 50 meq IV bicarbonate now over 5 minutes. REPEAT BLOOD GAS pending Ph upto 7.2  -Continue insulin infusion and DKA protocol with IV fluids based off that.  Blood glucose now down to 807.  Likely will fall fairly quickly, but necessary to continue insulin infusion due to severe acidosis.  Mental status is currently intact.  -follow BMP and Ketones q2hr       Possible severe sepsis, source unclear, profound leukocytosis:   Acute COVID-19 infection  Colitis on CT abdomen pelvis  Possible bowel obstruction ?  SMA syndrome with transition point seen in duodenum    -Leukocytosis to 66.8, tachycardic, and GI symptoms of nausea, vomiting, diarrhea along with some myalgias and headache.   - Lactic acid was elevated at 4.4 which is improving with IV fluids.  She is not  hypotensive or febrile.    -She did receive empiric antibiotics during hospitalization in October for similar presentation and no source was found.    -She does report a cough yesterday and has been around multiple sick family members.    -No definitive COVID19 exposures, she is not vaccinated.    -No urinary symptoms and urinalysis not suspicious for infection.  Chest x-ray without infiltrate.      -Continue empiric IV Zosyn  -Check enteric panel and C. difficile PCR  -Influenza A/B NEGATIVE  -2 blood cultures obtained  -Enteric panel and C. difficile ordered  -Stool lactoferrin  -Consult to general surgery  -We will check procalcitonin  -ID consulted    Acute COVID-19 infection  -Unvaccinated  -GI symptoms likely associated with COVID-19 infection  -Cannot exclude superimposed bacterial infection versus obstruction  -No hypoxia, no elevation of D-dimer, no chest x-ray findings  -We will hold off remdesivir  -Can consider steroids but will try to first stabilize hyperglycemia and DKA and have further assessment of underlying possible obstruction versus infection  -COVID-19 precautions  -ID consulted to help with treatment of COVID-19 team in setting of possible bacterial infection colitis with elevated procalcitonin and lactic acidosis     LESLIE, hyperkalemia, hyperphosphatemia:   - on admision Creatinine 1.29 with initial potassium 5.7 and 6.1 on repeat check.  Phosphorus level 6.9.    -LESLIE secondary to prerenal etiology with GI losses vomiting and diarrhea.  Mild hyperkalemia secondary to severe acidosis.    -Received 1 g IV calcium gluconate.  -Give 1 amp sodium bicarb as above  -BMP every 2 hours x3  -Anticipate eventually will need potassium replacement  -Recheck electrolytes including magnesium and phosphorus tomorrow morning  -telemetry     Pseudohyponatremia: Sodium 126 initially in the setting of blood glucose 1100, corrects to normal.     MDD, anxiety: Unclear if taking any medications at this time.  Resume  tomorrow if taking Lexapro and hydroxyzine.        DVT Prophylaxis: Enoxaparin (Lovenox) SQ  Code Status: Full Code  FEN: IV fluids per DKA protocol.  Clear liquid diet  Discharge Dispo: Home  Estimated Disch Date / # of Days until Disch: Admit inpatient to the ICU for severe DKA      Interval History   Assumed care, reviewed chart, patient quite lethargic and alert and oriented.  Offers no complaints Limited review of systems.    -Data reviewed today: I reviewed all new labs and imaging reports over the last 24 hours. I personally reviewed the EKG tracing showing Sinus tachycardia.    Physical Exam   Temp: 98  F (36.7  C)   BP: 104/70 Pulse: (!) 128   Resp: 21 SpO2: 98 %      Vitals:    01/11/22 0249   Weight: 52.2 kg (115 lb)     Vital Signs with Ranges  Temp:  [98  F (36.7  C)] 98  F (36.7  C)  Pulse:  [110-130] 128  Resp:  [16-33] 21  BP: ()/(56-78) 104/70  SpO2:  [90 %-100 %] 98 %  No intake/output data recorded.      GEN:  Alert, oriented x 3, appears lethargic and weak  HEENT:  Normocephalic/atraumatic, no scleral icterus, no nasal discharge, mouth dry  CV: Tachycardic no murmur or JVD.  S1 + S2 noted, no S3 or S4.  LUNGS:  Clear to auscultation bilaterally without rales/rhonchi/wheezing/retractions.  Symmetric chest rise on inhalation noted.  ABD:  Active bowel sounds, soft, non-tender/non-distended.  No rebound/guarding/rigidity.  EXT:  No edema.  No cyanosis.  No joint synovitis noted.  SKIN:  Dry to touch, no exanthems noted in the visualized areas.    Medications     NaCl 100 mL/hr at 01/11/22 0614     dextrose 5% and 0.45% NaCl       insulin (regular) 5.5 Units/hr (01/11/22 0654)     - MEDICATION INSTRUCTIONS -       sodium bicabonate for infusion 250 mL/hr at 01/11/22 0646       enoxaparin ANTICOAGULANT  40 mg Subcutaneous Q24H     piperacillin-tazobactam  3.375 g Intravenous Q6H     Data     Recent Labs   Lab 01/11/22  0509 01/11/22  0258 01/11/22  0246   HCO3V 6* 5* 5*     Recent Labs   Lab  01/11/22  0509 01/11/22  0258 01/11/22  0246   PH 7.00* 7.02* 7.02*   HCO3V 6* 5* 5*     Recent Labs   Lab 01/11/22  0332 01/11/22 0159   WBC 59.9* 66.8*   HGB 14.1 15.9*   HCT 45.6 55.2*   * 109*    472*     Recent Labs   Lab 01/11/22  0651 01/11/22  0548 01/11/22  0503 01/11/22 0332 01/11/22 0159   NA  --  137  --  132* 126*   POTASSIUM  --  5.3  --  6.1* 5.7*   CHLORIDE  --  110*  --  102 90*   CO2  --  5*  --  4* 5*   ANIONGAP  --  22*  --  26* 31*   * 706* 807* 977* 1,194*   BUN  --  30  --  32* 33*   CR  --  0.96  --  0.99 1.29*   GFRESTIMATED  --  86  --  83 60*   BEN  --  8.0*  --  7.8* 9.1     No results for input(s): CULT in the last 168 hours.  Recent Labs   Lab 01/11/22  0651 01/11/22  0548 01/11/22  0503 01/11/22  0332 01/11/22  0159   NA  --  137  --  132* 126*   POTASSIUM  --  5.3  --  6.1* 5.7*   CHLORIDE  --  110*  --  102 90*   CO2  --  5*  --  4* 5*   * 706* 807* 977* 1,194*     GFR Estimate   Date Value Ref Range Status   01/11/2022 86 >60 mL/min/1.73m2 Final     Comment:     Effective December 21, 2021 eGFRcr in adults is calculated using the 2021 CKD-EPI creatinine equation which includes age and gender (Janay torres al., NEJM, DOI: 10.1056/ZRDFip5567332)   01/11/2022 83 >60 mL/min/1.73m2 Final     Comment:     Effective December 21, 2021 eGFRcr in adults is calculated using the 2021 CKD-EPI creatinine equation which includes age and gender (Janay torres al., [facility], DOI: 10.1056/ZOKPhk5020105)   01/11/2022 60 (L) >60 mL/min/1.73m2 Final     Comment:     Effective December 21, 2021 eGFRcr in adults is calculated using the 2021 CKD-EPI creatinine equation which includes age and gender (Janay torres al., [facility], DOI: 10.1056/QPZUzw2181423)   06/05/2020 >90 >60 mL/min/[1.73_m2] Final     Comment:     Non  GFR Calc  Starting 12/18/2018, serum creatinine based estimated GFR (eGFR) will be   calculated using the Chronic Kidney Disease Epidemiology Collaboration    (CKD-EPI) equation.     06/04/2020 >90 >60 mL/min/[1.73_m2] Final     Comment:     Non  GFR Calc  Starting 12/18/2018, serum creatinine based estimated GFR (eGFR) will be   calculated using the Chronic Kidney Disease Epidemiology Collaboration   (CKD-EPI) equation.     06/04/2020 >90 >60 mL/min/[1.73_m2] Final     Comment:     Non  GFR Calc  Starting 12/18/2018, serum creatinine based estimated GFR (eGFR) will be   calculated using the Chronic Kidney Disease Epidemiology Collaboration   (CKD-EPI) equation.       GFR Estimate If Black   Date Value Ref Range Status   06/05/2020 >90 >60 mL/min/[1.73_m2] Final     Comment:      GFR Calc  Starting 12/18/2018, serum creatinine based estimated GFR (eGFR) will be   calculated using the Chronic Kidney Disease Epidemiology Collaboration   (CKD-EPI) equation.     06/04/2020 >90 >60 mL/min/[1.73_m2] Final     Comment:      GFR Calc  Starting 12/18/2018, serum creatinine based estimated GFR (eGFR) will be   calculated using the Chronic Kidney Disease Epidemiology Collaboration   (CKD-EPI) equation.     06/04/2020 >90 >60 mL/min/[1.73_m2] Final     Comment:      GFR Calc  Starting 12/18/2018, serum creatinine based estimated GFR (eGFR) will be   calculated using the Chronic Kidney Disease Epidemiology Collaboration   (CKD-EPI) equation.       Recent Labs   Lab 01/11/22  0159   AST 46*   ALT 43   ALKPHOS 190*   BILITOTAL 0.5     No results for input(s): INR in the last 168 hours.  Recent Labs   Lab 01/11/22  0509 01/11/22  0258 01/11/22  0246   LACT 3.0* 4.0* 4.4*     No results for input(s): LIPASE in the last 168 hours.  Recent Labs   Lab 01/11/22  0548 01/11/22  0332 01/11/22  0159   BUN 30 32* 33*   CR 0.96 0.99 1.29*     No results for input(s): TSH in the last 168 hours.  No results for input(s): TROPONIN, TROPI, TROPR in the last 168 hours.    Invalid input(s): TROP, TROPONINIES, TNIH  Recent  Labs   Lab 01/11/22  0226   COLOR Straw   APPEARANCE Clear   URINEGLC >=1000*   URINEBILI Negative   URINEKETONE 80 *   SG 1.023   UBLD Trace*   URINEPH 5.0   PROTEIN 20 *   NITRITE Negative   LEUKEST Negative   RBCU 1   WBCU 7*       Recent Results (from the past 24 hour(s))   XR Chest 1 View    Narrative    EXAM: XR CHEST 1 VIEW  LOCATION: [facility]  DATE/TIME: 1/11/2022 4:17 AM    INDICATION: sepsis  COMPARISON: 10/26/2021      Impression    IMPRESSION: Negative chest.   CT Abdomen Pelvis w Contrast    Narrative    EXAM: CT ABDOMEN PELVIS W CONTRAST  LOCATION: [facility]  DATE/TIME: 1/11/2022 4:15 AM    INDICATION: Abdominal pain, acute, nonlocalized.  COMPARISON: None.  TECHNIQUE: CT scan of the abdomen and pelvis was performed following injection of IV contrast. Multiplanar reformats were obtained. Dose reduction techniques were used.  CONTRAST: 57 mL Isovue-370.    FINDINGS:   LOWER CHEST: Normal.    HEPATOBILIARY: Fatty liver.    PANCREAS: Normal.    SPLEEN: Normal.    ADRENAL GLANDS: Normal.    KIDNEYS/BLADDER: Normal.    BOWEL: Fluid distended stomach and proximal duodenum with transition point seen at the duodenal sweep, in a very thin patient with SMA takeoff at acute angle, correlate to exclude SMA syndrome.    Normal appendix. Prostate: Versus mural thickening. No diverticulitis.    LYMPH NODES: Normal.    VASCULATURE: The aorta and branch vessels are widely patent including the superior mesenteric artery, celiac artery, inferior mesenteric artery and renal arteries. Patent portal and hepatic veins.    PELVIC ORGANS: IUD.    MUSCULOSKELETAL: Normal.      Impression    IMPRESSION:   1.  Fluid distended stomach and proximal duodenum with transition point seen at the duodenal sweep, correlate to exclude SMA syndrome.  2.  Widely patent aorta and branch vessels including the superior mesenteric artery.  3.  Decompressed press colon versus mural thickening,  correlate to exclude colitis.  4.  Fatty liver.  5.  Normal appendix. No colitis, or diverticulitis.  6.  IUD.              Head CT w/o contrast    Narrative    EXAM: CT HEAD W/O CONTRAST  LOCATION: [facility]  DATE/TIME: 1/11/2022 4:03 AM    INDICATION: Delirium  COMPARISON: None.  TECHNIQUE: Routine CT Head without IV contrast. Multiplanar reformats. Dose reduction techniques were used.    FINDINGS:  INTRACRANIAL CONTENTS: No intracranial hemorrhage, extraaxial collection, or mass effect.  No CT evidence of acute infarct. Normal parenchymal attenuation. Normal ventricles and sulci.     VISUALIZED ORBITS/SINUSES/MASTOIDS: No intraorbital abnormality. Mild mucosal thickening in the left sphenoid sinus. No middle ear or mastoid effusion.    BONES/SOFT TISSUES: No acute abnormality.      Impression    IMPRESSION:  1.  No acute intracranial pathology.

## 2022-01-11 NOTE — ED NOTES
[facility]  ED Nurse Handoff Report    Margo Cruz is a 21 year old female   ED Chief complaint: Nausea, Vomiting, & Diarrhea  . ED Diagnosis:   Final diagnoses:   Diabetic ketoacidosis without coma associated with type 1 diabetes mellitus (H)   Lactic acidosis   LESLIE (acute kidney injury) (H)   High anion gap metabolic acidosis   Hypermagnesemia     Allergies:   Allergies   Allergen Reactions     No Known Drug Allergies        Code Status: Full Code  Activity level - Baseline/Home:  Independent. Activity Level - Current:   Stand by Assist. Lift room needed: No. Bariatric: No   Needed: No   Isolation: No. Infection: Not Applicable.     Vital Signs:   Vitals:    01/11/22 0300 01/11/22 0315 01/11/22 0330 01/11/22 0345   BP: 113/58 100/56  106/65   Pulse: 117 117  114   Resp:       Temp:       SpO2:   100% 100%   Weight:           Cardiac Rhythm:  ,      Pain level:    Patient confused: Yes. Patient Falls Risk: Yes.   Elimination Status: Has voided   Patient Report - Initial Complaint: Nausea, vomitting, diarrhea. Focused Assessment: Pt found to have high blood sugar, pt confused upon initial assessment, improving with fluids and insulin drip. Pt lethargic, oriented to situation and self.  Speech is slurred.   Tests Performed: labs, ekg, CT. Abnormal Results:   Labs Ordered and Resulted from Time of ED Arrival to Time of ED Departure   COMPREHENSIVE METABOLIC PANEL - Abnormal       Result Value    Sodium 126 (*)     Potassium 5.7 (*)     Chloride 90 (*)     Carbon Dioxide (CO2) 5 (*)     Anion Gap 31 (*)     Urea Nitrogen 33 (*)     Creatinine 1.29 (*)     Calcium 9.1      Glucose 1,194 (*)     Alkaline Phosphatase 190 (*)     AST 46 (*)     ALT 43      Protein Total 8.7      Albumin 4.1      Bilirubin Total 0.5      GFR Estimate 60 (*)    GLUCOSE BY METER - Abnormal    GLUCOSE BY METER POCT >600 (*)    KETONE BETA-HYDROXYBUTYRATE QUANTITATIVE, RAPID - Abnormal    Ketone  (Beta-Hydroxybutyrate) Quantitative 4.7 (*)    ACETAMINOPHEN LEVEL - Abnormal    Acetaminophen <2 (*)    DRUG ABUSE SCREEN 1 URINE (ED) - Abnormal    Amphetamines Urine Screen Negative      Barbiturates Urine Screen Negative      Benzodiazepines Urine Screen Negative      Cannabinoids Urine Screen Positive (*)     Cocaine Urine Screen Negative      Opiates Urine Screen Negative     ISTAT GASES LACTATE VENOUS POCT - Abnormal    Lactic Acid POCT 4.4 (*)     Bicarbonate Venous POCT 5 (*)     O2 Sat, Venous POCT 81 (*)     pCO2V Venous POCT 19 (*)     pH Venous POCT 7.02 (*)     pO2 Venous POCT 65 (*)    MAGNESIUM - Abnormal    Magnesium 3.4 (*)    HEMOGLOBIN A1C - Abnormal    Hemoglobin A1C 9.9 (*)    KETONE BETA-HYDROXYBUTYRATE QUANTITATIVE, RAPID - Abnormal    Ketone (Beta-Hydroxybutyrate) Quantitative 4.7 (*)    ISTAT GASES LACTATE VENOUS POCT - Abnormal    Lactic Acid POCT 4.0 (*)     Bicarbonate Venous POCT 5 (*)     O2 Sat, Venous POCT 60 (*)     pCO2V Venous POCT 18 (*)     pH Venous POCT 7.02 (*)     pO2 Venous POCT 44     DIFFERENTIAL - Abnormal    % Neutrophils 64      % Lymphocytes 19      % Monocytes 13      % Eosinophils 0      % Basophils 1      % Metamyelocytes 2      % Myelocytes 1      Absolute Neutrophils 42.8 (*)     Absolute Lymphocytes 12.7 (*)     Absolute Monocytes 8.7 (*)     Absolute Eosinophils 0.0      Absolute Basophils 0.7 (*)     Absolute Metamyelocytes 1.3 (*)     Absolute Myelocytes 0.7 (*)     RBC Morphology Confirmed RBC Indices      Platelet Assessment        Value: Automated Count Confirmed. Platelet morphology is normal.   SALICYLATE LEVEL - Normal    Salicylate 5     ETHYL ALCOHOL LEVEL - Normal    Alcohol ethyl <0.01     TROPONIN I - Normal    Troponin I High Sensitivity 6     D DIMER QUANTITATIVE - Normal    D-Dimer Quantitative <0.27     CBC WITH PLATELETS AND DIFFERENTIAL   ROUTINE UA WITH MICROSCOPIC REFLEX TO CULTURE   GLUCOSE MONITOR NURSING POCT   GLUCOSE MONITOR NURSING  POCT   BASIC METABOLIC PANEL   OSMOLALITY   PHOSPHORUS   CBC WITH PLATELETS AND DIFFERENTIAL     CT Abdomen Pelvis w Contrast    (Results Pending)   Head CT w/o contrast    (Results Pending)     .   Treatments provided: See MAR  Family Comments: boyfriend at bedside  OBS brochure/video discussed/provided to patient:  Yes  ED Medications:   Medications   insulin 1 unit/1mL in saline (NovoLIN, HumuLIN Regular) drip - DKA algorithm (5.5 Units/hr Intravenous New Bag 1/11/22 0319)   glucose gel 15-30 g (has no administration in time range)     Or   dextrose 50 % injection 25-50 mL (has no administration in time range)     Or   glucagon injection 1 mg (has no administration in time range)   piperacillin-tazobactam (ZOSYN) intermittent infusion 4.5 g (4.5 g Intravenous New Bag 1/11/22 0351)   ondansetron (ZOFRAN) injection 4 mg (4 mg Intravenous Given 1/11/22 0201)   0.9% sodium chloride BOLUS (0 mLs Intravenous Stopped 1/11/22 0302)   0.9% sodium chloride BOLUS (0 mLs Intravenous Stopped 1/11/22 0302)     Drips infusing:  Yes, insulin  For the majority of the shift, the patient's behavior Green. Interventions performed were n/a.    Sepsis treatment initiated: No     Patient tested for COVID 19 prior to admission: YES    ED Nurse Name/Phone Number: Amina Rock RN,   3:55 AM

## 2022-01-11 NOTE — CONSULTS
ID consult dictated IMP1 21 to female acute DKA, prominent GI sxs but not clr infection , major leukocytosis  2 COVID 19    REC 1 watch from COVID standpoint  2 zosyn , await cxs follow all, doubt C diff , not having further diarrhea hold po vanco, and discontinue cdiff order/iso if not having diarrhea

## 2022-01-11 NOTE — PROGRESS NOTES
[facility]  Hospitalist ICU Progress Note  Tracy Claudio 01/11/22    Reason for Stay (Diagnosis): DKA         Assessment and Plan:      Summary of Stay: Margo Cruz is a 21 year old female with a history of T1DM and celiac sprue admitted on 1/11/2022 with DKA in the setting of a COVID-19 infection and possible severe sepsis.     Problem List/Assessment and Plan:     Endo:   1. DKA in the setting of type 1 diabetes  - 1/2 NS gtt at 100 mL/hr  - start 1/2 NS and 5% dextrose gtt when glucose reaches 200  - insulin gtt 5 units/hr  - monitor glucose  - monitor potassium, replace as needed     2. High anion gap metabolic acidosis   - initially very acidodic with pH 7.02, PCO2 19, PO2 65, HCO3 5 with an AG of 26 and lactic acid 4.4; labs currently improving  - patient received 3L NS bolus in the ED on 1/11; now on continuous   - pH was not improving in response to fluid resuscitation; patient then received a 50 mEq bolus of sodium bicarb in the ED    Renal/FEN:   1. Acute kidney injury  Likely prerenal given BUN:Cr >20 and in the setting of vomiting and diarrhea.   - Cr 1.29 on arrival --> now 0.82  - monitor    2. Hyperkalemia  - 5.7 --> 6.1 --> 5.3 --> 4.7 ---> 5.4  - likely secondary to severe acidemia  - monitor   - anticipate will likely need to replace     3. Hyperphosphatemia  - 6.9 in the ED  - monitor and recheck in AM    ID:   1. COVID-19 infection   2. Lactic acidosis and possible severe sepsis  3. Elevated WBC  4. Elevated procalcitonin   - The patient is unvaccinated against COVID-19. Although, COVID-19 could be contributing to some infectious symptoms, in the setting of her significantly elevated WBC (49,000) and elevated procalcitonin, there is concern for a bacterial cause of infection. Given diarrhea prior to admission, considering C. Diff, colitis, or other stool infection. UA shows no signs of infection. No infiltrate on CXR.  - s/p zosyn bolus in ED on 1/11  - Continue zosyn 3.375 g Q6H    - C. Diff, fecal lactoferrin, and enteric bacteria pending   - Considering treatment for COVID-19 if patient is a candidate   - ID consult, recs appreciated  - start oral vancomycin    GI:  1: Nausea, vomiting, diarrhea  2. Distended stomach and proximal duodenum with transition point seen at the duodenal sweep  3. Decompressed colon vs mural thickening  - considering colitis vs possible obstructive  - general surgery consulted, recs appreciated   - NG decompression of stomach  - NPO  - PRN zofran       Lines/drains/tubes: NG tube, 2 PIVs  DVT Prophylaxis: Enoxaparin (Lovenox) SQ  GI Prophylaxis: not indicated  Code Status: Full Code   Discharge Dispo/Date: Patient severely ill. Will require stabilization of her acidosis as well as further investigation into infectious causes.       Tracy Claudio, MS-4  [facility] [employer]        Interval History (Subjective):      ***                  Physical Exam:      Last Vital Signs:  /62   Pulse 110   Temp 98  F (36.7  C)   Resp 20   Wt 52.2 kg (115 lb)   SpO2 100%   BMI 20.37 kg/m      No intake or output data in the 24 hours ending 01/11/22 1217  Wt Readings from Last 1 Encounters:   01/11/22 52.2 kg (115 lb)     General: Alert, awake, no acute distress.***  HEENT: NC/AT, eyes anicteric, external occular movements intact, face symmetric.  Dentition WNL, MM moist.  Cardiac: RRR, S1, S2.  No murmurs appreciated.  Pulmonary: Normal chest rise, normal work of breathing.  Lungs CTA BL  Abdomen: soft, non-tender, non-distended.  Bowel Sounds Present.  No guarding.  Extremities: no deformities.  Warm, well perfused.  Skin: no rashes or lesions noted.  Warm and Dry.  Neuro: No focal deficits noted.  Speech clear.  Coordination and strength grossly normal.  Psych: Appropriate affect.         Medications:          enoxaparin ANTICOAGULANT  40 mg Subcutaneous Q24H     piperacillin-tazobactam  3.375 g Intravenous Q6H     vancomycin  125 mg Oral 4x Daily             Data:      All new lab and imaging data was reviewed.   Labs:  Recent Labs   Lab 01/11/22  1144 01/11/22  1140 01/11/22  1033 01/11/22  0909 01/11/22  0908 01/11/22  0809 01/11/22  0651   NA  --  141  --  139  --  138 138   POTASSIUM  --  5.4*  --  4.3  --  4.7 5.4*   CHLORIDE  --  116*  --  109  --  108 111*   CO2  --   --   --  9*  --  10* 5*   ANIONGAP  --   --   --  21*  --  20* 22*   *  --  339* 397*   < > 474* 575*  571*   BUN  --   --   --  26  --  28 28   CR  --   --   --  0.99  --  0.94 1.01   GFRESTIMATED  --   --   --  83  --  88 81   BEN  --   --   --  8.9  --  8.7 8.4*    < > = values in this interval not displayed.     Recent Labs   Lab 01/11/22  0909 01/11/22  0332 01/11/22  0159   WBC 43.4* 59.9* 66.8*   HGB 14.4 14.1 15.9*   HCT 43.3 45.6 55.2*   MCV 95 103* 109*    364 472*       VBG:    pH 7.02 --> 7.00 --> 7.2   pCO2: 19 -->18 --> 24 --> 27   HCO3: 5 --> 6 --> 11       Beta-hydroxybutyrate: 4.7 --> 6.1 --> 6.1 --> 5.8    Recent labs and studies reviewed.   Imaging:   Recent Results (from the past 24 hour(s))   XR Chest 1 View    Narrative    EXAM: XR CHEST 1 VIEW  LOCATION: [facility]  DATE/TIME: 1/11/2022 4:17 AM    INDICATION: sepsis  COMPARISON: 10/26/2021      Impression    IMPRESSION: Negative chest.   CT Abdomen Pelvis w Contrast    Narrative    EXAM: CT ABDOMEN PELVIS W CONTRAST  LOCATION: [facility]  DATE/TIME: 1/11/2022 4:15 AM    INDICATION: Abdominal pain, acute, nonlocalized.  COMPARISON: None.  TECHNIQUE: CT scan of the abdomen and pelvis was performed following injection of IV contrast. Multiplanar reformats were obtained. Dose reduction techniques were used.  CONTRAST: 57 mL Isovue-[id number].    FINDINGS:   LOWER CHEST: Normal.    HEPATOBILIARY: Fatty liver.    PANCREAS: Normal.    SPLEEN: Normal.    ADRENAL GLANDS: Normal.    KIDNEYS/BLADDER: Normal.    BOWEL: Fluid distended stomach and proximal duodenum with  transition point seen at the duodenal sweep, in a very thin patient with SMA takeoff at acute angle, correlate to exclude SMA syndrome.    Normal appendix. Prostate: Versus mural thickening. No diverticulitis.    LYMPH NODES: Normal.    VASCULATURE: The aorta and branch vessels are widely patent including the superior mesenteric artery, celiac artery, inferior mesenteric artery and renal arteries. Patent portal and hepatic veins.    PELVIC ORGANS: IUD.    MUSCULOSKELETAL: Normal.      Impression    IMPRESSION:   1.  Fluid distended stomach and proximal duodenum with transition point seen at the duodenal sweep, correlate to exclude SMA syndrome.  2.  Widely patent aorta and branch vessels including the superior mesenteric artery.  3.  Decompressed press colon versus mural thickening, correlate to exclude colitis.  4.  Fatty liver.  5.  Normal appendix. No colitis, or diverticulitis.  6.  IUD.              Head CT w/o contrast    Narrative    EXAM: CT HEAD W/O CONTRAST  LOCATION: [facility]  DATE/TIME: 1/11/2022 4:03 AM    INDICATION: Delirium  COMPARISON: None.  TECHNIQUE: Routine CT Head without IV contrast. Multiplanar reformats. Dose reduction techniques were used.    FINDINGS:  INTRACRANIAL CONTENTS: No intracranial hemorrhage, extraaxial collection, or mass effect.  No CT evidence of acute infarct. Normal parenchymal attenuation. Normal ventricles and sulci.     VISUALIZED ORBITS/SINUSES/MASTOIDS: No intraorbital abnormality. Mild mucosal thickening in the left sphenoid sinus. No middle ear or mastoid effusion.    BONES/SOFT TISSUES: No acute abnormality.      Impression    IMPRESSION:  1.  No acute intracranial pathology.   XR Abdomen Port 1 View    Narrative    ABDOMEN PORTABLE ONE VIEW   1/11/2022 11:02 AM     HISTORY: NG placement.    COMPARISON: CT abdomen and pelvis 1/11/2022.      Impression    IMPRESSION: Nasogastric tube identified with its tip at the proximal  stomach. Gas  distends the stomach and a few bowel loops. Correlate  with ileus.    LEA KANG MD         SYSTEM ID:  [id number]

## 2022-01-11 NOTE — ED PROVIDER NOTES
History   Chief Complaint:  Nausea, Vomiting, & Diarrhea     HPI   Margo Cruz is a 21 year old female with history of T1DM, anxiety, depression, celiac sprue who presents for evaluation of nausea, vomiting, diarrhea.  Also noted high blood sugar on home glucometer.  Patient reports over the last couple days she has been having back pain.  This has not appreciably changed in the last couple days, though shortly after waking yesterday morning around 6 AM, patient developed nausea and vomiting.  She has also had multiple episodes of loose nonbloody stool.  She is not having any fevers, cough, chest pain, difficulty breathing, sore throat, dysuria.  She arrives with her significant other, who states patient is concerned she has COVID-19 with multiple family members at home being sick recently.  She has not been vaccinated for COVID-19.  Patient reports she does occasionally use marijuana, denies other drug use.  She does drink alcohol, though denies use since onset of symptoms.  Chart review shows patient has had two hospitalizations for DKA in 6/20 and 10/21     Review of Systems   Constitutional: Positive for activity change and fatigue.   Gastrointestinal: Positive for diarrhea, nausea and vomiting.   Musculoskeletal: Positive for back pain.   All other systems reviewed and are negative.    Allergies:  No Known Drug Allergies    Medications:  blood glucose (CONTOUR NEXT TEST) test strip  blood glucose monitoring (TYE MICROLET) lancets  escitalopram (LEXAPRO) 20 MG tablet  hydrOXYzine (ATARAX) 25 MG tablet  Insulin Aspart (INSULIN PUMP - OUTPATIENT)  insulin aspart (NOVOLOG PEN) 100 UNIT/ML pen  insulin aspart (NOVOLOG VIAL) 100 UNITS/ML vial  insulin glargine (LANTUS PEN) 100 UNIT/ML pen  ondansetron (ZOFRAN-ODT) 4 MG ODT tab    Past Medical History:    Past Medical History:   Diagnosis Date     Diabetes mellitus      Seborrheic infantile dermatitis      Patient Active Problem List    Diagnosis Date Noted      Insulin pump status 10/27/2021     Priority: Medium     SIRS (systemic inflammatory response syndrome) (H)-tachycardia, leukocytosis 10/27/2021     Priority: Medium     Elevated procalcitonin 10/27/2021     Priority: Medium     Diabetes mellitus type 1 with hyperosmolarity (H) 10/27/2021     Priority: Medium     Lactic acidosis 10/27/2021     Priority: Medium     Hypophosphatemia 10/27/2021     Priority: Medium     Flank pain 10/27/2021     Priority: Medium     Nausea with vomiting 10/27/2021     Priority: Medium     IUD (intrauterine device) in place 10/27/2021     Priority: Medium     DKA, type 1 (H) 10/26/2021     Priority: Medium     Celiac sprue 07/30/2012     Priority: Medium     Type 1 diabetes, HbA1c goal < 8% (H) 02/06/2011     Priority: Medium     Hospitalized with DKA, 2/11 at diagnosis.  [facility]  Started on pump 4/2013.        Past Surgical History:    No past surgical history on file.     Family History:    Family History   Problem Relation Age of Onset     Diabetes Father      Social History:  The patient presents to the ED with boyfriend.    Physical Exam     Patient Vitals for the past 24 hrs:   BP Temp Pulse Resp SpO2   01/11/22 0145 110/75 98  F (36.7  C) (!) 125 26 100 %       Physical Exam  Nursing note and vitals reviewed.  Constitutional: Well nourished. Ill appearing  Eyes: Conjunctiva normal.  Pupils are equal, round, and reactive to light. 4mm bilaterally  ENT: Nose normal. Mucous membranes pink and moist.    Neck: Normal range of motion.  CVS: Sinus tachycardia.  Normal heart sounds.  No murmur.  Pulmonary: Lungs clear to auscultation bilaterally. No wheezes/rales/rhonchi.  GI: Abdomen soft. Mild generalized tenderness. No rigidity or guarding.  No CVA tenderness  MSK: No calf tenderness or swelling. No c/t/l bony tenderness  Neuro: Alert to person/place/time. Follows simple commands.  Skin: Skin is warm and dry. No rash noted.   Psychiatric: Flat affect, somewhat  slurring her words      Emergency Department Course     ECG:  Indication: Screening  Completed at 0217.  Read at 0217.   Sinus tachycardia, LAD. No significant changes from prior ECG (10/26/2021).   Rate 117 bpm. MA interval 148. QRS duration 108. QT/QTc 346/482. P-R-T axes 63 -31 69.    ECG 2:  ECG taken at 0431, ECG read at 0431  Sinus tachycardia  Otherwise normal ECG   Rate 124 bpm. MA interval 144. QRS duration 102. QT/QTc 304/436. P-R-T axes 86 -14 69.      Imaging:  Head CT w/o contrast   Final Result   IMPRESSION:   1.  No acute intracranial pathology.      CT Abdomen Pelvis w Contrast   Final Result   IMPRESSION:    1.  Fluid distended stomach and proximal duodenum with transition point seen at the duodenal sweep, correlate to exclude SMA syndrome.   2.  Widely patent aorta and branch vessels including the superior mesenteric artery.   3.  Decompressed press colon versus mural thickening, correlate to exclude colitis.   4.  Fatty liver.   5.  Normal appendix. No colitis, or diverticulitis.   6.  IUD.                    XR Chest 1 View   Final Result   IMPRESSION: Negative chest.        Report per radiology.     Laboratory:  Labs Ordered and Resulted from Time of ED Arrival to Time of ED Departure   COMPREHENSIVE METABOLIC PANEL - Abnormal       Result Value    Sodium 126 (*)     Potassium 5.7 (*)     Chloride 90 (*)     Carbon Dioxide (CO2) 5 (*)     Anion Gap 31 (*)     Urea Nitrogen 33 (*)     Creatinine 1.29 (*)     Calcium 9.1      Glucose 1,194 (*)     Alkaline Phosphatase 190 (*)     AST 46 (*)     ALT 43      Protein Total 8.7      Albumin 4.1      Bilirubin Total 0.5      GFR Estimate 60 (*)    GLUCOSE BY METER - Abnormal    GLUCOSE BY METER POCT >600 (*)    CBC WITH PLATELETS AND DIFFERENTIAL - Abnormal    WBC Count 66.8 (*)     RBC Count 5.06      Hemoglobin 15.9 (*)     Hematocrit 55.2 (*)      (*)     MCH 31.4      MCHC 28.8 (*)     RDW 12.3      Platelet Count 472 (*)    KETONE  BETA-HYDROXYBUTYRATE QUANTITATIVE, RAPID - Abnormal    Ketone (Beta-Hydroxybutyrate) Quantitative 4.7 (*)    ACETAMINOPHEN LEVEL - Abnormal    Acetaminophen <2 (*)    ROUTINE UA WITH MICROSCOPIC REFLEX TO CULTURE - Abnormal    Color Urine Straw      Appearance Urine Clear      Glucose Urine >=1000 (*)     Bilirubin Urine Negative      Ketones Urine 80  (*)     Specific Gravity Urine 1.023      Blood Urine Trace (*)     pH Urine 5.0      Protein Albumin Urine 20  (*)     Urobilinogen Urine Normal      Nitrite Urine Negative      Leukocyte Esterase Urine Negative      Bacteria Urine Few (*)     RBC Urine 1      WBC Urine 7 (*)     Squamous Epithelials Urine 1      Hyaline Casts Urine 1      Granular Casts Urine 4 (*)    DRUG ABUSE SCREEN 1 URINE (ED) - Abnormal    Amphetamines Urine Screen Negative      Barbiturates Urine Screen Negative      Benzodiazepines Urine Screen Negative      Cannabinoids Urine Screen Positive (*)     Cocaine Urine Screen Negative      Opiates Urine Screen Negative     ISTAT GASES LACTATE VENOUS POCT - Abnormal    Lactic Acid POCT 4.4 (*)     Bicarbonate Venous POCT 5 (*)     O2 Sat, Venous POCT 81 (*)     pCO2V Venous POCT 19 (*)     pH Venous POCT 7.02 (*)     pO2 Venous POCT 65 (*)    BASIC METABOLIC PANEL - Abnormal    Sodium 132 (*)     Potassium 6.1 (*)     Chloride 102      Carbon Dioxide (CO2) 4 (*)     Anion Gap 26 (*)     Urea Nitrogen 32 (*)     Creatinine 0.99      Calcium 7.8 (*)     Glucose 977 (*)     GFR Estimate 83     PHOSPHORUS - Abnormal    Phosphorus 6.9 (*)    MAGNESIUM - Abnormal    Magnesium 3.4 (*)    HEMOGLOBIN A1C - Abnormal    Hemoglobin A1C 9.9 (*)    CBC WITH PLATELETS AND DIFFERENTIAL - Abnormal    WBC Count 59.9 (*)     RBC Count 4.44      Hemoglobin 14.1      Hematocrit 45.6       (*)     MCH 31.8      MCHC 30.9 (*)     RDW 12.1      Platelet Count 364     KETONE BETA-HYDROXYBUTYRATE QUANTITATIVE, RAPID - Abnormal    Ketone (Beta-Hydroxybutyrate)  Quantitative 4.7 (*)    ISTAT GASES LACTATE VENOUS POCT - Abnormal    Lactic Acid POCT 4.0 (*)     Bicarbonate Venous POCT 5 (*)     O2 Sat, Venous POCT 60 (*)     pCO2V Venous POCT 18 (*)     pH Venous POCT 7.02 (*)     pO2 Venous POCT 44     DIFFERENTIAL - Abnormal    % Neutrophils 64      % Lymphocytes 19      % Monocytes 13      % Eosinophils 0      % Basophils 1      % Metamyelocytes 2      % Myelocytes 1      Absolute Neutrophils 42.8 (*)     Absolute Lymphocytes 12.7 (*)     Absolute Monocytes 8.7 (*)     Absolute Eosinophils 0.0      Absolute Basophils 0.7 (*)     Absolute Metamyelocytes 1.3 (*)     Absolute Myelocytes 0.7 (*)     RBC Morphology Confirmed RBC Indices      Platelet Assessment        Value: Automated Count Confirmed. Platelet morphology is normal.   GLUCOSE - Abnormal    Glucose 807 (*)    KETONE BETA-HYDROXYBUTYRATE QUANTITATIVE, RAPID - Abnormal    Ketone (Beta-Hydroxybutyrate) Quantitative 4.7 (*)    DIFFERENTIAL - Abnormal    % Neutrophils 83      % Lymphocytes 10      % Monocytes 6      % Eosinophils 0      % Basophils 0      % Myelocytes 1      Absolute Neutrophils 49.7 (*)     Absolute Lymphocytes 6.0 (*)     Absolute Monocytes 3.6 (*)     Absolute Eosinophils 0.0      Absolute Basophils 0.0      Absolute Myelocytes 0.6 (*)     RBC Morphology Confirmed RBC Indices      Platelet Assessment        Value: Automated Count Confirmed. Platelet morphology is normal.   ISTAT GASES LACTATE VENOUS POCT - Abnormal    Lactic Acid POCT 3.0 (*)     Bicarbonate Venous POCT 6 (*)     O2 Sat, Venous POCT 15 (*)     pCO2V Venous POCT 24 (*)     pH Venous POCT 7.00 (*)     pO2 Venous POCT 19 (*)    BASIC METABOLIC PANEL - Abnormal    Sodium 137      Potassium 5.3      Chloride 110 (*)     Carbon Dioxide (CO2) 5 (*)     Anion Gap 22 (*)     Urea Nitrogen 30      Creatinine 0.96      Calcium 8.0 (*)     Glucose 706 (*)     GFR Estimate 86     INFLUENZA A/B & SARS-COV2 PCR MULTIPLEX - Abnormal    Influenza A  PCR Negative      Influenza B PCR Negative      SARS CoV2 PCR Positive (*)    GLUCOSE - Abnormal    Glucose 571 (*)    SALICYLATE LEVEL - Normal    Salicylate 5     ETHYL ALCOHOL LEVEL - Normal    Alcohol ethyl <0.01     TROPONIN I - Normal    Troponin I High Sensitivity 6     D DIMER QUANTITATIVE - Normal    D-Dimer Quantitative <0.27     GLUCOSE MONITOR NURSING POCT   OSMOLALITY   PROCALCITONIN   GLUCOSE MONITOR NURSING POCT   GLUCOSE MONITOR NURSING POCT   KETONE BETA-HYDROXYBUTYRATE QUANTITATIVE, RAPID   BASIC METABOLIC PANEL   BLOOD GAS VENOUS   FECAL LACTOFERRIN   BLOOD GAS VENOUS   BASIC METABOLIC PANEL   BLOOD CULTURE   BLOOD CULTURE   CLOSTRIDIUM DIFFICILE TOXIN B   ENTERIC BACTERIA AND VIRUS PANEL BY HYACINTH STOOL     Procedures  None    Emergency Department Course:  Reviewed:  I reviewed nursing notes, vitals, and past medical history    Assessments:  0215 I performed a physical exam of the patient. Findings as above.      Patient rechecked and updated multiple times throughout course. Plan of care discussed and questions answered.     Consults:   Dr. De La Rosa, hospitalist    Interventions:  Medications   sodium bicarbonate 100 mEq in Water For Injection Sterile 400 mL infusion ( Intravenous New Bag 1/11/22 0646)   insulin 1 unit/1mL in saline (NovoLIN, HumuLIN Regular) drip - DKA algorithm ( Intravenous Rate/Dose Verify 1/11/22 0740)   glucose gel 15-30 g (has no administration in time range)     Or   dextrose 50 % injection 25-50 mL (has no administration in time range)     Or   glucagon injection 1 mg (has no administration in time range)   enoxaparin ANTICOAGULANT (LOVENOX) injection 40 mg (has no administration in time range)   0.45% sodium chloride infusion ( Intravenous New Bag 1/11/22 0614)   dextrose 5% and 0.45% NaCl infusion ( Intravenous Not Given 1/11/22 0615)   prochlorperazine (COMPAZINE) injection 10 mg (has no administration in time range)     Or   prochlorperazine (COMPAZINE) tablet 10 mg (has  no administration in time range)     Or   prochlorperazine (COMPAZINE) suppository 25 mg (has no administration in time range)   ondansetron (ZOFRAN-ODT) ODT tab 4 mg (has no administration in time range)     Or   ondansetron (ZOFRAN) injection 4 mg (has no administration in time range)   acetaminophen (TYLENOL) tablet 650 mg (has no administration in time range)     Or   acetaminophen (TYLENOL) solution 650 mg (has no administration in time range)   Patient is already receiving anticoagulation with heparin, enoxaparin (LOVENOX), warfarin (COUMADIN)  or other anticoagulant medication (has no administration in time range)   piperacillin-tazobactam (ZOSYN) infusion 3.375 g (has no administration in time range)   ondansetron (ZOFRAN) injection 4 mg (4 mg Intravenous Given 1/11/22 0201)   0.9% sodium chloride BOLUS (0 mLs Intravenous Stopped 1/11/22 0302)   0.9% sodium chloride BOLUS (0 mLs Intravenous Stopped 1/11/22 0302)   piperacillin-tazobactam (ZOSYN) intermittent infusion 4.5 g (0 g Intravenous Stopped 1/11/22 0421)   calcium gluconate 10 % injection 1 g (0 g Intravenous Stopped 1/11/22 0455)   0.9% sodium chloride BOLUS (0 mLs Intravenous Stopped 1/11/22 0421)   iopamidol (ISOVUE-370) solution 500 mL (57 mLs Intravenous Given 1/11/22 0409)   fentaNYL (PF) (SUBLIMAZE) injection 50 mcg (50 mcg Intravenous Given 1/11/22 0529)   0.9% sodium chloride BOLUS (0 mLs Intravenous Stopped 1/11/22 0605)   sodium bicarbonate 8.4 % injection 50 mEq (50 mEq Intravenous Given 1/11/22 0624)     Disposition:  The patient was admitted to the ICU under the care of Dr. De La Rosa    Impression & Plan       Covid-19  Margothi Cruz was evaluated during a global COVID-19 pandemic, which necessitated consideration that the patient might be at risk for infection with the SARS-CoV-2 virus that causes COVID-19.   Applicable protocols for evaluation were followed during the patient's care.   COVID-19 was considered as part of the patient's  "evaluation. The plan for testing is: a test was obtained during this visit.    CMS Diagnoses:   The patient has signs of Severe Sepsis        If one the following conditions is present, a 30 mL/kg bolus is recommended as part of the 6 hour bundle (IBW can be used for BMI >30, or document refusal/contraindication):      1.   Initial hypotension  defined as 2 bps < 90 or map < 65 in the 6hrs before or 6hrs after time zero.     2.  Lactate >4.     The patient has signs of Severe Sepsis as evidenced by:    1. 2 SIRS criteria, AND  2. Suspected infection, AND   3. Organ dysfunction: Lactic Acidosis with value >2.0    Time severe sepsis diagnosis confirmed: 0246  01/11/22 as this was the time when Lactate resulted, and the level was > 2.0    3 Hour Severe Sepsis Bundle Completion:    1. Initial Lactic Acid Result:   Recent Labs   Lab Test 01/11/22  0509 01/11/22  0258 01/11/22  0246   LACT 3.0* 4.0* 4.4*     2. Blood Cultures before Antibiotics: Yes  3. Broad Spectrum Antibiotics Administered:  yes       Anti-infectives (From admission through now)    Start     Dose/Rate Route Frequency Ordered Stop    01/11/22 0335  piperacillin-tazobactam (ZOSYN) intermittent infusion 4.5 g        Note to Pharmacy: For [facility], [facility] and [facility]: For Zosyn-naive patients, use the \"Zosyn initial dose + extended infusion\" order panel.    4.5 g  200 mL/hr over 30 Minutes Intravenous ONCE 01/11/22 0332 01/11/22 0421          4. Fluid volume administered in ED:  Full 30 mL/kg bolus given (see amount below).    BMI Readings from Last 1 Encounters:   01/11/22 20.37 kg/m      30 mL/kg fluids based on weight: 1,570 mL  30 mL/kg fluids based on IBW (must be >= 60 inches tall): 1,570 mL                Severe Sepsis reassessment:  1. Repeat Lactic Acid Level: 3.0  2. MAP>65 after initial IVF bolus, will continue to monitor fluid status and vital signs    I attest to having performed a repeat sepsis exam and assessment of perfusion at 0410 and the results " demonstrate improved perfusion. and None    Medical Decision Making:  This is a ill-appearing 21 year old female presenting with nausea, vomiting, diarrhea, and back pain.  Patient is afebrile.  Her glucose was above detectable limit on home glucometer, as well as on arrival here.  Clinically, her presentation seems consistent with DKA.  Laboratory workup results c/w DKA and elevated anion gap metabolic acidosis (ketoacidosis + lactic acidosis), as well as significant electrolyte derangements (hyperkalemia, hypermagnesemia, hyperphosphatemia).   Unclear what precipitated current episode though medication noncompliance, intoxication, infection were considered.      With close contacts also feeling ill recently, COVID testing was obtained.  This resulted positive during patient's time in the ED.  D-dimer negative.  EKG without ischemic changes; high sensitivity troponin negative.  She denies any active chest pain. CXR without focal pneumonia or acute process.  CT abd/pelvis should fluid distention of stomach and proximal duodenum, concern for possible colitis.  Tox workup positive for cannabinoid (as reported by patient).  Patient with profound leukocytosis; and meeting criteria for severe sepsis given initial lactate >4.  Concern lactate elevation was 2/2 to DKA as well though in setting of possible colitis, patient was given IV zosyn, blood cultures drawn.  Considered possible C. Diff though patient unable to provide stool sample during her time in the ED.  Patient was initiated on DKA protocol but became more acidotic initially despite aggressive IVF.  Bicarb was initiated on discussion with the hospitalist. She is hemodynacamially stable, protecting her airway. CT head obtained given a bit altered on arrival though stronger suspicion this is more secondary to profound acidosis.  She is without meningeal signs or focal neuro deficits.  I do not feel further advanced workup is warranted.      Critical Care Time: was  60 minutes for this patient excluding procedures    Diagnosis:    ICD-10-CM    1. Diabetic ketoacidosis without coma associated with type 1 diabetes mellitus (H)  E10.10    2. Lactic acidosis  E87.2    3. LESLIE (acute kidney injury) (H)  N17.9    4. High anion gap metabolic acidosis  E87.2    5. Hypermagnesemia  E83.41    6. Severe sepsis (H)  A41.9     R65.20    7. Hyperkalemia  E87.5    8. Colitis  K52.9    9. Infection due to 2019 novel coronavirus  U07.1        [facility]         Mercedes Long, DO  01/11/22 0820

## 2022-01-11 NOTE — ED TRIAGE NOTES
Patient reports smoking pot approx an hr ago, altered mental status. A/o to self and place. Reports being DM I , has not checked BS. Has been vomiting and having diarrhea, slurred speech.     Denies alcohol use

## 2022-01-11 NOTE — H&P
[facility]  Hospitalist Admission Note  Name: Margo Cruz    MRN: [id number]  YOB: 2000    Age: 21 year old  Date of admission: 1/11/2022  Primary care provider: Naomi Reynolds    Chief Complaint: Vomiting, diarrhea    Assessment and Plan:   Severe DKA in IDDM type I, lactic acidosis: Patient has an insulin pump.  History of DKA 6/20/2020 and 10/20/2021, at that time likely pump failure and possible sepsis.  Presenting with 2 days of nausea, vomiting, diarrhea, myalgias, headaches and has had some sick family members.  Initially profoundly acidotic initially with pH 7.02, PCO2 19, PO2 65, HCO3 5 with anion gap 26, ketones 4.7, and lactic acid 4.4 in the setting of a blood glucose of 1,194.  She thinks her pump has been working, but perhaps she has been rationing her insulin.  Her A1c is 9.9.    -Has received 3 L IV normal saline and started on insulin drip at about 3:30 AM.  Bicarbonate remains 5 and pH is still 7.0.  Give 50 meq IV bicarbonate now over 5 minutes.  -Continue insulin infusion and DKA protocol with IV fluids based off that.  Blood glucose now down to 807.  Likely will fall fairly quickly, but necessary to continue insulin infusion due to severe acidosis.  Mental status is currently intact.  -Ordered BMP and ketone check every 2 hours x 3 starting at 8 AM, may need further bicarbonate if acidosis not improving some with insulin infusion  -Her insulin pump needs to be removed    Possible severe sepsis, source unclear, profound leukocytosis: Leukocytosis to 66.8, tachycardic, and GI symptoms of nausea, vomiting, diarrhea along with some myalgias and headache.  Symptoms may be secondary to DKA alone, however that is not impressively elevated WBC count just for stress to margination.  Lactic acid was elevated at 4.4 which is improving with IV fluids.  She is not hypotensive or febrile.  She did receive empiric antibiotics during hospitalization in October for  similar presentation and no source was found.  She does report a cough yesterday and has been around multiple sick family members.  No definitive COVID19 exposures, she is not vaccinated.  No urinary symptoms and urinalysis not suspicious for infection.  Chest x-ray without infiltrate.  CT the abdomen pelvis shows possible diffuse colitis which may be the source.  -Continue empiric IV Zosyn  -Check enteric panel and C. difficile PCR  -Influenza A/B and COVID-19 PCR in process  -2 blood cultures obtained    LESLIE, hyperkalemia, hyperphosphatemia: Creatinine 1.29 with initial potassium 5.7, now rising to 6.1 on repeat check.  Phosphorus level 6.9.  LESLIE secondary to prerenal etiology with GI losses vomiting and diarrhea.  Mild hyperkalemia secondary to severe acidosis.  Received 1 g IV calcium gluconate.  -Give 1 amp sodium bicarb as above  -BMP every 2 hours x3  -Anticipate eventually will need potassium replacement  -Recheck electrolytes including magnesium and phosphorus tomorrow morning  -telemetry    Pseudohyponatremia: Sodium 126 initially in the setting of blood glucose 1100, corrects to normal.    MDD, anxiety: Unclear if taking any medications at this time.  Resume tomorrow if taking Lexapro and hydroxyzine.      DVT Prophylaxis: Enoxaparin (Lovenox) SQ  Code Status: Full Code  FEN: IV fluids per DKA protocol.  Clear liquid diet  Discharge Dispo: Home  Estimated Disch Date / # of Days until Disch: Admit inpatient to the ICU for severe DKA.  Anticipate at least 3 night hospitalization.      History of Present Illness:  Margo Cruz is a 21 year old female with PMH including IDDM type I, DKA, MDD, anxiety, and celiac sprue who presents with multiple GI symptoms.  She has a history of DKA in June 2020 and October 2021.  In October she had a profound leukocytosis and was treated with empiric antibiotics although no source found for infection.  She did have pump failure with some pus coming from her pump site at  that time.  For the past 2 days or so she has had nausea with multiple episodes of nonbloody emesis along with mild diffuse intermittent cramping abdominal pain.  This is resulted in multiple liquid stools per day.  Has had very little food or fluid intake due to the vomiting.  She has had some diffuse back pain and other muscle aches.  She has had a mild headache, but no neck stiffness.  Denies any fevers or chills.  She thinks her insulin pump is working okay, but she may have been rationing her insulin.  She has had multiple sick family members although no definitive exposure to anyone with COVID-19.  She has not been tested.  She has not vaccinated for COVID-19.  She denies any dysuria, hematuria.  She did have a new cough yesterday.  She does feel short of breath and is breathing fast.    History obtained from patient, medical record, and from Dr. Long in the emergency department.  Blood pressure 110/75, heart rate 125, temperature 98.0  F, oxygen is 97% on room air, respiratory rate high 20s.  Profound leukocytosis of 66.8, hemoglobin 15.9, platelet count 472.  Blood glucose is very elevated at 1194, sodium is 126, potassium 5.7, chloride 90, bicarb 5, BUN 33, creatinine 1.29.  Ketones elevated 4.7 and lactic acid 4.4.  VBG shows pH 7.02, PCO2 19, PO2 65, and HCO3 5.  Urinalysis shows 7 WBCs and negative LE.  D-dimer undetectable.  High sensitive troponin I normal at 6.  A1c elevated 9.9.  Chest x-ray negative for infiltrate.  Noncontrast head CT negative for acute pathology.  CT the abdomen pelvis shows possible diffuse colitis and a fluid distended stomach and proximal duodenum.  She received 3 L of normal saline, 1 g IV calcium gluconate, 50 mcg IV fentanyl, Zofran, empiric IV Zosyn, and was started on insulin infusion.  Repeat BMP and VBG show worsening hyperkalemia and no improvement in acidosis although only on insulin drip 1 hour.  Giving 1 amp of sodium bicarbonate push now and continuing DKA  protocol.  Admit to the ICU.  Multiple ICU was called around the area, no facility has any ICU beds due to the COVID-19 pandemic.  She will be boarding in the ICU until she can be admitted.     Past Medical History reviewed:  IDDM type I  DKA  MDD  Anxiety  Celiac sprue    Past Surgical History reviewed:  No past surgical history on file.  Social History reviewed:  Social History     Tobacco Use     Smoking status: Never Smoker     Smokeless tobacco: Never Used     Tobacco comment: mom smokes outside   Substance Use Topics     Alcohol use: No     Social History     Social History Narrative     Not on file   Cannabis use    Family History reviewed:  Family History   Problem Relation Age of Onset     Diabetes Father      Allergies:  Allergies   Allergen Reactions     No Known Drug Allergies      Medications:  Prior to Admission medications    Medication Sig Last Dose Taking? Auth Provider   blood glucose (CONTOUR NEXT TEST) test strip Use to test blood sugar 5-6 times daily or as directed.   Karlee Holly MD   blood glucose monitoring (TYE MICROLET) lancets Use to test blood sugar 4-5 times daily or as directed.   Naomi Reynolds APRN CNP   escitalopram (LEXAPRO) 20 MG tablet Take 1 tablet (20 mg) by mouth daily   Luanne Todd MD   hydrOXYzine (ATARAX) 25 MG tablet Take 1 tablet (25 mg) by mouth nightly as needed for anxiety (sleep)   Ольга Bustos NP   Insulin Aspart (INSULIN PUMP - OUTPATIENT) Inject Subcutaneous See Admin Instructions Type of pump: Medtronic  Type of insulin: Novolog (changed to generic about 1 week ago)  Basal rate(s)  3770-0809: 1 unit/hr  5572-2516: 1.1 units/hr  7913-4957: 0.55 units/hr  0865-7444: 1.1 units/hr  ISF: 35 (all day)  ICF (insulin to carb ratio)  1638-6333: 1unit/4.6g carbs  4480-4951: 1 unit/10g carbs  0936-4279: 1 unit/8g carbs  Active insulin time: 3 hrs  Target goal range:   Followed by endocrinology   Unknown, Entered By History   insulin aspart  (NOVOLOG PEN) 100 UNIT/ML pen Use 3x/day with meals (carb correction scale). Also correction scale with correction factor 1:18   Angle Sheldon MD   insulin aspart (NOVOLOG VIAL) 100 UNITS/ML vial Use up to 80 units daily in insulin pump. 90 day supply.   Karlee Holly MD   insulin glargine (LANTUS PEN) 100 UNIT/ML pen Inject 20 Units Subcutaneous 2 times daily   Angle Sheldon MD   ondansetron (ZOFRAN-ODT) 4 MG ODT tab Take 1 tablet (4 mg) by mouth every 6 hours as needed for nausea   Magaly Kong MD     Review of Systems:  A Comprehensive greater than 10 system review of systems was carried out.  Pertinent positives and negatives are noted above.  Otherwise negative.     Physical Exam:  Blood pressure 108/73, pulse 117, temperature 98  F (36.7  C), resp. rate 24, weight 52.2 kg (115 lb), SpO2 98 %, not currently breastfeeding.  Wt Readings from Last 1 Encounters:   01/11/22 52.2 kg (115 lb)     Exam:  Constitutional: Awake, appears unwell  Eyes: sclera white   HEENT: atraumatic, dry mucous membranes  Respiratory: No focal crackles or wheeze.  Tachypnea present.  Cardiovascular: Regular tachycardia without murmur  GI: Mild diffuse tenderness to palpation without guarding, not distended, bowel sounds present  Skin: no rash or lesions, acyanotic  Musculoskeletal/extremities: atraumatic, no major deformities. No edema  Neurologic: Alert and oriented to place and time.  Answering most questions appropriately.  No tremor.  Psychiatric: calm, cooperative     Lab and imaging data personally reviewed:  Labs:  Recent Labs   Lab 01/11/22  0509 01/11/22  0258 01/11/22  0246   PH 7.00* 7.02* 7.02*   HCO3V 6* 5* 5*     Recent Labs   Lab 01/11/22  0332 01/11/22  0159   WBC 59.9* 66.8*   HGB 14.1 15.9*   HCT 45.6 55.2*   * 109*    472*     Recent Labs   Lab 01/11/22  0503 01/11/22  0332 01/11/22  0159   NA  --  132* 126*   POTASSIUM  --  6.1* 5.7*   CHLORIDE  --  102 90*   CO2  --  4* 5*    ANIONGAP  --  26* 31*   * 977* 1,194*   BUN  --  32* 33*   CR  --  0.99 1.29*   GFRESTIMATED  --  83 60*   BEN  --  7.8* 9.1   MAG  --   --  3.4*   PHOS  --  6.9*  --    PROTTOTAL  --   --  8.7   ALBUMIN  --   --  4.1   BILITOTAL  --   --  0.5   ALKPHOS  --   --  190*   AST  --   --  46*   ALT  --   --  43     Recent Labs   Lab 01/11/22  0242   DD <0.27     Recent Labs   Lab 01/11/22  0503 01/11/22  0332 01/11/22  0159 01/11/22  0149   * 977* 1,194* >600*     Recent Labs   Lab 01/11/22  0509 01/11/22  0258 01/11/22  0246   LACT 3.0* 4.0* 4.4*       Recent Labs   Lab 01/11/22  0226   COLOR Straw   APPEARANCE Clear   URINEGLC >=1000*   URINEBILI Negative   URINEKETONE 80 *   SG 1.023   UBLD Trace*   URINEPH 5.0   PROTEIN 20 *   NITRITE Negative   LEUKEST Negative   RBCU 1   WBCU 7*     High-sensitivity troponin I 6  Hemoglobin A1c 9.9  Ketone 4.7 similar on repeat  UDS positive for cannabinoids  Acetaminophen level and ethanol levels undetectable  Salicylate level 5    EKG: Sinus tachycardia    Imaging:  Recent Results (from the past 24 hour(s))   XR Chest 1 View    Narrative    EXAM: XR CHEST 1 VIEW  LOCATION: [facility]  DATE/TIME: 1/11/2022 4:17 AM    INDICATION: sepsis  COMPARISON: 10/26/2021      Impression    IMPRESSION: Negative chest.   CT Abdomen Pelvis w Contrast    Narrative    EXAM: CT ABDOMEN PELVIS W CONTRAST  LOCATION: [facility]  DATE/TIME: 1/11/2022 4:15 AM    INDICATION: Abdominal pain, acute, nonlocalized.  COMPARISON: None.  TECHNIQUE: CT scan of the abdomen and pelvis was performed following injection of IV contrast. Multiplanar reformats were obtained. Dose reduction techniques were used.  CONTRAST: 57 mL Isovue-370.    FINDINGS:   LOWER CHEST: Normal.    HEPATOBILIARY: Fatty liver.    PANCREAS: Normal.    SPLEEN: Normal.    ADRENAL GLANDS: Normal.    KIDNEYS/BLADDER: Normal.    BOWEL: Fluid distended stomach and proximal duodenum with  transition point seen at the duodenal sweep, in a very thin patient with SMA takeoff at acute angle, correlate to exclude SMA syndrome.    Normal appendix. Prostate: Versus mural thickening. No diverticulitis.    LYMPH NODES: Normal.    VASCULATURE: The aorta and branch vessels are widely patent including the superior mesenteric artery, celiac artery, inferior mesenteric artery and renal arteries. Patent portal and hepatic veins.    PELVIC ORGANS: IUD.    MUSCULOSKELETAL: Normal.      Impression    IMPRESSION:   1.  Fluid distended stomach and proximal duodenum with transition point seen at the duodenal sweep, correlate to exclude SMA syndrome.  2.  Widely patent aorta and branch vessels including the superior mesenteric artery.  3.  Decompressed press colon versus mural thickening, correlate to exclude colitis.  4.  Fatty liver.  5.  Normal appendix. No colitis, or diverticulitis.  6.  IUD.              Head CT w/o contrast    Narrative    EXAM: CT HEAD W/O CONTRAST  LOCATION: [facility]  DATE/TIME: 1/11/2022 4:03 AM    INDICATION: Delirium  COMPARISON: None.  TECHNIQUE: Routine CT Head without IV contrast. Multiplanar reformats. Dose reduction techniques were used.    FINDINGS:  INTRACRANIAL CONTENTS: No intracranial hemorrhage, extraaxial collection, or mass effect.  No CT evidence of acute infarct. Normal parenchymal attenuation. Normal ventricles and sulci.     VISUALIZED ORBITS/SINUSES/MASTOIDS: No intraorbital abnormality. Mild mucosal thickening in the left sphenoid sinus. No middle ear or mastoid effusion.    BONES/SOFT TISSUES: No acute abnormality.      Impression    IMPRESSION:  1.  No acute intracranial pathology.       Gustavo De La Rosa MD  Hospitalist  [facility]

## 2022-01-12 LAB
ALBUMIN SERPL-MCNC: 3.1 G/DL (ref 3.4–5)
ALP SERPL-CCNC: 87 U/L (ref 40–150)
ALT SERPL W P-5'-P-CCNC: 29 U/L (ref 0–50)
ANION GAP SERPL CALCULATED.3IONS-SCNC: 9 MMOL/L (ref 3–14)
AST SERPL W P-5'-P-CCNC: 32 U/L (ref 0–45)
BILIRUB SERPL-MCNC: 0.5 MG/DL (ref 0.2–1.3)
BUN SERPL-MCNC: 14 MG/DL (ref 7–30)
CALCIUM SERPL-MCNC: 9.1 MG/DL (ref 8.5–10.1)
CHLORIDE BLD-SCNC: 116 MMOL/L (ref 94–109)
CO2 SERPL-SCNC: 20 MMOL/L (ref 20–32)
CREAT SERPL-MCNC: 0.75 MG/DL (ref 0.52–1.04)
D DIMER PPP FEU-MCNC: 0.28 UG/ML FEU (ref 0–0.5)
ERYTHROCYTE [DISTWIDTH] IN BLOOD BY AUTOMATED COUNT: 12.1 % (ref 10–15)
GFR SERPL CREATININE-BSD FRML MDRD: >90 ML/MIN/1.73M2
GLUCOSE BLD-MCNC: 187 MG/DL (ref 70–99)
GLUCOSE BLDC GLUCOMTR-MCNC: 106 MG/DL (ref 70–99)
GLUCOSE BLDC GLUCOMTR-MCNC: 108 MG/DL (ref 70–99)
GLUCOSE BLDC GLUCOMTR-MCNC: 133 MG/DL (ref 70–99)
GLUCOSE BLDC GLUCOMTR-MCNC: 135 MG/DL (ref 70–99)
GLUCOSE BLDC GLUCOMTR-MCNC: 145 MG/DL (ref 70–99)
GLUCOSE BLDC GLUCOMTR-MCNC: 149 MG/DL (ref 70–99)
GLUCOSE BLDC GLUCOMTR-MCNC: 158 MG/DL (ref 70–99)
GLUCOSE BLDC GLUCOMTR-MCNC: 161 MG/DL (ref 70–99)
GLUCOSE BLDC GLUCOMTR-MCNC: 180 MG/DL (ref 70–99)
GLUCOSE BLDC GLUCOMTR-MCNC: 195 MG/DL (ref 70–99)
GLUCOSE BLDC GLUCOMTR-MCNC: 195 MG/DL (ref 70–99)
GLUCOSE BLDC GLUCOMTR-MCNC: 197 MG/DL (ref 70–99)
GLUCOSE BLDC GLUCOMTR-MCNC: 210 MG/DL (ref 70–99)
GLUCOSE BLDC GLUCOMTR-MCNC: 213 MG/DL (ref 70–99)
GLUCOSE BLDC GLUCOMTR-MCNC: 223 MG/DL (ref 70–99)
GLUCOSE BLDC GLUCOMTR-MCNC: 230 MG/DL (ref 70–99)
GLUCOSE BLDC GLUCOMTR-MCNC: 247 MG/DL (ref 70–99)
GLUCOSE BLDC GLUCOMTR-MCNC: 255 MG/DL (ref 70–99)
HCT VFR BLD AUTO: 42.7 % (ref 35–47)
HGB BLD-MCNC: 14.5 G/DL (ref 11.7–15.7)
MAGNESIUM SERPL-MCNC: 1.8 MG/DL (ref 1.6–2.3)
MAGNESIUM SERPL-MCNC: 1.9 MG/DL (ref 1.6–2.3)
MCH RBC QN AUTO: 31.9 PG (ref 26.5–33)
MCHC RBC AUTO-ENTMCNC: 34 G/DL (ref 31.5–36.5)
MCV RBC AUTO: 94 FL (ref 78–100)
PHOSPHATE SERPL-MCNC: 1.3 MG/DL (ref 2.5–4.5)
PHOSPHATE SERPL-MCNC: 1.8 MG/DL (ref 2.5–4.5)
PLATELET # BLD AUTO: 375 10E3/UL (ref 150–450)
POTASSIUM BLD-SCNC: 3.3 MMOL/L (ref 3.4–5.3)
POTASSIUM BLD-SCNC: 3.6 MMOL/L (ref 3.4–5.3)
POTASSIUM BLD-SCNC: 4.1 MMOL/L (ref 3.4–5.3)
PROT SERPL-MCNC: 6.8 G/DL (ref 6.8–8.8)
RBC # BLD AUTO: 4.54 10E6/UL (ref 3.8–5.2)
SODIUM SERPL-SCNC: 145 MMOL/L (ref 133–144)
WBC # BLD AUTO: 27.6 10E3/UL (ref 4–11)

## 2022-01-12 PROCEDURE — 83735 ASSAY OF MAGNESIUM: CPT | Performed by: HOSPITALIST

## 2022-01-12 PROCEDURE — 80053 COMPREHEN METABOLIC PANEL: CPT | Performed by: INTERNAL MEDICINE

## 2022-01-12 PROCEDURE — 83735 ASSAY OF MAGNESIUM: CPT | Performed by: INTERNAL MEDICINE

## 2022-01-12 PROCEDURE — 200N000001 HC R&B ICU

## 2022-01-12 PROCEDURE — 120N000001 HC R&B MED SURG/OB

## 2022-01-12 PROCEDURE — 250N000009 HC RX 250: Performed by: HOSPITALIST

## 2022-01-12 PROCEDURE — 250N000012 HC RX MED GY IP 250 OP 636 PS 637: Performed by: HOSPITALIST

## 2022-01-12 PROCEDURE — 250N000011 HC RX IP 250 OP 636: Performed by: INTERNAL MEDICINE

## 2022-01-12 PROCEDURE — 84100 ASSAY OF PHOSPHORUS: CPT | Performed by: INTERNAL MEDICINE

## 2022-01-12 PROCEDURE — 258N000002 HC RX IP 258 OP 250: Performed by: HOSPITALIST

## 2022-01-12 PROCEDURE — 36415 COLL VENOUS BLD VENIPUNCTURE: CPT | Performed by: INTERNAL MEDICINE

## 2022-01-12 PROCEDURE — 250N000009 HC RX 250: Performed by: INTERNAL MEDICINE

## 2022-01-12 PROCEDURE — 84132 ASSAY OF SERUM POTASSIUM: CPT | Performed by: HOSPITALIST

## 2022-01-12 PROCEDURE — C9113 INJ PANTOPRAZOLE SODIUM, VIA: HCPCS | Performed by: INTERNAL MEDICINE

## 2022-01-12 PROCEDURE — 258N000001 HC RX 258: Performed by: INTERNAL MEDICINE

## 2022-01-12 PROCEDURE — 258N000003 HC RX IP 258 OP 636: Performed by: HOSPITALIST

## 2022-01-12 PROCEDURE — 84100 ASSAY OF PHOSPHORUS: CPT | Performed by: HOSPITALIST

## 2022-01-12 PROCEDURE — 36415 COLL VENOUS BLD VENIPUNCTURE: CPT | Performed by: HOSPITALIST

## 2022-01-12 PROCEDURE — 250N000011 HC RX IP 250 OP 636: Performed by: HOSPITALIST

## 2022-01-12 PROCEDURE — 99231 SBSQ HOSP IP/OBS SF/LOW 25: CPT | Performed by: SURGERY

## 2022-01-12 PROCEDURE — 99233 SBSQ HOSP IP/OBS HIGH 50: CPT | Performed by: HOSPITALIST

## 2022-01-12 PROCEDURE — 85379 FIBRIN DEGRADATION QUANT: CPT | Performed by: INTERNAL MEDICINE

## 2022-01-12 RX ORDER — SODIUM CHLORIDE 450 MG/100ML
INJECTION, SOLUTION INTRAVENOUS CONTINUOUS
Status: DISCONTINUED | OUTPATIENT
Start: 2022-01-12 | End: 2022-01-12

## 2022-01-12 RX ORDER — HYDROXYZINE HYDROCHLORIDE 25 MG/1
25 TABLET, FILM COATED ORAL
Status: DISCONTINUED | OUTPATIENT
Start: 2022-01-12 | End: 2022-01-15 | Stop reason: HOSPADM

## 2022-01-12 RX ORDER — POTASSIUM CHLORIDE 7.45 MG/ML
10 INJECTION INTRAVENOUS
Status: COMPLETED | OUTPATIENT
Start: 2022-01-12 | End: 2022-01-12

## 2022-01-12 RX ORDER — ESCITALOPRAM OXALATE 20 MG/1
20 TABLET ORAL DAILY
Status: DISCONTINUED | OUTPATIENT
Start: 2022-01-12 | End: 2022-01-15 | Stop reason: HOSPADM

## 2022-01-12 RX ORDER — SODIUM CHLORIDE AND POTASSIUM CHLORIDE 150; 450 MG/100ML; MG/100ML
INJECTION, SOLUTION INTRAVENOUS CONTINUOUS
Status: DISCONTINUED | OUTPATIENT
Start: 2022-01-12 | End: 2022-01-14

## 2022-01-12 RX ADMIN — TAZOBACTAM SODIUM AND PIPERACILLIN SODIUM 3.38 G: 375; 3 INJECTION, SOLUTION INTRAVENOUS at 04:06

## 2022-01-12 RX ADMIN — SODIUM CHLORIDE: 4.5 INJECTION, SOLUTION INTRAVENOUS at 08:08

## 2022-01-12 RX ADMIN — TAZOBACTAM SODIUM AND PIPERACILLIN SODIUM 3.38 G: 375; 3 INJECTION, SOLUTION INTRAVENOUS at 10:42

## 2022-01-12 RX ADMIN — POTASSIUM PHOSPHATE, MONOBASIC AND POTASSIUM PHOSPHATE, DIBASIC 15 MMOL: 224; 236 INJECTION, SOLUTION, CONCENTRATE INTRAVENOUS at 13:37

## 2022-01-12 RX ADMIN — POTASSIUM CHLORIDE 10 MEQ: 7.46 INJECTION, SOLUTION INTRAVENOUS at 12:28

## 2022-01-12 RX ADMIN — Medication: at 06:15

## 2022-01-12 RX ADMIN — INSULIN GLARGINE 12 UNITS: 100 INJECTION, SOLUTION SUBCUTANEOUS at 12:15

## 2022-01-12 RX ADMIN — INSULIN ASPART 2 UNITS: 100 INJECTION, SOLUTION INTRAVENOUS; SUBCUTANEOUS at 19:53

## 2022-01-12 RX ADMIN — ONDANSETRON 4 MG: 2 INJECTION INTRAMUSCULAR; INTRAVENOUS at 03:20

## 2022-01-12 RX ADMIN — POTASSIUM CHLORIDE AND SODIUM CHLORIDE: 450; 150 INJECTION, SOLUTION INTRAVENOUS at 21:26

## 2022-01-12 RX ADMIN — TAZOBACTAM SODIUM AND PIPERACILLIN SODIUM 3.38 G: 375; 3 INJECTION, SOLUTION INTRAVENOUS at 17:08

## 2022-01-12 RX ADMIN — POTASSIUM CHLORIDE 10 MEQ: 7.46 INJECTION, SOLUTION INTRAVENOUS at 11:31

## 2022-01-12 RX ADMIN — PANTOPRAZOLE SODIUM 40 MG: 40 INJECTION, POWDER, FOR SOLUTION INTRAVENOUS at 08:10

## 2022-01-12 RX ADMIN — TAZOBACTAM SODIUM AND PIPERACILLIN SODIUM 3.38 G: 375; 3 INJECTION, SOLUTION INTRAVENOUS at 22:20

## 2022-01-12 RX ADMIN — INSULIN ASPART 3 UNITS: 100 INJECTION, SOLUTION INTRAVENOUS; SUBCUTANEOUS at 17:08

## 2022-01-12 RX ADMIN — POTASSIUM CHLORIDE AND SODIUM CHLORIDE: 450; 150 INJECTION, SOLUTION INTRAVENOUS at 12:16

## 2022-01-12 RX ADMIN — ENOXAPARIN SODIUM 40 MG: 40 INJECTION SUBCUTANEOUS at 08:10

## 2022-01-12 RX ADMIN — INSULIN ASPART 2 UNITS: 100 INJECTION, SOLUTION INTRAVENOUS; SUBCUTANEOUS at 23:52

## 2022-01-12 RX ADMIN — DEXTROSE AND SODIUM CHLORIDE: 5; 450 INJECTION, SOLUTION INTRAVENOUS at 03:14

## 2022-01-12 ASSESSMENT — ACTIVITIES OF DAILY LIVING (ADL)
ADLS_ACUITY_SCORE: 22
ADLS_ACUITY_SCORE: 18
ADLS_ACUITY_SCORE: 22
DEPENDENT_IADLS:: INDEPENDENT
ADLS_ACUITY_SCORE: 18
ADLS_ACUITY_SCORE: 20
ADLS_ACUITY_SCORE: 22
ADLS_ACUITY_SCORE: 18
ADLS_ACUITY_SCORE: 22
ADLS_ACUITY_SCORE: 18
ADLS_ACUITY_SCORE: 22
ADLS_ACUITY_SCORE: 20
ADLS_ACUITY_SCORE: 18
ADLS_ACUITY_SCORE: 22
ADLS_ACUITY_SCORE: 18
ADLS_ACUITY_SCORE: 22
ADLS_ACUITY_SCORE: 22

## 2022-01-12 ASSESSMENT — MIFFLIN-ST. JEOR: SCORE: 1271.18

## 2022-01-12 NOTE — CONSULTS
Consult Date: 01/11/2022    REFERRING PHYSICIAN:  Dr. Gustavo De La Rosa.    IMPRESSION:     1.  A 21-year-old female with acute diabetic ketoacidosis including abdominal pain and profound leukocytosis, imaging and workup without clear infection site.  2.  Recent [employer] hospitalization episode with similar presentation.  No definite infection found very high leukocytosis with that episode as well.  Got antibiotics and resolved.  3.  COVID-19, no hypoxia, seemingly incidental diagnosis, although may be a precipitating cause.  4.  Abdominal pain, gastric dilatation workup without clear infection related cause.  5.  Diabetes mellitus.    RECOMMENDATIONS:     1.  Agree with broad antibiotics as we are doing.  2.  No specific COVID treatment for now.  Follow closely.  3.  Continued evaluation of the abdominal pain, leukocytosis, treatment of DKA, etc.    HISTORY OF PRESENT ILLNESS:  This 21-year-old female is seen in consultation due to leukocytosis, abdominal pain and diabetic ketoacidosis.  The patient had a relatively recent episode similar to this [employer].  White count was similarly very elevated.  No definite infection found.  Got antibiotics and resolved.  She has had some possible COVID-19 exposures and is found to be COVID-19 positive without obvious symptoms related to that directly.  She is having ongoing abdominal pain, found to have significant gastric dilatation, but no obvious infection source in the abdomen on imaging.    PAST MEDICAL HISTORY:  The prior episode of DKA as noted with profound leukocytosis also that episode, current COVID-19, history of diabetes, on a pump long-term.    ALLERGIES:  NONE.    SOCIAL AND FAMILY HISTORY:  Possible exposure to others with respiratory illness in the family.  No definite COVID-19 exposure, but she is now positive.  No other exposures, travel history or notable risk factors.    MEDICATIONS:  As listed.    REVIEW OF SYSTEMS:  Very sleepy at present time, unable to  get much history per the records available mostly having abdominal pain and cramping and more persistent abdominal pain both present.  Earlier had some diarrhea, but the diarrhea is largely resolved at this point.    PHYSICAL EXAMINATION:    GENERAL:  Very sleepy, lying in bed, afebrile earlier, but only low-grade, tachycardic at 120.  HEENT:  No visible lesions.  No intraoral lesions.  NECK:  Supple and nontender.  HEART:  Regular rhythm, tachycardic.  LUNGS:  Clear bilaterally.  ABDOMEN:  Tender, somewhat diffusely.  No focal abnormality.  EXTREMITIES:  No rashes, skin lesions or wounds.    LABORATORY DATA:  Blood cultures pending.  Imaging with gastric dilatation, but not any other clear abdominal site issue.  White blood cell count 66,800.  Creatinine within normal limits.  LFTs normal.    Thank you very much for this consultation.  I will follow this patient with you.     Phani Snyder MD        D: 2022   T: 2022   MT: [facility]    Name:     BANDAR WELLINGTONAria  MRN:      [id number]        Account:      [id number]   :      2000           Consult Date: 2022     Document: [id number]

## 2022-01-12 NOTE — CONSULTS
Care Management Initial Consult    General Information  Assessment completed with: Patient,    Type of CM/SW Visit: Initial Assessment    Primary Care Provider verified and updated as needed: Yes   Readmission within the last 30 days:        Reason for Consult: care coordination/care conference,discharge planning  Advance Care Planning:            Communication Assessment  Patient's communication style: spoken language (English or Bilingual)             Cognitive  Cognitive/Neuro/Behavioral: .WDL except  Level of Consciousness: lethargic,alert  Arousal Level: opens eyes spontaneously  Orientation: disoriented to,time  Mood/Behavior: calm,cooperative  Best Language: 0 - No aphasia  Speech: whispers,spontaneous,logical    Living Environment:   People in home: parent(s)     Current living Arrangements: house      Able to return to prior arrangements: yes       Family/Social Support:  Care provided by: self  Provides care for: no one  Marital Status: Single  Parent(s)          Description of Support System: Supportive,Involved    Support Assessment: Adequate family and caregiver support    Current Resources:   Patient receiving home care services: No     Community Resources: None  Equipment currently used at home: none  Supplies currently used at home: Diabetic Supplies    Employment/Financial:  Employment Status: unemployed        Financial Concerns: No concerns identified           Lifestyle & Psychosocial Needs:  Social Determinants of Health     Tobacco Use: Low Risk      Smoking Tobacco Use: Never Smoker     Smokeless Tobacco Use: Never Used   Alcohol Use: Not on file   Financial Resource Strain: Not on file   Food Insecurity: Not on file   Transportation Needs: Not on file   Physical Activity: Not on file   Stress: Not on file   Social Connections: Not on file   Intimate Partner Violence: Not on file   Depression: At risk     PHQ-2 Score: 5   Housing Stability: Not on file       Functional Status:  Prior to  admission patient needed assistance:   Dependent ADLs:: Independent  Dependent IADLs:: Independent       Mental Health Status:  Mental Health Status: No Current Concerns       Chemical Dependency Status:  Chemical Dependency Status: No Current Concerns             Values/Beliefs:  Spiritual, Cultural Beliefs, [employer] Practices, Values that affect care: no               Additional Information:  During care team rounds, bedside nurse request CM see pt for concerns about medication compliance.  Pt is COVID +, spoke with pt via phone.  Pt was able to provide correct address and phone number.  She said she splits her time living with her mom and dad.  She is currently not working and gets her insurance through her mom.  Pt has CGM, but she said it was quite new and isn't used to using it yet.  When asked how long she had been using the CGM she could not answer. She said she has an insulin pump and gets her medications filled at [facility], she denies any concerns about being able to afford her medications.  A1C 9.9.  Pt admitted to being groggy and did not want to talk to long.  She anticipates discharging back home with her parents, they will provide transportation.  CM sent a [facility] referral for hospital follow up appointment and [facility] referral as pt may benefit from C-DE consult as well.      Nenita Simons RN, BSN, PHN, CCM  Care Coordinator  [facility]  [phone]

## 2022-01-12 NOTE — PROGRESS NOTES
"[facility]  Infectious Disease Progress Note          Assessment and Plan:   IMPRESSION:     1.  A 21-year-old female with acute diabetic ketoacidosis including abdominal pain and profound leukocytosis, imaging and workup without clear infection site.  2.  Recent [employer] hospitalization episode with similar presentation.  No definite infection found very high leukocytosis with that episode as well.  Got antibiotics and resolved.  3.  COVID-19, no hypoxia, seemingly incidental diagnosis, although may be a precipitating cause.  4.  Abdominal pain, gastric dilatation workup without clear infection related cause.  5.  Diabetes mellitus.     RECOMMENDATIONS:     1.  Continuebroad antibiotics as we are doing.  2.  No specific COVID treatment for now.  Follow closely.  3.  Continued evaluation of the abdominal pain, leukocytosis, treatment of DKA, etc.All seem better wo clr cause, cxs neg        Interval History:   no new complaints and doing better; no cp, sob, n/v/d, less abd pain. T down WBC 42 K no hypoxia              Medications:       enoxaparin ANTICOAGULANT  40 mg Subcutaneous Q24H     insulin aspart  1-6 Units Subcutaneous Q4H     insulin glargine  12 Units Subcutaneous QAM     pantoprazole (PROTONIX) IV  40 mg Intravenous Daily with breakfast     piperacillin-tazobactam  3.375 g Intravenous Q6H     sodium phosphate  15 mmol Intravenous Once                  Physical Exam:   Blood pressure 127/88, pulse 80, temperature 98.2  F (36.8  C), temperature source Oral, resp. rate 18, height 1.626 m (5' 4\"), weight 52.1 kg (114 lb 14.4 oz), SpO2 100 %, not currently breastfeeding.  Wt Readings from Last 2 Encounters:   01/12/22 52.1 kg (114 lb 14.4 oz)   10/28/21 51.8 kg (114 lb 3.2 oz)     Vital Signs with Ranges  Temp:  [98.2  F (36.8  C)-100.4  F (38  C)] 98.2  F (36.8  C)  Pulse:  [] 80  Resp:  [18-20] 18  BP: (101-127)/(61-88) 127/88  SpO2:  [95 %-100 %] 100 %    Constitutional: " Awake, alert, cooperative, no apparent distress looks better   Lungs: Clear to auscultation bilaterally, no crackles or wheezing   Cardiovascular: Regular rate and rhythm, normal S1 and S2, and no murmur noted   Abdomen: Normal bowel sounds, soft, non-distended, non-tender   Skin: No rashes, no cyanosis, no edema   Other:           Data:   All microbiology laboratory data reviewed.  Recent Labs   Lab Test 01/11/22  1250 01/11/22  0909 01/11/22  0332   WBC 27.6* 43.4* 59.9*   HGB 14.5 14.4 14.1   HCT 42.7 43.3 45.6   MCV 94 95 103*    317 364     Recent Labs   Lab Test 01/12/22  0533 01/11/22  1858 01/11/22  1140   CR 0.75 0.89 0.82     No lab results found.  Recent Labs   Lab Test 06/04/20  1057   CULT No growth

## 2022-01-12 NOTE — PHARMACY-ADMISSION MEDICATION HISTORY
Admission medication history interview status for this patient is complete. See [facility] admission navigator for allergy information, prior to admission medications and immunization status.     Medication history interview done, indicate source(s): Patient and Family (mom- Elena)  Medication history resources (including written lists, pill bottles, clinic record):patient, mom, Carol Ann  Pharmacy:     Changes made to PTA medication list:  Added:   Changed: insulin pump settings  Reported as Not Taking:   Removed:     Actions taken by pharmacist (provider contacted, etc): left sticky note to MD     Additional medication history information: Patient states she has lantus and novolog pens at home to use when her insulin pump malfunctions.     Medication reconciliation/reorder completed by provider prior to medication history?  N   (Y/N)     For patients on insulin therapy:   Do you use sliding scale insulin based on blood sugars? Insulin pump  What is your pre-meal insulin coverage?  Insulin pump  Do you typically eat three meals a day? Not assessed  How many times do you check your blood glucose per day? Not assessed  How many episodes of hypoglycemia do you typically have per month? Not assessed  Do you have a Continuous Glucose Monitor (CGM)?  Yes    Prior to Admission medications    Medication Sig Last Dose Taking? Auth Provider   escitalopram (LEXAPRO) 20 MG tablet Take 1 tablet (20 mg) by mouth daily Past Week at Unknown time Yes Luanne Todd MD   hydrOXYzine (ATARAX) 25 MG tablet Take 1 tablet (25 mg) by mouth nightly as needed for anxiety (sleep) More than a month at Unknown time Yes Ольга Bustos, NP   Insulin Aspart (INSULIN PUMP - OUTPATIENT) Inject Subcutaneous See Admin Instructions Type of pump: [employer] MiniMDeliveryEdge 770G  Type of insulin: Novolog   Basal rate(s)  8094-5444: 0.9 unit/hr  1527-3552: 0.85 units/hr  1460-0749: 0.675 units/hr  4776-8702: 0.55 units/hr  ISF: 30 mg\dL  ICF (insulin to carb  ratio)  6035-4898: 1unit/5g carbs  3215-7556: 1 unit/10g carbs  0659-5779: 1 unit/8g carbs  Active insulin time: 3 hrs  Target goal range:   Followed by endocrinology 1/11/2022 at Unknown time Yes Unknown, Entered By History   ondansetron (ZOFRAN-ODT) 4 MG ODT tab Take 1 tablet (4 mg) by mouth every 6 hours as needed for nausea More than a month at Unknown time Yes Magaly Kong MD   blood glucose (CONTOUR NEXT TEST) test strip Use to test blood sugar 5-6 times daily or as directed.   Karlee Holly MD   blood glucose monitoring (TYE MICROLET) lancets Use to test blood sugar 4-5 times daily or as directed.   Naomi Reynolds APRN CNP   insulin aspart (NOVOLOG PEN) 100 UNIT/ML pen Use 3x/day with meals (carb correction scale). Also correction scale with correction factor 1:18   Angle Sheldon MD   insulin aspart (NOVOLOG VIAL) 100 UNITS/ML vial Use up to 80 units daily in insulin pump. 90 day supply.   Karlee Holly MD   insulin glargine (LANTUS PEN) 100 UNIT/ML pen Inject 20 Units Subcutaneous 2 times daily   Angle Sheldon MD

## 2022-01-12 NOTE — PROGRESS NOTES
[facility]  Hospitalist ICU Progress Note  Tracy Claudio 01/12/22    Reason for Stay (Diagnosis): DKA          Assessment and Plan:      Summary of Stay: Margo Cruz is a 21 year old female with a history of T1DM and celiac sprue admitted on 1/11/2022 with DKA in the setting of a COVID-19 infection and possible severe sepsis.      1/12: clinically improving today, plan to transition from insulin gtt to subcutaneous and transition off the ICU      Problem List/Assessment and Plan:     Endo:   1. DKA in the setting of type 1 diabetes  - labs have significantly improved - AG 9, glucose 135; patient also clinically better  - plan to transition off of the insulin gtt to subcutaneous; patient should then be able to transition out of the ICU  - monitor glucose     2. High anion gap metabolic acidosis, RESOLVED  - initially very acidodic with pH 7.02, PCO2 19, PO2 65, HCO3 5 with an AG of 26 and lactic acid 4.4; labs currently improving  - patient received 3L NS bolus in the ED on 1/11; now on continuous   - AG closed today at 9 (1/12)     Renal/FEN:   1. Acute kidney injury, RESOLVED  Likely prerenal given BUN:Cr >20 and in the setting of vomiting and diarrhea.   - Cr 1.29 on arrival --> now 0.75  - monitor     2. Hyperkalemia  - 5.7 --> 6.1 --> 5.3 --> 4.7 ---> 5.4 --> 3.3  - likely secondary to severe acidemia  - patient was hypokalemic this morning (1/12); replacement ordered  - monitor      3. Hyperphosphatemia  - 6.9 in the ED (1/11)  - today (1/12) was 1.3, replacement ordered  - monitor      ID:   1. COVID-19 infection   2. Lactic acidosis and possible severe sepsis  3. Elevated WBC  4. Elevated procalcitonin   - The patient is unvaccinated against COVID-19. Although, COVID-19 could be contributing to some infectious symptoms, in the setting of her significantly elevated WBC (49,000) and elevated procalcitonin, there is concern for a bacterial cause of infection. Given diarrhea prior to admission,  "considered C. Diff, colitis, or other stool infection. ID was consulted on 1/11 and feel the elevated WBC is due to COVID. Due to the patient not having anymore diarrhea, stool studies are no longer needed. UA shows no signs of infection. No infiltrate on CXR.  - s/p zosyn bolus in ED on 1/11  - Continue zosyn 3.375 g Q6H (start 1/11 - present)  - Considering treatment for COVID-19 if patient is a candidate    - Patient not candidate for decadron or remdesivir   - ID consult, recs appreciated  - No growth on blood cultures after 1 day     GI:  1: Nausea, vomiting, diarrhea, improved  2. Distended stomach and proximal duodenum with transition point seen at the duodenal sweep  3. Decompressed colon vs mural thickening  - considering colitis vs possible obstruction  - general surgery consulted, recs appreciated   - NG tube placed, continue decompression of stomach  - should have UGI at some point to rule out SMA syndrome       Lines/drains/tubes: NG tube 2 PIVs  DVT Prophylaxis: Enoxaparin (Lovenox) SQ  GI Prophylaxis: Protonix 40 mg daily  Code Status: Full Code  Discharge Dispo/Date: Patient's condition is improving. Plan to get the patient off the insulin drip and move off the ICU.       Tracy Claudio, MS-4  [employer]        Interval History (Subjective):      Nursing notes reviewed. Patient was febrile overnight (100.4). Today, patient's main concern today is pain and discomfort in her nose and throat from the NG tube. She states she otherwise is feeling better. She has not had any diarrhea (or any bowel movement) since admission. Her abdominal pain has improved. No further concerns.                   Physical Exam:      Last Vital Signs:  /83   Pulse 110   Temp 99.5  F (37.5  C) (Oral)   Resp 18   Ht 1.626 m (5' 4\")   Wt 52.1 kg (114 lb 14.4 oz)   SpO2 99%   BMI 19.72 kg/m      Intake/Output Summary (Last 24 hours) at 1/12/2022 0921  Last data filed at 1/12/2022 0400  Gross " per 24 hour   Intake 2167.41 ml   Output 2490 ml   Net -322.59 ml     Wt Readings from Last 1 Encounters:   01/12/22 52.1 kg (114 lb 14.4 oz)     General: Alert, awake. NG tube in place.   HEENT: NC/AT, eyes anicteric, external occular movements intact, face symmetric.  Dentition WNL. Dry mucous membranes and dry lips.   Cardiac: RRR, S1, S2.  No murmurs appreciated.  Pulmonary: Normal chest rise, normal work of breathing.  Lungs CTA BL  Abdomen: soft, non-tender, non-distended. No guarding.  Extremities: no deformities.  Warm, well perfused.  Skin: no rashes or lesions noted.  Warm and Dry.  Neuro: No focal deficits noted.  Speech clear.  Coordination and strength grossly normal.  Psych: Appropriate affect.         Medications:          enoxaparin ANTICOAGULANT  40 mg Subcutaneous Q24H     pantoprazole (PROTONIX) IV  40 mg Intravenous Daily with breakfast     piperacillin-tazobactam  3.375 g Intravenous Q6H          Data:      All new lab and imaging data was reviewed.   Labs:  Recent Labs   Lab 01/12/22  0804 01/12/22  0610 01/12/22  0533   NA  --   --  145*   POTASSIUM  --   --  3.3*   CHLORIDE  --   --  116*   CO2  --   --  20   ANIONGAP  --   --  9   *   < > 187*   BUN  --   --  14   CR  --   --  0.75   GFRESTIMATED  --   --  >90   BEN  --   --  9.1    < > = values in this interval not displayed.     Recent Labs   Lab 01/11/22  0909 01/11/22  0332 01/11/22  0159   WBC 43.4* 59.9* 66.8*   HGB 14.4 14.1 15.9*   HCT 43.3 45.6 55.2*   MCV 95 103* 109*    364 472*     Recent Labs   Lab 01/12/22  0804 01/12/22  0700 01/12/22  0610 01/12/22  0533 01/12/22  0502   * 195* 161* 187* 180*     Recent Labs   Lab 01/12/22  0533 01/11/22  0159   AST 32 46*   ALT 29 43   ALKPHOS 87 190*   BILITOTAL 0.5 0.5     Recent labs and studies reviewed.   Imaging:   Recent Results (from the past 24 hour(s))   XR Abdomen Port 1 View    Narrative    ABDOMEN PORTABLE ONE VIEW   1/11/2022 11:02 AM     HISTORY: NG  placement.    COMPARISON: CT abdomen and pelvis 1/11/2022.      Impression    IMPRESSION: Nasogastric tube identified with its tip at the proximal  stomach. Gas distends the stomach and a few bowel loops. Correlate  with ileus.    LEA KANG MD         SYSTEM ID:  [id number]

## 2022-01-12 NOTE — PLAN OF CARE
ICU End of Shift Summary.  For vital signs and complete assessments, please see documentation flowsheets.     Pertinent assessments:   Neuro: lethargic, but responsive and only disoriented to date,  PERRLA, follows commands  CV: ST, no ectopy, BP stable, pulses palpable all extremities  Pulm: diminished LS, on room air  GI: bowel sounds audible, no BM   : good output via purewick  Skin: WNL  Glucose regulation: verbal order acknowledged by day shift to remain on algorithm 1, but skilled nursing through shift no significant changes in glucose regulation. No written order or patient care order could be found, so  Per tele-hub, resumed regular algorithm     Major Shift Events: none  Plan (Upcoming Events): tbd  Discharge/Transfer Needs: tbd    Bedside Shift Report Completed : y  Bedside Safety Check Completed:y

## 2022-01-12 NOTE — PROGRESS NOTES
"[facility]   General Surgery Progress Note      Addendum:  Seen and agree.  I would continue NG suction. Could get UGI study as early as tomorrow, or certainly this admission to see if findings are consistent with SMA syndrome.  Otherwise stable.  Solange Ba MD         Assessment and Plan:   Assessment:   Gastric dilitation - likely multifactorial (gastroparesis, stress, possible SMA syndrome)  Severe DKA  Possible severe sepsis, source unclear, profound leukocytosis  Acute COVID-19 infection      Plan:   -continue NG decompression of stomach  -abx: Zosyn. ID following  -medical management of DKA, COVID infection  -at some point should have UGI to rule out SMA syndrome per radiologist         Interval History:   tmax 100.4. Tachy to 130 this am. Patient reports much improvement in abdominal symptoms. Denies abd pain or bloating. Not passing flatus. Primary c/o significant NGT discomfort, also states that NGT is causing nausea. NGT giving dark output, 1090 ml in past 24 hours.         Physical Exam:   Blood pressure 101/82, pulse 114, temperature 98.4  F (36.9  C), temperature source Oral, resp. rate 20, height 1.626 m (5' 4\"), weight 52.1 kg (114 lb 14.4 oz), SpO2 100 %, not currently breastfeeding.    I/O last 3 completed shifts:  In: 2167.41 [I.V.:2137.41; NG/GT:30]  Out: 2490 [Urine:1400; Emesis/NG output:1090]    Abdomen:   soft, non-distended, non-tender           Data:   Blood culture:  Results for orders placed or performed during the hospital encounter of 01/11/22   Blood Culture Peripheral Blood    Specimen: Peripheral Blood   Result Value Ref Range    Culture No growth after 1 day    Blood Culture Peripheral Blood    Specimen: Peripheral Blood   Result Value Ref Range    Culture No growth after 1 day    Results for orders placed or performed during the hospital encounter of 10/26/21   Blood Culture Hand, Right    Specimen: Hand, Right; Blood   Result Value Ref Range    Culture No " Growth    Blood Culture Hand, Left    Specimen: Hand, Left; Blood   Result Value Ref Range    Culture No Growth    Results for orders placed or performed during the hospital encounter of 10/26/21   Blood Culture Peripheral Blood    Specimen: Peripheral Blood   Result Value Ref Range    Culture No Growth    Blood Culture Arm, Left    Specimen: Arm, Left; Blood   Result Value Ref Range    Culture No Growth    Results for orders placed or performed during the hospital encounter of 06/04/20   Blood culture ONE site    Specimen: Blood    Right Arm   Result Value Ref Range    Specimen Description Blood Right Arm     Culture Micro No growth       Urine culture:  Results for orders placed or performed during the hospital encounter of 10/26/21   Urine Culture    Specimen: Urine, Midstream   Result Value Ref Range    Culture <10,000 CFU/mL Mixture of urogenital virgen      Recent Labs   Lab 01/11/22  1250 01/11/22  0909 01/11/22  0332   WBC 27.6* 43.4* 59.9*   HGB 14.5 14.4 14.1   HCT 42.7 43.3 45.6   MCV 94 95 103*    317 364       Sherrie Sampson PA-C     Addendum:  Seen and agree.  I would continue NG suction. Could get UGI study as early as tomorrow, or certainly this admission to see if findings are consistent with SMA syndrome.  Otherwise stable.  Solange Ba MD

## 2022-01-12 NOTE — PROGRESS NOTES
[facility]    Hospitalist Progress Note  Name: Margo Cruz    MRN: [id number]  Provider:  Kirk Rolle DO, MPH  Date of Service: 01/12/2022    Summary of Stay: Margo Cruz is a 21 year old female with a history of suboptimally controlled type 1 diabetes mellitus on insulin pump, depression, anxiety, and celiac sprue admitted on 1/11/2022 with nausea and diarrhea and was found to have severe DKA and COVID-19 infection.    Problem List:   1. Severe DKA and suboptimally controlled type 1 diabetes mellitus: Initial pH was 7.02 and bicarb of 5 with anion gap of 26.  Ketones were 4.7 and lactic acid 4.4 in the setting of a blood sugar 1194.  She decays she has been rationing her insulin and is also concern for possible pump failure.  Bicarb level has normalized and anion gap has closed.  Currently awaiting pharmacy reconciliation with regards to her insulin pump dosing but does appear that she takes about 0.5 to 1.2 units/h.  For now we will start Lantus 12 units subcutaneous daily and titrate as needed.  We will hold off on restarting her insulin pump.  We will attempt to wean off the insulin drip.  Continue IV fluids with half-normal saline.  I have indicated that she should have family/friends bring in fresh pump supplies for replacement prior to discharge.  2. Possible severe sepsis: Lactic acid was elevated on admission.  She has temperature 100.4 and nonspecific leukocytosis of 66.8 with tachycardia.  This is challenging to interpret in the face of severe DKA.  Cultures are negative.  Only focal symptoms are that of a GI etiology.  Continue empiric Zosyn for now.  ID is following.  No ongoing diarrhea.  Influenza negative but she does have COVID-19.  3. Leukocytosis: Unclear etiology.  Could simply be acute phase reactant related to severe DKA.  Trending down, empiric antibiotics on board  4. COVID-19 infection: She is unvaccinated.  Currently not hypoxic so not requiring COVID-19  dedicated treatment such as steroids or remdesivir.  I counseled her on the need for vaccination when she is recovered.  5. Gastric dilation: Unclear etiology.  Could be related to gastroparesis, SMA syndrome, COVID-19, and celiac sprue.  No evidence of bowel obstruction.  Appreciate surgery consultation.  Currently has NG tube with over 700 cc output.  Management per surgery.  Should have UGI to rule out SMA syndrome, defer timing to surgery service.  6. Hyperkalemia: Resolved.  7. Hypokalemia: Initiate potassium replacement protocol.  8. Hypophosphatemia: Replacement protocol ordered.  9. Hypernatremia: Adjusting to half-normal saline.    DVT Prophylaxis: Pneumatic Compression Devices  Code Status: Full Code  Diet: NPO for Medical/Clinical Reasons Except for: Meds, Ice Chips    Johnson Catheter: Not present  Disposition: Expected discharge in 2-3 days to home. Goals prior to discharge include manage DKA, bowel issues, and infectious conditions. She can transfer to the medical floor if off insulin drip  Incidental Findings: None.  Family updated today: Called mother, voicemail left.     Interval History   Assumed care from previous hospitalist. The history was fully reviewed.  The patient reports doing well. No chest pain or shortness of breath. Abdominal pain better. No nausea, vomiting, diarrhea, constipation. Tm 100.4. No other specific complaints identified.   Discussed with nursing.  Almost 1 L NG output.    -Data reviewed today: I personally reviewed all new labs and imaging results over the last 24 hours.     Physical Exam   Temp: 98.4  F (36.9  C) Temp src: Oral BP: 101/82 Pulse: 114   Resp: 20 SpO2: 100 % O2 Device: None (Room air)    Vitals:    01/11/22 0249 01/11/22 1450 01/12/22 0300   Weight: 52.2 kg (115 lb) 50.3 kg (111 lb) 52.1 kg (114 lb 14.4 oz)     Vital Signs with Ranges  Temp:  [98.4  F (36.9  C)-100.4  F (38  C)] 98.4  F (36.9  C)  Pulse:  [110-130] 114  Resp:  [16-25] 20  BP: ()/(57-83)  101/82  SpO2:  [77 %-100 %] 100 %  I/O last 3 completed shifts:  In: 2167.41 [I.V.:2137.41; NG/GT:30]  Out: 2490 [Urine:1400; Emesis/NG output:1090]    GENERAL: No apparent distress. Awake, alert, and fully oriented.  HEENT: Normocephalic, atraumatic. Extraocular movements intact.  CARDIOVASCULAR: Regular rate and rhythm without murmurs or rubs. No S3.  PULMONARY: Clear bilaterally.  GASTROINTESTINAL: Soft, non-tender, non-distended. Bowel sounds normoactive.   EXTREMITIES: No cyanosis or clubbing. No edema.  NEUROLOGICAL: CN 2-12 grossly intact, no focal neurological deficits.  DERMATOLOGICAL: No rash, ulcer, bruising, nor jaundice.     Medications     insulin (regular) 0.2 Units/hr (01/12/22 0900)     - MEDICATION INSTRUCTIONS -       NaCl 100 mL/hr at 01/12/22 1000       enoxaparin ANTICOAGULANT  40 mg Subcutaneous Q24H     pantoprazole (PROTONIX) IV  40 mg Intravenous Daily with breakfast     piperacillin-tazobactam  3.375 g Intravenous Q6H     Data     Laboratory:  Recent Labs   Lab 01/11/22  1250 01/11/22  0909 01/11/22  0332   WBC 27.6* 43.4* 59.9*   HGB 14.5 14.4 14.1   HCT 42.7 43.3 45.6   MCV 94 95 103*    317 364     Recent Labs   Lab 01/12/22  1110 01/12/22  1000 01/12/22  0911 01/12/22  0610 01/12/22  0533 01/11/22  1947 01/11/22  1858 01/11/22  1144 01/11/22  1140   NA  --   --   --   --  145*  --  142  --  141   POTASSIUM  --   --   --   --  3.3*  --  4.0  --  5.4*   CHLORIDE  --   --   --   --  116*  --  113*  --  116*   CO2  --   --   --   --  20  --  13*  --  7*   ANIONGAP  --   --   --   --  9  --  16*  --  18*   * 106* 108*   < > 187*   < > 254*   < > 319*   BUN  --   --   --   --  14  --  19  --  23   CR  --   --   --   --  0.75  --  0.89  --  0.82   GFRESTIMATED  --   --   --   --  >90  --  >90  --  >90   BEN  --   --   --   --  9.1  --  8.8  --  9.0    < > = values in this interval not displayed.     No results for input(s): CULT in the last 168 hours.    Imaging:  Recent Results  (from the past 24 hour(s))   XR Abdomen Port 1 View    Narrative    ABDOMEN PORTABLE ONE VIEW   1/11/2022 11:02 AM     HISTORY: NG placement.    COMPARISON: CT abdomen and pelvis 1/11/2022.      Impression    IMPRESSION: Nasogastric tube identified with its tip at the proximal  stomach. Gas distends the stomach and a few bowel loops. Correlate  with ileus.    LEA KANG MD         SYSTEM ID:  [id number]         Kirk Rolle DO, MPH  [facility] Hospital[facility]  [address] [place] [address]  01/12/2022

## 2022-01-13 ENCOUNTER — APPOINTMENT (OUTPATIENT)
Dept: GENERAL RADIOLOGY | Facility: CLINIC | Age: 22
End: 2022-01-13
Attending: PHYSICIAN ASSISTANT
Payer: COMMERCIAL

## 2022-01-13 LAB
ANION GAP SERPL CALCULATED.3IONS-SCNC: 11 MMOL/L (ref 3–14)
BUN SERPL-MCNC: 11 MG/DL (ref 7–30)
CALCIUM SERPL-MCNC: 8.6 MG/DL (ref 8.5–10.1)
CHLORIDE BLD-SCNC: 110 MMOL/L (ref 94–109)
CO2 SERPL-SCNC: 20 MMOL/L (ref 20–32)
CREAT SERPL-MCNC: 0.73 MG/DL (ref 0.52–1.04)
ERYTHROCYTE [DISTWIDTH] IN BLOOD BY AUTOMATED COUNT: 12.7 % (ref 10–15)
GFR SERPL CREATININE-BSD FRML MDRD: >90 ML/MIN/1.73M2
GLUCOSE BLD-MCNC: 309 MG/DL (ref 70–99)
GLUCOSE BLDC GLUCOMTR-MCNC: 215 MG/DL (ref 70–99)
GLUCOSE BLDC GLUCOMTR-MCNC: 233 MG/DL (ref 70–99)
GLUCOSE BLDC GLUCOMTR-MCNC: 236 MG/DL (ref 70–99)
GLUCOSE BLDC GLUCOMTR-MCNC: 243 MG/DL (ref 70–99)
GLUCOSE BLDC GLUCOMTR-MCNC: 270 MG/DL (ref 70–99)
HCT VFR BLD AUTO: 36.8 % (ref 35–47)
HGB BLD-MCNC: 12.4 G/DL (ref 11.7–15.7)
MAGNESIUM SERPL-MCNC: 1.9 MG/DL (ref 1.6–2.3)
MCH RBC QN AUTO: 31.3 PG (ref 26.5–33)
MCHC RBC AUTO-ENTMCNC: 33.7 G/DL (ref 31.5–36.5)
MCV RBC AUTO: 93 FL (ref 78–100)
PHOSPHATE SERPL-MCNC: 2.1 MG/DL (ref 2.5–4.5)
PHOSPHATE SERPL-MCNC: 2.8 MG/DL (ref 2.5–4.5)
PLAT MORPH BLD: NORMAL
PLATELET # BLD AUTO: 196 10E3/UL (ref 150–450)
POTASSIUM BLD-SCNC: 3.6 MMOL/L (ref 3.4–5.3)
RBC # BLD AUTO: 3.96 10E6/UL (ref 3.8–5.2)
RBC MORPH BLD: NORMAL
SODIUM SERPL-SCNC: 141 MMOL/L (ref 133–144)
WBC # BLD AUTO: 10.9 10E3/UL (ref 4–11)

## 2022-01-13 PROCEDURE — 250N000013 HC RX MED GY IP 250 OP 250 PS 637: Performed by: HOSPITALIST

## 2022-01-13 PROCEDURE — 258N000003 HC RX IP 258 OP 636: Performed by: HOSPITALIST

## 2022-01-13 PROCEDURE — 250N000011 HC RX IP 250 OP 636: Performed by: INTERNAL MEDICINE

## 2022-01-13 PROCEDURE — 87493 C DIFF AMPLIFIED PROBE: CPT | Performed by: INTERNAL MEDICINE

## 2022-01-13 PROCEDURE — 250N000009 HC RX 250: Performed by: HOSPITALIST

## 2022-01-13 PROCEDURE — 250N000011 HC RX IP 250 OP 636: Performed by: HOSPITALIST

## 2022-01-13 PROCEDURE — 250N000013 HC RX MED GY IP 250 OP 250 PS 637: Performed by: INTERNAL MEDICINE

## 2022-01-13 PROCEDURE — 87506 IADNA-DNA/RNA PROBE TQ 6-11: CPT | Performed by: INTERNAL MEDICINE

## 2022-01-13 PROCEDURE — 36415 COLL VENOUS BLD VENIPUNCTURE: CPT | Performed by: HOSPITALIST

## 2022-01-13 PROCEDURE — 80048 BASIC METABOLIC PNL TOTAL CA: CPT | Performed by: HOSPITALIST

## 2022-01-13 PROCEDURE — 120N000001 HC R&B MED SURG/OB

## 2022-01-13 PROCEDURE — 84100 ASSAY OF PHOSPHORUS: CPT | Performed by: HOSPITALIST

## 2022-01-13 PROCEDURE — 99233 SBSQ HOSP IP/OBS HIGH 50: CPT | Performed by: INTERNAL MEDICINE

## 2022-01-13 PROCEDURE — 99232 SBSQ HOSP IP/OBS MODERATE 35: CPT | Performed by: SURGERY

## 2022-01-13 PROCEDURE — 83630 LACTOFERRIN FECAL (QUAL): CPT | Performed by: INTERNAL MEDICINE

## 2022-01-13 PROCEDURE — 74240 X-RAY XM UPR GI TRC 1CNTRST: CPT

## 2022-01-13 PROCEDURE — 85027 COMPLETE CBC AUTOMATED: CPT | Performed by: HOSPITALIST

## 2022-01-13 PROCEDURE — 83735 ASSAY OF MAGNESIUM: CPT | Performed by: HOSPITALIST

## 2022-01-13 PROCEDURE — C9113 INJ PANTOPRAZOLE SODIUM, VIA: HCPCS | Performed by: INTERNAL MEDICINE

## 2022-01-13 RX ADMIN — ESCITALOPRAM OXALATE 20 MG: 20 TABLET ORAL at 22:11

## 2022-01-13 RX ADMIN — INSULIN ASPART 2 UNITS: 100 INJECTION, SOLUTION INTRAVENOUS; SUBCUTANEOUS at 20:59

## 2022-01-13 RX ADMIN — PANTOPRAZOLE SODIUM 40 MG: 40 INJECTION, POWDER, FOR SOLUTION INTRAVENOUS at 08:57

## 2022-01-13 RX ADMIN — DIATRIZOATE MEGLUMINE AND DIATRIZOATE SODIUM 60 ML: 660; 100 SOLUTION ORAL; RECTAL at 13:12

## 2022-01-13 RX ADMIN — INSULIN ASPART 3 UNITS: 100 INJECTION, SOLUTION INTRAVENOUS; SUBCUTANEOUS at 23:49

## 2022-01-13 RX ADMIN — ENOXAPARIN SODIUM 40 MG: 40 INJECTION SUBCUTANEOUS at 08:57

## 2022-01-13 RX ADMIN — INSULIN ASPART 2 UNITS: 100 INJECTION, SOLUTION INTRAVENOUS; SUBCUTANEOUS at 15:56

## 2022-01-13 RX ADMIN — SODIUM PHOSPHATE, MONOBASIC, MONOHYDRATE 9 MMOL: 276; 142 INJECTION, SOLUTION INTRAVENOUS at 04:06

## 2022-01-13 RX ADMIN — TAZOBACTAM SODIUM AND PIPERACILLIN SODIUM 3.38 G: 375; 3 INJECTION, SOLUTION INTRAVENOUS at 15:56

## 2022-01-13 RX ADMIN — INSULIN ASPART 3 UNITS: 100 INJECTION, SOLUTION INTRAVENOUS; SUBCUTANEOUS at 12:14

## 2022-01-13 RX ADMIN — POTASSIUM CHLORIDE AND SODIUM CHLORIDE: 450; 150 INJECTION, SOLUTION INTRAVENOUS at 11:04

## 2022-01-13 RX ADMIN — INSULIN ASPART 4 UNITS: 100 INJECTION, SOLUTION INTRAVENOUS; SUBCUTANEOUS at 09:02

## 2022-01-13 RX ADMIN — TAZOBACTAM SODIUM AND PIPERACILLIN SODIUM 3.38 G: 375; 3 INJECTION, SOLUTION INTRAVENOUS at 10:04

## 2022-01-13 RX ADMIN — ACETAMINOPHEN 650 MG: 160 SOLUTION ORAL at 00:52

## 2022-01-13 RX ADMIN — POTASSIUM CHLORIDE AND SODIUM CHLORIDE: 450; 150 INJECTION, SOLUTION INTRAVENOUS at 22:44

## 2022-01-13 RX ADMIN — TAZOBACTAM SODIUM AND PIPERACILLIN SODIUM 3.38 G: 375; 3 INJECTION, SOLUTION INTRAVENOUS at 04:07

## 2022-01-13 RX ADMIN — TAZOBACTAM SODIUM AND PIPERACILLIN SODIUM 3.38 G: 375; 3 INJECTION, SOLUTION INTRAVENOUS at 22:11

## 2022-01-13 RX ADMIN — INSULIN ASPART 2 UNITS: 100 INJECTION, SOLUTION INTRAVENOUS; SUBCUTANEOUS at 04:18

## 2022-01-13 ASSESSMENT — ACTIVITIES OF DAILY LIVING (ADL)
ADLS_ACUITY_SCORE: 18
ADLS_ACUITY_SCORE: 14
ADLS_ACUITY_SCORE: 18
ADLS_ACUITY_SCORE: 22
ADLS_ACUITY_SCORE: 22
ADLS_ACUITY_SCORE: 18
ADLS_ACUITY_SCORE: 22
ADLS_ACUITY_SCORE: 18
ADLS_ACUITY_SCORE: 22
ADLS_ACUITY_SCORE: 14
ADLS_ACUITY_SCORE: 18
ADLS_ACUITY_SCORE: 14

## 2022-01-13 ASSESSMENT — MIFFLIN-ST. JEOR: SCORE: 1260.75

## 2022-01-13 NOTE — PLAN OF CARE
Transfer from ICU. A&O. Hx of DKA. B/236, 2 units insulin given. Tele: SR. K/Mg/Phos protocol. NaPhos given. Zosyn given. PRN tylenol given for HA via NG tube. NG tube low intermittent suction for gastric decompression. R/L PIV infusing. Ind in room. VSS.

## 2022-01-13 NOTE — PROGRESS NOTES
[facility]  Hospitalist Progress Note  Gustavo De La Rosa MD 01/13/22    Reason for Stay (Diagnosis): Severe DKA, possible severe sepsis, COVID-19 infection, gastric dilation         Assessment and Plan:      Summary of Stay:  Margo Cruz is a 21 year old female with a history of suboptimally controlled type 1 diabetes mellitus on insulin pump, depression, anxiety, and celiac sprue admitted on 1/11/2022 with nausea and diarrhea and was found to have severe DKA and COVID-19 infection.  She was given IV fluids and sodium bicarbonate for severe acidosis.  She was started on insulin infusion and admitted to the ICU.  She had a severe leukocytosis of 66 along with tachycardia so she was empirically placed on IV Zosyn with cultures negative to date.  COVID-19 PCR is positive, but no respiratory symptoms or hypoxia.  CT of the abdomen pelvis showed gastric and proximal duodenal dilation.  NG was placed with significant continuous output.  General surgery consulted.  Consideration for SMA syndrome and she will undergo upper GI series today.  Now out of DKA and on subcutaneous insulin.    Problem List/Assessment and Plan:   Severe DKA in IDDM type I, lactic acidosis: Patient has an insulin pump.  History of DKA 6/20/2020 and 10/20/2021, at that time likely pump failure and possible sepsis.  Presenting with 2 days of nausea, vomiting, diarrhea, myalgias, headaches and has had some sick family members.  Initially profoundly acidotic initially with pH 7.02, PCO2 19, PO2 65, HCO3 5 with anion gap 26, ketones 4.7, and lactic acid 4.4 in the setting of a blood glucose of 1,194.  She thinks her pump has been working, but perhaps she has been rationing her insulin.  Her A1c is 9.9.    -DKA resolved with insulin infusion, now on subcutaneous insulin  -Increase glargine to 20 units daily in the morning  -Medium dose sliding scale insulin while n.p.o.  -Family will bring in her insulin pump supplies     Possible severe  sepsis, source unclear, profound leukocytosis: Leukocytosis to 66.8, tachycardic, and GI symptoms of nausea, vomiting, diarrhea along with some myalgias and headache.  Symptoms may be secondary to DKA alone, however that is not impressively elevated WBC count just for stress to margination.  Lactic acid was elevated at 4.4 which is improving with IV fluids.  Has had some low-grade fevers while here. She did receive empiric antibiotics during hospitalization in October for similar presentation and no source was found.  She does report a cough yesterday and has been around multiple sick family members.  No urinary symptoms and urinalysis not suspicious for infection.  Chest x-ray without infiltrate.  CT the abdomen pelvis shows possible diffuse colitis which may be the source.  COVID-19 PCR was positive, but no respiratory symptoms.  -ID consulted  -Continue empiric IV Zosyn, anticipate stopping soon  -Leukocytosis resolved  -blood cultures NGTD    COVID-19 infection: Positive COVID-19 PCR.  She is not vaccinated.  No respiratory symptoms nor hypoxia.  No specific treatment for this other than DVT prophylaxis.    Gastric dilation: Vomiting prior to coming in.  Initial CT of the abdomen pelvis showed a fluid distended stomach and proximal duodenum with transition point at the duodenal sweep.  Etiology unclear, but consideration for SMA syndrome.  Her BMI is 19 which would put her at risk for this.  Alternatively she has poorly controlled IDDM type I so gastroparesis is also possible.  Seems less likely to be related to COVID-19 infection.  Has not had any bowel movement since admission.  -General surgery consulted  -NG has remained in place and she has been NPO.  1.6 L NG output over the last 24 hours  -upper GI series today to evaluate for possible SMA syndrome  -If contrast rapidly moving through stomach and small intestine may be able to remove NG tube and slowly advance diet.  Patient is very frustrated with the  "treatment plans.     LESLIE, hyperkalemia, hyperphosphatemia, hypernatremia, hypokalemia, hypophosphatemia: Creatinine 1.29 with initial potassium 5.7.  Phosphorus level 6.9.  LESLIE secondary to prerenal etiology with GI losses vomiting and diarrhea.  Mild hyperkalemia secondary to severe acidosis.  Received 1 g IV calcium gluconate.  Eventually developed hypokalemia and hypophosphatemia which were replaced and hypernatremia that improved with hypotonic fluids.  -Electrolyte replacement protocols     Pseudohyponatremia: Sodium 126 initially in the setting of blood glucose 1100, corrected to normal.     MDD, anxiety:  Resumed Lexapro and hydroxyzine as needed.  Flat affect but hospitalization.        DVT Prophylaxis: Enoxaparin (Lovenox) SQ  Code Status: Full Code  FEN:  NPO, 1/2 NS with 20 mill equivalents potassium at 100 ml/hr while n.p.o.  Discharge Dispo: Home  Estimated Disch Date / # of Days until Disch:  TBD, pending resolution obstructive GI symptoms.  Likely at least 1 to 2 days.        Interval History (Subjective):      Continues with NG in place.  Had 1600 mL output over the last 24 hours.  Temperature 100.2  F.  She does not like the NG tube and wants it removed as soon as possible.  She denies any nausea or vomiting.  She is having some mild upper abdominal discomfort currently.  Denies any cough or shortness of breath.                  Physical Exam:      Last Vital Signs:  /65 (BP Location: Right arm)   Pulse 68   Temp 99.3  F (37.4  C) (Oral)   Resp 16   Ht 1.626 m (5' 4\")   Wt 51.1 kg (112 lb 9.6 oz)   SpO2 97%   BMI 19.33 kg/m        Intake/Output Summary (Last 24 hours) at 1/13/2022 1246  Last data filed at 1/13/2022 0624  Gross per 24 hour   Intake 1433.33 ml   Output 1425 ml   Net 8.33 ml       Constitutional: Awake, NAD  Eyes: sclera white  HEENT: NG in place with dark fluid output  Respiratory: no respiratory distress, lungs cta bilaterally, no crackles or wheeze  Cardiovascular: RRR. "  No murmur  GI: Thin, mild upper tenderness to palpation without guarding, bowel sounds present  Skin: no rash    Musculoskeletal/extremities:  No edema  Neurologic: A&O, speech clear  Psychiatric: Very flat affect, but cooperative         Medications:      All current medications were reviewed with changes reflected in problem list.         Data:      All new lab and imaging data was reviewed.   Labs:  No results for input(s): CULT in the last 168 hours.  Recent Labs   Lab 01/13/22  1213 01/13/22  0729 01/13/22  0416 01/12/22  1952 01/12/22  1612 01/12/22  1203 01/12/22  1153 01/12/22  0610 01/12/22  0533 01/11/22  1947 01/11/22  1858   NA  --  141  --   --   --   --   --   --  145*  --  142   POTASSIUM  --  3.6  --   --  4.1  --  3.6  --  3.3*  --  4.0   CHLORIDE  --  110*  --   --   --   --   --   --  116*  --  113*   CO2  --  20  --   --   --   --   --   --  20  --  13*   ANIONGAP  --  11  --   --   --   --   --   --  9  --  16*   * 309* 236*   < >  --    < >  --    < > 187*   < > 254*   BUN  --  11  --   --   --   --   --   --  14  --  19   CR  --  0.73  --   --   --   --   --   --  0.75  --  0.89   GFRESTIMATED  --  >90  --   --   --   --   --   --  >90  --  >90   BEN  --  8.6  --   --   --   --   --   --  9.1  --  8.8    < > = values in this interval not displayed.     Recent Labs   Lab 01/13/22  0729 01/11/22  1250 01/11/22  0909   WBC 10.9 27.6* 43.4*   HGB 12.4 14.5 14.4   HCT 36.8 42.7 43.3   MCV 93 94 95    375 317     Recent Labs   Lab 01/13/22  1213 01/13/22  0729 01/13/22  0416 01/12/22  1599 01/12/22  1952   * 309* 236* 197* 230*      Imaging:   Upper GI series pending      Gustavo De La Rosa MD

## 2022-01-13 NOTE — PLAN OF CARE
ICU End of Shift Summary.  For vital signs and complete assessments, please see documentation flowsheets.     Pertinent assessments: More alert, rests between cares. Declined increased activity out of bed. NG -400 output. No BM. Incontinent of urine/PW.   Major Shift Events: K+ and Phos replaced. Transitioned from insulin gtt to subcutaneous insulin. Last .   Plan (Upcoming Events): Phos re-check at 2330. Monitor BG. Surgery following- might schedule an upper EGD. Continue to keep NG in to LIS. SW consult to provide resources for cost of insulin.  Discharge/Transfer Needs: TBD    Bedside Shift Report Completed : Y  Bedside Safety Check Completed: Y

## 2022-01-13 NOTE — PROGRESS NOTES
"[facility]  Infectious Disease Progress Note          Assessment and Plan:   IMPRESSION:     1.  A 21-year-old female with acute diabetic ketoacidosis including abdominal pain and profound leukocytosis, imaging and workup without clear infection site.  2.  Recent [employer] hospitalization episode with similar presentation.  No definite infection found very high leukocytosis with that episode as well.  Got antibiotics and resolved.  3.  COVID-19, no hypoxia, seemingly incidental diagnosis, although may be a precipitating cause.  4.  Abdominal pain, gastric dilatation workup without clear infection related cause.  5.  Diabetes mellitus.     RECOMMENDATIONS:     1.  Continue zosyn but no proven focal infection, may all be reactive leukemoid rxn  2.  No specific COVID treatment for now.  Follow closely.  3. resolved abdominal pain, leukocytosis, resolving  DKA,.All seem better wo clr cause, cxs neg        Interval History:   no new complaints and doing better; no cp, sob, n/v/d, less abd pain. T down WBC 10 K no hypoxia asymptomatic COVID so far              Medications:       enoxaparin ANTICOAGULANT  40 mg Subcutaneous Q24H     escitalopram  20 mg Oral Daily     insulin aspart  1-6 Units Subcutaneous Q4H     insulin glargine  20 Units Subcutaneous QAM     pantoprazole (PROTONIX) IV  40 mg Intravenous Daily with breakfast     piperacillin-tazobactam  3.375 g Intravenous Q6H                  Physical Exam:   Blood pressure 112/65, pulse 68, temperature 99.3  F (37.4  C), temperature source Oral, resp. rate 16, height 1.626 m (5' 4\"), weight 51.1 kg (112 lb 9.6 oz), SpO2 97 %, not currently breastfeeding.  Wt Readings from Last 2 Encounters:   01/13/22 51.1 kg (112 lb 9.6 oz)   10/28/21 51.8 kg (114 lb 3.2 oz)     Vital Signs with Ranges  Temp:  [98.2  F (36.8  C)-100.2  F (37.9  C)] 99.3  F (37.4  C)  Pulse:  [] 68  Resp:  [16-24] 16  BP: (103-127)/(60-90) 112/65  SpO2:  [96 %-100 %] 97 " Date of Service: 06/26/2018    HISTORY:  This is a 47-year-old gentleman with persistent right shoulder pain.  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  He started developing significant pain about 7 months ago.  Back in March, we did an MRI showing high-grade possible complete rotator cuff tear without retraction, with a questionable SLAP lesion.  No pain over the AC joint, a lot of pain is radiating down towards the deltoid.  We tried conservative measures.  We did offer him an injection, but he says it was \"not that bad at that time.\"  He was through physical therapy with several formal visits, but is not doing a home exercise program.  He is having trouble sleeping and pain with weather change.  He has \"good days and bad days.\"  He does not take anything consistently for pain.    REVIEW OF SYSTEMS:  Does not volunteer fever, chills, constitutional symptoms, chest pain or shortness of breath.    PHYSICAL EXAMINATION:  Well-developed, well-nourished 47-year-old gentleman in no distress.  He is alert, oriented, appropriate to exam.  He has got good range of motion of his shoulder with 4/5 rotator cuff strength.  Positive impingement, negative apprehension, no pain over the AC joint.  Neurovascular intact distally at least to light touch.  Negative sulcus sign.    RADIOGRAPHS:  Reviewed, including MRI.    IMPRESSION:  High-grade partial rotator cuff tear versus full-thickness rotator cuff, possible superior labrum anterior posterior lesion.    PLAN:  He is at a point where he wants to move beyond conservative measures.  We talked about a rotator cuff repair, doing subacromial decompression, leaving the AC joint alone.  We will also look at the labrum to see if there is indeed a need to fix that.  If we fix both the SLAP lesion and the rotator cuff, he could end up with shoulder stiffness.  We may elect to concentrate on the rotator cuff.  We then had a candid conversation about potential risks and realistic  %    Constitutional: Awake, alert, cooperative, no apparent distress looks better   Lungs: Clear to auscultation bilaterally, no crackles or wheezing   Cardiovascular: Regular rate and rhythm, normal S1 and S2, and no murmur noted   Abdomen: Normal bowel sounds, soft, non-distended, non-tender   Skin: No rashes, no cyanosis, no edema   Other:           Data:   All microbiology laboratory data reviewed.  Recent Labs   Lab Test 01/13/22  0729 01/11/22  1250 01/11/22  0909   WBC 10.9 27.6* 43.4*   HGB 12.4 14.5 14.4   HCT 36.8 42.7 43.3   MCV 93 94 95    375 317     Recent Labs   Lab Test 01/13/22  0729 01/12/22  0533 01/11/22  1858   CR 0.73 0.75 0.89     No lab results found.  Recent Labs   Lab Test 06/04/20  1057   CULT No growth        expectations.  These were outlined in the consent that he signed today.  No questions and wished to proceed.      Dictated By: Silverio Salazar MD  Signing Provider: MD SAL Ann/noah ([id number])  DD: 06/26/2018 17:22:09 TD: 06/28/2018 05:06:36    Copy Sent To:

## 2022-01-13 NOTE — PROGRESS NOTES
"[facility]   General Surgery Progress Note             Assessment and Plan:   Assessment:   Gastric dilitation - likely multifactorial (gastroparesis, stress, possible SMA syndrome)  Severe DKA, resolving  Possible severe sepsis, source unclear, profound leukocytosis  Acute COVID-19 infection      Plan:   -UGI ordered to rule out SMA syndrome.  -continue NG decompression of stomach, possible NG removal if normal UGI  -abx: Zosyn. ID following  -medical management of DKA, COVID infection           Interval History:   tmax 100.2. Continues to feel fine, denies abd discomfort or bloating. Primary c/o NGT discomfort and eager to remove it. + hungry. Not passing flatus. Per RN, patient drank significant amount of water overnight which can explain high NGT output (925 ml) overnight.  NGT output is dark brown in color.         Physical Exam:   Blood pressure 112/65, pulse 68, temperature 99.3  F (37.4  C), temperature source Oral, resp. rate 16, height 1.626 m (5' 4\"), weight 51.1 kg (112 lb 9.6 oz), SpO2 97 %, not currently breastfeeding.    I/O last 3 completed shifts:  In: 2547.38 [I.V.:2487.38; NG/GT:60]  Out: 2125 [Urine:500; Emesis/NG output:1625]    Abdomen:   soft, non-distended, non-tender. + active bowel sounds.          Data:   Blood culture:  Results for orders placed or performed during the hospital encounter of 01/11/22   Blood Culture Peripheral Blood    Specimen: Peripheral Blood   Result Value Ref Range    Culture No growth after 2 days    Blood Culture Peripheral Blood    Specimen: Peripheral Blood   Result Value Ref Range    Culture No growth after 2 days    Results for orders placed or performed during the hospital encounter of 10/26/21   Blood Culture Hand, Right    Specimen: Hand, Right; Blood   Result Value Ref Range    Culture No Growth    Blood Culture Hand, Left    Specimen: Hand, Left; Blood   Result Value Ref Range    Culture No Growth    Results for orders placed or performed " during the hospital encounter of 10/26/21   Blood Culture Peripheral Blood    Specimen: Peripheral Blood   Result Value Ref Range    Culture No Growth    Blood Culture Arm, Left    Specimen: Arm, Left; Blood   Result Value Ref Range    Culture No Growth    Results for orders placed or performed during the hospital encounter of 06/04/20   Blood culture ONE site    Specimen: Blood    Right Arm   Result Value Ref Range    Specimen Description Blood Right Arm     Culture Micro No growth       Urine culture:  Results for orders placed or performed during the hospital encounter of 10/26/21   Urine Culture    Specimen: Urine, Midstream   Result Value Ref Range    Culture <10,000 CFU/mL Mixture of urogenital virgen      Recent Labs   Lab 01/13/22  0729 01/11/22  1250 01/11/22  0909   WBC 10.9 27.6* 43.4*   HGB 12.4 14.5 14.4   HCT 36.8 42.7 43.3   MCV 93 94 95    375 317       Sherrie Sampson PA-C     As above, upper GI shows normal duodenum and jejunum with normal passage of contrast into the jejunum.  No fluoroscopic evidence for superior mesenteric artery syndrome.  She is on Protonix.  High NG output is likely result of significant water intake overnight per RN  Will discontinue NG tube and start clear liquids slowly and in small amounts  Kam Wilson MD  1/13/2022 3:04 PM

## 2022-01-14 LAB
ANION GAP SERPL CALCULATED.3IONS-SCNC: 12 MMOL/L (ref 3–14)
BUN SERPL-MCNC: 9 MG/DL (ref 7–30)
C COLI+JEJUNI+LARI FUSA STL QL NAA+PROBE: NOT DETECTED
C DIFF TOX B STL QL: NEGATIVE
CALCIUM SERPL-MCNC: 8.3 MG/DL (ref 8.5–10.1)
CHLORIDE BLD-SCNC: 109 MMOL/L (ref 94–109)
CO2 SERPL-SCNC: 19 MMOL/L (ref 20–32)
CREAT SERPL-MCNC: 0.58 MG/DL (ref 0.52–1.04)
EC STX1 GENE STL QL NAA+PROBE: NOT DETECTED
EC STX2 GENE STL QL NAA+PROBE: NOT DETECTED
ERYTHROCYTE [DISTWIDTH] IN BLOOD BY AUTOMATED COUNT: 12 % (ref 10–15)
GFR SERPL CREATININE-BSD FRML MDRD: >90 ML/MIN/1.73M2
GLUCOSE BLD-MCNC: 265 MG/DL (ref 70–99)
GLUCOSE BLDC GLUCOMTR-MCNC: 174 MG/DL (ref 70–99)
GLUCOSE BLDC GLUCOMTR-MCNC: 233 MG/DL (ref 70–99)
GLUCOSE BLDC GLUCOMTR-MCNC: 302 MG/DL (ref 70–99)
GLUCOSE BLDC GLUCOMTR-MCNC: 379 MG/DL (ref 70–99)
GLUCOSE BLDC GLUCOMTR-MCNC: 86 MG/DL (ref 70–99)
HCT VFR BLD AUTO: 36.6 % (ref 35–47)
HGB BLD-MCNC: 12.1 G/DL (ref 11.7–15.7)
LACTOFERRIN STL QL IA: POSITIVE
MAGNESIUM SERPL-MCNC: 2 MG/DL (ref 1.6–2.3)
MCH RBC QN AUTO: 31.2 PG (ref 26.5–33)
MCHC RBC AUTO-ENTMCNC: 33.1 G/DL (ref 31.5–36.5)
MCV RBC AUTO: 94 FL (ref 78–100)
NOROV GI+II ORF1-ORF2 JNC STL QL NAA+PR: NOT DETECTED
PHOSPHATE SERPL-MCNC: 2.6 MG/DL (ref 2.5–4.5)
PLATELET # BLD AUTO: 162 10E3/UL (ref 150–450)
POTASSIUM BLD-SCNC: 4 MMOL/L (ref 3.4–5.3)
RBC # BLD AUTO: 3.88 10E6/UL (ref 3.8–5.2)
RVA NSP5 STL QL NAA+PROBE: NOT DETECTED
SALMONELLA SP RPOD STL QL NAA+PROBE: NOT DETECTED
SHIGELLA SP+EIEC IPAH STL QL NAA+PROBE: NOT DETECTED
SODIUM SERPL-SCNC: 140 MMOL/L (ref 133–144)
V CHOL+PARA RFBL+TRKH+TNAA STL QL NAA+PR: NOT DETECTED
WBC # BLD AUTO: 8.3 10E3/UL (ref 4–11)
Y ENTERO RECN STL QL NAA+PROBE: NOT DETECTED

## 2022-01-14 PROCEDURE — 84100 ASSAY OF PHOSPHORUS: CPT | Performed by: INTERNAL MEDICINE

## 2022-01-14 PROCEDURE — C9113 INJ PANTOPRAZOLE SODIUM, VIA: HCPCS | Performed by: INTERNAL MEDICINE

## 2022-01-14 PROCEDURE — 250N000011 HC RX IP 250 OP 636: Performed by: INTERNAL MEDICINE

## 2022-01-14 PROCEDURE — 250N000011 HC RX IP 250 OP 636: Performed by: HOSPITALIST

## 2022-01-14 PROCEDURE — 36415 COLL VENOUS BLD VENIPUNCTURE: CPT | Performed by: INTERNAL MEDICINE

## 2022-01-14 PROCEDURE — 80048 BASIC METABOLIC PNL TOTAL CA: CPT | Performed by: INTERNAL MEDICINE

## 2022-01-14 PROCEDURE — 250N000013 HC RX MED GY IP 250 OP 250 PS 637: Performed by: HOSPITALIST

## 2022-01-14 PROCEDURE — 120N000001 HC R&B MED SURG/OB

## 2022-01-14 PROCEDURE — 83735 ASSAY OF MAGNESIUM: CPT | Performed by: INTERNAL MEDICINE

## 2022-01-14 PROCEDURE — 99233 SBSQ HOSP IP/OBS HIGH 50: CPT | Performed by: INTERNAL MEDICINE

## 2022-01-14 PROCEDURE — 250N000013 HC RX MED GY IP 250 OP 250 PS 637: Performed by: INTERNAL MEDICINE

## 2022-01-14 PROCEDURE — 99231 SBSQ HOSP IP/OBS SF/LOW 25: CPT | Performed by: SURGERY

## 2022-01-14 PROCEDURE — 85027 COMPLETE CBC AUTOMATED: CPT | Performed by: INTERNAL MEDICINE

## 2022-01-14 RX ORDER — DEXTROSE MONOHYDRATE 25 G/50ML
25-50 INJECTION, SOLUTION INTRAVENOUS
Status: DISCONTINUED | OUTPATIENT
Start: 2022-01-14 | End: 2022-01-14

## 2022-01-14 RX ORDER — NICOTINE POLACRILEX 4 MG
15-30 LOZENGE BUCCAL
Status: DISCONTINUED | OUTPATIENT
Start: 2022-01-14 | End: 2022-01-14

## 2022-01-14 RX ADMIN — PROCHLORPERAZINE EDISYLATE 10 MG: 5 INJECTION INTRAMUSCULAR; INTRAVENOUS at 04:37

## 2022-01-14 RX ADMIN — POTASSIUM CHLORIDE AND SODIUM CHLORIDE: 450; 150 INJECTION, SOLUTION INTRAVENOUS at 09:55

## 2022-01-14 RX ADMIN — TAZOBACTAM SODIUM AND PIPERACILLIN SODIUM 3.38 G: 375; 3 INJECTION, SOLUTION INTRAVENOUS at 09:46

## 2022-01-14 RX ADMIN — INSULIN ASPART 5 UNITS: 100 INJECTION, SOLUTION INTRAVENOUS; SUBCUTANEOUS at 04:37

## 2022-01-14 RX ADMIN — PANTOPRAZOLE SODIUM 40 MG: 40 INJECTION, POWDER, FOR SOLUTION INTRAVENOUS at 09:36

## 2022-01-14 RX ADMIN — ESCITALOPRAM OXALATE 20 MG: 20 TABLET ORAL at 09:36

## 2022-01-14 RX ADMIN — ONDANSETRON 4 MG: 2 INJECTION INTRAMUSCULAR; INTRAVENOUS at 00:10

## 2022-01-14 RX ADMIN — HYDROXYZINE HYDROCHLORIDE 25 MG: 25 TABLET ORAL at 00:07

## 2022-01-14 RX ADMIN — ENOXAPARIN SODIUM 40 MG: 40 INJECTION SUBCUTANEOUS at 09:36

## 2022-01-14 RX ADMIN — TAZOBACTAM SODIUM AND PIPERACILLIN SODIUM 3.38 G: 375; 3 INJECTION, SOLUTION INTRAVENOUS at 04:37

## 2022-01-14 RX ADMIN — ACETAMINOPHEN 650 MG: 325 TABLET, FILM COATED ORAL at 00:06

## 2022-01-14 ASSESSMENT — ACTIVITIES OF DAILY LIVING (ADL)
ADLS_ACUITY_SCORE: 14

## 2022-01-14 NOTE — PROGRESS NOTES
"[facility]  General Surgery Progress Note           Assessment and Plan:   Assessment:   DM1 with DKA, dilated stomach but negative gastrografin UGI (no evidence of SMA syndrome)      Plan:     Loose stools likely from antibiotics and gastrografin, consider probiotics, ? If antibiotics can be stopped (per ID)  If continued gastric problems, consider GI consult for motility evaluation  No general surgery problems apparent,   Will follow peripherally, please call if we can be of help         Interval History:   Gastrografin negative for SMA syndrome         Physical Exam:   Blood pressure 108/52, pulse 80, temperature (!) 96.1  F (35.6  C), temperature source Oral, resp. rate 16, height 1.626 m (5' 4\"), weight 51.1 kg (112 lb 9.6 oz), SpO2 96 %, not currently breastfeeding.    I/O last 3 completed shifts:  In: -   Out: 800 [Stool:800]                  Data:     Recent Labs   Lab 01/13/22  0729 01/11/22  1250 01/11/22  0909   WBC 10.9 27.6* 43.4*   HGB 12.4 14.5 14.4   HCT 36.8 42.7 43.3   MCV 93 94 95    375 317     Recent Labs   Lab 01/14/22  0428 01/13/22  2344 01/13/22 2012 01/13/22  1213 01/13/22  0729 01/12/22  2349 01/12/22  2332 01/12/22  1952 01/12/22  1612 01/12/22  1203 01/12/22  1153 01/12/22  0610 01/12/22  0533 01/11/22  1947 01/11/22  1858 01/11/22  0332 01/11/22  0159   NA  --   --   --   --  141  --   --   --   --   --   --   --  145*  --  142   < > 126*   POTASSIUM  --   --   --   --  3.6  --   --   --  4.1  --  3.6  --  3.3*  --  4.0   < > 5.7*   CHLORIDE  --   --   --   --  110*  --   --   --   --   --   --   --  116*  --  113*   < > 90*   CO2  --   --   --   --  20  --   --   --   --   --   --   --  20  --  13*   < > 5*   ANIONGAP  --   --   --   --  11  --   --   --   --   --   --   --  9  --  16*   < > 31*   * 270* 233*   < > 309*   < >  --    < >  --    < >  --    < > 187*   < > 254*   < > 1,194*   BUN  --   --   --   --  11  --   --   --   --   --   --   --  14  --  " "19   < > 33*   CR  --   --   --   --  0.73  --   --   --   --   --   --   --  0.75  --  0.89   < > 1.29*   GFRESTIMATED  --   --   --   --  >90  --   --   --   --   --   --   --  >90  --  >90   < > 60*   BEN  --   --   --   --  8.6  --   --   --   --   --   --   --  9.1  --  8.8   < > 9.1   MAG  --   --   --   --  1.9  --   --   --   --   --  1.9  --  1.8  --   --   --  3.4*   PHOS  --   --   --   --  2.8  --  2.1*  --   --   --  1.8*  --  1.3*  --   --    < >  --    PROTTOTAL  --   --   --   --   --   --   --   --   --   --   --   --  6.8  --   --   --  8.7   ALBUMIN  --   --   --   --   --   --   --   --   --   --   --   --  3.1*  --   --   --  4.1   BILITOTAL  --   --   --   --   --   --   --   --   --   --   --   --  0.5  --   --   --  0.5   ALKPHOS  --   --   --   --   --   --   --   --   --   --   --   --  87  --   --   --  190*   AST  --   --   --   --   --   --   --   --   --   --   --   --  32  --   --   --  46*   ALT  --   --   --   --   --   --   --   --   --   --   --   --  29  --   --   --  43    < > = values in this interval not displayed.     Fluoroscopy UGI:   Nl duodenum and prox jejunum \" no convincing fluoroscopic evidence for SMA syndrome\"       Kam Wilson MD     "

## 2022-01-14 NOTE — PLAN OF CARE
"VSS. Denies pain. Upper GI Xray completed in am, with no reports of blockage or abnormal GI tract. NG tube removed around 1530. Tolerating clear liquid diet, no complaints of nausea or abdominal pain. 1 episode of diarrhea, but no sample was obtained due to pt getting rid of sample accidentally. Bg check q4h: 309, 243, 215, 233. Sliding scale insulin given accordingly. IV zosyn q6h. Ambulating in room independently. Continue with POC. Discharge plan unknown.    /74 (BP Location: Left arm)   Pulse 96   Temp 98  F (36.7  C) (Oral)   Resp 16   Ht 1.626 m (5' 4\")   Wt 51.1 kg (112 lb 9.6 oz)   SpO2 98%   BMI 19.33 kg/m      "

## 2022-01-14 NOTE — PROGRESS NOTES
"[facility]  Infectious Disease Progress Note          Assessment and Plan:   IMPRESSION:     1.  A 21-year-old female with acute diabetic ketoacidosis including abdominal pain and profound leukocytosis, imaging and workup without clear infection site.  2.  Recent [employer] hospitalization episode with similar presentation.  No definite infection found very high leukocytosis with that episode as well.  Got antibiotics and resolved.  3.  COVID-19, no hypoxia, seemingly incidental diagnosis, although may be a precipitating cause.  4.  Abdominal pain, gastric dilatation workup without clear infection related cause.  5.  Diabetes mellitus.     RECOMMENDATIONS:     1.  No proven focal infection, may all be reactive leukemoid rxn, stop antibiotics and watch off, also having diarrhea which could be antibiotics related   2.  No specific COVID treatment for now.  Follow closely.  3. Resolved abdominal pain, leukocytosis, resolving  DKA,.All seem better wo clr cause, cxs neg        Interval History:   no new complaints and doing better; no cp, sob, n/v/d, less abd pain. T down WBC 10 K no hypoxia asymptomatic COVID so far              Medications:       enoxaparin ANTICOAGULANT  40 mg Subcutaneous Q24H     escitalopram  20 mg Oral Daily     insulin aspart  1-7 Units Subcutaneous TID AC     insulin aspart  1-5 Units Subcutaneous At Bedtime     insulin aspart   Subcutaneous TID w/meals     insulin glargine  30 Units Subcutaneous QAM     pantoprazole (PROTONIX) IV  40 mg Intravenous Daily with breakfast     piperacillin-tazobactam  3.375 g Intravenous Q6H                  Physical Exam:   Blood pressure 111/59, pulse 90, temperature 97.1  F (36.2  C), temperature source Oral, resp. rate 18, height 1.626 m (5' 4\"), weight 51.1 kg (112 lb 9.6 oz), SpO2 96 %, not currently breastfeeding.  Wt Readings from Last 2 Encounters:   01/13/22 51.1 kg (112 lb 9.6 oz)   10/28/21 51.8 kg (114 lb 3.2 oz)     Vital Signs " with Ranges  Temp:  [96.1  F (35.6  C)-98.5  F (36.9  C)] 97.1  F (36.2  C)  Pulse:  [80-96] 90  Resp:  [16-18] 18  BP: (108-128)/(52-90) 111/59  SpO2:  [96 %-100 %] 96 %    Constitutional: Awake, alert, cooperative, no apparent distress looks better   Lungs: Clear to auscultation bilaterally, no crackles or wheezing   Cardiovascular: Regular rate and rhythm, normal S1 and S2, and no murmur noted   Abdomen: Normal bowel sounds, soft, non-distended, non-tender   Skin: No rashes, no cyanosis, no edema   Other:           Data:   All microbiology laboratory data reviewed.  Recent Labs   Lab Test 01/13/22  0729 01/11/22  1250 01/11/22  0909   WBC 10.9 27.6* 43.4*   HGB 12.4 14.5 14.4   HCT 36.8 42.7 43.3   MCV 93 94 95    375 317     Recent Labs   Lab Test 01/13/22  0729 01/12/22  0533 01/11/22  1858   CR 0.73 0.75 0.89     No lab results found.  Recent Labs   Lab Test 06/04/20  1057   CULT No growth

## 2022-01-14 NOTE — PLAN OF CARE
Vss, no co pain/cp/sob.   , 233.  Up IND, has not ordered lunch or breakfast today despite encouragement, mom is to facilitate bringing in pt's pump and/or supplies today so that is available for transition.  Mag 2.0, K+ 4.0, phos 2.6 all with rechecks ordered for am.  Covid isolation continues, tolerating RA, ARTHUR/IVF dc'd, non-tele. Possible discharge tomorrow. Continue poc and monitoring.       Problem: Adult Inpatient Plan of Care  Goal: Plan of Care Review  Outcome: No Change  Goal: Patient-Specific Goal (Individualized)  Outcome: No Change  Goal: Absence of Hospital-Acquired Illness or Injury  Outcome: No Change  Intervention: Identify and Manage Fall Risk  Recent Flowsheet Documentation  Taken 1/14/2022 1220 by Margot Macias RN  Safety Promotion/Fall Prevention: safety round/check completed  Taken 1/14/2022 1145 by Margot Macias RN  Safety Promotion/Fall Prevention: safety round/check completed  Taken 1/14/2022 1005 by Margot Macias RN  Safety Promotion/Fall Prevention: safety round/check completed  Taken 1/14/2022 0935 by Margot Macias RN  Safety Promotion/Fall Prevention:   fall prevention program maintained   treat underlying cause   treat reversible contributory factors   supervised activity   safety round/check completed   room organization consistent   patient and family education   nonskid shoes/slippers when out of bed   lighting adjusted   increase visualization of patient   increased rounding and observation   clutter free environment maintained   activity supervised   assistive device/personal items within reach  Intervention: Prevent Skin Injury  Recent Flowsheet Documentation  Taken 1/14/2022 0935 by Margot Macias RN  Body Position: position changed independently  Intervention: Prevent and Manage VTE (Venous Thromboembolism) Risk  Recent Flowsheet Documentation  Taken 1/14/2022 0935 by Margot Macias RN  VTE Prevention/Management:   AROM (active range of motion)  performed   fluids promoted   bleeding risk assessed   bleeding precautions maintained   anticoagulant therapy maintained   ambulation promoted  Goal: Optimal Comfort and Wellbeing  Outcome: No Change  Goal: Readiness for Transition of Care  Outcome: No Change     Problem: Hyperglycemia  Goal: Blood Glucose Level Within Targeted Range  Outcome: No Change

## 2022-01-14 NOTE — PLAN OF CARE
"/52 (BP Location: Right arm)   Pulse 80   Temp (!) 96.1  F (35.6  C) (Oral)   Resp 16   Ht 1.626 m (5' 4\")   Wt 51.1 kg (112 lb 9.6 oz)   SpO2 96%   BMI 19.33 kg/m        VSS. AO x4. Ind in room. Q4 , 379- sliding scale given per orders. Clear liq diet. Frequent large liquid Bms overnight- stool sample sent. Nauseous overnight- zofran and compazine given with relief. Tylenol and atarax given for abd pain. IV zosyn given this shift. NS+ K infusing at 100 ml/hr. Will continue POC.   "

## 2022-01-14 NOTE — PROGRESS NOTES
[facility]  Hospitalist Progress Note  Gustavo De La Rosa MD 01/14/22    Reason for Stay (Diagnosis): Severe DKA, possible severe sepsis, COVID-19 infection, gastric dilation         Assessment and Plan:      Summary of Stay:  Margo Cruz is a 21 year old female with a history of suboptimally controlled type 1 diabetes mellitus on insulin pump, depression, anxiety, and celiac sprue admitted on 1/11/2022 with nausea and diarrhea and was found to have severe DKA and COVID-19 infection.  She was given IV fluids and sodium bicarbonate for severe acidosis.  She was started on insulin infusion and admitted to the ICU.  She had a severe leukocytosis of 66 along with tachycardia so she was empirically placed on IV Zosyn with cultures negative to date.  COVID-19 PCR is positive, but no respiratory symptoms or hypoxia.  CT of the abdomen pelvis showed gastric and proximal duodenal dilation.  NG was placed with significant continuous output.  General surgery consulted.  Underwent upper GI series that was normal.  Now out of DKA and on subcutaneous insulin, increasing doses today due to hyperglycemia.  NG removed and advancing diet as tolerated.    Problem List/Assessment and Plan:   Severe DKA in IDDM type I, lactic acidosis: Patient has an insulin pump.  History of DKA 6/20/2020 and 10/20/2021, at that time likely pump failure and possible sepsis.  Presenting with 2 days of nausea, vomiting, diarrhea, myalgias, headaches and has had some sick family members.  Initially profoundly acidotic initially with pH 7.02, PCO2 19, PO2 65, HCO3 5 with anion gap 26, ketones 4.7, and lactic acid 4.4 in the setting of a blood glucose of 1,194.  She thinks her pump has been working, but perhaps she has been rationing her insulin.  Her A1c is 9.9.    -DKA resolved with insulin infusion, now on subcutaneous insulin  -Increase glargine to 30 units daily in the morning  -Add 1 unit aspart per 10 g carbohydrate with meals and  medium dose sliding scale insulin  -Family will bring in her insulin pump supplies today so that we can transition to her pump tomorrow     Possible severe sepsis, source unclear, more likely profound leukemoid reaction: Leukocytosis to 66.8, tachycardic, and GI symptoms of nausea, vomiting, diarrhea along with some myalgias and headache.  Symptoms may be secondary to DKA alone, however that is not impressively elevated WBC count just for stress to margination.  Lactic acid was elevated at 4.4 which is improving with IV fluids.  Has had some low-grade fevers while here. She did receive empiric antibiotics during hospitalization in October for similar presentation and no source was found.  She does report a cough yesterday and has been around multiple sick family members.  No urinary symptoms and urinalysis not suspicious for infection.  Chest x-ray without infiltrate.  CT the abdomen pelvis shows possible diffuse colitis which may be the source.  COVID-19 PCR was positive, but no respiratory symptoms.  -ID consulted  -Received 4 days empiric IV Zosyn.  All cultures negative.  Leukocytosis resolved.  -Stop antibiotics and monitor for recurrence of symptoms    Diarrhea: Developed new liquid diarrhea today.  Suspect this is predominantly due to Gastrografin she received yesterday and perhaps from the antibiotics.  C. difficile PCR and enteric panel were negative.  Fecal lactoferrin test was elevated, but this was in the setting of a severe leukemoid reaction.  Denies any chronic issues with constipation/diarrhea so less likely IBD.    COVID-19 infection: Positive COVID-19 PCR.  She is not vaccinated.  No respiratory symptoms nor hypoxia.  No specific treatment for this other than DVT prophylaxis.    Gastric dilation, improved: Vomiting prior to coming in.  Initial CT of the abdomen pelvis showed a fluid distended stomach and proximal duodenum with transition point at the duodenal sweep.  Etiology unclear, but  "consideration for SMA syndrome.  Her BMI is 19 which would put her at risk for this.  Alternatively she has poorly controlled IDDM type I so gastroparesis is also possible.  Seems less likely to be related to COVID-19 infection.  Has not had any bowel movement since admission.  -General surgery consulted, now signed off  -Upper GI series was normal, this makes SMA syndrome very unlikely  -NG removed, advance diet as tolerated     LESLIE, hyperkalemia, hyperphosphatemia, hypernatremia, hypokalemia, hypophosphatemia: Creatinine 1.29 with initial potassium 5.7.  Phosphorus level 6.9.  LESLIE secondary to prerenal etiology with GI losses vomiting and diarrhea.  Mild hyperkalemia secondary to severe acidosis.  Received 1 g IV calcium gluconate.  Eventually developed hypokalemia and hypophosphatemia which were replaced and hypernatremia that improved with hypotonic fluids.  -Electrolyte replacement protocols     Pseudohyponatremia: Sodium 126 initially in the setting of blood glucose 1100, corrected to normal.     MDD, anxiety:  Resumed Lexapro and hydroxyzine as needed.  Flat affect but hospitalization.        DVT Prophylaxis: Enoxaparin (Lovenox) SQ  Code Status: Full Code  FEN:  Advance diet as tolerated, stop IV fluid  Discharge Dispo: Home  Estimated Disch Date / # of Days until Disch:  If tolerating diet plan to transition to insulin pump in the morning and likely discharge later in the day        Interval History (Subjective):      NG removed.  Had some nausea last night, better today.  No further abdominal pain.  Multiple liquid stools following upper GI series.  Afebrile.  No cough or shortness of breath.  Tolerating clears.                  Physical Exam:      Last Vital Signs:  /63 (BP Location: Left arm)   Pulse 78   Temp 97.3  F (36.3  C) (Oral)   Resp 18   Ht 1.626 m (5' 4\")   Wt 51.1 kg (112 lb 9.6 oz)   SpO2 97%   BMI 19.33 kg/m      Intake/Output Summary (Last 24 hours) at 1/14/2022 1236  Last data " filed at 1/14/2022 0947  Gross per 24 hour   Intake 6991 ml   Output 800 ml   Net 6191 ml       Constitutional: Awake, NAD  Eyes: sclera white  HEENT: N MMM  Respiratory: no respiratory distress, lungs cta bilaterally, no crackles or wheeze  Cardiovascular: RRR.  No murmur  GI: Thin, nontender to palpation, bowel sounds present, not distended  Skin: no rash    Musculoskeletal/extremities:  No edema  Neurologic: A&O, speech clear  Psychiatric: Affect less flat today         Medications:      All current medications were reviewed with changes reflected in problem list.         Data:      All new lab and imaging data was reviewed.   Labs:  No results for input(s): CULT in the last 168 hours.  Recent Labs   Lab 01/14/22  1215 01/14/22  0934 01/14/22  0847 01/13/22  1213 01/13/22  0729 01/12/22  1952 01/12/22  1612 01/12/22  0610 01/12/22  0533   NA  --   --  140  --  141  --   --   --  145*   POTASSIUM  --   --  4.0  --  3.6  --  4.1   < > 3.3*   CHLORIDE  --   --  109  --  110*  --   --   --  116*   CO2  --   --  19*  --  20  --   --   --  20   ANIONGAP  --   --  12  --  11  --   --   --  9   * 302* 265*   < > 309*   < >  --    < > 187*   BUN  --   --  9  --  11  --   --   --  14   CR  --   --  0.58  --  0.73  --   --   --  0.75   GFRESTIMATED  --   --  >90  --  >90  --   --   --  >90   BEN  --   --  8.3*  --  8.6  --   --   --  9.1    < > = values in this interval not displayed.     Recent Labs   Lab 01/14/22  0847 01/13/22  0729 01/11/22  1250   WBC 8.3 10.9 27.6*   HGB 12.1 12.4 14.5   HCT 36.6 36.8 42.7   MCV 94 93 94    196 375     Recent Labs   Lab 01/14/22  1215 01/14/22  0934 01/14/22  0847 01/14/22  0428 01/13/22  2344   * 302* 265* 379* 270*   Enteric panel negative  C. difficile PCR negative  Fecal lactoferrin positive    Imaging:   Recent Results (from the past 24 hour(s))   XR Upper GI Water Soluble    Narrative    UPPER GI WATER SOLUBLE   1/13/2022 1:15 PM     HISTORY: Gastric  dilatation. Evaluate for SMA syndrome.    COMPARISON: Abdominal radiograph performed 1/11/2022.    TECHNIQUE: 120 mL water-soluble contrast was administered through the  NG tube. Single contrast technique was employed. 1.2 minutes of  fluoroscopy time was used. A total of 8 fluoroscopic spot images were  obtained.     FINDINGS: An enteric tube is in place, with tip in the proximal  stomach, and sidehole inferior to the gastroesophageal junction. The  stomach is unremarkable, without evidence for mass or erosions.  Gastric mucosal pattern is normal where seen. The stomach is not  abnormally distended. Contrast passed readily into the duodenum and  proximal jejunum. The duodenum and proximal jejunum are also  unremarkable where visualized.      Impression    IMPRESSION: Unremarkable limited water-soluble upper GI examination.  No convincing fluoroscopic evidence for SMA syndrome.     MINA CRUM MD         SYSTEM ID:  [id number]           Gustavo De La Rosa MD

## 2022-01-15 VITALS
SYSTOLIC BLOOD PRESSURE: 123 MMHG | WEIGHT: 112.6 LBS | OXYGEN SATURATION: 99 % | DIASTOLIC BLOOD PRESSURE: 84 MMHG | BODY MASS INDEX: 19.22 KG/M2 | HEART RATE: 63 BPM | HEIGHT: 64 IN | RESPIRATION RATE: 20 BRPM | TEMPERATURE: 98.2 F

## 2022-01-15 LAB
ANION GAP SERPL CALCULATED.3IONS-SCNC: 6 MMOL/L (ref 3–14)
BUN SERPL-MCNC: 8 MG/DL (ref 7–30)
CALCIUM SERPL-MCNC: 8.5 MG/DL (ref 8.5–10.1)
CHLORIDE BLD-SCNC: 110 MMOL/L (ref 94–109)
CO2 SERPL-SCNC: 27 MMOL/L (ref 20–32)
CREAT SERPL-MCNC: 0.53 MG/DL (ref 0.52–1.04)
ERYTHROCYTE [DISTWIDTH] IN BLOOD BY AUTOMATED COUNT: 11.8 % (ref 10–15)
GFR SERPL CREATININE-BSD FRML MDRD: >90 ML/MIN/1.73M2
GLUCOSE BLD-MCNC: 82 MG/DL (ref 70–99)
GLUCOSE BLDC GLUCOMTR-MCNC: 101 MG/DL (ref 70–99)
GLUCOSE BLDC GLUCOMTR-MCNC: 146 MG/DL (ref 70–99)
GLUCOSE BLDC GLUCOMTR-MCNC: 184 MG/DL (ref 70–99)
GLUCOSE BLDC GLUCOMTR-MCNC: 84 MG/DL (ref 70–99)
GLUCOSE BLDC GLUCOMTR-MCNC: 87 MG/DL (ref 70–99)
HCT VFR BLD AUTO: 36.9 % (ref 35–47)
HGB BLD-MCNC: 12.1 G/DL (ref 11.7–15.7)
MAGNESIUM SERPL-MCNC: 1.9 MG/DL (ref 1.6–2.3)
MCH RBC QN AUTO: 30.9 PG (ref 26.5–33)
MCHC RBC AUTO-ENTMCNC: 32.8 G/DL (ref 31.5–36.5)
MCV RBC AUTO: 94 FL (ref 78–100)
PHOSPHATE SERPL-MCNC: 2.9 MG/DL (ref 2.5–4.5)
PLATELET # BLD AUTO: 158 10E3/UL (ref 150–450)
POTASSIUM BLD-SCNC: 3.2 MMOL/L (ref 3.4–5.3)
RBC # BLD AUTO: 3.92 10E6/UL (ref 3.8–5.2)
SODIUM SERPL-SCNC: 143 MMOL/L (ref 133–144)
WBC # BLD AUTO: 5.8 10E3/UL (ref 4–11)

## 2022-01-15 PROCEDURE — 250N000013 HC RX MED GY IP 250 OP 250 PS 637: Performed by: INTERNAL MEDICINE

## 2022-01-15 PROCEDURE — 36415 COLL VENOUS BLD VENIPUNCTURE: CPT | Performed by: INTERNAL MEDICINE

## 2022-01-15 PROCEDURE — 250N000013 HC RX MED GY IP 250 OP 250 PS 637: Performed by: HOSPITALIST

## 2022-01-15 PROCEDURE — 99239 HOSP IP/OBS DSCHRG MGMT >30: CPT | Performed by: INTERNAL MEDICINE

## 2022-01-15 PROCEDURE — 84100 ASSAY OF PHOSPHORUS: CPT | Performed by: INTERNAL MEDICINE

## 2022-01-15 PROCEDURE — 250N000011 HC RX IP 250 OP 636: Performed by: INTERNAL MEDICINE

## 2022-01-15 PROCEDURE — 85027 COMPLETE CBC AUTOMATED: CPT | Performed by: INTERNAL MEDICINE

## 2022-01-15 PROCEDURE — 83735 ASSAY OF MAGNESIUM: CPT | Performed by: INTERNAL MEDICINE

## 2022-01-15 PROCEDURE — C9113 INJ PANTOPRAZOLE SODIUM, VIA: HCPCS | Performed by: INTERNAL MEDICINE

## 2022-01-15 PROCEDURE — 82310 ASSAY OF CALCIUM: CPT | Performed by: INTERNAL MEDICINE

## 2022-01-15 RX ORDER — POTASSIUM CHLORIDE 1500 MG/1
20 TABLET, EXTENDED RELEASE ORAL ONCE
Status: COMPLETED | OUTPATIENT
Start: 2022-01-15 | End: 2022-01-15

## 2022-01-15 RX ORDER — LOPERAMIDE HCL 2 MG
2 CAPSULE ORAL 4 TIMES DAILY PRN
Status: DISCONTINUED | OUTPATIENT
Start: 2022-01-15 | End: 2022-01-15 | Stop reason: HOSPADM

## 2022-01-15 RX ORDER — ONDANSETRON 4 MG/1
4 TABLET, ORALLY DISINTEGRATING ORAL EVERY 8 HOURS PRN
Qty: 10 TABLET | Refills: 0 | Status: ON HOLD | OUTPATIENT
Start: 2022-01-15 | End: 2022-06-12

## 2022-01-15 RX ORDER — DEXTROSE MONOHYDRATE 25 G/50ML
25-50 INJECTION, SOLUTION INTRAVENOUS
Status: CANCELLED | OUTPATIENT
Start: 2022-01-15

## 2022-01-15 RX ORDER — NICOTINE POLACRILEX 4 MG
15-30 LOZENGE BUCCAL
Status: CANCELLED | OUTPATIENT
Start: 2022-01-15

## 2022-01-15 RX ADMIN — LOPERAMIDE HYDROCHLORIDE 2 MG: 2 CAPSULE ORAL at 09:19

## 2022-01-15 RX ADMIN — PANTOPRAZOLE SODIUM 40 MG: 40 INJECTION, POWDER, FOR SOLUTION INTRAVENOUS at 09:21

## 2022-01-15 RX ADMIN — POTASSIUM CHLORIDE 20 MEQ: 20 TABLET, EXTENDED RELEASE ORAL at 09:20

## 2022-01-15 RX ADMIN — ENOXAPARIN SODIUM 40 MG: 40 INJECTION SUBCUTANEOUS at 09:21

## 2022-01-15 RX ADMIN — ESCITALOPRAM OXALATE 20 MG: 20 TABLET ORAL at 09:20

## 2022-01-15 ASSESSMENT — ACTIVITIES OF DAILY LIVING (ADL)
ADLS_ACUITY_SCORE: 14

## 2022-01-15 NOTE — DISCHARGE SUMMARY
[facility]  Discharge Summary  Name: Margo Cruz    MRN: [id number]  YOB: 2000    Age: 21 year old  Date of Discharge:  1/15/2022  Date of Admission: 1/11/2022  Primary Care Provider: Naomi Reynolds  Discharge Physician:  Gustavo De La Rosa MD  Discharging Service:  Hospitalist      Discharge Diagnoses:  Severe DKA  IDDM type I  Lactic acidosis  Possible severe sepsis  Diarrhea  COVID-19 infection  Gastric dilation  LESLIE  Hyperkalemia  Hyperphosphatemia  Hypernatremia  Hypokalemia  Hypophosphatemia  Pseudohyponatremia  MDD  Anxiety     Hospital Course:  Summary of Stay:  Margo Cruz is a 21 year old female with a history of suboptimally controlled type 1 diabetes mellitus on insulin pump, depression, anxiety, and celiac sprue admitted on 1/11/2022 with nausea and diarrhea and was found to have severe DKA and COVID-19 infection.  She was given IV fluids and sodium bicarbonate for severe acidosis.  She was started on insulin infusion and admitted to the ICU.  She had a severe leukocytosis of 66 along with tachycardia so she was empirically placed on IV Zosyn with cultures negative to date.  COVID-19 PCR is positive, but no respiratory symptoms or hypoxia.  CT of the abdomen pelvis showed gastric and proximal duodenal dilation.  NG was placed with significant continuous output.  General surgery consulted.  Underwent upper GI series that was normal.  Transitioned to subcutaneous insulin.  NG removed.  Tolerating regular diet.  Antibiotics stopped.  Transition to home insulin pump.  Discharge home with follow-up with PCP within 1 week.     Problem List/Assessment and Plan:   Severe DKA in IDDM type I, lactic acidosis: Patient has an insulin pump.  History of DKA 6/20/2020 and 10/20/2021, at that time likely pump failure and possible sepsis.  Presenting with 2 days of nausea, vomiting, diarrhea, myalgias, headaches and has had some sick family members.  Initially profoundly acidotic  initially with pH 7.02, PCO2 19, PO2 65, HCO3 5 with anion gap 26, ketones 4.7, and lactic acid 4.4 in the setting of a blood glucose of 1,194.  She thinks her pump has been working, but perhaps she has been rationing her insulin.  Her A1c is 9.9.    -DKA resolved with insulin infusion  -Transition to subcutaneous insulin.  Blood glucose better controlled.  Now transition to home insulin pump with previous settings  -Instructed to monitor blood sugars very closely over the next 2 days and follow-up with PCP within 1 week     Possible severe sepsis, source unclear, more likely profound leukemoid reaction: Leukocytosis to 66.8, tachycardic, and GI symptoms of nausea, vomiting, diarrhea along with some myalgias and headache.  Symptoms may be secondary to DKA alone, however that is not impressively elevated WBC count just for stress to margination.  Lactic acid was elevated at 4.4 which is improving with IV fluids.  Has had some low-grade fevers while here. She did receive empiric antibiotics during hospitalization in October for similar presentation and no source was found.  She does report a cough yesterday and has been around multiple sick family members.  No urinary symptoms and urinalysis not suspicious for infection.  Chest x-ray without infiltrate.  CT the abdomen pelvis shows possible diffuse colitis which may be the source.  COVID-19 PCR was positive, but no respiratory symptoms.  -ID consulted  -Received 4 days empiric IV Zosyn.  All cultures negative.  Leukocytosis resolved.  Antibiotics stopped.     Diarrhea: Developed new liquid diarrhea.  Suspect this is predominantly due to Gastrografin she received yesterday and perhaps from the antibiotics.  C. difficile PCR and enteric panel were negative.  Fecal lactoferrin test was elevated, but this was in the setting of a severe leukemoid reaction.  Denies any chronic issues with constipation/diarrhea so less likely IBD.  Improved.  Can use Imodium as  needed.     COVID-19 infection: Positive COVID-19 PCR.  She is not vaccinated.  No respiratory symptoms nor hypoxia.  No specific treatment for this other than DVT prophylaxis.     Gastric dilation, improved: Vomiting prior to coming in.  Initial CT of the abdomen pelvis showed a fluid distended stomach and proximal duodenum with transition point at the duodenal sweep.  Etiology unclear, but consideration for SMA syndrome.  Her BMI is 19 which would put her at risk for this.  Alternatively she has poorly controlled IDDM type I so gastroparesis is also possible.  Seems less likely to be related to COVID-19 infection.  Has not had any bowel movement since admission.  -General surgery consulted, now signed off  -Upper GI series was normal, this makes SMA syndrome very unlikely  -NG removed  -Now tolerating regular diet     LESLIE, hyperkalemia, hyperphosphatemia, hypernatremia, hypokalemia, hypophosphatemia: Creatinine 1.29 with initial potassium 5.7.  Phosphorus level 6.9.  LESLIE secondary to prerenal etiology with GI losses vomiting and diarrhea.  Mild hyperkalemia secondary to severe acidosis.  Received 1 g IV calcium gluconate.  Eventually developed hypokalemia and hypophosphatemia which were replaced and hypernatremia that improved with hypotonic fluids.  -Electrolytes were replaced while here     Pseudohyponatremia: Sodium 126 initially in the setting of blood glucose 1100, corrected to normal.     MDD, anxiety:  Resumed Lexapro and hydroxyzine as needed.  Flat affect but hospitalization.     Discharge Disposition:  Discharged to home     Allergies:  Allergies   Allergen Reactions     No Known Drug Allergies         Discharge Medications:   Current Discharge Medication List      START taking these medications    Details   !! ondansetron (ZOFRAN-ODT) 4 MG ODT tab Take 1 tablet (4 mg) by mouth every 8 hours as needed for nausea  Qty: 10 tablet, Refills: 0    Associated Diagnoses: Nausea and vomiting, intractability of  vomiting not specified, unspecified vomiting type       !! - Potential duplicate medications found. Please discuss with provider.      CONTINUE these medications which have CHANGED    Details   insulin glargine (LANTUS PEN) 100 UNIT/ML pen Inject 20 Units Subcutaneous 2 times daily If you have pump failure  Qty: 12 mL, Refills: 0    Comments: If Lantus is not covered by insurance, may substitute Basaglar or Semglee or other insulin glargine product per insurance preference at same dose and frequency.    Associated Diagnoses: Type 1 diabetes, HbA1c goal < 8% (H)         CONTINUE these medications which have NOT CHANGED    Details   escitalopram (LEXAPRO) 20 MG tablet Take 1 tablet (20 mg) by mouth daily  Qty: 30 tablet, Refills: 0    Comments: Due for follow-up  Associated Diagnoses: Anxiety; Depression, unspecified depression type      hydrOXYzine (ATARAX) 25 MG tablet Take 1 tablet (25 mg) by mouth nightly as needed for anxiety (sleep)  Qty: 30 tablet, Refills: 0    Associated Diagnoses: Anxiety; Depression, unspecified depression type      Insulin Aspart (INSULIN PUMP - OUTPATIENT) Inject Subcutaneous See Admin Instructions Type of pump: cartmi MiniMHomeZada 770G  Type of insulin: Novolog   Basal rate(s)  4960-0418: 0.9 unit/hr  7593-2444: 0.85 units/hr  5803-1804: 0.675 units/hr  5813-8502: 0.55 units/hr  ISF: 30 mg  ICF (insulin to carb ratio)  9503-9856: 1unit/5g carbs  7125-1770: 1 unit/10g carbs  5470-3932: 1 unit/8g carbs  Active insulin time: 3 hrs  Target goal range:   Followed by endocrinology      !! ondansetron (ZOFRAN-ODT) 4 MG ODT tab Take 1 tablet (4 mg) by mouth every 6 hours as needed for nausea  Qty: 10 tablet, Refills: 0    Associated Diagnoses: Non-intractable vomiting with nausea, unspecified vomiting type      blood glucose (CONTOUR NEXT TEST) test strip Use to test blood sugar 5-6 times daily or as directed.  Qty: 150 each, Refills: 4    Associated Diagnoses: Type 1 diabetes, HbA1c goal <  "8% (H)      blood glucose monitoring (TYE MICROLET) lancets Use to test blood sugar 4-5 times daily or as directed.  Qty: 100 each, Refills: 4    Associated Diagnoses: Type 1 diabetes, HbA1c goal < 8% (H)      insulin aspart (NOVOLOG PEN) 100 UNIT/ML pen Use 3x/day with meals (carb correction scale). Also correction scale with correction factor 1:18  Qty: 15 mL, Refills: 0    Associated Diagnoses: Type 1 diabetes, HbA1c goal < 8% (H)      insulin aspart (NOVOLOG VIAL) 100 UNITS/ML vial Use up to 80 units daily in insulin pump. 90 day supply.  Qty: 70 mL, Refills: 4    Associated Diagnoses: Type 1 diabetes, HbA1c goal < 8% (H)       !! - Potential duplicate medications found. Please discuss with provider.           Condition on Discharge:  Discharge condition: Good   Discharge vitals: Blood pressure 123/84, pulse 63, temperature 98.2  F (36.8  C), temperature source Oral, resp. rate 20, height 1.626 m (5' 4\"), weight 51.1 kg (112 lb 9.6 oz), SpO2 99 %, not currently breastfeeding.   Code status on discharge: Full Code     History of Illness:  See detailed admission note for full details.    Physical Exam:  Blood pressure 123/84, pulse 63, temperature 98.2  F (36.8  C), temperature source Oral, resp. rate 20, height 1.626 m (5' 4\"), weight 51.1 kg (112 lb 9.6 oz), SpO2 99 %, not currently breastfeeding.  Wt Readings from Last 1 Encounters:   01/13/22 51.1 kg (112 lb 9.6 oz)     Constitutional: Awake, NAD   Eyes: sclera white  HEENT:   MMM  Respiratory: no respiratory distress, lungs cta bilaterally, no crackles or wheeze  Cardiovascular: RRR.  No murmur   GI: Thin, non-tender, not distended, bowel sounds present  Skin: no rash   Musculoskeletal/extremities:  No edema  Neurologic: A&O, speech clear  Psychiatric: calm, cooperative, more conversive    Procedures other than Imaging:  None     Imaging:  Results for orders placed or performed during the hospital encounter of 01/11/22   CT Abdomen Pelvis w Contrast    " Narrative    EXAM: CT ABDOMEN PELVIS W CONTRAST  LOCATION: [facility]  DATE/TIME: 1/11/2022 4:15 AM    INDICATION: Abdominal pain, acute, nonlocalized.  COMPARISON: None.  TECHNIQUE: CT scan of the abdomen and pelvis was performed following injection of IV contrast. Multiplanar reformats were obtained. Dose reduction techniques were used.  CONTRAST: 57 mL Isovue-370.    FINDINGS:   LOWER CHEST: Normal.    HEPATOBILIARY: Fatty liver.    PANCREAS: Normal.    SPLEEN: Normal.    ADRENAL GLANDS: Normal.    KIDNEYS/BLADDER: Normal.    BOWEL: Fluid distended stomach and proximal duodenum with transition point seen at the duodenal sweep, in a very thin patient with SMA takeoff at acute angle, correlate to exclude SMA syndrome.    Normal appendix. Prostate: Versus mural thickening. No diverticulitis.    LYMPH NODES: Normal.    VASCULATURE: The aorta and branch vessels are widely patent including the superior mesenteric artery, celiac artery, inferior mesenteric artery and renal arteries. Patent portal and hepatic veins.    PELVIC ORGANS: IUD.    MUSCULOSKELETAL: Normal.      Impression    IMPRESSION:   1.  Fluid distended stomach and proximal duodenum with transition point seen at the duodenal sweep, correlate to exclude SMA syndrome.  2.  Widely patent aorta and branch vessels including the superior mesenteric artery.  3.  Decompressed press colon versus mural thickening, correlate to exclude colitis.  4.  Fatty liver.  5.  Normal appendix. No colitis, or diverticulitis.  6.  IUD.              Head CT w/o contrast    Narrative    EXAM: CT HEAD W/O CONTRAST  LOCATION: [facility]  DATE/TIME: 1/11/2022 4:03 AM    INDICATION: Delirium  COMPARISON: None.  TECHNIQUE: Routine CT Head without IV contrast. Multiplanar reformats. Dose reduction techniques were used.    FINDINGS:  INTRACRANIAL CONTENTS: No intracranial hemorrhage, extraaxial collection, or mass effect.  No CT evidence of  acute infarct. Normal parenchymal attenuation. Normal ventricles and sulci.     VISUALIZED ORBITS/SINUSES/MASTOIDS: No intraorbital abnormality. Mild mucosal thickening in the left sphenoid sinus. No middle ear or mastoid effusion.    BONES/SOFT TISSUES: No acute abnormality.      Impression    IMPRESSION:  1.  No acute intracranial pathology.   XR Chest 1 View    Narrative    EXAM: XR CHEST 1 VIEW  LOCATION: [facility]  DATE/TIME: 1/11/2022 4:17 AM    INDICATION: sepsis  COMPARISON: 10/26/2021      Impression    IMPRESSION: Negative chest.   XR Abdomen Port 1 View    Narrative    ABDOMEN PORTABLE ONE VIEW   1/11/2022 11:02 AM     HISTORY: NG placement.    COMPARISON: CT abdomen and pelvis 1/11/2022.      Impression    IMPRESSION: Nasogastric tube identified with its tip at the proximal  stomach. Gas distends the stomach and a few bowel loops. Correlate  with ileus.    LEA KANG MD         SYSTEM ID:  [id number]   XR Upper GI Water Soluble    Narrative    UPPER GI WATER SOLUBLE   1/13/2022 1:15 PM     HISTORY: Gastric dilatation. Evaluate for SMA syndrome.    COMPARISON: Abdominal radiograph performed 1/11/2022.    TECHNIQUE: 120 mL water-soluble contrast was administered through the  NG tube. Single contrast technique was employed. 1.2 minutes of  fluoroscopy time was used. A total of 8 fluoroscopic spot images were  obtained.     FINDINGS: An enteric tube is in place, with tip in the proximal  stomach, and sidehole inferior to the gastroesophageal junction. The  stomach is unremarkable, without evidence for mass or erosions.  Gastric mucosal pattern is normal where seen. The stomach is not  abnormally distended. Contrast passed readily into the duodenum and  proximal jejunum. The duodenum and proximal jejunum are also  unremarkable where visualized.      Impression    IMPRESSION: Unremarkable limited water-soluble upper GI examination.  No convincing fluoroscopic evidence for SMA syndrome.      MINA CRUM MD         SYSTEM ID:  [id number]        Consultations:  Consultation during this admission received from infectious disease and surgery.       Recent Lab Results:  Recent Labs   Lab 01/15/22  0734 01/14/22  0847 01/13/22  0729   WBC 5.8 8.3 10.9   HGB 12.1 12.1 12.4   HCT 36.9 36.6 36.8   MCV 94 94 93    162 196       Recent Labs   Lab 01/15/22  1506 01/15/22  1315 01/15/22  0936 01/15/22  0918 01/15/22  0734 01/14/22  0934 01/14/22  0847 01/13/22  1213 01/13/22  0729 01/12/22  0610 01/12/22  0533 01/11/22  0332 01/11/22  0159   NA  --   --   --   --  143  --  140  --  141  --  145*   < > 126*   POTASSIUM  --   --   --   --  3.2*  --  4.0  --  3.6   < > 3.3*   < > 5.7*   CHLORIDE  --   --   --   --  110*  --  109  --  110*  --  116*   < > 90*   CO2  --   --   --   --  27  --  19*  --  20  --  20   < > 5*   ANIONGAP  --   --   --   --  6  --  12  --  11  --  9   < > 31*   * 184* 146*   < > 82   < > 265*   < > 309*   < > 187*   < > 1,194*   BUN  --   --   --   --  8  --  9  --  11  --  14   < > 33*   CR  --   --   --   --  0.53  --  0.58  --  0.73  --  0.75   < > 1.29*   GFRESTIMATED  --   --   --   --  >90  --  >90  --  >90  --  >90   < > 60*   BEN  --   --   --   --  8.5  --  8.3*  --  8.6  --  9.1   < > 9.1   MAG  --   --   --   --  1.9  --  2.0  --  1.9   < > 1.8  --  3.4*   PHOS  --   --   --   --  2.9  --  2.6  --  2.8   < > 1.3*   < >  --    PROTTOTAL  --   --   --   --   --   --   --   --   --   --  6.8  --  8.7   ALBUMIN  --   --   --   --   --   --   --   --   --   --  3.1*  --  4.1   BILITOTAL  --   --   --   --   --   --   --   --   --   --  0.5  --  0.5   ALKPHOS  --   --   --   --   --   --   --   --   --   --  87  --  190*   AST  --   --   --   --   --   --   --   --   --   --  32  --  46*   ALT  --   --   --   --   --   --   --   --   --   --  29  --  43    < > = values in this interval not displayed.     Recent Labs   Lab 01/11/22  0509 01/11/22  0258 01/11/22  0246    LACT 3.0* 4.0* 4.4*       Recent Labs   Lab 01/11/22  0226   COLOR Straw   APPEARANCE Clear   URINEGLC >=1000*   URINEBILI Negative   URINEKETONE 80 *   SG 1.023   UBLD Trace*   URINEPH 5.0   PROTEIN 20 *   NITRITE Negative   LEUKEST Negative   RBCU 1   WBCU 7*     Blood cultures no growth  Enteric panel negative  C. difficile PCR negative  COVID-19 PCR positive  Influenza A/B PCR negative  Ketone peak 6.6  Serum hCG negative  Phosphorus peak 6.9  Phosphorus low 1.3     Pending Results:    Unresulted Labs Ordered in the Past 30 Days of this Admission     Date and Time Order Name Status Description    1/11/2022  4:03 AM Blood Culture Peripheral Blood Preliminary     1/11/2022  4:03 AM Blood Culture Peripheral Blood Preliminary          These results will be followed up by patient's primary care provider.    Discharge Instructions and Follow-Up:   Discharge Procedure Orders   Care Coordination Referral   Standing Status: Future   Referral Priority: Routine: Next available opening Referral Type: Care Coordination   Number of Visits Requested: 1     COVID-19 GetWell Loop Referral   Referral Priority: Routine: Next available opening Referral Type: Consultation   Number of Visits Requested: 1     Reason for your hospital stay   Order Comments: You were hospitalized for DKA.  Additionally you tested positive for COVID-19 which could have been the reason for going into DKA.  He had a very high white blood cell count as you have had before and he received empiric antibiotics, but no cultures were positive so these have been stopped.  You also had a dilated stomach with vomiting which resolved after treatment of DKA and with decompression of the stomach.  You did not need any specific treatment for COVID-19 which was a mild case.  You are back on your previous home pump settings and I recommend you follow your blood sugar very closely the next couple of days to make sure it is not going high again which could indicate an  issue with the pump or at least the settings.     Contact provider   Order Comments: Contact your primary care provider if If increased trouble breathing, new arm/leg swelling, dizziness/passing out, falls, bleeding that doesn't stop, or uncontrolled pain.     Follow-up and recommended labs and tests    Order Comments: Follow up with primary care provider, Naomi Reynolds, within 7 days for hospital follow-up for DKA and COVID19.     Discharge - Quarantine/Isolation Instruction   Order Comments: Date of symptom onset:   1/9/22  Date of first positive test:  1/11/22  If you tested positive COVID-19 and show symptoms (fever, cough, body aches or trouble breathing):        Stay home and away from others (self-isolate) until:        At least 5 days have passed since your symptoms started. AND...        You've had no fever-and no medicine that reduces fever for 1 full day (24 hours). AND...         Your other symptoms have resolved (gotten better).  If you tested positive for COVID-19 but don't show symptoms:       Stay home and away from others (self-isolate) until at least 5 days have passed since the date of your first positive COVID-19 test.     Activity   Order Comments: Your activity upon discharge: activity as tolerated     Order Specific Question Answer Comments   Is discharge order? Yes      Full Code     Order Specific Question Answer Comments   Code status determined by: Discussion with patient/ legal decision maker      Diet   Order Comments: Follow this diet upon discharge: Moderate Consistent Carb (60 g CHO per Meal) Diet     Order Specific Question Answer Comments   Is discharge order? Yes        I, Gustavo De La Rosa MD, personally saw the patient today and spent greater than 30 minutes discharging this patient.    Gustavo De La Rosa MD

## 2022-01-15 NOTE — PLAN OF CARE
Vss, no co pain/cp/sob.   BG 69, 87, 146, 184.  Up IND, has tolerated meals, hooked up to home insulin pump/regimen and RN is to krishan ivey with BG results at 1500 to verify that she is ok to d/c.  Mag 1.9, K+ 3.2 replaced with recheck pending, phos 2.9 all with rechecks ordered for am.  Covid isolation continues, tolerating RA, regular diet. Possible discharge this afternoon pending the above. Continue poc and monitoring.        Problem: Adult Inpatient Plan of Care  Goal: Plan of Care Review  Outcome: Improving  Goal: Patient-Specific Goal (Individualized)  Outcome: Improving  Goal: Absence of Hospital-Acquired Illness or Injury  Outcome: Improving  Intervention: Identify and Manage Fall Risk  Recent Flowsheet Documentation  Taken 1/15/2022 1315 by Margot Macias RN  Safety Promotion/Fall Prevention: safety round/check completed  Taken 1/15/2022 1227 by Margot Macias RN  Safety Promotion/Fall Prevention: safety round/check completed  Taken 1/15/2022 1142 by Margot Macias RN  Safety Promotion/Fall Prevention: safety round/check completed  Taken 1/15/2022 1032 by Margot Macias RN  Safety Promotion/Fall Prevention: safety round/check completed  Taken 1/15/2022 0924 by Margot Macias RN  Safety Promotion/Fall Prevention:   fall prevention program maintained   treat underlying cause   treat reversible contributory factors   supervised activity   safety round/check completed   room organization consistent   patient and family education   nonskid shoes/slippers when out of bed   lighting adjusted   increase visualization of patient   increased rounding and observation   clutter free environment maintained   assistive device/personal items within reach   activity supervised  Taken 1/15/2022 0718 by Margot Macias RN  Safety Promotion/Fall Prevention: safety round/check completed  Intervention: Prevent Skin Injury  Recent Flowsheet Documentation  Taken 1/15/2022 0924 by Margot Macias RN  Body  Position: position changed independently  Intervention: Prevent and Manage VTE (Venous Thromboembolism) Risk  Recent Flowsheet Documentation  Taken 1/15/2022 0924 by Margot Macias RN  VTE Prevention/Management:   AROM (active range of motion) performed   ambulation promoted   fluids promoted   bleeding risk assessed   bleeding precautions maintained   anticoagulant therapy maintained  Goal: Optimal Comfort and Wellbeing  Outcome: Improving  Goal: Readiness for Transition of Care  Outcome: Improving     Problem: Hyperglycemia  Goal: Blood Glucose Level Within Targeted Range  Outcome: Improving

## 2022-01-15 NOTE — PROVIDER NOTIFICATION
Paged dr robles: BG at 1500 is 101.    3:19 PM Ok for discharge. Remind her to check sugars 4x per day per Lana ivey RN updated.

## 2022-01-15 NOTE — PLAN OF CARE
Writer resumed care at 1500. Pt A&O x4, up in room ad nasim. VSS. Denies pain. COVID positive. On RA. C/O abdominal discomfort, hesitant to eat dinner r/t frequent loose stools. No stools in afternoon. BG 86 before dinner, no coverage given. Clear liquid diet. Mag/K/Phos protocol, recheck scheduled for tomorrow am. GI following. Discharge TBD, will continue POC.

## 2022-01-15 NOTE — PROVIDER NOTIFICATION
Spoke with dr robles via paging: plans to hook up insulin pump today, working with pharmacy to do so. BG 69, juice given and will recheck.    9:19 AM   BG 87

## 2022-01-15 NOTE — PLAN OF CARE
Vitals are Temp: 98.8  F (37.1  C) Temp src: Oral BP: 99/64 Pulse: 65   Resp: 24 SpO2: 97 %.    Patient is Alert and Oriented x4. Ambulates independently.  COVID precautions.  On room air. Saturations above 90's. Pt is a Clear liquid diet, advance as tolerated.  Denying pain.  Patient is Saline locked. Blood sugars 174 and 84 this shift. On K+, Mg, and Phos protocols. Plan to transition to insulin pump today. Continue to monitor.

## 2022-01-15 NOTE — PROGRESS NOTES
"Called lab twice to have someone come to draw the 1330 K+ recheck. Both times this RN was reassured that someone was coming \"right away.\" Still has not been drawn at this time.  "

## 2022-01-15 NOTE — PROGRESS NOTES
Chart checked for infectious disease.   No new micro   No fever   No leucocytosis   Off antibiotics   Continue watch off antibiotics   See my note from 1/ 14 for more details.     Erin Liao MD  Infectious Disease

## 2022-01-15 NOTE — PROGRESS NOTES
Per verbal order from dr robles, pt may place insulin pump back on with previous home settings. States that he is waiting for pharmacy to enter orders for him to cosign for this. States to recheck BG around 1500 and page with result so he can decide to discharge her or not later this afternoon.

## 2022-01-16 LAB
BACTERIA BLD CULT: NO GROWTH
BACTERIA BLD CULT: NO GROWTH

## 2022-01-17 ENCOUNTER — PATIENT OUTREACH (OUTPATIENT)
Dept: CARE COORDINATION | Facility: CLINIC | Age: 22
End: 2022-01-17
Payer: COMMERCIAL

## 2022-01-17 ASSESSMENT — ACTIVITIES OF DAILY LIVING (ADL): DEPENDENT_IADLS:: INDEPENDENT

## 2022-01-17 NOTE — PROGRESS NOTES
Clinic Care Coordination Contact  [facility]/Voicemail    Referral Source: IP Report  Clinical Data: Care Coordinator Outreach  Outreach attempted x 1.  Unable to leave a voice mail, there's not one set up.    Plan: Care Coordinator will try to reach patient again in 1-2 business days.    Carolyn Riddle RN, BSN, PHN  Primary Care / Care Coordinator   [facility]  [email or web address]   [phone]

## 2022-02-17 PROBLEM — E11.10 DKA (DIABETIC KETOACIDOSIS) (H): Status: RESOLVED | Noted: 2020-06-04 | Resolved: 2020-08-11

## 2022-03-10 ENCOUNTER — OFFICE VISIT (OUTPATIENT)
Dept: PEDIATRICS | Facility: CLINIC | Age: 22
End: 2022-03-10
Payer: COMMERCIAL

## 2022-03-10 VITALS
BODY MASS INDEX: 20.43 KG/M2 | WEIGHT: 119.7 LBS | OXYGEN SATURATION: 98 % | HEIGHT: 64 IN | HEART RATE: 83 BPM | TEMPERATURE: 98.1 F | SYSTOLIC BLOOD PRESSURE: 94 MMHG | DIASTOLIC BLOOD PRESSURE: 50 MMHG

## 2022-03-10 DIAGNOSIS — Z00.00 ROUTINE GENERAL MEDICAL EXAMINATION AT A HEALTH CARE FACILITY: ICD-10-CM

## 2022-03-10 DIAGNOSIS — F32.A DEPRESSION, UNSPECIFIED DEPRESSION TYPE: ICD-10-CM

## 2022-03-10 DIAGNOSIS — Z11.4 SCREENING FOR HIV (HUMAN IMMUNODEFICIENCY VIRUS): ICD-10-CM

## 2022-03-10 DIAGNOSIS — Z11.3 ROUTINE SCREENING FOR STI (SEXUALLY TRANSMITTED INFECTION): ICD-10-CM

## 2022-03-10 DIAGNOSIS — E10.9 TYPE 1 DIABETES, HBA1C GOAL < 8% (H): ICD-10-CM

## 2022-03-10 DIAGNOSIS — Z13.220 SCREENING FOR HYPERLIPIDEMIA: ICD-10-CM

## 2022-03-10 DIAGNOSIS — R19.7 DIARRHEA, UNSPECIFIED TYPE: ICD-10-CM

## 2022-03-10 DIAGNOSIS — R10.13 ABDOMINAL PAIN, EPIGASTRIC: ICD-10-CM

## 2022-03-10 DIAGNOSIS — R10.11 ABDOMINAL PAIN, RIGHT UPPER QUADRANT: ICD-10-CM

## 2022-03-10 DIAGNOSIS — K62.5 BRBPR (BRIGHT RED BLOOD PER RECTUM): Primary | ICD-10-CM

## 2022-03-10 DIAGNOSIS — Z12.4 CERVICAL CANCER SCREENING: ICD-10-CM

## 2022-03-10 DIAGNOSIS — F41.9 ANXIETY: ICD-10-CM

## 2022-03-10 DIAGNOSIS — Z11.59 NEED FOR HEPATITIS C SCREENING TEST: ICD-10-CM

## 2022-03-10 LAB
ERYTHROCYTE [DISTWIDTH] IN BLOOD BY AUTOMATED COUNT: 11.9 % (ref 10–15)
HCT VFR BLD AUTO: 41.6 % (ref 35–47)
HGB BLD-MCNC: 14.1 G/DL (ref 11.7–15.7)
MCH RBC QN AUTO: 31.6 PG (ref 26.5–33)
MCHC RBC AUTO-ENTMCNC: 33.9 G/DL (ref 31.5–36.5)
MCV RBC AUTO: 93 FL (ref 78–100)
PLATELET # BLD AUTO: 319 10E3/UL (ref 150–450)
RBC # BLD AUTO: 4.46 10E6/UL (ref 3.8–5.2)
WBC # BLD AUTO: 7.6 10E3/UL (ref 4–11)

## 2022-03-10 PROCEDURE — 80061 LIPID PANEL: CPT

## 2022-03-10 PROCEDURE — 82043 UR ALBUMIN QUANTITATIVE: CPT | Mod: 59

## 2022-03-10 PROCEDURE — 85027 COMPLETE CBC AUTOMATED: CPT

## 2022-03-10 PROCEDURE — 99395 PREV VISIT EST AGE 18-39: CPT | Mod: GC

## 2022-03-10 PROCEDURE — 90472 IMMUNIZATION ADMIN EACH ADD: CPT

## 2022-03-10 PROCEDURE — 36415 COLL VENOUS BLD VENIPUNCTURE: CPT

## 2022-03-10 PROCEDURE — 99214 OFFICE O/P EST MOD 30 MIN: CPT | Mod: 25

## 2022-03-10 PROCEDURE — 90651 9VHPV VACCINE 2/3 DOSE IM: CPT

## 2022-03-10 PROCEDURE — 0051A COVID-19,PF,PFIZER (12+ YRS): CPT

## 2022-03-10 PROCEDURE — 87389 HIV-1 AG W/HIV-1&-2 AB AG IA: CPT

## 2022-03-10 PROCEDURE — 86803 HEPATITIS C AB TEST: CPT

## 2022-03-10 PROCEDURE — G0145 SCR C/V CYTO,THINLAYER,RESCR: HCPCS

## 2022-03-10 PROCEDURE — 80053 COMPREHEN METABOLIC PANEL: CPT

## 2022-03-10 PROCEDURE — 90686 IIV4 VACC NO PRSV 0.5 ML IM: CPT

## 2022-03-10 PROCEDURE — 87491 CHLMYD TRACH DNA AMP PROBE: CPT

## 2022-03-10 PROCEDURE — 86780 TREPONEMA PALLIDUM: CPT

## 2022-03-10 PROCEDURE — 90471 IMMUNIZATION ADMIN: CPT

## 2022-03-10 PROCEDURE — 91305 COVID-19,PF,PFIZER (12+ YRS): CPT

## 2022-03-10 RX ORDER — ESCITALOPRAM OXALATE 20 MG/1
20 TABLET ORAL DAILY
Qty: 30 TABLET | Refills: 1 | Status: SHIPPED | OUTPATIENT
Start: 2022-03-10 | End: 2022-05-20

## 2022-03-10 RX ORDER — HYDROXYZINE HYDROCHLORIDE 25 MG/1
25 TABLET, FILM COATED ORAL
Qty: 30 TABLET | Refills: 1 | Status: ON HOLD | OUTPATIENT
Start: 2022-03-10 | End: 2022-06-14

## 2022-03-10 ASSESSMENT — ENCOUNTER SYMPTOMS
HEARTBURN: 1
COUGH: 0
SORE THROAT: 0
NAUSEA: 1
CHILLS: 1
FEVER: 0
HEMATURIA: 1
DYSURIA: 0
JOINT SWELLING: 0
SHORTNESS OF BREATH: 0
CONSTIPATION: 1
HEADACHES: 1
DIARRHEA: 1
PARESTHESIAS: 0
DIZZINESS: 1
ARTHRALGIAS: 0
WEAKNESS: 1
NERVOUS/ANXIOUS: 1
FREQUENCY: 1
HEMATOCHEZIA: 1
MYALGIAS: 0
EYE PAIN: 0
PALPITATIONS: 0
ABDOMINAL PAIN: 1

## 2022-03-10 ASSESSMENT — PATIENT HEALTH QUESTIONNAIRE - PHQ9
10. IF YOU CHECKED OFF ANY PROBLEMS, HOW DIFFICULT HAVE THESE PROBLEMS MADE IT FOR YOU TO DO YOUR WORK, TAKE CARE OF THINGS AT HOME, OR GET ALONG WITH OTHER PEOPLE: EXTREMELY DIFFICULT
SUM OF ALL RESPONSES TO PHQ QUESTIONS 1-9: 16
SUM OF ALL RESPONSES TO PHQ QUESTIONS 1-9: 16

## 2022-03-10 NOTE — PROGRESS NOTES
"   SUBJECTIVE:   CC: Margo Cruz is an 21 year old woman who presents for preventive health visit.       Patient has been advised of split billing requirements and indicates understanding: Yes  Healthy Habits:     Getting at least 3 servings of Calcium per day:  NO    Bi-annual eye exam:  NO    Dental care twice a year:  NO    Sleep apnea or symptoms of sleep apnea:  Daytime drowsiness    Diet:  Diabetic and Gluten-free/reduced    Frequency of exercise:  None    Taking medications regularly:  No    Barriers to taking medications:  Problems remembering to take them    PHQ-2 Total Score: 4    Additional concerns today:  No      Was hospitalized for DKA in setting of COVID and possible sepsis in 01/2022, did require ICU level care. Had diarrhea, nausea, vomiting, gastric and proximal duodenal dilation with concern for colitis. Still having stomach pain and alternating constipation/diarrhea with some \"black to green stools\" as well some bright red blood, omeprazole helped improve this but ran out and is no longer taking this. Also taking Zofran for nausea. Has had a decreased appetite and weight has been fluctuating. Feels tired and weak all the time, lightheaded and dizzy when standing.    Depression and anxiety symptoms ongoing, took Lexapro 20mg daily and found that downs weren't as bad, but didn't follow up and ran out two months ago. Has a hard time taking it every day. Hydroxyzine helps her anxiety.     Vaginal bleeding with IUD, usually spotting but had four days of light bleeding recently.       Today's PHQ-2 Score:   PHQ-2 ( 1999 Pfizer) 3/10/2022   Q1: Little interest or pleasure in doing things 2   Q2: Feeling down, depressed or hopeless 2   PHQ-2 Score 4   PHQ-2 Total Score (12-17 Years)- Positive if 3 or more points; Administer PHQ-A if positive -   Q1: Little interest or pleasure in doing things More than half the days   Q2: Feeling down, depressed or hopeless More than half the days   PHQ-2 Score 4 "     PATIENT HEALTH QUESTIONNAIRE-9 (PHQ - 9)    Over the last 2 weeks, how often have you been bothered by any of the following problems?    1. Little interest or pleasure in doing things -  More than half the days   2. Feeling down, depressed, or hopeless -  More than half the days   3. Trouble falling or staying asleep, or sleeping too much - More than half the days   4. Feeling tired or having little energy -  More than half the days   5. Poor appetite or overeating -  Nearly every day   6. Feeling bad about yourself - or that you are a failure or have let yourself or your family down -  Nearly every day   7. Trouble concentrating on things, such as reading the newspaper or watching television - Not at all   8. Moving or speaking so slowly that other people could have noticed? Or the opposite - being so fidgety or restless that you have been moving around a lot more than usual Several days   9. Thoughts that you would be better off dead or of hurting  yourself in some way Several days   Total Score: 16     If you checked off any problems, how difficult have these problems made it for you to do your work, take care of things at home, or get along with other people?      Developed by Ronaldo Jesus, Kenia Edouard, Az Robin and colleagues, with an educational mt from [employer] No permission required to reproduce, translate, display or distribute. permission required to reproduce, translate, display or distribute.      Abuse: Current or Past (Physical, Sexual or Emotional) - No  Do you feel safe in your environment? Yes        Social History     Tobacco Use     Smoking status: Former Smoker     Packs/day: 0.10     Types: Cigarettes     Smokeless tobacco: Never Used     Tobacco comment: mom smokes outside   Substance Use Topics     Alcohol use: Yes     Comment: goes out 2-3 times per week and has up to 3-4 drinks at a time     If you drink alcohol do you typically have >3 drinks per day or >7  "drinks per week? No    Alcohol Use 3/10/2022   Prescreen: >3 drinks/day or >7 drinks/week? No   Prescreen: >3 drinks/day or >7 drinks/week? -       Reviewed orders with patient.  Reviewed health maintenance and updated orders accordingly - Yes    History of abnormal Pap smear: NO - age 21-29 PAP every 3 years recommended     Reviewed and updated as needed this visit by clinical staff   Tobacco  Allergies  Meds  Problems  Med Hx  Surg Hx  Fam Hx  Soc   Hx        Reviewed and updated as needed this visit by Provider   Tobacco   Meds  Problems  Med Hx  Surg Hx  Fam Hx  Soc Hx       Past Medical History:   Diagnosis Date     Diabetes mellitus      Seborrheic infantile dermatitis       History reviewed. No pertinent surgical history.    Review of Systems   Constitutional: Positive for chills. Negative for fever.   HENT: Negative for congestion, ear pain, hearing loss and sore throat.    Eyes: Negative for pain and visual disturbance.   Respiratory: Negative for cough and shortness of breath.    Cardiovascular: Negative for palpitations and peripheral edema.   Gastrointestinal: Positive for abdominal pain, constipation, diarrhea, heartburn, hematochezia and nausea.   Genitourinary: Positive for frequency, hematuria and urgency. Negative for dysuria and genital sores.   Musculoskeletal: Negative for arthralgias, joint swelling and myalgias.   Skin: Negative for rash.   Neurological: Positive for dizziness, weakness and headaches. Negative for paresthesias.   Psychiatric/Behavioral: Positive for mood changes. The patient is nervous/anxious.         OBJECTIVE:   BP 94/50   Pulse 83   Temp 98.1  F (36.7  C)   Ht 1.62 m (5' 3.78\")   Wt 54.3 kg (119 lb 11.2 oz)   SpO2 98%   BMI 20.69 kg/m    Physical Exam  Gen: awake and alert, no apparent distress, appears stated age, sitting comfortably upright in chair.  HEENT: head atraumatic and normocephalic, hearing grossly normal, PERRLA, EOM grossly intact, no " conjunctival injection or icterus, no nasal drainage, no oral ulcers, normal dentition, moist mucous membranes  Neck: supple, no lymphadenopathy, no stiffness, normal ROM  CV: RRR, normal S1 and S2, no murmurs, rubs, or gallops, normal radial and DP pulses, normal capillary refill.  Pulm: CTAB, no wheezes, rhonchi, or rales. No increased WOB.  Abd: soft, tender to palpation in epigastric area, RUQ, and bilateral lower quadrants R>L, non-distended, normal bowel sounds throughout, no HSM.  Ext: no edema, normal muscle tone, moves all extremities equally  Skin: warm and dry, no rashes, pallor, cyanosis, jaundice, or bruising.  Neuro: A&Ox3, answers questions appropriately, normal speech, CN II-XII grossly intact.  Psych: normal affect, makes good eye contact      Diagnostic Test Results:  Labs reviewed in Epic  Results for orders placed or performed in visit on 03/10/22 (from the past 24 hour(s))   CBC with platelets   Result Value Ref Range    WBC Count 7.6 4.0 - 11.0 10e3/uL    RBC Count 4.46 3.80 - 5.20 10e6/uL    Hemoglobin 14.1 11.7 - 15.7 g/dL    Hematocrit 41.6 35.0 - 47.0 %    MCV 93 78 - 100 fL    MCH 31.6 26.5 - 33.0 pg    MCHC 33.9 31.5 - 36.5 g/dL    RDW 11.9 10.0 - 15.0 %    Platelet Count 319 150 - 450 10e3/uL       ASSESSMENT/PLAN:   Margo was seen today for physical.    Diagnoses and all orders for this visit:    Routine general medical examination at a health care facility  -     REVIEW OF HEALTH MAINTENANCE PROTOCOL ORDERS  -     C HUMAN PAPILLOMA VIRUS VACCINE (GARDASIL 9) 3 DOSE IM  -     Get third and final Gardasil dose in minimum 12 weeks   -     INFLUENZA VACCINE IM > 6 MONTHS VALENT IIV4 (AFLURIA/FLUZONE)  -     COVID-19,PF,PFIZER (12+ Yrs GRAY LABEL)  -     PFIZER COVID-19 VACCINE 2ND DOSE APPT; Future  -     Recommended to take vitamin D and increase calcium intake (or supplement)  -     Recommended to see an optometrist yearly and a dentist twice yearly  -     Return in four weeks for  depression/anxiety symptom check and medication management as below  -     Return in one year for routine follow up    Diarrhea  BRBPR (bright red blood per rectum)  Abdominal pain, right upper quadrant  -     Comprehensive metabolic panel (BMP + Alb, Alk Phos, ALT, AST, Total. Bili, TP)  -     CBC with platelets; Future  -     Adult Gastro Ref - Consult Only; Future  -     CBC with platelets    Screening for HIV (human immunodeficiency virus)  -     HIV Antigen Antibody Combo    Need for hepatitis C screening test  -     Hepatitis C Screen Reflex to HCV RNA Quant and Genotype    Routine screening for STI (sexually transmitted infection)  -     Chlamydia trachomatis PCR - Clinic Collect  -     Treponema Abs w Reflex to RPR and Titer    Cervical cancer screening  -     Pap Screen only - recommended age 21 - 24 years    Depression, unspecified depression type  Anxiety  -     escitalopram (LEXAPRO) 20 MG tablet; Take 1 tablet (20 mg) by mouth daily  -     Return in one month for follow up, symptom check and medication adjustment  -     hydrOXYzine (ATARAX) 25 MG tablet; Take 1 tablet (25 mg) by mouth nightly as needed for anxiety (sleep)  -     Adult Mental Health  Referral; Future (counseling)    Type 1 diabetes, HbA1c goal < 8% (H)  -     Albumin Random Urine Quantitative with Creat Ratio    Screening for hyperlipidemia  -     Lipid panel reflex to direct LDL Fasting    COUNSELING:  Reviewed preventive health counseling, as reflected in patient instructions       Regular exercise       Healthy diet/nutrition       Alcohol Use       Contraception       Osteoporosis prevention/bone health       Safe sex practices/STD prevention       Consider Hep C screening for all patients one time for ages 18-79 years       HIV screeninx in teen years, 1x in adult years, and at intervals if high risk    Estimated body mass index is 20.69 kg/m  as calculated from the following:    Height as of this encounter: 1.62 m (5'  "3.78\").    Weight as of this encounter: 54.3 kg (119 lb 11.2 oz).      She reports that she has quit smoking. Her smoking use included cigarettes. She smoked 0.10 packs per day. She has never used smokeless tobacco.      Counseling Resources:  ATP IV Guidelines  Pooled Cohorts Equation Calculator  Breast Cancer Risk Calculator  BRCA-Related Cancer Risk Assessment: FHS-7 Tool  FRAX Risk Assessment  ICSI Preventive Guidelines  Dietary Guidelines for Americans, 2010  AMERICAN PET RESORT's MyPlate  ASA Prophylaxis  Lung CA Screening    Shasha Vasquez MD  [facility]  Answers for HPI/ROS submitted by the patient on 3/10/2022  If you checked off any problems, how difficult have these problems made it for you to do your work, take care of things at home, or get along with other people?: Extremely difficult  PHQ9 TOTAL SCORE: 16        I have seen this patient and I was present during all critical and key portions of the procedure/exam and immediately available to furnish services during the entire service. I have reviewed the documentation from this resident and discussed the findings with them, and I agree with the documentation their documentation.      Kam Mehta MD  Internal Medicine and Pediatrics   "

## 2022-03-10 NOTE — PATIENT INSTRUCTIONS
- Take calcium and vitamin D daily for bone health, and weight bearing exercise a few times a day  - See an eye doctor to evaluate your vision  - See a dentist twice yearly  - Referral sent for gastroenterology for your stomach issues  - Referral sent for mental health/therapy  - Start your lexapro and return in a month to check on your depression symptoms  - We will contact you if there are abnormal results for your blood work and pap smear  - You received the HPV, influenza, and COVID vaccines today (get the third and final HPV vaccine at your follow up visit in one month)      Preventive Health Recommendations  Female Ages 21 to 25     Yearly exam:     See your health care provider every year in order to  o Review health changes.   o Discuss preventive care.    o Review your medicines if your doctor has prescribed any.      You should be tested each year for STDs (sexually transmitted diseases).       Talk to your provider about how often you should have cholesterol testing.      Get a Pap test every three years. If you have an abnormal result, your doctor may have you test more often.      If you are at risk for diabetes, you should have a diabetes test (fasting glucose).     Shots:     Get a flu shot each year.     Get a tetanus shot every 10 years.     Consider getting the shot (vaccine) that prevents cervical cancer (Gardasil).    Nutrition:     Eat at least 5 servings of fruits and vegetables each day.    Eat whole-grain bread, whole-wheat pasta and brown rice instead of white grains and rice.    Get adequate Calcium and Vitamin D.     Lifestyle    Exercise at least 150 minutes a week each week (30 minutes a day, 5 days a week). This will help you control your weight and prevent disease.    Limit alcohol to one drink per day.    No smoking.     Wear sunscreen to prevent skin cancer.    See your dentist every six months for an exam and cleaning.

## 2022-03-11 LAB
ALBUMIN SERPL-MCNC: 3.8 G/DL (ref 3.4–5)
ALP SERPL-CCNC: 64 U/L (ref 40–150)
ALT SERPL W P-5'-P-CCNC: 18 U/L (ref 0–50)
ANION GAP SERPL CALCULATED.3IONS-SCNC: 9 MMOL/L (ref 3–14)
AST SERPL W P-5'-P-CCNC: 13 U/L (ref 0–45)
BILIRUB SERPL-MCNC: 0.4 MG/DL (ref 0.2–1.3)
BUN SERPL-MCNC: 10 MG/DL (ref 7–30)
C TRACH DNA SPEC QL NAA+PROBE: NEGATIVE
CALCIUM SERPL-MCNC: 9.2 MG/DL (ref 8.5–10.1)
CHLORIDE BLD-SCNC: 108 MMOL/L (ref 94–109)
CHOLEST SERPL-MCNC: 169 MG/DL
CO2 SERPL-SCNC: 21 MMOL/L (ref 20–32)
CREAT SERPL-MCNC: 0.59 MG/DL (ref 0.52–1.04)
CREAT UR-MCNC: 65 MG/DL
FASTING STATUS PATIENT QL REPORTED: ABNORMAL
GFR SERPL CREATININE-BSD FRML MDRD: >90 ML/MIN/1.73M2
GLUCOSE BLD-MCNC: 141 MG/DL (ref 70–99)
HCV AB SERPL QL IA: NONREACTIVE
HDLC SERPL-MCNC: 46 MG/DL
HIV 1+2 AB+HIV1 P24 AG SERPL QL IA: NONREACTIVE
LDLC SERPL CALC-MCNC: 104 MG/DL
MICROALBUMIN UR-MCNC: 8 MG/L
MICROALBUMIN/CREAT UR: 12.31 MG/G CR (ref 0–25)
NONHDLC SERPL-MCNC: 123 MG/DL
POTASSIUM BLD-SCNC: 4.1 MMOL/L (ref 3.4–5.3)
PROT SERPL-MCNC: 7.5 G/DL (ref 6.8–8.8)
SODIUM SERPL-SCNC: 138 MMOL/L (ref 133–144)
T PALLIDUM AB SER QL: NONREACTIVE
TRIGL SERPL-MCNC: 97 MG/DL

## 2022-03-14 LAB
BKR LAB AP GYN ADEQUACY: NORMAL
BKR LAB AP GYN INTERPRETATION: NORMAL
BKR LAB AP HPV REFLEX: NO
BKR LAB AP PREVIOUS ABNORMAL: NORMAL
PATH REPORT.COMMENTS IMP SPEC: NORMAL
PATH REPORT.COMMENTS IMP SPEC: NORMAL
PATH REPORT.RELEVANT HX SPEC: NORMAL

## 2022-04-11 ENCOUNTER — TELEPHONE (OUTPATIENT)
Dept: ENDOCRINOLOGY | Facility: CLINIC | Age: 22
End: 2022-04-11
Payer: COMMERCIAL

## 2022-04-11 DIAGNOSIS — E10.9 TYPE 1 DIABETES, HBA1C GOAL < 8% (H): ICD-10-CM

## 2022-04-11 NOTE — TELEPHONE ENCOUNTER
Patient is out of medication.  Patient states she left a vial more two at a family member's on vacation and that she can not contact them to send to her because they have no address. Patient states she has gone thru all 4 refills given 03/02/22.  Patient states she had COVID-19 and a stomach bug and has to increase her insulin.

## 2022-04-11 NOTE — TELEPHONE ENCOUNTER
Endo staff- can you please take a look into this?  Thank you.  Last visit 6/2020.  Has upcoming visit.  Ok to send limited supply till visit.

## 2022-04-12 DIAGNOSIS — E10.9 TYPE 1 DIABETES, HBA1C GOAL < 8% (H): ICD-10-CM

## 2022-04-13 NOTE — TELEPHONE ENCOUNTER
Routing refill request to provider for review/approval because:  Lab Results   Component Value Date    A1C 9.9 01/11/2022    A1C 10.3 10/26/2021    A1C 8.3 06/04/2020    A1C 7.5 01/23/2020    A1C 7.3 04/23/2015    A1C 7.9 06/18/2014    A1C 7.4 10/11/2013     Pt has appt next week -  was NS for last several appt     Danielle Saenz RN

## 2022-04-14 NOTE — TELEPHONE ENCOUNTER
Dae Alfaro    Do you feel comfortable filling her insulin? You saw her with the residents recently. I haven't seen her for over two years.     Thanks,    Naomi Reynolds, CHUCKP-DNP.

## 2022-04-30 ENCOUNTER — HEALTH MAINTENANCE LETTER (OUTPATIENT)
Age: 22
End: 2022-04-30

## 2022-05-18 DIAGNOSIS — F32.A DEPRESSION, UNSPECIFIED DEPRESSION TYPE: ICD-10-CM

## 2022-05-18 DIAGNOSIS — F41.9 ANXIETY: ICD-10-CM

## 2022-05-19 NOTE — TELEPHONE ENCOUNTER
Routing refill request to provider for review/approval because:  Failed PHQ 9  Destiny Le, RN, BSN  [facility]

## 2022-05-20 RX ORDER — ESCITALOPRAM OXALATE 20 MG/1
20 TABLET ORAL DAILY
Qty: 90 TABLET | Refills: 0 | Status: SHIPPED | OUTPATIENT
Start: 2022-05-20 | End: 2022-06-01

## 2022-05-20 NOTE — TELEPHONE ENCOUNTER
Please call patient and tell her she needs to schedule before next refill.    Kam Mehta MD  Internal Medicine and Pediatrics

## 2022-05-23 NOTE — TELEPHONE ENCOUNTER
Called and LVM with direct number for call back. Pt needs apt for further refills.    Minda Miller, EMT at 11:34 AM on May 23, 2022  [facility] Health Guide  [phone]

## 2022-05-25 NOTE — TELEPHONE ENCOUNTER
Attempt #2 LVM with direct number for call back.    Minda Miller, EMT at 8:57 AM on May 25, 2022  [facility] Health Guide  [phone]

## 2022-05-26 NOTE — TELEPHONE ENCOUNTER
Attempt #3 called and LVM with direct number for call back.    Minda Miller, EMT at 8:55 AM on May 26, 2022  [facility] Health Guide  [phone]

## 2022-06-01 ENCOUNTER — OFFICE VISIT (OUTPATIENT)
Dept: ENDOCRINOLOGY | Facility: CLINIC | Age: 22
End: 2022-06-01
Payer: COMMERCIAL

## 2022-06-01 VITALS
SYSTOLIC BLOOD PRESSURE: 125 MMHG | DIASTOLIC BLOOD PRESSURE: 85 MMHG | HEART RATE: 104 BPM | OXYGEN SATURATION: 99 % | BODY MASS INDEX: 19.1 KG/M2 | HEIGHT: 64 IN | RESPIRATION RATE: 16 BRPM | TEMPERATURE: 98.5 F | WEIGHT: 111.9 LBS

## 2022-06-01 DIAGNOSIS — E10.9 TYPE 1 DIABETES, HBA1C GOAL < 8% (H): Primary | ICD-10-CM

## 2022-06-01 LAB — HBA1C MFR BLD: 8 % (ref 0–5.6)

## 2022-06-01 PROCEDURE — 83036 HEMOGLOBIN GLYCOSYLATED A1C: CPT | Performed by: INTERNAL MEDICINE

## 2022-06-01 PROCEDURE — 99215 OFFICE O/P EST HI 40 MIN: CPT | Performed by: INTERNAL MEDICINE

## 2022-06-01 PROCEDURE — 99207 PR FOOT EXAM NO CHARGE: CPT | Performed by: INTERNAL MEDICINE

## 2022-06-01 PROCEDURE — 36415 COLL VENOUS BLD VENIPUNCTURE: CPT | Performed by: INTERNAL MEDICINE

## 2022-06-01 NOTE — NURSING NOTE
ENDOCRINOLOGY INTAKE FORM    Patient Name:  Margo Cruz  :  2000    Is patient Diabetic?   Yes: type 1  Does patient have non-diabetic or other endocrine issues?  Yes:   High anion gap metabolic acidosis    Hypermagnesemia    Hypophosphatemia    Lactic acidosis    Vitals: There were no vitals taken for this visit.  BMI= There is no height or weight on file to calculate BMI.    Flu vaccine:  Yes:   Pneumonia vaccine:  Yes: 23 x 1    Smoking and Alcohol use:  Social History     Tobacco Use     Smoking status: Former Smoker     Packs/day: 0.10     Types: Cigarettes     Smokeless tobacco: Never Used     Tobacco comment: mom smokes outside   Vaping Use     Vaping Use: Never used   Substance Use Topics     Alcohol use: Yes     Comment: goes out 2-3 times per week and has up to 3-4 drinks at a time     Drug use: Yes     Types: Marijuana     Comment: few times a week       Foot Exam: No  Eye Exam:  No  Dental Exam:  No  Aspirin Use:  No    Lab Results   Component Value Date    A1C 9.9 2022    A1C 10.3 10/26/2021    A1C 8.3 2020    A1C 7.5 2020    A1C 7.3 2015    A1C 7.9 2014    A1C 7.4 10/11/2013       Lab Results   Component Value Date    MICROL 8 03/10/2022    MICROL 66 2020     No results found for: MICROALBUMIN    Cammie Flores [facility] End[facility]in[facility] [place]  [phone]

## 2022-06-01 NOTE — LETTER
6/1/2022         RE: Margo Cruz  [address] [place] [address]        Dear Colleague,    Thank you for referring your patient, Margo Cruz, to the [facility]. Please see a copy of my visit note below.    Endocrinology Clinic Note:  Name: Margo Cruz  Seen for follow-up of type 1 diabetes.    She is here with her mother.  HPI:  Margo Cruz is a 21 year old female who presents for the evaluation/management of type 1 diabetes.   has a past medical history of Diabetes mellitus and Seborrheic infantile dermatitis.    Noted 6/2020 hospitalization for DKA-   It is thought that the likely problem with her injection sites from her insulin pump was kinked causing decreased insulin delivery.   Was followed by endocrinology at [facility] earlier.  Available records, labs and images from outside clinic were personally reviewed.    Is on insulin pump X 7 years.  Is interested in Dexcom and newer insulin pump.  Has more questions about it.    She is using 770 G but not using Medtronic sensor.  She reports that she does not like Medtronic sensor.  She is okay to use Dexcom.    Noted hospitalization for DKA in January 2022.    Insulin Pump Information  Insulin Pump Type: Medtronic 770 G without sensor.  She is using manual mode 100% of the time.    Infusion Set: Medtronic  Medtronic Infusion Set: Silhouette  CGM: No  Insulin: using Insulin aspart in pump    Currently she is using Relion meter.     1. Type 1 DM:  Orginally diagnosed: 2011 (at age 9 years)  Current Regimen:   yes:     Diabetes Medication(s)     Insulin       Insulin Aspart (INSULIN PUMP - OUTPATIENT)    Inject Subcutaneous See Admin Instructions Type of pump: Medtronic MiniMed 770G  Type of insulin: Novolog   Basal rate(s)  0654-5796: 0.9 unit/hr  0307-9456: 0.85 units/hr  9801-2026: 0.675 units/hr  4053-3500: 0.55 units/hr  ISF: 30 mg\dL  ICF (insulin to carb ratio)  8784-1815: 1unit/5g carbs  0209-7688:  1 unit/10g carbs  9848-9599: 1 unit/8g carbs  Active insulin time: 3 hrs  Target goal range:   Followed by endocrinology     insulin aspart (NOVOLOG PEN) 100 UNIT/ML pen    Use 3x/day with meals (carb correction scale). Also correction scale with correction factor 1:18     insulin aspart (NOVOLOG VIAL) 100 UNITS/ML vial    Use up to 80 units daily in insulin pump. 90 day supply.     insulin glargine (LANTUS PEN) 100 UNIT/ML pen    Inject 20 Units Subcutaneous 2 times daily If you have pump failure          BS checks: 3-4 times  Average Meter Download: Blood glucose data reviewed personally. See nursing note from this encounter for details.  Few episodes of low Bg in the setting of overcorrection.  Exercise:  Last A1c: 8.4%  Symptoms of hypoglycemia (low blood sugar):   Using PUMP:  Current Regimen:   Insulin pump -   Time Rate (U/hr)   0000-  0.900   0600  0.850   1200  0.675   2200  0.550     Carbohydrate Ratio -    Time Ratio   0000-  5.0   1000  1500 10.0  8.0     Sensitivity   30   Active Insulin Time   hours   Basal  17.5 units (55 %)   Bolus   14.4 units (45 %)   Total Carbohydrates/day     Total Insulin/day   31.9 units/day   Average Blood Sugar                     DM Complications:   Complications:   Diabetes Complications  Description / Detail    Diabetic Retinopathy  No   CAD / PAD  No   Neuropathy  No   Nephropathy / Microalbuminuria  No  Lab Results   Component Value Date    UMALCR 17.29 01/23/2020         Gastroparesis  No   Hypoglycemia [place]  No          2. Hypertension:  Not on medication.  3. Hyperlipidemia: Not on medication.    PMH/PSH:  Past Medical History:   Diagnosis Date     Diabetes mellitus      Seborrheic infantile dermatitis      History reviewed. No pertinent surgical history.  Family Hx:  Family History   Problem Relation Age of Onset     Breast Cancer Mother         BRCA negative     Diabetes Father      Lung Cancer Maternal Grandfather      GERD Paternal Grandfather          "   DM1: Father.           Social Hx:  Social History     Socioeconomic History     Marital status: Single     Spouse name: Not on file     Number of children: Not on file     Years of education: Not on file     Highest education level: Not on file   Occupational History     Not on file   Tobacco Use     Smoking status: Former Smoker     Packs/day: 0.10     Types: Cigarettes     Smokeless tobacco: Never Used     Tobacco comment: mom smokes outside   Vaping Use     Vaping Use: Never used   Substance and Sexual Activity     Alcohol use: Yes     Comment: goes out 2-3 times per week and has up to 3-4 drinks at a time     Drug use: Yes     Types: Marijuana     Comment: few times a week     Sexual activity: Yes     Partners: Male     Birth control/protection: I.U.D.   Other Topics Concern     Not on file   Social History Narrative     Not on file     Social Determinants of Health     Financial Resource Strain: Not on file   Food Insecurity: Not on file   Transportation Needs: Not on file   Physical Activity: Not on file   Stress: Not on file   Social Connections: Not on file   Intimate Partner Violence: Not on file   Housing Stability: Not on file          MEDICATIONS:  has a current medication list which includes the following prescription(s): contour next test, blood glucose monitoring, hydroxyzine, insulin aspart, insulin aspart, insulin aspart, insulin glargine, omeprazole, and ondansetron.    ROS     ROS: 10 point ROS neg other than the symptoms noted above in the HPI.    Physical Exam   VS: /85 (BP Location: Left arm, Patient Position: Chair, Cuff Size: Adult Regular)   Pulse 104   Temp 98.5  F (36.9  C) (Tympanic)   Resp 16   Ht 1.619 m (5' 3.75\")   Wt 50.8 kg (111 lb 14.4 oz)   LMP  (LMP Unknown)   SpO2 99%   Breastfeeding No   BMI 19.36 kg/m    GENERAL: healthy, alert and no distress  EYES: Eyes grossly normal to inspection, conjunctivae and sclerae normal  ENT: no nose swelling, nasal " discharge.  Thyroid: no apparent thyroid nodules.  Thyroid appears normal in size and nontender.  CV: RRR, no rubs, gallops, no murmurs  RESP: CTAB, no wheezes, rales, or ronchi  ABDO: +BS  EXTREMITIES: no hand tremors.  NEURO: Cranial nerves grossly intact, mentation intact and speech normal  SKIN: No apparent skin lesions, rash or edema seen   PSYCH: mentation appears normal, affect normal/bright, judgement and insight intact, normal speech and appearance well-groomed  DM FOOT EXAM: normal DP and PT pulses, no trophic changes or ulcerative lesions, normal sensory exam and normal monofilament exam.       LABS:  A1c:  Lab Results   Component Value Date    A1C 9.9 01/11/2022    A1C 10.3 10/26/2021    A1C 8.3 06/04/2020    A1C 7.5 01/23/2020    A1C 7.3 04/23/2015    A1C 7.9 06/18/2014    A1C 7.4 10/11/2013       BMP:   Creatinine   Date Value Ref Range Status   03/10/2022 0.59 0.52 - 1.04 mg/dL Final   06/05/2020 0.64 0.50 - 1.00 mg/dL Final       Urine Micro:  Lab Results   Component Value Date    UMALCR 12.31 03/10/2022    UMALCR 17.29 01/23/2020        LFTs/Lipids:  Lab Results   Component Value Date    A1C 9.9 01/11/2022    A1C 10.3 10/26/2021    A1C 8.3 06/04/2020    A1C 7.5 01/23/2020    A1C 7.3 04/23/2015    A1C 7.9 06/18/2014    A1C 7.4 10/11/2013     TFTs:  Lab Results   Component Value Date    TSH 0.79 01/23/2020           Blood Glucose and pump data/ Meter reviewed.     All pertinent notes, labs, and images personally reviewed by me.       Glucometer/ insulin pump (if applicable)/ CGM data (if applicable) downloaded, Personally reviewed and interpreted.  All Blood sugar data reviewed personally and interpreted as well as discussed with pt.  See nursing note from 8/11/2020 for details of BG/CGM log.  Where applicable   All past medical, social and Family history reviewed and updated in Epic.    A/P  Ms.Alyssa JENNI Cruz is a 21 year old here for the evaluation/management of diabetes:    1. DM1 - Under Poor  control.  A1c 9.9%.  No known complications from Diabetes.  Few episodes of low BG likely in the setting of over correction.  Using Medtronic 770 G pump without sensor.  She is not putting blood sugar for bolus calculation for many days.  Limited blood sugar data available.  No recent A1c to review.  Plan:  Discussed diagnosis, pathophysiology, management and treatment options of condition with pt.  I also discussed importance of strict blood sugar control to prevent complications associated with uncontrolled diabetes.    Labs today.  Continue current pump settings.  Repeat labs in 3 months.  Recommend diabetic educator follow-up for: Carbohydrate counting, insulin pump education and starting home CGM.  If she is not far in a type of CGM at that time then will recommend to get 15-day diagnostic sensor data at that time.  Of freestyle bob sample given to patient.      2. Hypertension -not on medication       3. Hyperlipidemia - Not on medication.  FLP in acceptable range.   4. Prevention  Ophthalmology- recommend annually.  ASA- NA 2/2 to age.  Smoking- No  Foot exam: 6/1/2022      Most Recent Immunizations   Administered Date(s) Administered     COVID-19,PF,Pfizer 12+ Yrs (2022 and After) 03/10/2022     Comvax (HIB/HepB) 01/06/2003     DTAP (<7y) 02/09/2006     DTaP, Unspecified 08/30/2013     HPV9 03/10/2022     Influenza (IIV3) PF 12/04/2006     Influenza Vaccine IM > 6 months Valent IIV4 (Alfuria,Fluzone) 03/10/2022     MMR 02/09/2006     Meningococcal (Menactra ) 08/30/2013     Meningococcal (Menveo ) 08/30/2013     Pneumococcal (PCV 7) 01/06/2003     Pneumococcal 23 valent 01/23/2020     Poliovirus, inactivated (IPV) 02/09/2006     TDAP Vaccine (Adacel) 08/30/2013     Varicella 08/30/2013         Recommend checking blood sugars before meals and at bedtime.    If Blood glucose are low more often-> 2-3 times/week- give us a call.  The patient is advised to Make better food choices: reduce carbs, Reduce portion  size, weight loss and exercise 3-4 times a week.  Discussed hypoglycemia signs and symptoms as well as management in detail.      There is some variability among people, most will usually develop symptoms suggestive of hypoglycemia when blood glucose levels are lowered to the mid 60's. The first set of symptoms are called adrenergic. Patients may experience any of the following nervousness, sweating, intense hunger, trembling, weakness, palpitations, and difficulty speaking. When BS fall below 50 the patient is unable to talk and take oral therapy.  Would recommend Glucagon emergency kit for the patient and education for family and friends around the patient.   The acute management of hypoglycemia involves the rapid delivery of a source of easily absorbed sugar. Regular soda, juice, lifesavers, table sugar, are good options. 15 grams of glucose is the dose that is given, followed by an assessment of symptoms and a blood glucose check if possible. If after 10 minutes there is no improvement, another 10-15 grams should be given. This can be repeated up to three times. The equivalency of 10-15 grams of glucose (approximate servings) are: Four lifesavers, 4 teaspoons of sugar, or 1/2 cup or 4 oz of juice or regular pop.    Total time spent in with the patient evaluation:  25 min    Additional time spent reviewing pertinent lab tests and chart notes, and documentation:  15 min    Follow-up:  3 months.    Karlee Holly M.D  Endocrinology  [facility]/[place]  CC: Luanne Todd      All questions were answered.  The patient indicates understanding of the above issues and agrees with the plan set forth.     Disclaimer: This note consists of symbols derived from keyboarding, dictation and/or voice recognition software. As a result, there may be errors in the script that have gone undetected. Please consider this when interpreting information found in this chart.    Addendum to above note and clinic  visit:    Labs reviewed.    See result note/telephone encounter.                Again, thank you for allowing me to participate in the care of your patient.        Sincerely,        Karlee Holly MD

## 2022-06-01 NOTE — RESULT ENCOUNTER NOTE
Margo    Recently done endocrinology lab test/ imaging test showed:  Lab Results       Component                Value               Date                       A1C                      8.0                 06/01/2022                 A1C                      9.9                 01/11/2022              Here is a copy for your records.    A1c is a measure of Blood glucose control over 3 months. Goal A1c is < 7.0%.    Follow up as discussed in last clinic visit.    Please call endocrinology clinic (nurse line: [phone]) if questions.    Karlee Holly MD  Endocrinology   [facility]/[place]  June 1, 2022

## 2022-06-01 NOTE — PATIENT INSTRUCTIONS
-[facility]  Dr Holly, Endocrinology Department    [facility]   [address] [facility] [address]  [place] [address]  Appointment [phone]  Fax: [phone]  Kiel: Monday - Thursday      Please check the cost coverage and copay with insurance before recommended tests, services and medications (especially if new medications are prescribed).     If ordered, please get blood work done 1 week prior to your next appointment so they will be available to Dr. Holly at your visit.    To provide the best diabetic care, please bring your blood glucose meter to each and every visit with your  Endocrinologist. Your blood glucose CGM/meter/insulin pump will be downloaded at every appointment.  Please arrive 15 minutes before your scheduled appointment. This will allow for your blood glucose CGM/meter/insulin pump  to be downloaded.  If you are wearing DEXCOM please bring  or sharing code from the Dexcom Clarity Sandy so that it can be downloaded.  If you are using FREESTYLE HOA personal sensors please bring the reader.      Labs today.  Continue current pump settings.  Repeat labs in 3 months.  Your provider has referred you to Diabetes Education: For all [facility]:  Phone [phone]; Fax [phone]  Please call and make the appointment.    Recommend checking blood sugars before meals and at bedtime.    If Blood glucose are low more often-> 2-3 times/week- give us a call.  Make better food choices: reduce carbs, Reduce portion size, weight loss and exercise 3-4 times a week.    What is hypoglycemia:  Hypoglycemia is when blood sugar levels become too low - below 70 m/dl.      What causes hypoglycemia?  - using too much insulin  -taking too many diabetes pills  -not eating enough, or skipping meals or snacks  -not eating enough carbohydrate with meals  -changing your exercise routine  -drinking alcohol in excess    It is also possible to have  hypoglycemia even when you are carefully managing your blood sugar levels.    What does it feel like when blood sugars get too low?  You may feel:  - anxious  -confused  -dizzy  -hungry  -light-headed  -nervous  -shaky  -sleepy  -sweaty    You may have  -blurred or cloudy vision  -heart palpitations (heart skips a beat or races)  -tingling or numbness around the mouth and tongue  -tremors    What to do if you have symptoms of hypoglycmemia:  If you think your blood sugar is too low, check it with a glucose meter.  If its below 70 mg/dl, consume one of the following:  Fruit juice (1/2 cup)  Glucose tablets (15 grams)  Hard candy (5 to 7 pieces)  Honey or sugar (2 teaspoons)  Milk (1/2 cup)  Soft drink (non-diet, 1/2 cup)    Wait 15 minutes and check your blood glucose again.  IF it is still below 70 mg/dl, have another food item listed above. Wait another 15 minutes and repeat the blood glucose test.  Have a small meal or snack that contains some carbohydrate after your blood glucose rises above 70 mg/dl.    If you are at risk of hypoglycemia, always carry with you glucose tablets or one of the foods listed above.      To prevent Hypoglycemia:  Avoid situations that may cause hypoglycemia  Before making any change to your diet or exercise routine, discuss them with your healthcare provider  Keep a record of your blood glucose levels.  Include the time of day, diabetes medications, when you had your last meal or snack, and what you were doing at the time (e.g. Watching TV, gardening, jogging, etc).    Talk to your healthcare provider if your blood glucose levels are often low        Patient guide on hypoglycemia    [email or web address]/[email or web address]-diagnosis-[email or web address]-management-[email or web address]

## 2022-06-01 NOTE — PROGRESS NOTES
Endocrinology Clinic Note:  Name: Margo Cruz  Seen for follow-up of type 1 diabetes.    She is here with her mother.  HPI:  Margo Cruz is a 21 year old female who presents for the evaluation/management of type 1 diabetes.   has a past medical history of Diabetes mellitus and Seborrheic infantile dermatitis.    Noted 6/2020 hospitalization for DKA-   It is thought that the likely problem with her injection sites from her insulin pump was kinked causing decreased insulin delivery.   Was followed by endocrinology at [facility] earlier.  Available records, labs and images from outside clinic were personally reviewed.    Is on insulin pump X 7 years.  Is interested in Dexcom and newer insulin pump.  Has more questions about it.    She is using 770 G but not using Medtronic sensor.  She reports that she does not like Medtronic sensor.  She is okay to use Dexcom.    Noted hospitalization for DKA in January 2022.    Insulin Pump Information  Insulin Pump Type: Medtronic 770 G without sensor.  She is using manual mode 100% of the time.    Infusion Set: Medtronic  Medtronic Infusion Set: Silhouette  CGM: No  Insulin: using Insulin aspart in pump    Currently she is using Relion meter.     1. Type 1 DM:  Orginally diagnosed: 2011 (at age 9 years)  Current Regimen:   yes:     Diabetes Medication(s)     Insulin       Insulin Aspart (INSULIN PUMP - OUTPATIENT)    Inject Subcutaneous See Admin Instructions Type of pump: Medtronic MiniMed 770G  Type of insulin: Novolog   Basal rate(s)  3618-6355: 0.9 unit/hr  6855-0631: 0.85 units/hr  5320-9066: 0.675 units/hr  6156-3986: 0.55 units/hr  ISF: 30 mg\dL  ICF (insulin to carb ratio)  2331-4222: 1unit/5g carbs  5332-2579: 1 unit/10g carbs  6138-4608: 1 unit/8g carbs  Active insulin time: 3 hrs  Target goal range:   Followed by endocrinology     insulin aspart (NOVOLOG PEN) 100 UNIT/ML pen    Use 3x/day with meals (carb correction scale). Also correction scale  with correction factor 1:18     insulin aspart (NOVOLOG VIAL) 100 UNITS/ML vial    Use up to 80 units daily in insulin pump. 90 day supply.     insulin glargine (LANTUS PEN) 100 UNIT/ML pen    Inject 20 Units Subcutaneous 2 times daily If you have pump failure          BS checks: 3-4 times  Average Meter Download: Blood glucose data reviewed personally. See nursing note from this encounter for details.  Few episodes of low Bg in the setting of overcorrection.  Exercise:  Last A1c: 8.4%  Symptoms of hypoglycemia (low blood sugar):   Using PUMP:  Current Regimen:   Insulin pump -   Time Rate (U/hr)   0000-  0.900   0600  0.850   1200  0.675   2200  0.550     Carbohydrate Ratio -    Time Ratio   0000-  5.0   1000  1500 10.0  8.0     Sensitivity   30   Active Insulin Time   hours   Basal  17.5 units (55 %)   Bolus   14.4 units (45 %)   Total Carbohydrates/day     Total Insulin/day   31.9 units/day   Average Blood Sugar                     DM Complications:   Complications:   Diabetes Complications  Description / Detail    Diabetic Retinopathy  No   CAD / PAD  No   Neuropathy  No   Nephropathy / Microalbuminuria  No  Lab Results   Component Value Date    UMALCR 17.29 01/23/2020         Gastroparesis  No   Hypoglycemia [place]  No          2. Hypertension:  Not on medication.  3. Hyperlipidemia: Not on medication.    PMH/PSH:  Past Medical History:   Diagnosis Date     Diabetes mellitus      Seborrheic infantile dermatitis      History reviewed. No pertinent surgical history.  Family Hx:  Family History   Problem Relation Age of Onset     Breast Cancer Mother         BRCA negative     Diabetes Father      Lung Cancer Maternal Grandfather      GERD Paternal Grandfather            DM1: Father.           Social Hx:  Social History     Socioeconomic History     Marital status: Single     Spouse name: Not on file     Number of children: Not on file     Years of education: Not on file     Highest education level: Not on file  "  Occupational History     Not on file   Tobacco Use     Smoking status: Former Smoker     Packs/day: 0.10     Types: Cigarettes     Smokeless tobacco: Never Used     Tobacco comment: mom smokes outside   Vaping Use     Vaping Use: Never used   Substance and Sexual Activity     Alcohol use: Yes     Comment: goes out 2-3 times per week and has up to 3-4 drinks at a time     Drug use: Yes     Types: Marijuana     Comment: few times a week     Sexual activity: Yes     Partners: Male     Birth control/protection: I.U.D.   Other Topics Concern     Not on file   Social History Narrative     Not on file     Social Determinants of Health     Financial Resource Strain: Not on file   Food Insecurity: Not on file   Transportation Needs: Not on file   Physical Activity: Not on file   Stress: Not on file   Social Connections: Not on file   Intimate Partner Violence: Not on file   Housing Stability: Not on file          MEDICATIONS:  has a current medication list which includes the following prescription(s): contour next test, blood glucose monitoring, hydroxyzine, insulin aspart, insulin aspart, insulin aspart, insulin glargine, omeprazole, and ondansetron.    ROS     ROS: 10 point ROS neg other than the symptoms noted above in the HPI.    Physical Exam   VS: /85 (BP Location: Left arm, Patient Position: Chair, Cuff Size: Adult Regular)   Pulse 104   Temp 98.5  F (36.9  C) (Tympanic)   Resp 16   Ht 1.619 m (5' 3.75\")   Wt 50.8 kg (111 lb 14.4 oz)   LMP  (LMP Unknown)   SpO2 99%   Breastfeeding No   BMI 19.36 kg/m    GENERAL: healthy, alert and no distress  EYES: Eyes grossly normal to inspection, conjunctivae and sclerae normal  ENT: no nose swelling, nasal discharge.  Thyroid: no apparent thyroid nodules.  Thyroid appears normal in size and nontender.  CV: RRR, no rubs, gallops, no murmurs  RESP: CTAB, no wheezes, rales, or ronchi  ABDO: +BS  EXTREMITIES: no hand tremors.  NEURO: Cranial nerves grossly intact, " mentation intact and speech normal  SKIN: No apparent skin lesions, rash or edema seen   PSYCH: mentation appears normal, affect normal/bright, judgement and insight intact, normal speech and appearance well-groomed  DM FOOT EXAM: normal DP and PT pulses, no trophic changes or ulcerative lesions, normal sensory exam and normal monofilament exam.       LABS:  A1c:  Lab Results   Component Value Date    A1C 9.9 01/11/2022    A1C 10.3 10/26/2021    A1C 8.3 06/04/2020    A1C 7.5 01/23/2020    A1C 7.3 04/23/2015    A1C 7.9 06/18/2014    A1C 7.4 10/11/2013       BMP:   Creatinine   Date Value Ref Range Status   03/10/2022 0.59 0.52 - 1.04 mg/dL Final   06/05/2020 0.64 0.50 - 1.00 mg/dL Final       Urine Micro:  Lab Results   Component Value Date    UMALCR 12.31 03/10/2022    UMALCR 17.29 01/23/2020        LFTs/Lipids:  Lab Results   Component Value Date    A1C 9.9 01/11/2022    A1C 10.3 10/26/2021    A1C 8.3 06/04/2020    A1C 7.5 01/23/2020    A1C 7.3 04/23/2015    A1C 7.9 06/18/2014    A1C 7.4 10/11/2013     TFTs:  Lab Results   Component Value Date    TSH 0.79 01/23/2020           Blood Glucose and pump data/ Meter reviewed.     All pertinent notes, labs, and images personally reviewed by me.       Glucometer/ insulin pump (if applicable)/ CGM data (if applicable) downloaded, Personally reviewed and interpreted.  All Blood sugar data reviewed personally and interpreted as well as discussed with pt.  See nursing note from 8/11/2020 for details of BG/CGM log.  Where applicable   All past medical, social and Family history reviewed and updated in Epic.    A/P  Ms.Alyssa JENNI Cruz is a 21 year old here for the evaluation/management of diabetes:    1. DM1 - Under Poor control.  A1c 9.9%.  No known complications from Diabetes.  Few episodes of low BG likely in the setting of over correction.  Using Medtronic 770 G pump without sensor.  She is not putting blood sugar for bolus calculation for many days.  Limited blood sugar  data available.  No recent A1c to review.  Plan:  Discussed diagnosis, pathophysiology, management and treatment options of condition with pt.  I also discussed importance of strict blood sugar control to prevent complications associated with uncontrolled diabetes.    Labs today.  Continue current pump settings.  Repeat labs in 3 months.  Recommend diabetic educator follow-up for: Carbohydrate counting, insulin pump education and starting home CGM.  If she is not far in a type of CGM at that time then will recommend to get 15-day diagnostic sensor data at that time.  Of freestyle bob sample given to patient.      2. Hypertension -not on medication       3. Hyperlipidemia - Not on medication.  FLP in acceptable range.   4. Prevention  Ophthalmology- recommend annually.  ASA- NA 2/2 to age.  Smoking- No  Foot exam: 6/1/2022      Most Recent Immunizations   Administered Date(s) Administered     COVID-19,PF,Pfizer 12+ Yrs (2022 and After) 03/10/2022     Comvax (HIB/HepB) 01/06/2003     DTAP (<7y) 02/09/2006     DTaP, Unspecified 08/30/2013     HPV9 03/10/2022     Influenza (IIV3) PF 12/04/2006     Influenza Vaccine IM > 6 months Valent IIV4 (Alfuria,Fluzone) 03/10/2022     MMR 02/09/2006     Meningococcal (Menactra ) 08/30/2013     Meningococcal (Menveo ) 08/30/2013     Pneumococcal (PCV 7) 01/06/2003     Pneumococcal 23 valent 01/23/2020     Poliovirus, inactivated (IPV) 02/09/2006     TDAP Vaccine (Adacel) 08/30/2013     Varicella 08/30/2013         Recommend checking blood sugars before meals and at bedtime.    If Blood glucose are low more often-> 2-3 times/week- give us a call.  The patient is advised to Make better food choices: reduce carbs, Reduce portion size, weight loss and exercise 3-4 times a week.  Discussed hypoglycemia signs and symptoms as well as management in detail.      There is some variability among people, most will usually develop symptoms suggestive of hypoglycemia when blood glucose levels  are lowered to the mid 60's. The first set of symptoms are called adrenergic. Patients may experience any of the following nervousness, sweating, intense hunger, trembling, weakness, palpitations, and difficulty speaking. When BS fall below 50 the patient is unable to talk and take oral therapy.  Would recommend Glucagon emergency kit for the patient and education for family and friends around the patient.   The acute management of hypoglycemia involves the rapid delivery of a source of easily absorbed sugar. Regular soda, juice, lifesavers, table sugar, are good options. 15 grams of glucose is the dose that is given, followed by an assessment of symptoms and a blood glucose check if possible. If after 10 minutes there is no improvement, another 10-15 grams should be given. This can be repeated up to three times. The equivalency of 10-15 grams of glucose (approximate servings) are: Four lifesavers, 4 teaspoons of sugar, or 1/2 cup or 4 oz of juice or regular pop.    Total time spent in with the patient evaluation:  25 min    Additional time spent reviewing pertinent lab tests and chart notes, and documentation:  15 min    Follow-up:  3 months.    Karlee Holly M.D  Endocrinology  [facility]  CC: Luanne Todd      All questions were answered.  The patient indicates understanding of the above issues and agrees with the plan set forth.     Disclaimer: This note consists of symbols derived from keyboarding, dictation and/or voice recognition software. As a result, there may be errors in the script that have gone undetected. Please consider this when interpreting information found in this chart.    Addendum to above note and clinic visit:    Labs reviewed.    See result note/telephone encounter.

## 2022-06-02 ENCOUNTER — TELEPHONE (OUTPATIENT)
Dept: ENDOCRINOLOGY | Facility: CLINIC | Age: 22
End: 2022-06-02
Payer: COMMERCIAL

## 2022-06-02 NOTE — TELEPHONE ENCOUNTER
Diabetes Education Scheduling Outreach #1:    Call to patient to schedule. Left message with phone number to call to schedule.    Also sent [employer] message for second attempt. Requested patient to call to schedule.    Margot Sherwood OnCall  Diabetes and Nutrition Scheduling

## 2022-06-05 DIAGNOSIS — R10.13 ABDOMINAL PAIN, EPIGASTRIC: ICD-10-CM

## 2022-06-07 ENCOUNTER — HOSPITAL ENCOUNTER (EMERGENCY)
Facility: CLINIC | Age: 22
Discharge: HOME | End: 2022-06-08
Attending: EMERGENCY MEDICINE | Admitting: EMERGENCY MEDICINE
Payer: COMMERCIAL

## 2022-06-07 ENCOUNTER — APPOINTMENT (OUTPATIENT)
Dept: CT IMAGING | Facility: CLINIC | Age: 22
End: 2022-06-07
Attending: EMERGENCY MEDICINE
Payer: COMMERCIAL

## 2022-06-07 ENCOUNTER — NURSE TRIAGE (OUTPATIENT)
Dept: NURSING | Facility: CLINIC | Age: 22
End: 2022-06-07
Payer: COMMERCIAL

## 2022-06-07 DIAGNOSIS — F43.0 ACUTE REACTION TO STRESS: ICD-10-CM

## 2022-06-07 DIAGNOSIS — R73.9 HYPERGLYCEMIA: ICD-10-CM

## 2022-06-07 DIAGNOSIS — R79.89 KETONEMIA: ICD-10-CM

## 2022-06-07 LAB
ALBUMIN SERPL-MCNC: 3.7 G/DL (ref 3.4–5)
ALP SERPL-CCNC: 59 U/L (ref 40–150)
ALT SERPL W P-5'-P-CCNC: 14 U/L (ref 0–50)
ANION GAP SERPL CALCULATED.3IONS-SCNC: 12 MMOL/L (ref 3–14)
AST SERPL W P-5'-P-CCNC: 16 U/L (ref 0–45)
BASE EXCESS BLDV CALC-SCNC: -1.8 MMOL/L (ref -7.7–1.9)
BASOPHILS # BLD AUTO: 0.1 10E3/UL (ref 0–0.2)
BASOPHILS NFR BLD AUTO: 1 %
BILIRUB SERPL-MCNC: 0.5 MG/DL (ref 0.2–1.3)
BUN SERPL-MCNC: 13 MG/DL (ref 7–30)
CALCIUM SERPL-MCNC: 8.8 MG/DL (ref 8.5–10.1)
CHLORIDE BLD-SCNC: 105 MMOL/L (ref 94–109)
CO2 SERPL-SCNC: 22 MMOL/L (ref 20–32)
CREAT SERPL-MCNC: 0.65 MG/DL (ref 0.52–1.04)
EOSINOPHIL # BLD AUTO: 0.1 10E3/UL (ref 0–0.7)
EOSINOPHIL NFR BLD AUTO: 2 %
ERYTHROCYTE [DISTWIDTH] IN BLOOD BY AUTOMATED COUNT: 12.6 % (ref 10–15)
GFR SERPL CREATININE-BSD FRML MDRD: >90 ML/MIN/1.73M2
GLUCOSE BLD-MCNC: 417 MG/DL (ref 70–99)
GLUCOSE BLDC GLUCOMTR-MCNC: 329 MG/DL (ref 70–99)
GLUCOSE BLDC GLUCOMTR-MCNC: 385 MG/DL (ref 70–99)
HCO3 BLDV-SCNC: 23 MMOL/L (ref 21–28)
HCT VFR BLD AUTO: 38.4 % (ref 35–47)
HGB BLD-MCNC: 13 G/DL (ref 11.7–15.7)
IMM GRANULOCYTES # BLD: 0 10E3/UL
IMM GRANULOCYTES NFR BLD: 0 %
KETONES BLD-SCNC: 4.1 MMOL/L (ref 0–0.6)
LYMPHOCYTES # BLD AUTO: 1.6 10E3/UL (ref 0.8–5.3)
LYMPHOCYTES NFR BLD AUTO: 21 %
MCH RBC QN AUTO: 31.6 PG (ref 26.5–33)
MCHC RBC AUTO-ENTMCNC: 33.9 G/DL (ref 31.5–36.5)
MCV RBC AUTO: 93 FL (ref 78–100)
MONOCYTES # BLD AUTO: 0.5 10E3/UL (ref 0–1.3)
MONOCYTES NFR BLD AUTO: 6 %
NEUTROPHILS # BLD AUTO: 5.4 10E3/UL (ref 1.6–8.3)
NEUTROPHILS NFR BLD AUTO: 70 %
NRBC # BLD AUTO: 0 10E3/UL
NRBC BLD AUTO-RTO: 0 /100
O2/TOTAL GAS SETTING VFR VENT: 0 %
OXYHGB MFR BLDV: 64 % (ref 70–75)
PCO2 BLDV: 38 MM HG (ref 40–50)
PH BLDV: 7.39 [PH] (ref 7.32–7.43)
PLATELET # BLD AUTO: 271 10E3/UL (ref 150–450)
PO2 BLDV: 35 MM HG (ref 25–47)
POTASSIUM BLD-SCNC: 4.1 MMOL/L (ref 3.4–5.3)
PROT SERPL-MCNC: 6.9 G/DL (ref 6.8–8.8)
RBC # BLD AUTO: 4.12 10E6/UL (ref 3.8–5.2)
SODIUM SERPL-SCNC: 139 MMOL/L (ref 133–144)
WBC # BLD AUTO: 7.7 10E3/UL (ref 4–11)

## 2022-06-07 PROCEDURE — 96360 HYDRATION IV INFUSION INIT: CPT

## 2022-06-07 PROCEDURE — 250N000012 HC RX MED GY IP 250 OP 636 PS 637: Performed by: EMERGENCY MEDICINE

## 2022-06-07 PROCEDURE — 93005 ELECTROCARDIOGRAM TRACING: CPT

## 2022-06-07 PROCEDURE — C9803 HOPD COVID-19 SPEC COLLECT: HCPCS

## 2022-06-07 PROCEDURE — 99285 EMERGENCY DEPT VISIT HI MDM: CPT | Mod: 25

## 2022-06-07 PROCEDURE — 70450 CT HEAD/BRAIN W/O DYE: CPT

## 2022-06-07 PROCEDURE — 96372 THER/PROPH/DIAG INJ SC/IM: CPT | Mod: 59 | Performed by: EMERGENCY MEDICINE

## 2022-06-07 PROCEDURE — 80053 COMPREHEN METABOLIC PANEL: CPT | Performed by: EMERGENCY MEDICINE

## 2022-06-07 PROCEDURE — 82805 BLOOD GASES W/O2 SATURATION: CPT | Performed by: EMERGENCY MEDICINE

## 2022-06-07 PROCEDURE — 85014 HEMATOCRIT: CPT | Performed by: EMERGENCY MEDICINE

## 2022-06-07 PROCEDURE — 96361 HYDRATE IV INFUSION ADD-ON: CPT

## 2022-06-07 PROCEDURE — 90791 PSYCH DIAGNOSTIC EVALUATION: CPT

## 2022-06-07 PROCEDURE — 258N000003 HC RX IP 258 OP 636: Performed by: EMERGENCY MEDICINE

## 2022-06-07 PROCEDURE — 250N000013 HC RX MED GY IP 250 OP 250 PS 637: Performed by: EMERGENCY MEDICINE

## 2022-06-07 PROCEDURE — 250N000011 HC RX IP 250 OP 636: Performed by: EMERGENCY MEDICINE

## 2022-06-07 PROCEDURE — 36415 COLL VENOUS BLD VENIPUNCTURE: CPT | Performed by: EMERGENCY MEDICINE

## 2022-06-07 PROCEDURE — 82010 KETONE BODYS QUAN: CPT | Performed by: EMERGENCY MEDICINE

## 2022-06-07 RX ORDER — OLANZAPINE 5 MG/1
10 TABLET, ORALLY DISINTEGRATING ORAL ONCE
Status: DISCONTINUED | OUTPATIENT
Start: 2022-06-07 | End: 2022-06-07

## 2022-06-07 RX ORDER — DEXTROSE MONOHYDRATE 25 G/50ML
25-50 INJECTION, SOLUTION INTRAVENOUS
Status: DISCONTINUED | OUTPATIENT
Start: 2022-06-07 | End: 2022-06-08 | Stop reason: HOSPADM

## 2022-06-07 RX ORDER — OLANZAPINE 10 MG/2ML
5 INJECTION, POWDER, FOR SOLUTION INTRAMUSCULAR ONCE
Status: COMPLETED | OUTPATIENT
Start: 2022-06-07 | End: 2022-06-07

## 2022-06-07 RX ORDER — NICOTINE POLACRILEX 4 MG
15-30 LOZENGE BUCCAL
Status: DISCONTINUED | OUTPATIENT
Start: 2022-06-07 | End: 2022-06-08 | Stop reason: HOSPADM

## 2022-06-07 RX ADMIN — INSULIN GLARGINE 20 UNITS: 100 INJECTION, SOLUTION SUBCUTANEOUS at 23:24

## 2022-06-07 RX ADMIN — SODIUM CHLORIDE, POTASSIUM CHLORIDE, SODIUM LACTATE AND CALCIUM CHLORIDE 1000 ML: 600; 310; 30; 20 INJECTION, SOLUTION INTRAVENOUS at 21:01

## 2022-06-07 RX ADMIN — SODIUM CHLORIDE, POTASSIUM CHLORIDE, SODIUM LACTATE AND CALCIUM CHLORIDE 1000 ML: 600; 310; 30; 20 INJECTION, SOLUTION INTRAVENOUS at 22:15

## 2022-06-07 RX ADMIN — OLANZAPINE 5 MG: 10 INJECTION, POWDER, LYOPHILIZED, FOR SOLUTION INTRAMUSCULAR at 20:58

## 2022-06-07 ASSESSMENT — ENCOUNTER SYMPTOMS
DIARRHEA: 0
DECREASED CONCENTRATION: 1
FEVER: 0
SLEEP DISTURBANCE: 1
ABDOMINAL PAIN: 0
APPETITE CHANGE: 1
CONFUSION: 1
VOMITING: 0

## 2022-06-07 NOTE — TELEPHONE ENCOUNTER
Call received from Elena beal  No Consent to Communicate on chart  Mother feels that Margo's behavior may be an emergency    ** Of Note - Margo is a Type-1 Diabetic    - Pulled out her Insulin pump  - Restless, moving things around  - Not answering/speaking to mother or boyfriend  - Intermittently alking about Aliens    Initially before pulling out Insuling pump, BG = 127; Now BG >200      Symptoms started a few days ago and progressively worsened today    911 advised    COVID 19 Nurse Triage Plan/Patient Instructions    Please be aware that novel coronavirus (COVID-19) may be circulating in the community. If you develop symptoms such as fever, cough, or SOB or if you have concerns about the presence of another infection including coronavirus (COVID-19), please contact your health care provider or visit [email or web address].     Disposition/Instructions    Call to EMS/911 recommended. Follow protocol based instructions.     Bring Your Own Device:  Please also bring your smart device(s) (smart phones, tablets, laptops) and their charging cables for your personal use and to communicate with your care team during your visit.    Thank you for taking steps to prevent the spread of this virus.  o Limit your contact with others.  o Wear a simple mask to cover your cough.  o Wash your hands well and often.    Resources    [facility]: About COVID-19: [email or web address]    CDC: What to Do If You're Sick: www.cdc.gov/coronavirus/2019-ncov/about/steps-when-sick.html    CDC: Ending Home Isolation: www.cdc.gov/coronavirus/2019-ncov/hcp/disposition-in-home-patients.html     CDC: Caring for Someone: www.cdc.gov/coronavirus/2019-ncov/if-you-are-sick/care-for-someone.html     [facility]: Interim Guidance for Hospital Discharge to Home: www.health.[facility].[place]/diseases/coronavirus/hcp/hospdischarge.pdf    [facility] clinical trials (COVID-19 research studies):  [email or web address]     Below are the COVID-19 hotlines at the [place] Department of Health ([facility]). Interpreters are available.   o For health questions: Call 426-556-5240 or 1-212.575.6382 (7 a.m. to 7 p.m.)  o For questions about schools and childcare: Call 242-480-2470 or 1-644.510.8009 (7 a.m. to 7 p.m.)     Marianna Mcnamara RN  [facility] Nurse Advisors      Reason for Disposition    [1] Difficult to awaken or acting confused (e.g., disoriented, slurred speech) AND [2] present now AND [3] has diabetes (diabetes mellitus)    Protocols used: CONFUSION - DELIRIUM-A-AH

## 2022-06-08 VITALS
TEMPERATURE: 98.2 F | RESPIRATION RATE: 15 BRPM | HEART RATE: 81 BPM | OXYGEN SATURATION: 98 % | DIASTOLIC BLOOD PRESSURE: 78 MMHG | SYSTOLIC BLOOD PRESSURE: 119 MMHG

## 2022-06-08 LAB
ALBUMIN UR-MCNC: NEGATIVE MG/DL
AMPHETAMINES UR QL SCN: ABNORMAL
AMPHETAMINES UR QL SCN: ABNORMAL
ANION GAP SERPL CALCULATED.3IONS-SCNC: 3 MMOL/L (ref 3–14)
ANION GAP SERPL CALCULATED.3IONS-SCNC: 5 MMOL/L (ref 3–14)
APPEARANCE UR: CLEAR
ATRIAL RATE - MUSE: 93 BPM
BACTERIA #/AREA URNS HPF: ABNORMAL /HPF
BARBITURATES UR QL: ABNORMAL
BARBITURATES UR QL: ABNORMAL
BENZODIAZ UR QL: ABNORMAL
BENZODIAZ UR QL: ABNORMAL
BILIRUB UR QL STRIP: NEGATIVE
BUN SERPL-MCNC: 10 MG/DL (ref 7–30)
BUN SERPL-MCNC: 11 MG/DL (ref 7–30)
CALCIUM SERPL-MCNC: 8.1 MG/DL (ref 8.5–10.1)
CALCIUM SERPL-MCNC: 8.5 MG/DL (ref 8.5–10.1)
CANNABINOIDS UR QL SCN: ABNORMAL
CANNABINOIDS UR QL SCN: ABNORMAL
CHLORIDE BLD-SCNC: 109 MMOL/L (ref 94–109)
CHLORIDE BLD-SCNC: 110 MMOL/L (ref 94–109)
CO2 SERPL-SCNC: 25 MMOL/L (ref 20–32)
CO2 SERPL-SCNC: 27 MMOL/L (ref 20–32)
COCAINE UR QL: ABNORMAL
COCAINE UR QL: ABNORMAL
COLOR UR AUTO: ABNORMAL
CREAT SERPL-MCNC: 0.58 MG/DL (ref 0.52–1.04)
CREAT SERPL-MCNC: 0.6 MG/DL (ref 0.52–1.04)
DIASTOLIC BLOOD PRESSURE - MUSE: NORMAL MMHG
GFR SERPL CREATININE-BSD FRML MDRD: >90 ML/MIN/1.73M2
GFR SERPL CREATININE-BSD FRML MDRD: >90 ML/MIN/1.73M2
GLUCOSE BLD-MCNC: 150 MG/DL (ref 70–99)
GLUCOSE BLD-MCNC: 254 MG/DL (ref 70–99)
GLUCOSE BLDC GLUCOMTR-MCNC: 145 MG/DL (ref 70–99)
GLUCOSE BLDC GLUCOMTR-MCNC: 195 MG/DL (ref 70–99)
GLUCOSE BLDC GLUCOMTR-MCNC: 209 MG/DL (ref 70–99)
GLUCOSE BLDC GLUCOMTR-MCNC: 333 MG/DL (ref 70–99)
GLUCOSE BLDC GLUCOMTR-MCNC: 36 MG/DL (ref 70–99)
GLUCOSE BLDC GLUCOMTR-MCNC: 64 MG/DL (ref 70–99)
GLUCOSE UR STRIP-MCNC: >=1000 MG/DL
HCG UR QL: NEGATIVE
HGB UR QL STRIP: NEGATIVE
INTERPRETATION ECG - MUSE: NORMAL
KETONES BLD-SCNC: 1.9 MMOL/L (ref 0–0.6)
KETONES BLD-SCNC: 2.1 MMOL/L (ref 0–0.6)
KETONES UR STRIP-MCNC: 80 MG/DL
LEUKOCYTE ESTERASE UR QL STRIP: NEGATIVE
MUCOUS THREADS #/AREA URNS LPF: PRESENT /LPF
NITRATE UR QL: NEGATIVE
OPIATES UR QL SCN: ABNORMAL
OPIATES UR QL SCN: ABNORMAL
P AXIS - MUSE: 55 DEGREES
PH UR STRIP: 5.5 [PH] (ref 5–7)
POTASSIUM BLD-SCNC: 3.8 MMOL/L (ref 3.4–5.3)
POTASSIUM BLD-SCNC: 4 MMOL/L (ref 3.4–5.3)
PR INTERVAL - MUSE: 166 MS
QRS DURATION - MUSE: 94 MS
QT - MUSE: 346 MS
QTC - MUSE: 430 MS
R AXIS - MUSE: -27 DEGREES
RBC URINE: 1 /HPF
SARS-COV-2 RNA RESP QL NAA+PROBE: NEGATIVE
SODIUM SERPL-SCNC: 138 MMOL/L (ref 133–144)
SODIUM SERPL-SCNC: 141 MMOL/L (ref 133–144)
SP GR UR STRIP: 1.02 (ref 1–1.03)
SQUAMOUS EPITHELIAL: 5 /HPF
SYSTOLIC BLOOD PRESSURE - MUSE: NORMAL MMHG
T AXIS - MUSE: 55 DEGREES
UROBILINOGEN UR STRIP-MCNC: NORMAL MG/DL
VENTRICULAR RATE- MUSE: 93 BPM
WBC URINE: 1 /HPF

## 2022-06-08 PROCEDURE — 82310 ASSAY OF CALCIUM: CPT | Performed by: EMERGENCY MEDICINE

## 2022-06-08 PROCEDURE — 81001 URINALYSIS AUTO W/SCOPE: CPT | Mod: XU | Performed by: EMERGENCY MEDICINE

## 2022-06-08 PROCEDURE — 80307 DRUG TEST PRSMV CHEM ANLYZR: CPT | Performed by: EMERGENCY MEDICINE

## 2022-06-08 PROCEDURE — U0005 INFEC AGEN DETEC AMPLI PROBE: HCPCS | Performed by: EMERGENCY MEDICINE

## 2022-06-08 PROCEDURE — 80048 BASIC METABOLIC PNL TOTAL CA: CPT | Performed by: EMERGENCY MEDICINE

## 2022-06-08 PROCEDURE — 96372 THER/PROPH/DIAG INJ SC/IM: CPT | Performed by: EMERGENCY MEDICINE

## 2022-06-08 PROCEDURE — 81025 URINE PREGNANCY TEST: CPT | Performed by: EMERGENCY MEDICINE

## 2022-06-08 PROCEDURE — 36415 COLL VENOUS BLD VENIPUNCTURE: CPT | Performed by: EMERGENCY MEDICINE

## 2022-06-08 PROCEDURE — 258N000003 HC RX IP 258 OP 636: Performed by: EMERGENCY MEDICINE

## 2022-06-08 PROCEDURE — 96361 HYDRATE IV INFUSION ADD-ON: CPT

## 2022-06-08 PROCEDURE — 82010 KETONE BODYS QUAN: CPT | Mod: 91 | Performed by: EMERGENCY MEDICINE

## 2022-06-08 PROCEDURE — 250N000012 HC RX MED GY IP 250 OP 636 PS 637: Performed by: EMERGENCY MEDICINE

## 2022-06-08 RX ORDER — SODIUM CHLORIDE 9 MG/ML
INJECTION, SOLUTION INTRAVENOUS CONTINUOUS
Status: DISCONTINUED | OUTPATIENT
Start: 2022-06-08 | End: 2022-06-08 | Stop reason: HOSPADM

## 2022-06-08 RX ADMIN — SODIUM CHLORIDE 500 ML: 9 INJECTION, SOLUTION INTRAVENOUS at 10:20

## 2022-06-08 RX ADMIN — SODIUM CHLORIDE 125 ML/HR: 9 INJECTION, SOLUTION INTRAVENOUS at 12:30

## 2022-06-08 RX ADMIN — INSULIN ASPART 1 UNITS: 100 INJECTION, SOLUTION INTRAVENOUS; SUBCUTANEOUS at 10:19

## 2022-06-08 NOTE — ED NOTES
Patient making strange statement, appears to possibly be responding to internal stimuli. Family bedside, VSS on RA.

## 2022-06-08 NOTE — ED NOTES
Patient sleeping. Family brought home insulin pump, with needles, placed in belongings bag in DEC room w/ shoes. Patient currently sleeping and recently received IM insulin. BG slowly reducing.

## 2022-06-08 NOTE — ED PROVIDER NOTES
Patient's had an uneventful day shift during which time she slept.  An attempted assessment by DEC at 1120 was deferred as patient was too somnolent, repeatedly falling asleep she has been medically cleared is afebrile and hemodynamically stable.  Plans are DEC assessment when able.     Kiran Betancourt MD  06/08/22 0029

## 2022-06-08 NOTE — ED NOTES
[facility] ED Behavioral Health Handoff Note:       Brief HPI:  This is a 21 year old female signed out to me by Dr. Phillips with concerns for mental status changes concerning for a new mental health issue such as schizophrenia in the setting of insulin noncompliance and hyperglycemia with ketosis but no acidosis.  See initial ED Provider note for details of the presentation.     Patient is medically cleared for admission to a Behavioral Health unit.        Hold Status:  Active Orders   Legal    Health Officer Authority to Detain (SHAILESH)     Frequency: Effective Now     Start Date/Time: 06/07/22 2310      Number of Occurrences: Until Specified     Order Comments: This patient presented with circumstances that have led me to be reasonably suspicious that the patient is experiencing psychosis. The patient's judgment to this situation appears to be impaired. Given the circumstances in which the patient presented, it is likely that the patient is at significant risk of harming self or others if this situation is not investigated further. I am highly concerned that the patient is mentally ill and currently cannot safely care for oneself. This represents endangerment to the patient's well-being and safety, and I am placing a Health Officer Authority hold upon the patient at this time.           The patient has not required medication for agitation over my shift.    The patient's home medications have been reviewed and ordered/administered.    Exam:   Temp:  [98.2  F (36.8  C)] 98.2  F (36.8  C)  Pulse:  [] 69  Resp:  [15-28] 15  BP: (119-128)/(75-78) 119/78  SpO2:  [96 %-100 %] 97 %    Patient is reassessed.  Is currently sleeping.  Appears comfortable.    ED Course:    Medications   glucose gel 15-30 g (has no administration in time range)     Or   dextrose 50 % injection 25-50 mL (has no administration in time range)     Or   glucagon injection 1 mg (has no administration in time range)   insulin glargine  (LANTUS PEN) injection 20 Units (20 Units Subcutaneous Given 6/7/22 2324)   insulin aspart (NovoLOG) injection (RAPID ACTING) (has no administration in time range)   insulin aspart (NovoLOG) injection (RAPID ACTING) (4 Units Subcutaneous Given 6/7/22 2324)   lactated ringers BOLUS 1,000 mL (0 mLs Intravenous Stopped 6/7/22 2215)   OLANZapine (zyPREXA) injection 5 mg (5 mg Intramuscular Given 6/7/22 2058)   lactated ringers BOLUS 1,000 mL (0 mLs Intravenous Stopped 6/7/22 2318)       There were no significant events while under my care.  Ketonemia is resolving.  Blood sugar is improving.    Patient was signed out to the oncoming provider. Dr. Betancourt      Impression:    ICD-10-CM    1. Altered mental status, unspecified altered mental status type  R41.82    2. Hyperglycemia  R73.9    3. Ketonemia  R79.89        Plan:    1. Await DEC assessment      RESULTS:   Results for orders placed or performed during the hospital encounter of 06/07/22 (from the past 24 hour(s))   Glucose by meter     Status: Abnormal    Collection Time: 06/07/22  7:41 PM   Result Value Ref Range    GLUCOSE BY METER POCT 329 (H) 70 - 99 mg/dL   EKG 12-lead, tracing only     Status: None (Preliminary result)    Collection Time: 06/07/22  8:17 PM   Result Value Ref Range    Systolic Blood Pressure  mmHg    Diastolic Blood Pressure  mmHg    Ventricular Rate 93 BPM    Atrial Rate 93 BPM    KS Interval 166 ms    QRS Duration 94 ms     ms    QTc 430 ms    P Axis 55 degrees    R AXIS -27 degrees    T Axis 55 degrees    Interpretation ECG       Sinus rhythm with sinus arrhythmia  Incomplete right bundle branch block  Possible Inferior infarct , age undetermined  Abnormal ECG  When compared with ECG of 11-JAN-2022 07:37,  Borderline criteria for Inferior infarct are now Present     CBC with platelets differential     Status: None    Collection Time: 06/07/22  8:23 PM    Narrative    The following orders were created for panel order CBC with  platelets differential.  Procedure                               Abnormality         Status                     ---------                               -----------         ------                     CBC with platelets and d...[[id number]]                      Final result                 Please view results for these tests on the individual orders.   Comprehensive metabolic panel     Status: Abnormal    Collection Time: 06/07/22  8:23 PM   Result Value Ref Range    Sodium 139 133 - 144 mmol/L    Potassium 4.1 3.4 - 5.3 mmol/L    Chloride 105 94 - 109 mmol/L    Carbon Dioxide (CO2) 22 20 - 32 mmol/L    Anion Gap 12 3 - 14 mmol/L    Urea Nitrogen 13 7 - 30 mg/dL    Creatinine 0.65 0.52 - 1.04 mg/dL    Calcium 8.8 8.5 - 10.1 mg/dL    Glucose 417 (H) 70 - 99 mg/dL    Alkaline Phosphatase 59 40 - 150 U/L    AST 16 0 - 45 U/L    ALT 14 0 - 50 U/L    Protein Total 6.9 6.8 - 8.8 g/dL    Albumin 3.7 3.4 - 5.0 g/dL    Bilirubin Total 0.5 0.2 - 1.3 mg/dL    GFR Estimate >90 >60 mL/min/1.73m2   Blood gas venous and oxyhgb     Status: Abnormal    Collection Time: 06/07/22  8:23 PM   Result Value Ref Range    pH Venous 7.39 7.32 - 7.43    pCO2 Venous 38 (L) 40 - 50 mm Hg    pO2 Venous 35 25 - 47 mm Hg    Bicarbonate Venous 23 21 - 28 mmol/L    FIO2 0     Oxyhemoglobin Venous 64 (L) 70 - 75 %    Base Excess/Deficit (+/-) -1.8 -7.7 - 1.9 mmol/L   Ketone Beta-Hydroxybutyrate Quantitative     Status: Abnormal    Collection Time: 06/07/22  8:23 PM   Result Value Ref Range    Ketone (Beta-Hydroxybutyrate) Quantitative 4.1 (HH) 0.0 - 0.6 mmol/L   CBC with platelets and differential     Status: None    Collection Time: 06/07/22  8:23 PM   Result Value Ref Range    WBC Count 7.7 4.0 - 11.0 10e3/uL    RBC Count 4.12 3.80 - 5.20 10e6/uL    Hemoglobin 13.0 11.7 - 15.7 g/dL    Hematocrit 38.4 35.0 - 47.0 %    MCV 93 78 - 100 fL    MCH 31.6 26.5 - 33.0 pg    MCHC 33.9 31.5 - 36.5 g/dL    RDW 12.6 10.0 - 15.0 %    Platelet Count 271 150 - 450  10e3/uL    % Neutrophils 70 %    % Lymphocytes 21 %    % Monocytes 6 %    % Eosinophils 2 %    % Basophils 1 %    % Immature Granulocytes 0 %    NRBCs per 100 WBC 0 <1 /100    Absolute Neutrophils 5.4 1.6 - 8.3 10e3/uL    Absolute Lymphocytes 1.6 0.8 - 5.3 10e3/uL    Absolute Monocytes 0.5 0.0 - 1.3 10e3/uL    Absolute Eosinophils 0.1 0.0 - 0.7 10e3/uL    Absolute Basophils 0.1 0.0 - 0.2 10e3/uL    Absolute Immature Granulocytes 0.0 <=0.4 10e3/uL    Absolute NRBCs 0.0 10e3/uL   Glucose by meter     Status: Abnormal    Collection Time: 06/07/22 10:28 PM   Result Value Ref Range    GLUCOSE BY METER POCT 385 (H) 70 - 99 mg/dL   Head CT w/o contrast     Status: None    Collection Time: 06/07/22 10:54 PM    Narrative    EXAM: CT HEAD W/O CONTRAST  LOCATION: [facility]  DATE/TIME: 6/7/2022 10:46 PM    INDICATION: Confusion  COMPARISON: None.  TECHNIQUE: Routine CT Head without IV contrast. Multiplanar reformats. Dose reduction techniques were used.    FINDINGS:  INTRACRANIAL CONTENTS: No intracranial hemorrhage, extraaxial collection, or mass effect.  No CT evidence of acute infarct. Normal parenchymal attenuation. Normal ventricles and sulci. Noted is slightly increased density of basilar artery. This is   probably still within normal limits; if there is clinical suspicion for basilar thrombosis, CTA may be performed.    VISUALIZED ORBITS/SINUSES/MASTOIDS: No intraorbital abnormality. No paranasal sinus mucosal disease. No middle ear or mastoid effusion.    BONES/SOFT TISSUES: No acute abnormality.      Impression    IMPRESSION:  1.  No hemorrhage, mass, or mass effect.  2.  Noted is slightly increased density of basilar artery. This is probably still within normal limits; if there is clinical suspicion for basilar thrombosis, CTA may be performed.   Glucose by meter     Status: Abnormal    Collection Time: 06/08/22 12:29 AM   Result Value Ref Range    GLUCOSE BY METER POCT 333 (H) 70 - 99 mg/dL    Ketone Beta-Hydroxybutyrate Quantitative     Status: Abnormal    Collection Time: 06/08/22  1:56 AM   Result Value Ref Range    Ketone (Beta-Hydroxybutyrate) Quantitative 1.9 (HH) 0.0 - 0.6 mmol/L   Basic metabolic panel (BMP)     Status: Abnormal    Collection Time: 06/08/22  1:56 AM   Result Value Ref Range    Sodium 138 133 - 144 mmol/L    Potassium 3.8 3.4 - 5.3 mmol/L    Chloride 110 (H) 94 - 109 mmol/L    Carbon Dioxide (CO2) 25 20 - 32 mmol/L    Anion Gap 3 3 - 14 mmol/L    Urea Nitrogen 11 7 - 30 mg/dL    Creatinine 0.58 0.52 - 1.04 mg/dL    Calcium 8.1 (L) 8.5 - 10.1 mg/dL    Glucose 254 (H) 70 - 99 mg/dL    GFR Estimate >90 >60 mL/min/1.73m2   Glucose by meter     Status: Abnormal    Collection Time: 06/08/22  5:50 AM   Result Value Ref Range    GLUCOSE BY METER POCT 209 (H) 70 - 99 mg/dL   UA with Microscopic reflex to Culture     Status: Abnormal    Collection Time: 06/08/22  5:54 AM    Specimen: Urine, Midstream   Result Value Ref Range    Color Urine Straw Colorless, Straw, Light Yellow, Yellow    Appearance Urine Clear Clear    Glucose Urine >=1000 (A) Negative mg/dL    Bilirubin Urine Negative Negative    Ketones Urine 80  (A) Negative mg/dL    Specific Gravity Urine 1.019 1.003 - 1.035    Blood Urine Negative Negative    pH Urine 5.5 5.0 - 7.0    Protein Albumin Urine Negative Negative mg/dL    Urobilinogen Urine Normal Normal, 2.0 mg/dL    Nitrite Urine Negative Negative    Leukocyte Esterase Urine Negative Negative    Bacteria Urine Few (A) None Seen /HPF    Mucus Urine Present (A) None Seen /LPF    RBC Urine 1 <=2 /HPF    WBC Urine 1 <=5 /HPF    Squamous Epithelials Urine 5 (H) <=1 /HPF    Narrative    Urine Culture not indicated   HCG qualitative urine (UPT)     Status: Normal    Collection Time: 06/08/22  5:54 AM   Result Value Ref Range    hCG Urine Qualitative Negative Negative   Urine Drugs of Abuse Screen     Status: None (In process)    Collection Time: 06/08/22  5:54 AM    Narrative     The following orders were created for panel order Urine Drugs of Abuse Screen.  Procedure                               Abnormality         Status                     ---------                               -----------         ------                     Drug abuse screen 1 urin...[[id number]]                      In process                   Please view results for these tests on the individual orders.             MD Danish Smith Tracy Dianne, MD  06/08/22 0699

## 2022-06-08 NOTE — ED NOTES
Lab called and stated they are unable to locate patient's COVID swab that was sent down yesterday and it will need to be recollected. Will recollect when patient is awake.

## 2022-06-08 NOTE — ED PROVIDER NOTES
History   Chief Complaint:  Mental Health Problem       HPI   Margo Cruz is a 21 year old female with history of type 1 diabetes who presents with mental health problem. The patient's mom reports that over a week ago she started having erratic behavior that has continued to worsen. Her mom states it seems like she knows where she is and what is going on but is not answering questions correctly. Her mom reports she has never acted like this before. The patient also reportedly removed her insulin pump today at 1900. The patient has type 1 diabetes and her blood sugar has been elevated the past couple weeks. Her mom reports that she was hospitalized in [facility] in October and in January. She denies any abdominal pain, fevers, vomiting and diarrhea. The patient notes that sometimes she forgets to eat, drink and sleep because she gets so distracted. Mom denies any concern for alcohol or drug problems.  Patient unable to provide any significant history due to tangential speech and inappropriate responses.  Mother notes there is a history of schizophrenia in patient's biological father.    Review of Systems   Constitutional: Positive for appetite change. Negative for fever.   Gastrointestinal: Negative for abdominal pain, diarrhea and vomiting.   Psychiatric/Behavioral: Positive for behavioral problems, confusion, decreased concentration and sleep disturbance.   All other systems reviewed and are negative.      Allergies:  Gluten Meal    Medications:  Atarax   Insulin   Novolog   Lantus   Prilosec   Zofran     Past Medical History:     Diabetes   Celiac disease     Family History:    Mother- breast cancer   Father- diabetes     Social History:  The patient presents her mother and her brother.    Physical Exam     Patient Vitals for the past 24 hrs:   BP Temp Temp src Pulse Resp SpO2   06/07/22 2111 119/78 -- -- 73 26 97 %   06/07/22 2031 128/75 -- -- 99 21 97 %   06/07/22 1947 123/76 98.2  F (36.8  C) Temporal 119 18 99  %       Physical Exam      HEENT:    Oropharynx is moist, without lesions or trismus.  Eyes:    Conjunctiva normal, PERRL     EOMs intact  Neck:    Supple, no meningismus.     CV:     Regular rate and rhythm.      No murmurs, rubs or gallops.       2+ radial pulses bilateral.   PULM:    Clear to auscultation bilateral.       No respiratory distress.      Good air exchange.     No rales or wheezing.     No stridor.  ABD:    Soft, non-tender, non-distended.       No rebound, guarding or rigidity.  MSK:     No gross deformity to all four extremities.   LYMPH:   No cervical lymphadenopathy.  NEURO:   Alert and oriented to person and place but not time     CN II-XII intact, speech is clear with no aphasia.     Strength is 5/5 in all 4 extremities.  Sensation is intact.       Normal muscular tone, no tremor.  Skin:    Warm, dry and intact.    Psych:    Patient constantly fidgeting with her hands     Speech is tangential and disorganized     Not responding to internal stimuli     No homicidal/suicidal ideation.     Intermittently cooperative      Emergency Department Course   ECG:   ECG taken at 2017, ECG read at 2023  Normal sinus rhythm with sinus arrhythmia   Incomplete right bundle branch block   Possible inferior infarct, age undetermined   Rate 93 bpm. SC interval 166. QRS duration 94. QT/QTc 346/430. P-R-T axes 55 -27 55.    Imaging:  Head CT w/o contrast    (Results Pending)     Report per radiology    Laboratory:  Labs Ordered and Resulted from Time of ED Arrival to Time of ED Departure   COMPREHENSIVE METABOLIC PANEL - Abnormal       Result Value    Sodium 139      Potassium 4.1      Chloride 105      Carbon Dioxide (CO2) 22      Anion Gap 12      Urea Nitrogen 13      Creatinine 0.65      Calcium 8.8      Glucose 417 (*)     Alkaline Phosphatase 59      AST 16      ALT 14      Protein Total 6.9      Albumin 3.7      Bilirubin Total 0.5      GFR Estimate >90     BLOOD GAS VENOUS WITH OXYHEMOGLOBIN - Abnormal     pH Venous 7.39      pCO2 Venous 38 (*)     pO2 Venous 35      Bicarbonate Venous 23      FIO2 0      Oxyhemoglobin Venous 64 (*)     Base Excess/Deficit (+/-) -1.8     KETONE BETA-HYDROXYBUTYRATE QUANTITATIVE, RAPID - Abnormal    Ketone (Beta-Hydroxybutyrate) Quantitative 4.1 (*)    GLUCOSE BY METER - Abnormal    GLUCOSE BY METER POCT 329 (*)    GLUCOSE MONITOR NURSING POCT   CBC WITH PLATELETS AND DIFFERENTIAL    WBC Count 7.7      RBC Count 4.12      Hemoglobin 13.0      Hematocrit 38.4      MCV 93      MCH 31.6      MCHC 33.9      RDW 12.6      Platelet Count 271      % Neutrophils 70      % Lymphocytes 21      % Monocytes 6      % Eosinophils 2      % Basophils 1      % Immature Granulocytes 0      NRBCs per 100 WBC 0      Absolute Neutrophils 5.4      Absolute Lymphocytes 1.6      Absolute Monocytes 0.5      Absolute Eosinophils 0.1      Absolute Basophils 0.1      Absolute Immature Granulocytes 0.0      Absolute NRBCs 0.0     ROUTINE UA WITH MICROSCOPIC REFLEX TO CULTURE   HCG QUALITATIVE URINE   COVID-19 VIRUS (CORONAVIRUS) BY PCR   DRUG ABUSE SCREEN 1 URINE (ED)        Procedures    Emergency Department Course:    Reviewed:  I reviewed nursing notes, vitals and past medical history    Assessments:   I obtained history and examined the patient as noted above.      I returned to check on patient.      Interventions:  Medications   lactated ringers BOLUS 1,000 mL (1,000 mLs Intravenous New Bag 22)   OLANZapine (zyPREXA) injection 5 mg (5 mg Intramuscular Given 22)       Disposition:  Care of the patient was transferred to my colleague Dr. Peng pending CT and disposition.     Impression & Plan     Medical Decision Makin-year-old female with type 1 diabetes presents to the ED with mental status change defined by disorientation, tangential speech and disorganized thoughts and behavior.  She has no focal neurological deficit but given novel onset of abnormal behavior, head CT  ordered and currently pending.  Patient is hyperglycemic with ketonemia but no DKA.  This would not account for her symptoms.  Her evaluation is not consistent with a specific toxidrome and there has been no reported history of alcohol or drug abuse.  Patient was intermittently cooperative and refusing certain medical testing where she required IM Zyprexa and is sleeping comfortably in the bed.    Patient was given IV fluids and subcutaneous insulin.  Unfortunately she does not have her insulin pump with her.  I have asked that brother and mother go home to grab the insulin pump so we can reinitiate her insulin pump.    Once patient is awake, she will need to speak with DEC.  Based on my current evaluation, patient clearly will require inpatient hospitalization for disorganized behavior and suspected new onset mental health crisis and inability to care for herself.  Patient will be changed over to Dr. Peng to follow-up on head CT, improved control of glucose/ketonemia, DEC recommendations and tentative plans for transfer.    Diagnosis:    ICD-10-CM    1. Altered mental status, unspecified altered mental status type  R41.82    2. Hyperglycemia  R73.9    3. Ketonemia  R79.89        Discharge Medications:  New Prescriptions    No medications on file       Scribe Disclosure:  I, Polly Gamez, am serving as a scribe at 7:58 PM on 6/7/2022 to document services personally performed by Jason Phillips MD based on my observations and the provider's statements to me.            Jason Phillips MD  06/07/22 4107

## 2022-06-08 NOTE — ED NOTES
"6/7/2022  Margothi Sauerbibiana 2000                                                                                                                          TRIAGE AND TRANSITION CLINICAL CHART REVIEW    Patient location: [facility] ED  Patient chart was reviewed for relevant clinical information as follows:    Identified Mental Health History:    Previous Diagnoses: none listed   Previous Treatment Episodes/Hospitalizations: none for MH specific   Previous E visits due to MH concerns: none listed  Previous Psychiatric Medications: hydroxyzine PRN listed in chart  Previous Holds/Commitments: none listed    Identified Legal History: none listed     Current Encounter:  Note from ED provider 6/7/2022 8 PM  \"21 year old female with history of type 1 diabetes who presents with mental health problem. The patient's mom reports that over a week ago she started having erratic behavior that has continued to worsen. Her mom states it seems like she knows where she is and what is going on but is not answering questions correctly. Her mom reports she has never acted like this before. The patient also reportedly removed her insulin pump today at 1900. The patient has type 1 diabetes and her blood sugar has been elevated the past couple weeks. Her mom reports that she was hospitalized in [facility] in October and in January. She denies any abdominal pain, fevers, vomiting and diarrhea. The patient notes that sometimes she forgets to eat, drink and sleep because she gets so distracted. Mom denies any concern for alcohol or drug problems.  Patient unable to provide any significant history due to tangential speech and inappropriate responses.  Mother notes there is a history of schizophrenia in patient's biological father\"       Current Medical Concerns: insulin noncompliance and hyperglycemia with ketosis but no acidosis listed in ED provider notes   Relevant Lab Results: listed in Epic  Drug Screen Results: UDS positive for cannabinoids "    COVID-19 Screen Results: negative

## 2022-06-08 NOTE — CONSULTS
6/7/2022  Margo Cruz 2000     Writer consulted with ED nurse, Corina,  on this date at 11:20 AM. It was determined that pt would not benefit from assessment at this time due to Pt unable able to participate in assessment    ED will resubmit for DEC assessment when pt is able to stay awake and answer questions. Pt is unable to do so at this time as it is reported pt keeps falling alseep.     Destiny Webber, [facility] HospitalSW, [facility]

## 2022-06-08 NOTE — ED TRIAGE NOTES
Per mother pt has had erratic behaviors that started a week ago and has gotten worse over the past week. Pt is stating random facts about herself such as how she lives in [place] and how some people call her a buffalo. Pt is a T1D.      Triage Assessment     Row Name 06/07/22 1935       Triage Assessment (Adult)    Airway WDL WDL       Respiratory WDL    Respiratory WDL WDL       Skin Circulation/Temperature WDL    Skin Circulation/Temperature WDL WDL       Cardiac WDL    Cardiac WDL WDL       Peripheral/Neurovascular WDL    Peripheral Neurovascular WDL WDL       Cognitive/Neuro/Behavioral WDL    Cognitive/Neuro/Behavioral WDL X;mood/behavior    Level of Consciousness confused    Mood/Behavior hyperactive (agitated, impulsive)       Pupils (CN II)    Pupil PERRLA yes       [place] Coma Scale    Best Eye Response 4-->(E4) spontaneous    Best Motor Response 6-->(M6) obeys commands    Best Verbal Response 5-->(V5) oriented    Brigid Coma Scale Score 15
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
## 2022-06-08 NOTE — DISCHARGE INSTRUCTIONS
Aftercare Plan  If I am feeling unsafe or I am in a crisis, I will: reach out to my parents, boyfriend, other friends and [facility] crisis line for their support.  Contact my established care providers   Call the National Suicide Prevention Lifeline: 221.222.1999   Go to the nearest emergency room   Call [phone]     Writer encourage Pt to take her medications as prescribed and keep all of scheduled appointments with her outpatient service providers.  DEC Coordinator will contact Pt within 1 or 2 business days to ensure coordination of care and provide assistance with appointments.      Warning signs that I or other people might notice when a crisis is developing for me: increased depression, isolation, cry, worry, racing thoughts, panic attacks, anxiety, poor diabetes management, disorganized, erratic behavior, tangential speech, disrupted sleep and appetite.    Things I am able to do on my own to cope or help me feel better: listening to music, watching TV, movies as distraction, drawing, painting, coloring, playing video games, cooking, baking, deep breathing exercise, meditation, positive affirmations, and taking walks with my dog.    Things that I am able to do with others to cope or help me feel better: socializing with my family and friends, visiting the community center.    Things I can use or do for distraction: istening to music, watching TV, movies as distraction, drawing, painting, coloring, playing video games, cooking, baking, deep breathing exercise, meditation, positive affirmations, and taking walks with my dog.    Changes I can make to support my mental health and wellness: taking medications as prescribed and good diabetes management.  Practicing good self-care, including getting adequate sleep/rest, healthy diet and regular exercise.  Establishing meaningful daily routine and structure to stay busy.  Joining a peer support group through [place], [place] to increase positive support system.  [facility]  ([place] on Mental Illness) improves the lives of children and adults with mental illnesses and their families by providing free classes on mental illnesses and support groups for adults with mental illnesses, parents and family members. For more information:  Phone: 193.569.1315  Toll free: 8-074-VTUX-Iconicfuture  Website: [email or web address]://www.[employer].org/     People in my life that I can ask for help: my parents, boyfriend and friends.     Your [facility] has a mental health crisis team you can call 24/7: [facility] Crisis  [phone]    Other things that are important when I m in crisis: support from my family and friends.   Throughout  [place]: call **CRISIS (**[phone])  Crisis Text Line: is available for free, 24/7 by texting [place] to 169536    Appointment information and/or additional resources available to me: Writer recommended Pt to engage in new outpatient psychiatry for medication management and individual therapy service to address her ongoing mental health symptoms and improve coping skills.  Pt declined to medication management but agreed to engage in new individual therapy service.  Pt has her new virtual outpatient therapy appointment scheduled for tomorrow, June 9th at 3pm with following service provider:    Kimber Guid[facility], [address], [address] [phone] 6/9/2022 3:00 pm    Below is a list of FREE Mental Health Options in the [facility] Area:    [facility]  Serves those in emotional crisis with 24-hour, seven-day-a-week crisis counseling, assessment, referral, and medication management.   Suicidal: [phone] Consultation: [phone]  [address] [place] [address], 24/7 Crisis Intervention Center     [facility]  [phone]  Serves those in need of free outpatient mental health care  Hours: Mon, Wed, Fri 1-3pm; Mon-Thurs 6:30-8:30pm    [facility] for Mental Health  [address] [place] [address]  "[facility] [address]  [phone]       Crisis Lines  Crisis Text Line  Text 749367  You will be connected with a trained live crisis counselor to provide support.    Por haleigh, amandao  JOHN a 357627 o texto a 442-AYUDAME en WhatsApp    National Hope Line  1.800.SUICIDE [7661368]      Community Resources  Fast Tracker  Linking people to mental health and substance use disorder resources  Angie's Listn.Gyst     [place] Mental Health Warm Line  Peer to peer support  Monday thru Saturday, 12 pm to 10 pm  [phone] or 4.378.153.5775  Text \"Support\" to 73416    National [place] on Mental Illness (CECELIA)  080.834.5424 or 1.888.CECELIA.HELPS        Mental Health Apps  My3  https://Kid Care Years.org/    VirtualHopeBox  https://Outlisten/apps/virtual-hope-box/      Additional Information  Today you were seen by a licensed mental health professional through Triage and Transition services, Behavioral Healthcare Providers ([facility])  for a crisis assessment in the Emergency Department at [facility].  It is recommended that you follow up with your established providers (psychiatrist, mental health therapist, and/or primary care doctor - as relevant) as soon as possible. Coordinators from [facility] will be calling you in the next 24-48 hours to ensure that you have the resources you need.  You can also contact [facility] coordinators directly at [phone]. You may have been scheduled for or offered an appointment with a mental health provider. [facility] maintains an extensive network of licensed behavioral health providers to connect patients with the services they need.  We do not charge providers a fee to participate in our referral network.  We match patients with providers based on a patient's specific needs, insurance coverage, and location.  Our first effort will be to refer you to a provider within your care system, and will utilize providers outside your care system as needed.      "

## 2022-06-09 ENCOUNTER — TELEPHONE (OUTPATIENT)
Dept: BEHAVIORAL HEALTH | Facility: CLINIC | Age: 22
End: 2022-06-09

## 2022-06-09 ENCOUNTER — HOSPITAL ENCOUNTER (EMERGENCY)
Facility: CLINIC | Age: 22
Discharge: PSYCHIATRIC OR SUBSTANCE USE FACILITY | End: 2022-06-11
Attending: EMERGENCY MEDICINE | Admitting: EMERGENCY MEDICINE
Payer: COMMERCIAL

## 2022-06-09 DIAGNOSIS — F23 ACUTE PSYCHOSIS (H): ICD-10-CM

## 2022-06-09 DIAGNOSIS — E10.65 TYPE 1 DIABETES MELLITUS WITH HYPERGLYCEMIA (H): ICD-10-CM

## 2022-06-09 LAB
AMPHETAMINES UR QL SCN: ABNORMAL
BARBITURATES UR QL: ABNORMAL
BENZODIAZ UR QL: ABNORMAL
CANNABINOIDS UR QL SCN: ABNORMAL
COCAINE UR QL: ABNORMAL
GLUCOSE BLDC GLUCOMTR-MCNC: 103 MG/DL (ref 70–99)
GLUCOSE BLDC GLUCOMTR-MCNC: 84 MG/DL (ref 70–99)
OPIATES UR QL SCN: ABNORMAL
SARS-COV-2 RNA RESP QL NAA+PROBE: NEGATIVE

## 2022-06-09 PROCEDURE — 80307 DRUG TEST PRSMV CHEM ANLYZR: CPT | Performed by: EMERGENCY MEDICINE

## 2022-06-09 PROCEDURE — 80048 BASIC METABOLIC PNL TOTAL CA: CPT | Performed by: EMERGENCY MEDICINE

## 2022-06-09 PROCEDURE — C9803 HOPD COVID-19 SPEC COLLECT: HCPCS

## 2022-06-09 PROCEDURE — 250N000011 HC RX IP 250 OP 636: Performed by: EMERGENCY MEDICINE

## 2022-06-09 PROCEDURE — U0003 INFECTIOUS AGENT DETECTION BY NUCLEIC ACID (DNA OR RNA); SEVERE ACUTE RESPIRATORY SYNDROME CORONAVIRUS 2 (SARS-COV-2) (CORONAVIRUS DISEASE [COVID-19]), AMPLIFIED PROBE TECHNIQUE, MAKING USE OF HIGH THROUGHPUT TECHNOLOGIES AS DESCRIBED BY CMS-2020-01-R: HCPCS | Performed by: EMERGENCY MEDICINE

## 2022-06-09 PROCEDURE — 96372 THER/PROPH/DIAG INJ SC/IM: CPT | Performed by: EMERGENCY MEDICINE

## 2022-06-09 PROCEDURE — 99285 EMERGENCY DEPT VISIT HI MDM: CPT | Mod: 25

## 2022-06-09 PROCEDURE — 250N000013 HC RX MED GY IP 250 OP 250 PS 637: Performed by: EMERGENCY MEDICINE

## 2022-06-09 PROCEDURE — 36415 COLL VENOUS BLD VENIPUNCTURE: CPT | Performed by: EMERGENCY MEDICINE

## 2022-06-09 PROCEDURE — 90791 PSYCH DIAGNOSTIC EVALUATION: CPT

## 2022-06-09 PROCEDURE — 82077 ASSAY SPEC XCP UR&BREATH IA: CPT | Performed by: EMERGENCY MEDICINE

## 2022-06-09 PROCEDURE — 84703 CHORIONIC GONADOTROPIN ASSAY: CPT | Performed by: EMERGENCY MEDICINE

## 2022-06-09 RX ORDER — OLANZAPINE 10 MG/2ML
10 INJECTION, POWDER, FOR SOLUTION INTRAMUSCULAR ONCE
Status: COMPLETED | OUTPATIENT
Start: 2022-06-09 | End: 2022-06-09

## 2022-06-09 RX ORDER — OLANZAPINE 5 MG/1
10 TABLET, ORALLY DISINTEGRATING ORAL ONCE
Status: COMPLETED | OUTPATIENT
Start: 2022-06-09 | End: 2022-06-09

## 2022-06-09 RX ADMIN — OLANZAPINE 5 MG: 5 TABLET, ORALLY DISINTEGRATING ORAL at 18:55

## 2022-06-09 RX ADMIN — OLANZAPINE 10 MG: 10 INJECTION, POWDER, FOR SOLUTION INTRAMUSCULAR at 19:45

## 2022-06-09 ASSESSMENT — ENCOUNTER SYMPTOMS: AGITATION: 1
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
## 2022-06-09 NOTE — ED TRIAGE NOTES
Pt presents to ED with mom. Mom states that she wandered into a  center this morning. PD was called at that time.  Mom reports erratic behavior.   Released yesterday from ED.
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
## 2022-06-09 NOTE — ED TRIAGE NOTES
Triage Assessment     Row Name 06/09/22 8059       Triage Assessment (Adult)    Airway WDL WDL       Respiratory WDL    Respiratory WDL WDL       Cardiac WDL    Cardiac WDL WDL       Peripheral/Neurovascular WDL    Peripheral Neurovascular WDL WDL       Cognitive/Neuro/Behavioral WDL    Cognitive/Neuro/Behavioral WDL X    Level of Consciousness confused    Arousal Level opens eyes spontaneously    Orientation disoriented to;time;situation    Speech fluent

## 2022-06-10 ENCOUNTER — TELEPHONE (OUTPATIENT)
Dept: BEHAVIORAL HEALTH | Facility: CLINIC | Age: 22
End: 2022-06-10
Payer: COMMERCIAL

## 2022-06-10 LAB
ANION GAP SERPL CALCULATED.3IONS-SCNC: 7 MMOL/L (ref 3–14)
BUN SERPL-MCNC: 6 MG/DL (ref 7–30)
CALCIUM SERPL-MCNC: 8.5 MG/DL (ref 8.5–10.1)
CHLORIDE BLD-SCNC: 108 MMOL/L (ref 94–109)
CO2 SERPL-SCNC: 26 MMOL/L (ref 20–32)
CREAT SERPL-MCNC: 0.57 MG/DL (ref 0.52–1.04)
ETHANOL SERPL-MCNC: <0.01 G/DL
GFR SERPL CREATININE-BSD FRML MDRD: >90 ML/MIN/1.73M2
GLUCOSE BLD-MCNC: 169 MG/DL (ref 70–99)
GLUCOSE BLDC GLUCOMTR-MCNC: 136 MG/DL (ref 70–99)
GLUCOSE BLDC GLUCOMTR-MCNC: 160 MG/DL (ref 70–99)
GLUCOSE BLDC GLUCOMTR-MCNC: 206 MG/DL (ref 70–99)
GLUCOSE BLDC GLUCOMTR-MCNC: 235 MG/DL (ref 70–99)
GLUCOSE BLDC GLUCOMTR-MCNC: 251 MG/DL (ref 70–99)
GLUCOSE BLDC GLUCOMTR-MCNC: 322 MG/DL (ref 70–99)
GLUCOSE BLDC GLUCOMTR-MCNC: 356 MG/DL (ref 70–99)
GLUCOSE BLDC GLUCOMTR-MCNC: 395 MG/DL (ref 70–99)
GLUCOSE BLDC GLUCOMTR-MCNC: 69 MG/DL (ref 70–99)
HCG SERPL QL: NEGATIVE
HOLD SPECIMEN: NORMAL
POTASSIUM BLD-SCNC: 3.5 MMOL/L (ref 3.4–5.3)
SODIUM SERPL-SCNC: 141 MMOL/L (ref 133–144)

## 2022-06-10 PROCEDURE — 250N000013 HC RX MED GY IP 250 OP 250 PS 637: Performed by: EMERGENCY MEDICINE

## 2022-06-10 PROCEDURE — 96361 HYDRATE IV INFUSION ADD-ON: CPT

## 2022-06-10 PROCEDURE — 258N000003 HC RX IP 258 OP 636: Performed by: EMERGENCY MEDICINE

## 2022-06-10 PROCEDURE — 96374 THER/PROPH/DIAG INJ IV PUSH: CPT

## 2022-06-10 RX ORDER — ONDANSETRON 2 MG/ML
4 INJECTION INTRAMUSCULAR; INTRAVENOUS ONCE
Status: DISCONTINUED | OUTPATIENT
Start: 2022-06-10 | End: 2022-06-11 | Stop reason: HOSPADM

## 2022-06-10 RX ORDER — OLANZAPINE 5 MG/1
5 TABLET, ORALLY DISINTEGRATING ORAL ONCE
Status: COMPLETED | OUTPATIENT
Start: 2022-06-10 | End: 2022-06-10

## 2022-06-10 RX ORDER — ESCITALOPRAM OXALATE 20 MG/1
20 TABLET ORAL DAILY
Status: ON HOLD | COMMUNITY
End: 2022-06-14

## 2022-06-10 RX ADMIN — SODIUM CHLORIDE 1000 ML: 9 INJECTION, SOLUTION INTRAVENOUS at 06:40

## 2022-06-10 RX ADMIN — OLANZAPINE 5 MG: 5 TABLET, ORALLY DISINTEGRATING ORAL at 20:58

## 2022-06-10 RX ADMIN — INSULIN HUMAN 10 UNITS: 100 INJECTION, SOLUTION PARENTERAL at 05:03

## 2022-06-10 NOTE — ED NOTES
Pt accessed new site for insulin pump per MD request. MD is aware. Pt took 1.86 units per at home regiment for BG of 206 that was taken at 0753 today.

## 2022-06-10 NOTE — ED NOTES
Mother arrived to visit patient and requested update on bed status. Informed mother patient placement is working on placement, but that writer has no further info at this time.

## 2022-06-10 NOTE — PHARMACY-ADMISSION MEDICATION HISTORY
Admission medication history interview status for this patient is complete. See [facility] admission navigator for allergy information, prior to admission medications and immunization status.     Medication history interview done, indicate source(s): Patient  Medication history resources (including written lists, pill bottles, clinic record): epic list, fill history  Pharmacy: [facility], [address] [facility]    Changes made to PTA medication list:  Added: Escitalopram (doesn't know when took last).  Changed: Lantus from 20 bid to 9 bid when insulin pump off  Reported as Not Taking: Has Zofran left, but not taken for a long time  Removed: none    Actions taken by pharmacist (provider contacted, etc):None     Additional medication history information:None    Medication reconciliation/reorder completed by provider prior to medication history? N   (Y/N)     For patients on insulin therapy:   Has Insulin pump.   Do you use sliding scale insulin based on blood sugars?   What is your pre-meal insulin coverage?    Do you typically eat three meals a day?   How many times do you check your blood glucose per day?   How many episodes of hypoglycemia do you typically have per month?   Do you have a Continuous Glucose Monitor (CGM)?      Prior to Admission medications    Medication Sig Last Dose Taking? Auth Provider   escitalopram (LEXAPRO) 20 MG tablet Take 20 mg by mouth daily not sure when took last at Unknown time Yes Unknown, Entered By History   hydrOXYzine (ATARAX) 25 MG tablet Take 1 tablet (25 mg) by mouth nightly as needed for anxiety (sleep) Unknown at Unknown time Yes Kam Mehta MD   Insulin Aspart (INSULIN PUMP - OUTPATIENT) Inject Subcutaneous See Admin Instructions Type of pump: Medtronic MiniMDhir Diamonds 770G  Type of insulin: Novolog   Basal rate(s)  0010-3985: 0.9 unit/hr  3425-8118: 0.85 units/hr  4597-7940: 0.675 units/hr  9617-5266: 0.55 units/hr  ISF: 30 mg\dL  ICF (insulin to carb ratio)  7605-1107: 1unit/5g  carbs  7063-5932: 1 unit/10g carbs  0623-0977: 1 unit/8g carbs  Active insulin time: 3 hrs  Target goal range:   Followed by endocrinology pump disconnected 6/9/2022 at PM Yes Unknown, Entered By History   insulin aspart (NOVOLOG PEN) 100 UNIT/ML pen Use 3x/day with meals (carb correction scale). Also correction scale with correction factor 1:18  Patient taking differently: Use 3x/day with meals (carb correction scale- uses 1 unit/5g carbs to 1 unit/10 grams carbs.     (Also correction scale with correction factor 1:18) 6/10/2022 at Unknown time Yes Kam Mehta MD   insulin aspart (NOVOLOG VIAL) 100 UNITS/ML vial Use up to 80 units daily in insulin pump. 90 day supply. Past Week at Unknown time Yes Karlee Holly MD   insulin glargine (LANTUS PEN) 100 UNIT/ML pen Inject 20 Units Subcutaneous 2 times daily If you have pump failure  Patient taking differently: Inject 9 Units Subcutaneous 2 times daily (Uses ~18 units/day basal in pump, so when off pump, uses ~ 9 units bid)     If you have pump failure More than a month at Unknown time Yes Gustavo De La Rosa MD   omeprazole (PRILOSEC) 20 MG DR capsule TAKE 1 CAPSULE(20 MG) BY MOUTH DAILY  Patient taking differently: Take 20 mg by mouth daily as needed Unknown at Unknown time Yes Kam Mehta MD   ondansetron (ZOFRAN-ODT) 4 MG ODT tab Take 1 tablet (4 mg) by mouth every 8 hours as needed for nausea More than a month at Unknown time Yes Gustavo De La Rosa MD   blood glucose (CONTOUR NEXT TEST) test strip Use to test blood sugar 5-6 times daily or as directed.   Karlee Holly MD   blood glucose monitoring (TYE MICROLET) lancets Use to test blood sugar 4-5 times daily or as directed.   Naomi Reynolds APRN CNP

## 2022-06-10 NOTE — ED NOTES
0989-Writer sent PT/OT notes via [facility] and hard fax to Eileen at [facility]. Pending additional OT notes and orders for SNF.   Blood glucose 136, awaiting meal tray, mother at bedside
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
## 2022-06-10 NOTE — ED NOTES
Patient placement spoke with writer and stated there is a bed available in [place] and wanted to know if the patient was agreeable to being transferred to [place]. Writer spoke with patient who stated she is willing to transfer to [place]. Intake updated

## 2022-06-10 NOTE — ED PROVIDER NOTES
21 year old female received in signout from Dr. Long who presents to the ED w/ manic behavior.  Please see primary note for full details.  At time of signout, patient is hyperglycemic given her insulin pump was removed.  Insulin pump replaced and hypoglycemia treated.  Blood sugars significantly improved.  Otherwise no acute events.  Patient awaiting inpatient psychiatric admission.  Signed out in stable condition                   Pavel Chen MD  06/10/22 0321

## 2022-06-10 NOTE — TELEPHONE ENCOUNTER
S: 6:44 pm Pt is a 21 yr old fem in [facility] ED for psychosis report by Mari JENKINS:  reports pt is disorganized.  Mother reports pt has no hx of psychosis but father has schizophrenia.   also reports pt is agitated, tangential and fidgeting.  Mother also reports pt has not been making sense, is kicking and screaming.  Hx of dep and anxiety.  No prev ip admissions.  No mh meds.  Medical: Diabetes type 1.  Utox pos for marijuana.  COVID not ordered.     A: vol     R: pt waiting in ED for appropriate placement.     Patient cleared and ready for behavioral bed placement: Yes

## 2022-06-10 NOTE — ED NOTES
"Writer spoke with patient regarding removing restraints. Writer explained to patient that ER staff can take the restraints off the patient if she is able to be calm and cooperative for fifteen minutes while still restrained. Patient verbalized understanding and writer left the room to observe patient. Patient immediately began trying to sit up in bed, shaking arms and legs in restraints, and screaming \"Mom\", \"I just want to go outside\", \"Please take these off.\" Writer entered room again and explained to patient once more that ER staff can take the restraints off if the patient is able to be calm and cooperative for fifteen minutes. Patient verbalized understanding and writer left the room. Patient immediately began screaming, shaking arms and legs in restraints. Writer explained to patient that ER staff would not be able to remove restraints at this time.  "

## 2022-06-10 NOTE — ED NOTES
Blood glucose 69. Patient asymptomatic at this time. 8oz of apple juice given, meal tray ordered. MD updated

## 2022-06-10 NOTE — TELEPHONE ENCOUNTER
0304 Bed Search Update:    [facility]-No beds available  [facility]-No beds available  [facility]-No beds available  [facility]-No beds available  [facility]-No beds available  [facility]-No beds available  [facility]-No beds available  [facility]-No beds available  [facility]-No beds available  [facility]-No beds available.  Low acuity only.  Mixed unit adol/adult/kim  [facility]-No beds available  [facility]-No beds available  [facility]-No beds available  [facility]-No beds available  [facility]-Posting 1 bed for low acuity mood d/o.  [facility]-0256 Per Drea, low acuity only.  [facility]-No beds available.  Must be on a 72 HH. No intake after 10 PM.  [facility]-No beds available  [facility]-Voluntary/Viejas only. No hx violence or sexual assault.  [facility]-No beds available  [facility]-No beds available  [facility]-No recent hx violence/aggression. Must be able to program in groups.  [facility]-Low acuity only.  [facility]-No beds available.  No recent violence or aggression.  [facility]-No beds available  [facility]-No beds available  [facility]-No answer at 0302.    Remains on wait list pending bed availability.

## 2022-06-10 NOTE — ED NOTES
Patient states she is feeling nauseated. RN requested Zofran. Pt states she doesn't want any Zofran now, but will let RN know if she changes her mind. Emesis bag provided to patient.

## 2022-06-10 NOTE — ED PROVIDER NOTES
I received patient in signout from Dr. Biggs.  Please refer to their complete H&P for further information.  Briefly, patient is a 20 yo female who presented with concerns for erratic behavior.  She was given PO zyprexa on arrival; insulin pump removed given concern she would intentionally cause hypoglycemia. Evaluated by DEC who recommended admission. At time of signout, bed placement pending.     Overnight, IV established as patient noted to have uptrending blood glucose.  As her insulin pump was discontinued, IV insulin provided.  She was cooperative during my observation.  Signed out to AM physician, Dr. Chen.       Mercedes Long, DO  06/10/22 0779

## 2022-06-10 NOTE — ED NOTES
Spoke with intake, states inpatient will not allow pt to continue using insulin pump when admitted.

## 2022-06-10 NOTE — TELEPHONE ENCOUNTER
Called provider Drea to present pt to [facility].    Provider accepted pt at 5:05PM to [facility].    Pt added to unit que and unit called with disposition at 5:27PM    ED updated with placement at 5:30PM

## 2022-06-10 NOTE — ED NOTES
. Patient stated her pump says no bolus needed for BG of 160. Assisted with ordering meal tray. Denies all other needs

## 2022-06-10 NOTE — SAFE
Margo Cruz  June 9, 2022  SAFE Note    Critical Safety Issues: experiencing acute psychosis.      Current Suicidal Ideation/Self-Injurious Concerns/Methods: None - N/A      Current or Historical Inappropriate Sexual Behavior: No      Current or Historical Aggression/Homicidal Ideation: None - N/A      Guardianship Status: [facility] Guardian: is her own guardian.. Guardianship paperwork is not required.    This patient is a child/adolescent: No    This patient has additional special visitor precautions: No    Updated care team:yes    For additional details see full [facility] assessment.       Mari Kinsey, HUSSAINSW

## 2022-06-10 NOTE — TELEPHONE ENCOUNTER
R:  6/10 11:45am  Call to [facility] ED for update-patient has been calm and cooperative for 12 hours.  Patient prefers [facility] area.  Writer informed patient's RN that her insulin pump will need to be removed if she goes to a MH unit.  Bed search completed, currently there no appropriate available beds within the [facility].  Pt will remain on work list until placement found.

## 2022-06-11 ENCOUNTER — HOSPITAL ENCOUNTER (INPATIENT)
Facility: HOSPITAL | Age: 22
LOS: 4 days | Discharge: HOME | End: 2022-06-15
Attending: STUDENT IN AN ORGANIZED HEALTH CARE EDUCATION/TRAINING PROGRAM | Admitting: STUDENT IN AN ORGANIZED HEALTH CARE EDUCATION/TRAINING PROGRAM
Payer: COMMERCIAL

## 2022-06-11 VITALS
OXYGEN SATURATION: 98 % | TEMPERATURE: 98.2 F | RESPIRATION RATE: 12 BRPM | SYSTOLIC BLOOD PRESSURE: 128 MMHG | DIASTOLIC BLOOD PRESSURE: 94 MMHG | HEART RATE: 76 BPM

## 2022-06-11 DIAGNOSIS — F39 UNSPECIFIED MOOD (AFFECTIVE) DISORDER (H): Primary | ICD-10-CM

## 2022-06-11 PROBLEM — F29 PSYCHOSIS (H): Status: ACTIVE | Noted: 2022-06-11

## 2022-06-11 LAB
GLUCOSE BLDC GLUCOMTR-MCNC: 119 MG/DL (ref 70–99)
GLUCOSE BLDC GLUCOMTR-MCNC: 147 MG/DL (ref 70–99)
GLUCOSE BLDC GLUCOMTR-MCNC: 162 MG/DL (ref 70–99)
GLUCOSE BLDC GLUCOMTR-MCNC: 165 MG/DL (ref 70–99)
GLUCOSE BLDC GLUCOMTR-MCNC: 220 MG/DL (ref 70–99)
GLUCOSE BLDC GLUCOMTR-MCNC: 225 MG/DL (ref 70–99)
GLUCOSE BLDC GLUCOMTR-MCNC: 54 MG/DL (ref 70–99)

## 2022-06-11 PROCEDURE — 250N000013 HC RX MED GY IP 250 OP 250 PS 637: Performed by: NURSE PRACTITIONER

## 2022-06-11 PROCEDURE — 124N000001 HC R&B MH

## 2022-06-11 RX ORDER — OLANZAPINE 10 MG/1
10 TABLET ORAL 3 TIMES DAILY PRN
Status: DISCONTINUED | OUTPATIENT
Start: 2022-06-11 | End: 2022-06-15 | Stop reason: HOSPADM

## 2022-06-11 RX ORDER — ACETAMINOPHEN 325 MG/1
650 TABLET ORAL EVERY 4 HOURS PRN
Status: DISCONTINUED | OUTPATIENT
Start: 2022-06-11 | End: 2022-06-15 | Stop reason: HOSPADM

## 2022-06-11 RX ORDER — HYDROXYZINE HYDROCHLORIDE 25 MG/1
50 TABLET, FILM COATED ORAL EVERY 4 HOURS PRN
Status: DISCONTINUED | OUTPATIENT
Start: 2022-06-11 | End: 2022-06-14

## 2022-06-11 RX ORDER — AMOXICILLIN 250 MG
1 CAPSULE ORAL 2 TIMES DAILY PRN
Status: DISCONTINUED | OUTPATIENT
Start: 2022-06-11 | End: 2022-06-15 | Stop reason: HOSPADM

## 2022-06-11 RX ORDER — DEXTROSE MONOHYDRATE 25 G/50ML
25-50 INJECTION, SOLUTION INTRAVENOUS
Status: DISCONTINUED | OUTPATIENT
Start: 2022-06-11 | End: 2022-06-15 | Stop reason: HOSPADM

## 2022-06-11 RX ORDER — MAGNESIUM HYDROXIDE/ALUMINUM HYDROXICE/SIMETHICONE 120; 1200; 1200 MG/30ML; MG/30ML; MG/30ML
30 SUSPENSION ORAL EVERY 4 HOURS PRN
Status: DISCONTINUED | OUTPATIENT
Start: 2022-06-11 | End: 2022-06-15 | Stop reason: HOSPADM

## 2022-06-11 RX ORDER — NICOTINE POLACRILEX 4 MG
15-30 LOZENGE BUCCAL
Status: DISCONTINUED | OUTPATIENT
Start: 2022-06-11 | End: 2022-06-15 | Stop reason: HOSPADM

## 2022-06-11 RX ORDER — LANOLIN ALCOHOL/MO/W.PET/CERES
3 CREAM (GRAM) TOPICAL
Status: DISCONTINUED | OUTPATIENT
Start: 2022-06-11 | End: 2022-06-15 | Stop reason: HOSPADM

## 2022-06-11 RX ORDER — OLANZAPINE 10 MG/2ML
10 INJECTION, POWDER, FOR SOLUTION INTRAMUSCULAR 3 TIMES DAILY PRN
Status: DISCONTINUED | OUTPATIENT
Start: 2022-06-11 | End: 2022-06-15 | Stop reason: HOSPADM

## 2022-06-11 RX ADMIN — OLANZAPINE 10 MG: 10 TABLET, FILM COATED ORAL at 21:46

## 2022-06-11 ASSESSMENT — ACTIVITIES OF DAILY LIVING (ADL)
DOING_ERRANDS_INDEPENDENTLY_DIFFICULTY: NO
WEAR_GLASSES_OR_BLIND: NO
NUMBER_OF_TIMES_PATIENT_HAS_FALLEN_WITHIN_LAST_SIX_MONTHS: 1
CONCENTRATING,_REMEMBERING_OR_MAKING_DECISIONS_DIFFICULTY: NO
CHANGE_IN_FUNCTIONAL_STATUS_SINCE_ONSET_OF_CURRENT_ILLNESS/INJURY: NO
FALL_HISTORY_WITHIN_LAST_SIX_MONTHS: YES
TOILETING_ISSUES: NO
WALKING_OR_CLIMBING_STAIRS_DIFFICULTY: NO
DRESSING/BATHING_DIFFICULTY: NO
DIFFICULTY_EATING/SWALLOWING: NO

## 2022-06-11 NOTE — ED NOTES
Patient done eating breakfast cleaned her own meal tray. Calm, respectful and cooperative.  She ask for her phone to call mother and father. Security ok with patient having phone. Gave patient her cell phone.

## 2022-06-11 NOTE — ED NOTES
Pt requested fresh change of clothes from belongings bag. Clothes provided to pt. RN will continue to monitor.

## 2022-06-11 NOTE — ED PROVIDER NOTES
21-year-old female received in signout awaiting inpatient psychiatric admission for lexus.  No acute events after shift change.  The patient's glucose levels stabilized w/ the use of her pump.  Reportedly will be transported at approximately 1500 this afternoon to [facility].  Patient signed out in stable condition awaiting transportation.     Pavel Chen MD  06/11/22 3585
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
## 2022-06-11 NOTE — ED PROVIDER NOTES
I received patient in signout from Dr. Díaz.  Please refer to their complete H&P for further information.  Briefly, patient pending bed availability for psychiatric admission; likely bed available in [place]. Patient's blood glucose downtrended during her time in the ED and was given juice.  Her repeat blood glucose uptrended.  Patient signed out to my partner Dr. Chen.  Likely her basal insulin rate needs to be adjusted on her pump.  No other acute events overnight. Remains on 72 hours [facility]AILESH.     Mercedes Long,   06/11/22 0633

## 2022-06-11 NOTE — ED PROVIDER NOTES
Sign-out Note    Received this patient in sign-out from Dr. Chen at 2:00 PM.  Please refer to earlier documentation detailing presenting complaints, evaluation, and ED course.  In brief, patient is being seen in the ER for manic behavior.    Recommendations from previous provider: awaiting transfer to [place]    ED course under my care:  No acute events    Disposition: Transfer to inpatient mental health           Kiran Díaz MD  06/11/22 6982

## 2022-06-11 NOTE — ED NOTES
Mother here inquiring about 72 hour hold when that would . Did check chart and charted 72 hour hold, pt reports never receiving 72 hour hold paper work (rights). Did give pt her 72 hour hold rights and did document patient receiving her rights.

## 2022-06-11 NOTE — ED NOTES
Report given to EMS, pt cooperative, calm, and pleasant with staff. Pt verbalized understanding of transfer, reason for transfer, and safety. All personal belongings taken with patient and personally handed to EMS on transfer. 2 clothing bags, 1 bag for person items, art items, etc., and 1 bag with insulin supplies for when pt changes insulin pump. Office, room, and lockers checked; no personal items left. Otto updated on pt transport time.

## 2022-06-11 NOTE — ED NOTES
Writer and PATRICIA Sethi contacted Lala GOMEZ from [facility]; [phone]. They advised pt to bring her own insulin pump changing supplies with her. Those supplies not available to fill at their facility. Writer contacted mother; mother will drop off supplies to be sent securely with patient upon transfer.

## 2022-06-11 NOTE — ED PROVIDER NOTES
I received this patient in signout from Dr. Chen.  Plan for transfer to inpatient mental health unit.  Patient provided additional dose of oral Zyprexa given additional manic/disorganized behavior.  Will be signed out to my partner of the overnight shift pending transfer.     Kiran Díaz MD  06/10/22 8032

## 2022-06-12 LAB
GLUCOSE BLDC GLUCOMTR-MCNC: 100 MG/DL (ref 70–99)
GLUCOSE BLDC GLUCOMTR-MCNC: 115 MG/DL (ref 70–99)
GLUCOSE BLDC GLUCOMTR-MCNC: 192 MG/DL (ref 70–99)
GLUCOSE BLDC GLUCOMTR-MCNC: 193 MG/DL (ref 70–99)
GLUCOSE BLDC GLUCOMTR-MCNC: 233 MG/DL (ref 70–99)
GLUCOSE BLDC GLUCOMTR-MCNC: 50 MG/DL (ref 70–99)
GLUCOSE BLDC GLUCOMTR-MCNC: 76 MG/DL (ref 70–99)
GLUCOSE BLDC GLUCOMTR-MCNC: 76 MG/DL (ref 70–99)
GLUCOSE BLDC GLUCOMTR-MCNC: 83 MG/DL (ref 70–99)

## 2022-06-12 PROCEDURE — 250N000013 HC RX MED GY IP 250 OP 250 PS 637: Performed by: NURSE PRACTITIONER

## 2022-06-12 PROCEDURE — 99223 1ST HOSP IP/OBS HIGH 75: CPT | Mod: AI | Performed by: NURSE PRACTITIONER

## 2022-06-12 PROCEDURE — 124N000001 HC R&B MH

## 2022-06-12 RX ORDER — NICOTINE POLACRILEX 4 MG
15-30 LOZENGE BUCCAL
Status: DISCONTINUED | OUTPATIENT
Start: 2022-06-12 | End: 2022-06-12

## 2022-06-12 RX ORDER — ESCITALOPRAM OXALATE 10 MG/1
10 TABLET ORAL DAILY
Status: DISCONTINUED | OUTPATIENT
Start: 2022-06-13 | End: 2022-06-15 | Stop reason: HOSPADM

## 2022-06-12 RX ORDER — DEXTROSE MONOHYDRATE 25 G/50ML
25-50 INJECTION, SOLUTION INTRAVENOUS
Status: DISCONTINUED | OUTPATIENT
Start: 2022-06-12 | End: 2022-06-12

## 2022-06-12 RX ORDER — ESCITALOPRAM OXALATE 5 MG/1
5 TABLET ORAL DAILY
Status: COMPLETED | OUTPATIENT
Start: 2022-06-12 | End: 2022-06-12

## 2022-06-12 RX ADMIN — ESCITALOPRAM OXALATE 5 MG: 5 TABLET, FILM COATED ORAL at 18:43

## 2022-06-12 RX ADMIN — OLANZAPINE 10 MG: 10 TABLET, FILM COATED ORAL at 13:49

## 2022-06-12 ASSESSMENT — ACTIVITIES OF DAILY LIVING (ADL)
HYGIENE/GROOMING: INDEPENDENT
ORAL_HYGIENE: INDEPENDENT
LAUNDRY: UNABLE TO COMPLETE
DRESS: INDEPENDENT;SCRUBS (BEHAVIORAL HEALTH)

## 2022-06-12 NOTE — PLAN OF CARE
Problem: Behavioral Health Plan of Care  Goal: Patient-Specific Goal (Individualization)  Description: Pt will eat at least 50% of meals.   Pt will sleep 6-8 hours per night.   Pt will be medication compliant.   Pt will attend 50% of groups.   Outcome: Ongoing, Progressing  Note: Report received from Rose. Rounding complete. Pt observed sleeping in left side lying position with regular and unlabored respirations.    0215- Pt BS 76. Pt offered and accepted 1 cup of apple juice. She has a second on her bedside table as she told this writer that she has a history of her BS dropping off pretty fast at night when she has her insulin. Writer will recheck BS in approx 2 hour to ensure that it is staying within normal limits. Pt asymptomatic.    0445-  will recheck again closer to shift change. Pt denies issues.     0658- PT     Pt has been in bed with eyes closed and regular respirations. 15 minute and PRN checks all night. No complaints offered. Will continue to monitor.    Pt slept approx 5.75  hours this NOC shift.    Face to face end of shift report communicated to oncoming RN.    Ayala BARRIENTOS RN  June 12, 2022  2:23 AM          Problem: Thought Process Alteration  Goal: Optimal Thought Clarity  Description: Pt will be able to have a linear, reality based conversation.   Outcome: Ongoing, Progressing  Note: Unable to assess due to pt sleeping. No issues or concerns noted at this time.     Goal Outcome Evaluation:
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
## 2022-06-12 NOTE — PLAN OF CARE
"  Problem: Behavioral Health Plan of Care  Goal: Patient-Specific Goal (Individualization)  Description: Pt will eat at least 50% of meals.   Pt will sleep 6-8 hours per night.   Pt will be medication compliant.   Pt will attend 50% of groups.   Note: Patient up to unit off/on throughout shift, attending some groups and playing cards with peers. Does withdraw to bedroom at times to be alone. Full range affect, clear speech and pleasant during conversation.  Denies SI/HI, hallucinations and pain at this time. Reports continued depression rated 6/10. Patient talks positively about current boyfriend and family support system.   1349- Received PRN Zyprexa 10 mg per request for anxiety rated 10/10. Patient asks for \"med that really helped last night\". Continues to be up to unit with peers towards end of shift. Continue to monitor at this time.   0840- Patient reports a carb count of 69 grams and self administered 13.8 unit bolus on insulin pump.   1100- Patient reports self administering 1 unit bolus through insulin pump.   1135- Blood glucose was 193 and insulin pump did not provide any further coverage at this time.   PTA medications verified through [facility] in [place], [place]. Pharmacy reports that they have reached out to endocrinologist for more specific medication instructions and have not gotten a response.      Problem: Thought Process Alteration  Goal: Optimal Thought Clarity  Description: Pt will be able to have a linear, reality based conversation.   Note: Continue to monitor at this time.     Face to face end of shift report communicated to oncoming RN.     Kate Toth RN  6/12/2022  7:49 AM           "

## 2022-06-12 NOTE — PLAN OF CARE
Problem: Behavioral Health Plan of Care  Goal: Patient-Specific Goal (Individualization)  Description: Pt will eat at least 50% of meals.   Pt will sleep 6-8 hours per night.   Pt will be medication compliant.   Pt will attend 50% of groups.   Outcome: Ongoing, Progressing    Pt in lounge at the start of the shift coloring at the table. Pt attended group prior to dinner, colored in group and talked with staff.     Pt blood sugar checked at 1700 prior to dinner. Accuchek at 83 requiring no insulin. Pt reported eating dinner of 90 carbs. Pt received 11.25 units. Pt asked about her insulin basal rate from the ambulatory pump, pt states that it is 18.225 per day and does not break down to a certain amount between 0062-6401. Pt frustrated stating her pump is a self monitoring system and she is not going to try telling us rates that don't apply to her system.      Pt took a nap after dinner, pt woke up and asked to test, pt blood sugar at 50 at 1942. Pt given a bag of potato chips and an egg salad sandwich. Pt rechecked at 2021, blood sugar at this time was 76. Pt will recheck blood sugar before going to bed. Pt rechecked blood sugar at 2135 and was at 233. Pt received 2.75 units from insulin pump.     Pt changed insulin pump this evening, pt contracts for safety with insulin pump. Pt denies any suicidal thoughts. Pt reported that she refilled 300mL when the pump was changed and was charted in the MAR, pt refilled at the time of pump change.       Pt woken up to recheck blood sugar at 2248, blood sugar at this time was 192. Pt will be rechecked at 0200.          Problem: Thought Process Alteration  Goal: Optimal Thought Clarity  Description: Pt will be able to have a linear, reality based conversation.   Outcome: Ongoing, Progressing   Goal Outcome Evaluation:             Face to face end of shift report communicated to night shift RN.     Aspen Guadalupe, PATRICIA  6/12/2022  4:11 PM

## 2022-06-12 NOTE — PROGRESS NOTES
06/11/22 2123   Patient Belongings   Did you bring any home meds/supplements to the hospital?  Yes   Disposition of meds  Pharmacy approved for patient use (see comment)   Patient Belongings remains with patient;sent to security per site process   Patient Belongings Remaining with Patient medical/assistive equipment;other (see comments);clothing;body jewelry;medical device;shoes   Patient Belongings Put in Hospital Secure Location (Security or Locker, etc.) bracelet;necklace;cell phone/electronics;other (see comments);money (see comment)   Belongings Search Yes   Clothing Search Yes   Second Staff Alicja   Comment Calendar, 2 books, pencil case containing colored pencils, grey andpink flip flops, small notebook, 9 pairs of [id number], black pants, 3 bras, 1 purple tank top, yellow shorts, 11 pairs of socks, 1 pink shirt, 1 blue long sleeve, 1 grey hoodie, tooth brush, tooth paste, deodorant, Ronni's shampoo/body wash, chapstick, Jerad Klein jacket, nathanael shorts, olive green shirt, green hospital socks, foam truck, foam unicorn, diet mnt dew, zero sugar gatorade, Marine [employer], makeup brush, grey slippers, black tank top, yellow long sleeves, grey pants, black hoodie.   List items sent to safe: Iphone in purple case with cracks, white charging block with white cord, bracelet, necklace with foreign change on it, $1    In med room: Site changes for insulin pump with the insulin.    All other belongings put in assigned cubby in belongings room.     I have reviewed my belongings list on admission and verify that it is correct.     Patient signature_______________________________    Second staff witness (if patient unable to sign) ______________________________       I have received all my belongings at discharge.    Patient signature________________________________    No HYMAN  6/11/2022  9:39 PM
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
## 2022-06-13 LAB
GLUCOSE BLDC GLUCOMTR-MCNC: 162 MG/DL (ref 70–99)
GLUCOSE BLDC GLUCOMTR-MCNC: 167 MG/DL (ref 70–99)
GLUCOSE BLDC GLUCOMTR-MCNC: 168 MG/DL (ref 70–99)
GLUCOSE BLDC GLUCOMTR-MCNC: 170 MG/DL (ref 70–99)
GLUCOSE BLDC GLUCOMTR-MCNC: 172 MG/DL (ref 70–99)
GLUCOSE BLDC GLUCOMTR-MCNC: 186 MG/DL (ref 70–99)
GLUCOSE BLDC GLUCOMTR-MCNC: 245 MG/DL (ref 70–99)
GLUCOSE BLDC GLUCOMTR-MCNC: 261 MG/DL (ref 70–99)

## 2022-06-13 PROCEDURE — 99232 SBSQ HOSP IP/OBS MODERATE 35: CPT | Performed by: NURSE PRACTITIONER

## 2022-06-13 PROCEDURE — 124N000001 HC R&B MH

## 2022-06-13 PROCEDURE — 250N000013 HC RX MED GY IP 250 OP 250 PS 637: Performed by: NURSE PRACTITIONER

## 2022-06-13 RX ORDER — CALCIUM CARBONATE 500 MG/1
500 TABLET, CHEWABLE ORAL 2 TIMES DAILY PRN
Status: DISCONTINUED | OUTPATIENT
Start: 2022-06-13 | End: 2022-06-15 | Stop reason: HOSPADM

## 2022-06-13 RX ORDER — CALCIUM CARBONATE 500 MG/1
1 TABLET, CHEWABLE ORAL PRN
COMMUNITY

## 2022-06-13 RX ORDER — ONDANSETRON 4 MG/1
4 TABLET, ORALLY DISINTEGRATING ORAL EVERY 6 HOURS PRN
COMMUNITY
End: 2023-09-08

## 2022-06-13 RX ORDER — MULTIPLE VITAMINS W/ MINERALS TAB 9MG-400MCG
1 TAB ORAL DAILY
Status: DISCONTINUED | OUTPATIENT
Start: 2022-06-14 | End: 2022-06-15 | Stop reason: HOSPADM

## 2022-06-13 RX ORDER — ONDANSETRON 4 MG/1
4 TABLET, ORALLY DISINTEGRATING ORAL EVERY 6 HOURS PRN
Status: DISCONTINUED | OUTPATIENT
Start: 2022-06-13 | End: 2022-06-15 | Stop reason: HOSPADM

## 2022-06-13 RX ORDER — VITAMIN B COMPLEX
25 TABLET ORAL DAILY
Status: DISCONTINUED | OUTPATIENT
Start: 2022-06-14 | End: 2022-06-15 | Stop reason: HOSPADM

## 2022-06-13 RX ADMIN — ESCITALOPRAM OXALATE 10 MG: 10 TABLET ORAL at 08:07

## 2022-06-13 RX ADMIN — HYDROXYZINE HYDROCHLORIDE 50 MG: 25 TABLET, FILM COATED ORAL at 07:43

## 2022-06-13 RX ADMIN — OLANZAPINE 10 MG: 10 TABLET, FILM COATED ORAL at 20:47

## 2022-06-13 ASSESSMENT — ACTIVITIES OF DAILY LIVING (ADL)
DRESS: INDEPENDENT;SCRUBS (BEHAVIORAL HEALTH)
DRESS: INDEPENDENT;SCRUBS (BEHAVIORAL HEALTH)
ORAL_HYGIENE: INDEPENDENT
LAUNDRY: UNABLE TO COMPLETE
ORAL_HYGIENE: INDEPENDENT
HYGIENE/GROOMING: INDEPENDENT
HYGIENE/GROOMING: INDEPENDENT

## 2022-06-13 NOTE — PLAN OF CARE
Problem: Behavioral Health Plan of Care  Goal: Patient-Specific Goal (Individualization)  Description: Pt will eat at least 50% of meals.   Pt will sleep 6-8 hours per night.   Pt will be medication compliant.   Pt will attend 50% of groups.   Outcome: Ongoing, Progressing     Problem: Thought Process Alteration  Goal: Optimal Thought Clarity  Description: Pt will be able to have a linear, reality based conversation.   Outcome: Ongoing, Progressing   Goal Outcome Evaluation:    Plan of Care Reviewed With: patient        Pt. Has been up and about on unit, slept 6.5 hours last night, is medication compliant, attending at least 50% of group therapy sessions, has linear conversation, is able to make needs be known, appetite is good, ate 100% of supper, has linear conversation, pleasant and cooperative, will continue to monitor progress.  2047- Pt. Requested/received zyprexa 10mg po for high anxiety/agitation.       Face to face end of shift report will be communicated to oncoming night shift RN.     Ayala Mcdonnell RN  6/13/2022  6:35 PM

## 2022-06-13 NOTE — PROGRESS NOTES
"[facility] PSYCHIATRY  PROGRESS NOTE     SUBJECTIVE   Margo is up in her room when I see her today. She reports feeling \"pretty good\" today. Is looking forward to a zoom visit with her significant other this evening. Patient denies any hallucinations or paranoia, denies any thoughts of harming self or others. Behavior has been appropriate and calm with no noted agitation. She reports an interest in attending outpatient groups for more support as well as getting established with an individual therapist. She states she is hoping to discharge home in the next few days. Has been self-managing insulin pump with no issues.       MEDICATIONS   Scheduled Meds:    escitalopram  10 mg Oral Daily     insulin aspart   Device See Admin Instructions     insulin bolus from AMBULATORY PUMP   Subcutaneous See Admin Instructions     PRN Meds:.acetaminophen, alum & mag hydroxide-simethicone, glucose **OR** dextrose **OR** glucagon, hydrOXYzine, melatonin, nicotine, OLANZapine **OR** OLANZapine, senna-docusate     ALLERGIES   Allergies   Allergen Reactions     Gluten Meal      Other reaction(s): *Unknown     No Known Drug Allergies         MENTAL STATUS EXAM   Vitals: /82 (BP Location: Right arm)   Pulse 85   Temp 98.9  F (37.2  C) (Temporal)   Resp 16   Ht 1.6 m (5' 3\")   Wt 49.6 kg (109 lb 4.8 oz)   LMP  (LMP Unknown)   SpO2 97%   BMI 19.36 kg/m      Appearance:  awake, alert, adequately groomed, dressed in hospital scrubs and appeared as age stated  Attitude:  cooperative, pleasant  Eye Contact:  good  Mood:  better  Affect:  mood congruent  Speech:  clear, coherent  Psychomotor Behavior:  no evidence of tardive dyskinesia, dystonia, or tics  Thought Process:  logical, linear and goal oriented  Associations:  no loose associations  Thought Content:  no evidence of suicidal ideation or homicidal ideation and no evidence of psychotic thought  Insight:  fair  Judgment:  intact  Oriented to:  time, person, and " place  Attention Span and Concentration:  intact  Recent and Remote Memory:  intact  Language: Able to name objects, Able to repeat phrases and Able to read and write  Fund of Knowledge: appropriate  Muscle Strength and Tone: normal  Gait and Station: Normal       LABS   Recent Results (from the past 24 hour(s))   Glucose by meter    Collection Time: 06/12/22  4:59 PM   Result Value Ref Range    GLUCOSE BY METER POCT 83 70 - 99 mg/dL   Glucose by meter    Collection Time: 06/12/22  7:42 PM   Result Value Ref Range    GLUCOSE BY METER POCT 50 (LL) 70 - 99 mg/dL   Glucose by meter    Collection Time: 06/12/22  8:21 PM   Result Value Ref Range    GLUCOSE BY METER POCT 76 70 - 99 mg/dL   Glucose by meter    Collection Time: 06/12/22  9:35 PM   Result Value Ref Range    GLUCOSE BY METER POCT 233 (H) 70 - 99 mg/dL   Glucose by meter    Collection Time: 06/12/22 10:48 PM   Result Value Ref Range    GLUCOSE BY METER POCT 192 (H) 70 - 99 mg/dL   Glucose by meter    Collection Time: 06/13/22  2:03 AM   Result Value Ref Range    GLUCOSE BY METER POCT 168 (H) 70 - 99 mg/dL   Glucose by meter    Collection Time: 06/13/22  4:58 AM   Result Value Ref Range    GLUCOSE BY METER POCT 261 (H) 70 - 99 mg/dL   Glucose by meter    Collection Time: 06/13/22  7:02 AM   Result Value Ref Range    GLUCOSE BY METER POCT 167 (H) 70 - 99 mg/dL   Glucose by meter    Collection Time: 06/13/22 10:16 AM   Result Value Ref Range    GLUCOSE BY METER POCT 162 (H) 70 - 99 mg/dL         IMPRESSION     This is a 21 year old female with a PMH of depression, anxiety, PTSD, type 1 diabetes, and celiac diease who presented to the ED with erratic behavior. Was found wandering into a day care acting erratically. Was found to be disorganized by DEC . Patient indicates that her behavior prior to admission was related to her type 1 diabetes. States in January she was hospitalized for blood glucose over 1,000 after an issue with her insulin pump. Patient  "does manage own insulin pump with blood glucose relatively well managed so far here in the hospital. She reports she will start to feel \"foggy\" when blood sugars are \"off\". Throughout our discussion today she is able to carry a linear and reality based conversation. She denies any hallucinations and behavior has been controlled. She does wish to restart on a medication, most recently was taking Lexapro for depression and anxiety and Hydroxyzine for anxiety as needed, will restart these today. Will continue to monitor mood and thoughts over the next few days to determine whether addition of a mood stabilizer is warranted. Of note, patient's mother reports that her father has a history of schizophrenia so there is possibility of new emergence of mental health symptoms though her at times dysregulated blood glucose could also explain her odd, confused behavior as well. She is interested in seeing a therapist to work on processing past trauma that has recently resurfaced.        DIAGNOSES     1. Unspecified mood disorder, r/o MDD (by history) versus bipolar 2  2. R/o mood disorder d/t medical condition (type 1 diabetes)  3. PTSD, chronic  4. Type 1 diabetes       PLAN     Location: Unit 5  Legal Status: 72 hour hold    Safety Assessment:    Behavioral Orders   Procedures     Ambulatory insulin infusion pump alert for imaging tests     Pump must be temporarily suspended and removed for MRI, CT, or if there is a potential direct exposure to x-ray beams     Ambulatory insulin infusion pump alert for surgery     IF patient is scheduled for surgery, perform blood glucose and disconnect pump immediately prior to leaving the floor for the OR     Code 1 - Restrict to Unit     Routine Programming     As clinically indicated     Status 15     Every 15 minutes.      PTA medications continued/changed:   -Hydroxyzine 50 mg PRN anxiety  -Insulin pump per PTA orders  -Restart Lexapro 5 mg daily, increase to 10 mg daily on 6/13- " monitor for any manic symptoms    New medications tried and stopped:     -None    New medications initiated:     -None. Consider addition of mood stabilizer if symptoms warrant need for further regulation    Today's Changes:    - None    Programming: Patient will be treated in a therapeutic milieu with appropriate individual and group therapies. Education will be provided on diagnoses, medications, and treatments.     Medical diagnoses:  Type 1 diabetes    Consult: diabetes educator  Tests: blood glucose as ordered    Anticipated LOS: 2-3 days  Disposition: home with outpatient follow-up (psych, therapy, crisis resources)       TREATMENT TEAM CARE PLAN     Progress: continue to monitor for symptoms.    Continued Stay Criteria/Rationale: monitor for lexus/psychotic symptoms to ensure stability prior to discharge    Medical/Physical: See above.    Precautions: See above.     Plan: Continue inpatient care with unit support and medication management.    Rationale for change in precautions or plan: NA due to no change.    Participants: Drea Westbrook NP, Nursing, SW, OT.    The patient's care was discussed with the treatment team and chart notes were reviewed.       ATTESTATION      Drea Westbrook NP

## 2022-06-13 NOTE — CARE PLAN
"Prior to Admission Medication Reconciliation:     Medications added:   [] None  [x] As listed below:    tums- pt reported    Vit d and multivitamin- tries to remember to take    zofran- removed by endocrinology on 6/1, pt reports still having at home, \"has one left she has been saving for when she really needs it\".     Medications deleted:   [x] None  [] As listed below:    Medications marked for review/removal by attending:  [x] None  [] As listed below:    Changes made to existing medications:   [x] None  [] Updated strengths and frequencies to most current.   [] As listed below:      Pertinent notes/medications patient takes different than prescribed:     Hydroxyzine- pt reports that she was told she can take 1-2 tabs PRN, reports she has tried it, but it has been ineffective for her anxiety    Pt was kind enough to show me her pump and information in pump settings matches PTA meds    Input how pt uses correction scale if her pump fails for novolog    Pt confirmed she uses only 9 units BID of lantus for pump failure     Uses prilosec only PRN, prefers taking tums    lexapro- stopped by endocrinology on 6/1, but pt reports she is supposed to be on, she just hasn't been taking. Uncertain if it works    Last times/dates taken verified with patient:  [] Yes- completed myself  [] Prepared PTA medlist for review only. (will not be available to review personally)  [] Did not review with patient. Rx verification only. Review completed by nursing.    [x] Nurse completed no changes made (double checked entries)  [] Unable to review with patient at this time:    Allergies listed at another location:  []Allergies match allergies listed in Epic  []Other allergies listed:    Allergy review:    [x]Did not review: reviewed by nursing  []Did not review: pt unable at this time  []Verified current existing allergies or NKDA: no changes made   []Patient confirmed current existing allergies and new allergies added:    Medication " reconciliation sources:   [x]Patient  []Patient family member/emergency contact: **  []Aria Baer Report Review  [x]Epic Chart Review:    []Care Everywhere review  []Pharmacy med list/phone call: **  []Outside meds dispense report: see below  []Nursing home or Assisted Living MAR:  []Other: **      [facility]    Is patient on coumadin?   [x]No  []Yes    Requests for consultation by provider or pharmacist:   [x] Patient understands why all of their meds were prescribed and how to take them. No questions.   [] Managing party has no questions.   [] Patient/ managing party has questions about the following:  [] Did not review with patient. Cannot assess.     Fill dates and reported compliancy:  [x] Fill dates coincide with reported compliancy for all/most maintenance meds.   [] Not applicable. Patient is not taking any maintenance medications at this time.   [] Fill dates do not coincide with compliancy with maintenance meds. See notes in PTA medlist and in comments.    [] Fill dates do not coincide with the following medications but pt reports compliancy:  [] Did not review with patient. Cannot assess.     Historian accuracy:  [x] Excellent- alert and oriented, understands why meds were prescribed and how to take, able to answer specifics  [] Good- alert and oriented, understands why meds were prescribed and how to take, some confusion   [] Fair- alert and oriented, doesn't know medications without list, cannot answer specifics about medications, but has a decent process for which to take at home  [] Poor- does not know medications, may not have a process to take at home, may be cognitively unable to review at this time  []Medication management done by family member or facility, no concerns about historian accuracy.   [] Did not review with patient. Cannot assess.     Medication Management:  [x] Manages meds independently  [] Family member/ other party manages meds/assists:  [] Meds  managed by staff at facility  [] Meds set up by home care, family/other party helps administer  [] Meds set up by home care, self administers  [] Did not review with patient. Cannot assess.     Other medications aside from PTA:  [x] Denies taking any medications aside from those listed in PTA meds  [] Reports taking another medication(s) but cannot recall the name(s)  [] Refuses to say.  [] Did not review with patient. Cannot assess.     Comments: knows her diabetic medication very well.     Renetta Carrion on 6/13/2022 at 9:32 AM       Notifying appropriate party of changes/additions/discrepancies:  [x]No pertinent changes made, notification not necessary.   [] Notified attending provider via text page/phone call  [] Notified attending provider in person  [] Notified pharmacy  [] Notified nurse  [] Medications have not been reconciled by a provider yet, notification not necessary  [] Pt is not admitted to floor yet, PTA meds completed before admission.     Medications Prior to Admission   Medication Sig Dispense Refill Last Dose     blood glucose (CONTOUR NEXT TEST) test strip Use to test blood sugar 5-6 times daily or as directed. 150 each 4 6/12/2022 at Unknown time     blood glucose monitoring (TYE MICROLET) lancets Use to test blood sugar 4-5 times daily or as directed. 100 each 4 6/12/2022 at Unknown time     calcium carbonate (TUMS) 500 MG chewable tablet Take 1 chew tab by mouth as needed for heartburn   Past Week at Unknown time     cholecalciferol (VITAMIN D3) 25 mcg (1000 units) capsule Take 1 capsule by mouth daily   Past Week at Unknown time     escitalopram (LEXAPRO) 20 MG tablet Take 20 mg by mouth daily   More than a month at Unknown time     hydrOXYzine (ATARAX) 25 MG tablet Take 1 tablet (25 mg) by mouth nightly as needed for anxiety (sleep) (Patient taking differently: Take 25-50 mg by mouth nightly as needed for anxiety (sleep)) 30 tablet 1 More than a month at Unknown time     Insulin Aspart  (INSULIN PUMP - OUTPATIENT) Inject Subcutaneous See Admin Instructions Type of pump: Medtronic MiniMed 770G  Type of insulin: Novolog   Basal rate(s)  8305-2770: 0.9 unit/hr  0709-7738: 0.85 units/hr  1349-8260: 0.675 units/hr  7796-1214: 0.55 units/hr  ISF: 30 mg\dL  ICF (insulin to carb ratio)  7313-3353: 1unit/5g carbs  0336-3221: 1 unit/10g carbs  7730-6302: 1 unit/8g carbs  Active insulin time: 3 hrs  Target goal range:   Followed by endocrinology   6/12/2022 at Unknown time     insulin aspart (NOVOLOG PEN) 100 UNIT/ML pen Use 3x/day with meals (carb correction scale). Also correction scale with correction factor 1:18 (Patient taking differently: Use 3x/day with meals (carb correction scale- uses 1 unit/5g carbs at breakfast, 1 unit/10 grams carbs at lunch and 1 unit/8g carbs at dinner).) 15 mL 2 Past Month at Unknown time     insulin aspart (NOVOLOG VIAL) 100 UNITS/ML vial Use up to 80 units daily in insulin pump. 90 day supply. 30 mL 2 6/12/2022 at Unknown time     insulin glargine (LANTUS PEN) 100 UNIT/ML pen Inject 20 Units Subcutaneous 2 times daily If you have pump failure (Patient taking differently: Inject 9 Units Subcutaneous 2 times daily (Uses ~18 units/day basal in pump, so when off pump, uses ~ 9 units bid)     If you have pump failure) 12 mL 0 Unknown at Unknown time     Multiple Vitamins-Minerals (MULTIVITAMIN GUMMIES ADULT PO) Take 1 Dose by mouth daily   Past Week at Unknown time     omeprazole (PRILOSEC) 20 MG DR capsule TAKE 1 CAPSULE(20 MG) BY MOUTH DAILY (Patient taking differently: Take 20 mg by mouth daily as needed) 90 capsule 0 Past Month at Unknown time               Medication Dispense History (from 6/13/2021 to 6/11/2022)  Expand All  Collapse All    Cefdinir     Dispensed Days Supply Quantity Provider Pharmacy   CEFDINIR 300MG CAPSULES 10/30/2021 7 14 Units [facility]        Escitalopram Oxalate     Dispensed Days Supply Quantity  Provider Pharmacy   ESCITALOPRAM OXALATE 20 MG TABS 05/18/2022 30 30 [facility]   ESCITALOPRAM 20MG TABLETS 04/07/2022 30 30 [facility]   ESCITALOPRAM 20MG TABLETS 03/10/2022 30 30 [facility]        Insulin Aspart     Dispensed Days Supply Quantity Provider Pharmacy   INSULIN ASPART FLEXPEN 100 UNIT/ML INSULIN ASPART FLEXPEN 100 UNIT/ML SOPN 06/09/2022 45 15 pen [facility]   INSULIN ASPART 100 UNIT/ML SOLN 06/01/2022 37 30 mL [facility] [place]   INSULIN ASPART 100/ML [id number] 04/28/2022 37 30 [facility]...   INSULIN ASPART FLEXPEN INJ 3ML 04/14/2022 45 15 mL [facility]   INSULIN ASPART 100 UNIT/ML SOLN 04/13/2022 90 30 mL [facility] [place] #...   INSULIN ASPART 100/ML [id number] 09/19/2021 87 70 mL [facility] [place] [facility]   INSULIN ASPART 100 UNIT/ML SOLN 08/29/2021 87 70 mL [facility]...        Omeprazole     Dispensed Days Supply Quantity Provider Pharmacy   OMEPRAZOLE 20 MG CPDR 06/07/2022 90 90 applicator [facility]   OMEPRAZOLE 20MG CAPSULES 03/10/2022 90 90 Units [facility]        Ondansetron     Dispensed Days Supply Quantity Provider Pharmacy   ONDANSETRON 4 MG TBDP 01/15/2022 4 10 tablet VARSHA FLORES [facility] Burn...   ONDANSETRON ODT 4MG TABLETS 10/30/2021 2 10 Units [facility] #...        hydrOXYzine HCl     Dispensed Days Supply Quantity Provider Pharmacy   HYDROXYZINE HCL 25MG TABS (WHITE) 03/10/2022 30 30 Units JESSICA FENG [facility] #...        Disclaimer    Certain dispenses may not be available or accurate in this report, including over-the-counter medications, low cost  prescriptions, prescriptions paid for by the patient or non-participating sources, or errors in insurance claims information. The provider should independently verify medication history with the patient.    External Sources

## 2022-06-13 NOTE — DISCHARGE INSTRUCTIONS
Behavioral Discharge Planning and Instructions    Summary: Patient is a 21 year old female who was brought to the [facility] ED by her mother for evaluation of erratic behavior.     Main Diagnosis:     1. Unspecified mood disorder, r/o MDD (by history) versus bipolar 2  2. R/o mood disorder d/t medical condition (type 1 diabetes)  3. PTSD, chronic  4. Type 1 diabetes      Health Care Follow-up:     [facility]  [address] [place] [address]  Appointment: The clinic will be contacting the patient to schedule after hospital follow up with Naomi Reynolds APRN [facility]   [phone]    [employer]  The patient requested to contact and schedule her own therapy appointments.   [facility]   [address]  [address]  [place] [address]  Phone: [phone]   Fax: [phone]      Other Mental Health Resources & options:    [facility]  [address]  [place], [place] [address] [phone]  [facility]  [facility] [address] , [address]  [place] [address] [phone] Phone  [phone] Fax          Attend all scheduled appointments with your outpatient providers. Call at least 24 hours in advance if you need to reschedule an appointment to ensure continued access to your outpatient providers.     Major Treatments, Procedures and Findings:  You were provided with: a psychiatric assessment, assessed for medical stability, medication evaluation and/or management, group therapy, family therapy, individual therapy, CD evaluation/assessment, milieu management, and medical interventions    Symptoms to Report: feeling more aggressive, increased confusion, losing more sleep, mood getting worse, or thoughts of suicide    Early warning signs can include: increased depression or anxiety sleep disturbances increased thoughts or behaviors of suicide or self-harm  increased unusual thinking, such as paranoia or hearing  "voices        Resources:   Crisis Intervention: 133.863.4902 or 326-165-4067 (TTY: 274.738.1013).  Call anytime for help.  National [place] on Mental Illness (www.[place].radha.org): 577.714.9788 or 443-559-9766.  [place] Association for Children's Mental Health (www.mac.org): 297.296.4570.  Alcoholics Anonymous (www.alcoholics-anonymous.org): Check your phone book for your local chapter.  Suicide Awareness Voices of Education (SAVE) (www.save.org): 068-011-SFXD (9811)  National Suicide Prevention Line (www.mentalhealthmn.org): 421-109-NZBV (8261)  Mental Health Consumer/Survivor Network of [place] (www.mhcsn.net): 130.403.4195 or 753-920-3158  Mental Health Association of [place] (www.mentalhealth.org): 247.548.1988 or 087-035-1462  Self- Management and Recovery Training., SMART-- Toll free: 724.223.8754  www.Flypad  Text 4 Life: txt \"LIFE\" to 70017 for immediate support and crisis intervention  Crisis text line: Text \"[place]\" to 576894. Free, confidential, 24/7.  Crisis Intervention: 437.857.1769 or 588-305-5868. Call anytime for help.     General Medication Instructions:   See your medication sheet(s) for instructions.   Take all medicines as directed.  Make no changes unless your doctor suggests them.   Go to all your doctor visits.  Be sure to have all your required lab tests. This way, your medicines can be refilled on time.  Do not use any drugs not prescribed by your doctor.  Avoid alcohol.    Advance Directives:   Scanned document on file with [place]? No scanned doc  Is document scanned? Pt states no documents  Honoring Choices Your Rights Handout: Informed and given  Was more information offered? Pt declined    The Treatment team has appreciated the opportunity to work with you. If you have any questions or concerns about your recent admission, you can contact the unit which can receive your call 24 hours a day, 7 days a week. They will be able to get in touch with a Provider if needed. The unit number is " "[phone]     [place] Area:  [facility], Crisis stabilization [facility]- [phone]  [facility] Crisis Line: 1-153.327.3564  Advocates For Family Peace: 223-2797  [facility]: 222.920.9224 or 1-985.746.9014  [place] Battered Women's Program: 5-511-268-7634 Ext: 279       Calls answered Mon-Fri-8:00 am--4:30 pm    [place]:  Advocates for Family Peace: 5-679-911-9421  [facility] - 3-751-426-4668  [facility] first call for help: 1-798-017-7011  [facility]:  (317) 521-5044    [place] Area:  Warm Line: 1-119.139.7733       Calls answered Tuesday--Saturday 4:00 pm--10:00 pm  [facility] Crisis Line - 542.875.6521  [place] [facility] 027-053-6800    [place] Statewide:  [place] Crisis and Referral Services: 1-388-043-2099  National Suicide Prevention Lifeline: 4-105-512-TALK (5725)   - xdc0hoab- Text \"Life\" to 62947  First Call for Help: 2-1-1  [facility] Helpline- 0-180-EIHH-HELP   Crisis Text Line: Text  [place]  to 335836   "
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
## 2022-06-13 NOTE — PLAN OF CARE
"  Problem: Behavioral Health Plan of Care  Goal: Patient-Specific Goal (Individualization)  Description: Pt will eat at least 50% of meals.   Pt will sleep 6-8 hours per night.   Pt will be medication compliant.   Pt will attend 50% of groups.   Outcome: Ongoing, Progressing  Note: Report received from Rose. Rounding complete. Pt observed sleeping in left side lying position with regular and unlabored respirations.    Per previous shift regarding basal rate that we are supposed to be charting shiftly: \"Pt asked about her insulin basal rate from the ambulatory pump, pt states that it is 18.225 per day and does not break down to a certain amount between 5556-4628. Pt frustrated stating her pump is a self monitoring system and she is not going to try telling us rates that don't apply to her system. \" There are also new orders for accu-checks to be done before meals, at bedtime, 0200, and 0500. Writer will follow these parameters.     0200   0500  Pt gave herself a correction dose of 3.7 Units per her insulin pump instructions on screen.  0700     Pt has been in bed with eyes closed and regular respirations. 15 minute and PRN checks all night. No complaints offered. Will continue to monitor.    Pt slept approx  6.5  hours this NOC shift.    Face to face end of shift report communicated to oncoming RN.    Ayala BARRIENTOS RN  June 12, 2022  11:53 PM         Problem: Thought Process Alteration  Goal: Optimal Thought Clarity  Description: Pt will be able to have a linear, reality based conversation.   Outcome: Ongoing, Progressing  Note: Unable to assess due to pt sleeping. No issues or concerns noted at this time.                           "

## 2022-06-13 NOTE — PLAN OF CARE
Face to face report received from Ayala GOMEZ. Pt. Observed.     Problem: Behavioral Health Plan of Care  Goal: Patient-Specific Goal (Individualization)  Description: Pt will eat at least 50% of meals.   Pt will sleep 6-8 hours per night.   Pt will be medication compliant.   Pt will attend 50% of groups.   Outcome: Ongoing, Progressing  Pt. Denies HI, SI, hallucinations and pain. Pt. Reporting moderate anxiety and depression. PT. Requesting /administered PRN Atarax 50 mg po @ 0743 with effective results. Pt. Reporting her scheduled Lexapro is not helping her anxiety and he will talk to the provider today about changing it. Pt. Is cooperative with nursing assessment and medications. PT. In agreement to update staff to thoughts feelings of wanting to harm self or others and be safe with insulin pump.   Pt. Feeling like her BG was not right requesting/administered Accu-check @ 1016 with BG of 162 Pt. pump corrected insulin bolus by 0.4 units. Pt. Requesting Diabetic education. Mari in diabetic education called with update order for education was in. Mari unsure as to when the appointment can take place.     Bolus pt. Added to her insulin pump  0800 13 units for breakfast  0815 2 units for coffee  1016 correction dose of 0.4 units  1126 1 unit for coffee  1200 6 units for lunch  1306 0.6 units correction dose   Face to face end of shift report communicated to oncoming PATRICIA.     Janice Maldonado RN  6/13/2022

## 2022-06-14 LAB
GLUCOSE BLDC GLUCOMTR-MCNC: 134 MG/DL (ref 70–99)
GLUCOSE BLDC GLUCOMTR-MCNC: 164 MG/DL (ref 70–99)
GLUCOSE BLDC GLUCOMTR-MCNC: 170 MG/DL (ref 70–99)
GLUCOSE BLDC GLUCOMTR-MCNC: 172 MG/DL (ref 70–99)
GLUCOSE BLDC GLUCOMTR-MCNC: 214 MG/DL (ref 70–99)
GLUCOSE BLDC GLUCOMTR-MCNC: 246 MG/DL (ref 70–99)

## 2022-06-14 PROCEDURE — 99231 SBSQ HOSP IP/OBS SF/LOW 25: CPT | Performed by: NURSE PRACTITIONER

## 2022-06-14 PROCEDURE — 99239 HOSP IP/OBS DSCHRG MGMT >30: CPT | Performed by: NURSE PRACTITIONER

## 2022-06-14 PROCEDURE — 124N000001 HC R&B MH

## 2022-06-14 PROCEDURE — 250N000013 HC RX MED GY IP 250 OP 250 PS 637: Performed by: NURSE PRACTITIONER

## 2022-06-14 RX ORDER — HYDROXYZINE HYDROCHLORIDE 10 MG/1
10 TABLET, FILM COATED ORAL EVERY 4 HOURS PRN
Status: DISCONTINUED | OUTPATIENT
Start: 2022-06-14 | End: 2022-06-15 | Stop reason: HOSPADM

## 2022-06-14 RX ORDER — HYDROXYZINE HYDROCHLORIDE 10 MG/1
10 TABLET, FILM COATED ORAL EVERY 4 HOURS PRN
Qty: 60 TABLET | Refills: 1 | Status: SHIPPED | OUTPATIENT
Start: 2022-06-14 | End: 2023-09-08

## 2022-06-14 RX ORDER — ESCITALOPRAM OXALATE 10 MG/1
10 TABLET ORAL DAILY
Qty: 30 TABLET | Refills: 1 | Status: SHIPPED | OUTPATIENT
Start: 2022-06-15 | End: 2022-08-15

## 2022-06-14 RX ADMIN — Medication 25 MCG: at 08:28

## 2022-06-14 RX ADMIN — ESCITALOPRAM OXALATE 10 MG: 10 TABLET ORAL at 08:28

## 2022-06-14 RX ADMIN — MULTIPLE VITAMINS W/ MINERALS TAB 1 TABLET: TAB at 08:28

## 2022-06-14 RX ADMIN — OLANZAPINE 10 MG: 10 TABLET, FILM COATED ORAL at 20:19

## 2022-06-14 ASSESSMENT — ACTIVITIES OF DAILY LIVING (ADL)
ORAL_HYGIENE: INDEPENDENT
HYGIENE/GROOMING: INDEPENDENT
DRESS: INDEPENDENT;SCRUBS (BEHAVIORAL HEALTH)
HYGIENE/GROOMING: INDEPENDENT
ORAL_HYGIENE: INDEPENDENT
LAUNDRY: UNABLE TO COMPLETE
DRESS: SCRUBS (BEHAVIORAL HEALTH);INDEPENDENT

## 2022-06-14 NOTE — PLAN OF CARE
MARINO MCDANIELS RN  6/14/2022  7:45 AM  Face to face shift report received from PATRICIA Romero. Rounding completed, pt observed.     1414-Pt will be discharging tomorrow via boyfriend around 1100.  She is looking forward to going home.  Attended groups.       1122-Blood glucose 170.  Pt added results into insulin pump.  Pt compliant and educated with insulin pump as she does this daily.    1010-Reports sleeping well.  Denies SI, HI, hallucinations, pain, anxiety, and depression.  Pt was looking forward to calling her fiance as it's his birthday.  Cooperative with staff.  Insulin given by pump and pt very knowledgeable with counting carbs and setting pump as needed.  Pt took mom off BASIA.       Problem: Behavioral Health Plan of Care  Goal: Patient-Specific Goal (Individualization)  Description: Pt will eat at least 50% of meals.   Pt will sleep 6-8 hours per night.   Pt will be medication compliant.   Pt will attend 50% of groups.   Outcome: Ongoing, Progressing     Problem: Thought Process Alteration  Goal: Optimal Thought Clarity  Description: Pt will be able to have a linear, reality based conversation.   Outcome: Ongoing, Progressing   Goal Outcome Evaluation:  Face to face end of shift report communicated to oncoming shift RN.
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
## 2022-06-14 NOTE — PLAN OF CARE
Face to face report received from Ayala AMIN RN. Pt. Observed.     Problem: Behavioral Health Plan of Care  Goal: Patient-Specific Goal (Individualization)  Description: Pt will eat at least 50% of meals.   Pt will sleep 6-8 hours per night.   Pt will be medication compliant.   Pt will attend 50% of groups.   Outcome: Ongoing, Progressing  Note: Pt has been in bed with eyes closed and regular respirations for a total of 7 hours this noc shift. 15 minute and PRN checks all night. No complaints offered. Will continue to monitor.       Face to face end of shift report to be communicated to oncoming RN.     Janice Maldonado RN  6/14/2022

## 2022-06-14 NOTE — H&P
[facility]    History and Physical  Medical Services       Date of Admission:  6/11/2022  Date of Service (when I saw the patient): 06/14/22    Assessment & Plan     Principal Problem:    Psychosis (H)    Active Medical Problems:    Type 1 diabetes, HbA1c goal < 8% (H)- pt admitted on 6/11 with insulin pump. Pt brought own pump supplies and is able to carb count and manage pump. Nursing is doing accuchecks. Blood sugars are stable. She is on a consistent carb diet. Pt is able to contract for safety and denies any SI. Diabetes educator has been consulted.     Pt medically stable, no acute medical concerns. Chronic medical problems stable. Will sign off. Please consult for any new medical issues or concerns.         Code Status: Full Code    Margo Marquez CNP    Primary Care Physician   Naomi Reynolds    Chief Complaint   Psych evaluation     History is obtained from the patient and medical chart     History of Present Illness   (per ED) Margo Cruz is a 21 year old female who presents with manic behavior. The patient was seen here in the ED on June 7th for erratic behavior and disorganized thoughts. She is an insulin dependent diabetic who removed her insulin pump and was stablized in the ED. She spoke with DEC and was discharged with follow up for mental health outpatient treatment. She was discharged yesterday. Today she was at home and her boyfriend left for work. The mother came to pick her up and bring her to her house to be observed due to previous erratic behavior. She instead found no one in the home. She filed a Missing Person's Report and she was found after she wandered into a  and the  called the police. She also left the home in a stranger's vehicle before being dropped off at a different location. She was vaping before presenting to the ED. She drinks alcohol and uses marijuana occasionally. She also removed her insulin pump again. Her last blood sugar was at  100.    Past Medical History    I have reviewed this patient's medical history and updated it with pertinent information if needed.   Past Medical History:   Diagnosis Date     Diabetes mellitus      Seborrheic infantile dermatitis        Past Surgical History   I have reviewed this patient's surgical history and updated it with pertinent information if needed.  No past surgical history on file.    Prior to Admission Medications   Prior to Admission Medications   Prescriptions Last Dose Informant Patient Reported? Taking?   Insulin Aspart (INSULIN PUMP - OUTPATIENT) 6/12/2022 at Unknown time  Yes Yes   Sig: Inject Subcutaneous See Admin Instructions Type of pump: [employer] MiniMPressgram 770G  Type of insulin: Novolog   Basal rate(s)  6949-9787: 0.9 unit/hr  8683-9053: 0.85 units/hr  9824-1781: 0.675 units/hr  8138-1657: 0.55 units/hr  ISF: 30 mg\dL  ICF (insulin to carb ratio)  7264-2882: 1unit/5g carbs  5866-9260: 1 unit/10g carbs  5336-2054: 1 unit/8g carbs  Active insulin time: 3 hrs  Target goal range:   Followed by endocrinology   Multiple Vitamins-Minerals (MULTIVITAMIN GUMMIES ADULT PO) Past Week at Unknown time  Yes Yes   Sig: Take 1 Dose by mouth daily   blood glucose (CONTOUR NEXT TEST) test strip 6/12/2022 at Unknown time  No Yes   Sig: Use to test blood sugar 5-6 times daily or as directed.   blood glucose monitoring (TYE MICROLET) lancets 6/12/2022 at Unknown time  No Yes   Sig: Use to test blood sugar 4-5 times daily or as directed.   calcium carbonate (TUMS) 500 MG chewable tablet Past Week at Unknown time  Yes Yes   Sig: Take 1 chew tab by mouth as needed for heartburn   cholecalciferol (VITAMIN D3) 25 mcg (1000 units) capsule Past Week at Unknown time  Yes Yes   Sig: Take 1 capsule by mouth daily   escitalopram (LEXAPRO) 20 MG tablet More than a month at Unknown time Pharmacy Yes Yes   Sig: Take 20 mg by mouth daily   hydrOXYzine (ATARAX) 25 MG tablet More than a month at Unknown time  No Yes    Sig: Take 1 tablet (25 mg) by mouth nightly as needed for anxiety (sleep)   Patient taking differently: Take 25-50 mg by mouth nightly as needed for anxiety (sleep)   insulin aspart (NOVOLOG PEN) 100 UNIT/ML pen Past Month at Unknown time  No Yes   Sig: Use 3x/day with meals (carb correction scale). Also correction scale with correction factor 1:18   Patient taking differently: Use 3x/day with meals (carb correction scale- uses 1 unit/5g carbs at breakfast, 1 unit/10 grams carbs at lunch and 1 unit/8g carbs at dinner).   insulin aspart (NOVOLOG VIAL) 100 UNITS/ML vial 6/12/2022 at Unknown time Pharmacy No Yes   Sig: Use up to 80 units daily in insulin pump. 90 day supply.   insulin glargine (LANTUS PEN) 100 UNIT/ML pen Unknown at Unknown time  No Yes   Sig: Inject 20 Units Subcutaneous 2 times daily If you have pump failure   Patient taking differently: Inject 9 Units Subcutaneous 2 times daily (Uses ~18 units/day basal in pump, so when off pump, uses ~ 9 units bid)     If you have pump failure   omeprazole (PRILOSEC) 20 MG DR capsule Past Month at Unknown time Pharmacy No Yes   Sig: TAKE 1 CAPSULE(20 MG) BY MOUTH DAILY   Patient taking differently: Take 20 mg by mouth daily as needed   ondansetron (ZOFRAN ODT) 4 MG ODT tab Past Month at Unknown time  Yes Yes   Sig: Take 4 mg by mouth every 6 hours as needed for nausea      Facility-Administered Medications: None     Allergies   Allergies   Allergen Reactions     Gluten Meal      Other reaction(s): *Unknown     No Known Drug Allergies        Social History   I have reviewed this patient's social history and updated it with pertinent information if needed. Margo Cruz  reports that she has quit smoking. Her smoking use included cigarettes. She smoked 0.10 packs per day. She has never used smokeless tobacco. She reports current alcohol use. She reports current drug use. Drug: Marijuana.    Family History   I have reviewed this patient's family history and  updated it with pertinent information if needed.   Family History   Problem Relation Age of Onset     Breast Cancer Mother         BRCA negative     Diabetes Father      Lung Cancer Maternal Grandfather      GERD Paternal Grandfather        Review of Systems   CONSTITUTIONAL:  negative  EYES:  negative  HEENT:  negative  RESPIRATORY:  negative  CARDIOVASCULAR:  negative  GASTROINTESTINAL:  negative  GENITOURINARY:  negative  INTEGUMENT/BREAST:  negative  HEMATOLOGIC/LYMPHATIC:  negative  ALLERGIC/IMMUNOLOGIC:  negative  ENDOCRINE:  negative  MUSCULOSKELETAL:  negative  NEUROLOGICAL:  negative    Physical Exam   Temp: 97.8  F (36.6  C) Temp src: Temporal BP: 123/87 Pulse: 90   Resp: 18 SpO2: 100 % O2 Device: None (Room air)    Vital Signs with Ranges  Temp:  [97.8  F (36.6  C)-98.9  F (37.2  C)] 97.8  F (36.6  C)  Pulse:  [81-90] 90  Resp:  [14-18] 18  BP: (123-145)/(67-87) 123/87  SpO2:  [98 %-100 %] 100 %  109 lbs 4.8 oz    Constitutional: awake, alert, cooperative, no apparent distress, and appears stated age, vitals stable   Eyes: Lids and lashes normal, pupils equal, round and reactive to light, extra ocular muscles intact, sclera clear, conjunctiva normal  ENT: Normocephalic, without obvious abnormality, atraumatic,  external ears without lesions, oral pharynx with moist mucous membranes, tonsils without erythema or exudates, gums normal and good dentition.  Hematologic / Lymphatic: no cervical lymphadenopathy  Respiratory: No increased work of breathing, good air exchange, clear to auscultation bilaterally, no crackles or wheezing  Cardiovascular: Normal apical impulse, regular rate and rhythm, normal S1 and S2, no S3 or S4, and no murmur noted  GI: normal bowel sounds, soft, non-distended, non-tender, no masses palpated, no hepatosplenomegally  Genitounirinary: deferred   Skin: normal skin color, texture, turgor, no redness, warmth, or swelling and no rashes  Musculoskeletal: There is no redness, warmth, or  swelling of the joints.  Full range of motion noted.    Neurologic: Awake, alert, oriented to name, place and time.   Neuropsychiatric: General: normal, calm and normal eye contact    Data   Data reviewed today:   Recent Labs   Lab 06/14/22  1120 06/14/22  0652 06/14/22  0507 06/10/22  0400 06/09/22  2334 06/08/22  1648 06/08/22  1007 06/08/22  0550 06/08/22  0156 06/07/22  2228 06/07/22 2023   WBC  --   --   --   --   --   --   --   --   --   --  7.7   HGB  --   --   --   --   --   --   --   --   --   --  13.0   MCV  --   --   --   --   --   --   --   --   --   --  93   PLT  --   --   --   --   --   --   --   --   --   --  271   NA  --   --   --   --  141  --  141  --  138  --  139   POTASSIUM  --   --   --   --  3.5  --  4.0  --  3.8  --  4.1   CHLORIDE  --   --   --   --  108  --  109  --  110*  --  105   CO2  --   --   --   --  26  --  27  --  25  --  22   BUN  --   --   --   --  6*  --  10  --  11  --  13   CR  --   --   --   --  0.57  --  0.60  --  0.58  --  0.65   ANIONGAP  --   --   --   --  7  --  5  --  3  --  12   BEN  --   --   --   --  8.5  --  8.5  --  8.1*  --  8.8   * 172* 214*   < > 169*   < > 150*   < > 254*   < > 417*   ALBUMIN  --   --   --   --   --   --   --   --   --   --  3.7   PROTTOTAL  --   --   --   --   --   --   --   --   --   --  6.9   BILITOTAL  --   --   --   --   --   --   --   --   --   --  0.5   ALKPHOS  --   --   --   --   --   --   --   --   --   --  59   ALT  --   --   --   --   --   --   --   --   --   --  14   AST  --   --   --   --   --   --   --   --   --   --  16    < > = values in this interval not displayed.       No results found for this or any previous visit (from the past 24 hour(s)).

## 2022-06-14 NOTE — PLAN OF CARE
Problem: Behavioral Health Plan of Care  Goal: Patient-Specific Goal (Individualization)  Description: Pt will eat at least 50% of meals.   Pt will sleep 6-8 hours per night.   Pt will be medication compliant.   Pt will attend 50% of groups.   Outcome: Ongoing, Progressing     Problem: Thought Process Alteration  Goal: Optimal Thought Clarity  Description: Pt will be able to have a linear, reality based conversation.   Outcome: Ongoing, Progressing     Problem: Suicidal Behavior  Goal: Suicidal Behavior is Absent or Managed  Outcome: Ongoing, Progressing   Goal Outcome Evaluation:    Plan of Care Reviewed With: patient      Pt. Has been up and about on unit, ambulating in the halls frequently with other female pts., eating at least 50% of meals, taking prescribed medications, slept 7 hours last night, is able to make needs be known, denies suicidal ideation, depression, and hallucinations, cooperative with assessments and blood glucose checks, attending at least 50% of group therapy, will continue to monitor progress.  2019-  Pt. Requested/received zyprexa 10 mg po for anxiety/agitation/sleep.      Face to face end of shift report will be communicated to oncoming night shift RN.     Ayala Mcdonnell RN  6/14/2022  5:49 PM

## 2022-06-14 NOTE — PROGRESS NOTES
"[facility] PSYCHIATRY  PROGRESS NOTE     SUBJECTIVE   Margo is up in the lounge, agrees to meet in her room. Her affect is bright. She reports feeling \"pretty good\" and is hopeful to get home soon. She is able to have a linear and logical conversation, behavior has been calm and appropriate. She is tolerating Lexapro and feels that Hydroxyzine has been beneficial for her anxiety. Denies side effects. Has been attending groups and states she has been working in a WRAP packet to come up with a good safety plan at discharge. Discussed getting set up with a therapist, which she is agreeable to do. She states she is also going to look into groups available in her area for further support. She has been managing her insulin pump appropriately and blood glucose has been relatively stable while in the hospital. Patient denies any thoughts of harming self or others. States that her boyfriend can come pick her up tomorrow, so will plan on discharge at that time.       MEDICATIONS   Scheduled Meds:    escitalopram  10 mg Oral Daily     insulin aspart   Device See Admin Instructions     insulin bolus from AMBULATORY PUMP   Subcutaneous See Admin Instructions     multivitamin w/minerals  1 tablet Oral Daily     Vitamin D3  25 mcg Oral Daily     PRN Meds:.acetaminophen, alum & mag hydroxide-simethicone, calcium carbonate, glucose **OR** dextrose **OR** glucagon, hydrOXYzine, melatonin, nicotine, OLANZapine **OR** OLANZapine, ondansetron, senna-docusate     ALLERGIES   Allergies   Allergen Reactions     Gluten Meal      Other reaction(s): *Unknown     No Known Drug Allergies         MENTAL STATUS EXAM   Vitals: /84   Pulse 81   Temp 98.8  F (37.1  C) (Temporal)   Resp 16   Ht 1.6 m (5' 3\")   Wt 49.6 kg (109 lb 4.8 oz)   LMP  (LMP Unknown)   SpO2 97%   BMI 19.36 kg/m      Appearance:  awake, alert, adequately groomed, dressed in hospital scrubs and appeared as age stated  Attitude:  cooperative, pleasant  Eye " Contact:  good  Mood:  better  Affect:  brighter  Speech:  clear, coherent  Psychomotor Behavior:  no evidence of tardive dyskinesia, dystonia, or tics  Thought Process:  logical, linear and goal oriented  Associations:  no loose associations  Thought Content:  no evidence of suicidal ideation or homicidal ideation and no evidence of psychotic thought  Insight:  fair  Judgment:  intact  Oriented to:  time, person, and place  Attention Span and Concentration:  intact  Recent and Remote Memory:  intact  Language: Able to name objects, Able to repeat phrases and Able to read and write  Fund of Knowledge: appropriate  Muscle Strength and Tone: normal  Gait and Station: Normal       LABS   Recent Results (from the past 24 hour(s))   Glucose by meter    Collection Time: 06/13/22  7:51 PM   Result Value Ref [facility]    GLUCOSE BY METER POCT 186 (H) 70 - 99 mg/dL   Glucose by meter    Collection Time: 06/13/22 10:27 PM   Result Value Ref Range    GLUCOSE BY METER POCT 170 (H) 70 - 99 mg/dL   Glucose by meter    Collection Time: 06/14/22  2:06 AM   Result Value Ref Range    GLUCOSE BY METER POCT 134 (H) 70 - 99 mg/dL   Glucose by meter    Collection Time: 06/14/22  5:07 AM   Result Value Ref Range    GLUCOSE BY METER POCT 214 (H) 70 - 99 mg/dL   Glucose by meter    Collection Time: 06/14/22  6:52 AM   Result Value Ref Range    GLUCOSE BY METER POCT 172 (H) 70 - 99 mg/dL   Glucose by meter    Collection Time: 06/14/22 11:20 AM   Result Value Ref [facility]    GLUCOSE BY METER POCT 170 (H) 70 - 99 mg/dL   Glucose by meter    Collection Time: 06/14/22  5:01 PM   Result Value Ref Range    GLUCOSE BY METER POCT 164 (H) 70 - 99 mg/dL         IMPRESSION     This is a 21 year old female with a PMH of depression, anxiety, PTSD, type 1 diabetes, and celiac diease who presented to the ED with erratic behavior. Was found wandering into a day care acting erratically. Was found to be disorganized by DEC . Patient indicates that her  "behavior prior to admission was related to her type 1 diabetes. States in January she was hospitalized for blood glucose over 1,000 after an issue with her insulin pump. Patient does manage own insulin pump with blood glucose relatively well managed so far here in the hospital. She reports she will start to feel \"foggy\" when blood sugars are \"off\". Throughout our discussion today she is able to carry a linear and reality based conversation. She denies any hallucinations and behavior has been controlled. She does wish to restart on a medication, most recently was taking Lexapro for depression and anxiety and Hydroxyzine for anxiety as needed, will restart these today. Will continue to monitor mood and thoughts over the next few days to determine whether addition of a mood stabilizer is warranted. Of note, patient's mother reports that her father has a history of schizophrenia so there is possibility of new emergence of mental health symptoms though her at times dysregulated blood glucose could also explain her odd, confused behavior as well. She is interested in seeing a therapist to work on processing past trauma that has recently resurfaced.        DIAGNOSES     1. Unspecified mood disorder, r/o MDD (by history) versus bipolar 2 versus substance induced  2. R/o mood disorder d/t medical condition (type 1 diabetes)  3. PTSD, chronic  4. Cannabis use disorder  5. Type 1 diabetes       PLAN     Location: Unit 5  Legal Status: 72 hour hold    Safety Assessment:    Behavioral Orders   Procedures     Ambulatory insulin infusion pump alert for imaging tests     Pump must be temporarily suspended and removed for MRI, CT, or if there is a potential direct exposure to x-ray beams     Ambulatory insulin infusion pump alert for surgery     IF patient is scheduled for surgery, perform blood glucose and disconnect pump immediately prior to leaving the floor for the OR     Code 1 - Restrict to Unit     Routine Programming     As " clinically indicated     Status 15     Every 15 minutes.      PTA medications continued/changed:   -Hydroxyzine 50 mg PRN anxiety  -Insulin pump per PTA orders  -Restart Lexapro 5 mg daily, increase to 10 mg daily on 6/13- monitor for any manic symptoms    New medications tried and stopped:     -None    New medications initiated:     -None. Consider addition of mood stabilizer if symptoms warrant need for further regulation    Today's Changes:    - None    Programming: Patient will be treated in a therapeutic milieu with appropriate individual and group therapies. Education will be provided on diagnoses, medications, and treatments.     Medical diagnoses:  Type 1 diabetes    Consult: diabetes educator  Tests: blood glucose as ordered    Anticipated LOS: 1 more day, plan for discharge home with boyfriend on 6/14  Disposition: home with outpatient follow-up (psych, therapy, crisis resources)       ATTESTATION      Drea Westbrook NP

## 2022-06-14 NOTE — PROGRESS NOTES
1:1 time with the patient.  No changes made to the discharge plan at this time.   See the AVS for follow up appointments and recommendations.     ALAINA spoke to the patient. She shared that she is going to be transported home by her boyfriend Ramu tomorrow @ 11:00 am.     ALAINA spoke to the patient again. The patient stated that she would only like other therapy resources put in her discharge summery. She explained she would like to schedule her own therapy appointments.

## 2022-06-15 VITALS
WEIGHT: 109.3 LBS | SYSTOLIC BLOOD PRESSURE: 129 MMHG | TEMPERATURE: 97.9 F | OXYGEN SATURATION: 99 % | RESPIRATION RATE: 14 BRPM | HEIGHT: 63 IN | BODY MASS INDEX: 19.37 KG/M2 | HEART RATE: 97 BPM | DIASTOLIC BLOOD PRESSURE: 83 MMHG

## 2022-06-15 LAB
GLUCOSE BLDC GLUCOMTR-MCNC: 164 MG/DL (ref 70–99)
GLUCOSE BLDC GLUCOMTR-MCNC: 184 MG/DL (ref 70–99)
GLUCOSE BLDC GLUCOMTR-MCNC: 184 MG/DL (ref 70–99)

## 2022-06-15 PROCEDURE — 250N000013 HC RX MED GY IP 250 OP 250 PS 637: Performed by: NURSE PRACTITIONER

## 2022-06-15 RX ADMIN — MULTIPLE VITAMINS W/ MINERALS TAB 1 TABLET: TAB at 08:07

## 2022-06-15 RX ADMIN — ESCITALOPRAM OXALATE 10 MG: 10 TABLET ORAL at 08:07

## 2022-06-15 RX ADMIN — Medication 25 MCG: at 08:07

## 2022-06-15 NOTE — DISCHARGE SUMMARY
[facility] PSYCHIATRY  DISCHARGE SUMMARY     DISCHARGE DATA     Margo Cruz MRN# [id number]   Age: 21 year old YOB: 2000     Date of Admission: 6/11/2022  Date of Discharge: 6/15/2022  Discharge Provider: Drea Westbrook NP       REASON FOR ADMISSION   Patient is a 21 year old female who was brought to the [facility] ED by her mother for evaluation of erratic behavior. She reportedly had been seen in the ED on 6/7 for disorganized thoughts though was discharged home to follow-up with outpatient appointments. The day of admission patient's mother had gone to pick her up and found out she had left the home. Family was unable to find her and a missing person's report was filed. She was found after wandering into a day care and police were called. During these periods she would sporadically remove her insulin pump. She is a type 1 diabetic with insulin pump for management. She was placed on a 72 hour hold and transferred to behavioral health for further evaluation.        DISCHARGE DIAGNOSES     1. Unspecified mood disorder, r/o MDD (by history) versus bipolar 2 versus substance induced  2. R/o mood disorder d/t medical condition (type 1 diabetes)  3. PTSD, chronic  4. Cannabis use disorder  5. Type 1 diabetes       CONSULTS     Diabetes education consult       HOSPITAL COURSE     Legal status: 72 hour hold    Patient was admitted to unit 5 due to the aforementioned presentation. The patient was placed under 15 minute checks to ensure patient safety. The patient participated in unit programming and groups as able. She was able to self-manage her insulin pump during her stay. Throughout her stay patient exhibited linear, logical thought process and behavior was appropriate and calm. She denied any hallucinations of thoughts of harming self or others. She denied any increase in impulsivity, grandiosity, changes in sleep. Requested to restart Lexapro which she reported being helpful for anxiety and  depression. Tolerated this well, no exacerbation of any mental health symptoms noted. She was sleeping well. She did attend group programming and was visible in the unit milieu. It is still unclear exactly what prompted the events leading to admission, patient believes that her fluctuating blood glucose levels have played a part in her disorganization and confusion. There is also possibility of new onset major mood or psychotic disorder given father's diagnosis of schizophrenia however no symptoms were observed in the hospital to definitively explain behavior changes in the past week. She was very interested in establishing with a therapist and is interested in group therapy as well. 72 hour hold will  today and patient would like to discharge home. Boyfriend is able to come and pick her up.     The following changes to the patient's psychiatric medications were made:    PTA medications held:     -None    PTA medications continued/changed:     -Hydroxyzine 50 mg PRN anxiety  -Insulin pump per PTA orders  -Restart Lexapro 5 mg daily, increase to 10 mg daily on - monitor for any manic symptoms    New medications tried and stopped:     -None    New medications initiated:     -None.    With these changes and supports the patient noticed improvement in their symptoms and felt sufficiently ready for discharge. As a result, Margo Cruz was discharged. At the time of discharge, Magro Cruz was determined to not be a danger to self or others. The patient was also medically stable for discharge. At the current time of discharge, the patient does not meet criteria for involuntary hospitalization. On the day of discharge, the patient reports that they do not have suicidal or homicidal ideation. Steps taken to minimize risk include: assessing patient s behavior and thought process daily during hospital stay, discharging patient with adequate plan for follow up for mental and physical health and discussing  safety plan of returning to the hospital should the patient ever have thoughts of harming themselves or others. Therefore, based on all available evidence including the factors cited above, the patient does not appear to be at imminent risk for self-harm, and is appropriate for outpatient level of care. However, if patient uses substances or is medication non-adherent, their risk of decompensation and SI will be elevated. This was discussed with the patient.       DISCHARGE MEDICATIONS     Current Discharge Medication List      CONTINUE these medications which have CHANGED    Details   escitalopram (LEXAPRO) 10 MG tablet Take 1 tablet (10 mg) by mouth daily  Qty: 30 tablet, Refills: 1    Associated Diagnoses: Unspecified mood (affective) disorder (H)      hydrOXYzine (ATARAX) 10 MG tablet Take 1 tablet (10 mg) by mouth every 4 hours as needed for anxiety  Qty: 60 tablet, Refills: 1    Associated Diagnoses: Unspecified mood (affective) disorder (H)         CONTINUE these medications which have NOT CHANGED    Details   blood glucose (CONTOUR NEXT TEST) test strip Use to test blood sugar 5-6 times daily or as directed.  Qty: 150 each, Refills: 4    Associated Diagnoses: Type 1 diabetes, HbA1c goal < 8% (H)      blood glucose monitoring (TYE MICROLET) lancets Use to test blood sugar 4-5 times daily or as directed.  Qty: 100 each, Refills: 4    Associated Diagnoses: Type 1 diabetes, HbA1c goal < 8% (H)      calcium carbonate (TUMS) 500 MG chewable tablet Take 1 chew tab by mouth as needed for heartburn      cholecalciferol (VITAMIN D3) 25 mcg (1000 units) capsule Take 1 capsule by mouth daily      Insulin Aspart (INSULIN PUMP - OUTPATIENT) Inject Subcutaneous See Admin Instructions Type of pump: Suninfo Information MiniMSnapjoy 770G  Type of insulin: Novolog   Basal rate(s)  0954-5576: 0.9 unit/hr  4368-6883: 0.85 units/hr  4277-6100: 0.675 units/hr  3570-6959: 0.55 units/hr  ISF: 30 mg  ICF (insulin to carb ratio)  1905-5480:  "1unit/5g carbs  6026-3882: 1 unit/10g carbs  1219-0779: 1 unit/8g carbs  Active insulin time: 3 hrs  Target goal range:   Followed by endocrinology      insulin aspart (NOVOLOG PEN) 100 UNIT/ML pen Use 3x/day with meals (carb correction scale). Also correction scale with correction factor 1:18  Qty: 15 mL, Refills: 2    Associated Diagnoses: Type 1 diabetes, HbA1c goal < 8% (H)      insulin aspart (NOVOLOG VIAL) 100 UNITS/ML vial Use up to 80 units daily in insulin pump. 90 day supply.  Qty: 30 mL, Refills: 2    Associated Diagnoses: Type 1 diabetes, HbA1c goal < 8% (H)      insulin glargine (LANTUS PEN) 100 UNIT/ML pen Inject 20 Units Subcutaneous 2 times daily If you have pump failure  Qty: 12 mL, Refills: 0    Comments: If Lantus is not covered by insurance, may substitute Basaglar or Semglee or other insulin glargine product per insurance preference at same dose and frequency.    Associated Diagnoses: Type 1 diabetes, HbA1c goal < 8% (H)      Multiple Vitamins-Minerals (MULTIVITAMIN GUMMIES ADULT PO) Take 1 Dose by mouth daily      omeprazole (PRILOSEC) 20 MG DR capsule TAKE 1 CAPSULE(20 MG) BY MOUTH DAILY  Qty: 90 capsule, Refills: 0    Associated Diagnoses: Abdominal pain, epigastric      ondansetron (ZOFRAN ODT) 4 MG ODT tab Take 4 mg by mouth every 6 hours as needed for nausea           Reason for two or more neuroleptics: not applicable       MENTAL STATUS EXAM   Vitals: /84   Pulse 81   Temp 98.8  F (37.1  C) (Temporal)   Resp 16   Ht 1.6 m (5' 3\")   Wt 49.6 kg (109 lb 4.8 oz)   LMP  (LMP Unknown)   SpO2 97%   BMI 19.36 kg/m      Appearance: Alert, oriented, dressed in hospital scrubs, appears stated age   Attitude: Cooperative   Eye Contact: Good  Mood: \"Better\"  Affect: brighter  Speech: Normal rate and rhythm   Psychomotor Behavior: No tremor, rigidity, or psychomotor abnormality   Thought Process: Logical, goal directed, linear  Associations: No loose associations   Thought Content: " Denies SI or plan. No SIB. Denies A/V hallucinations. No evidence of delusional thought.  Insight: Good  Judgment: Good  Oriented to: Person, place, and time  Attention Span and Concentration: Intact  Recent and Remote Memory: Intact  Language: English with appropriate syntax and vocabulary  Fund of Knowledge: Average  Muscle Strength and Tone: Grossly normal  Gait and Station: Grossly normal       DISCHARGE PLAN     1.  Education given regarding diagnostic and treatment options with risks, benefits and alternatives with adequate verbalization of understanding.  2.  Discharge to home. Upon detailed review of risk factors, patient amenable for release.   3.  Continue aforementioned medications and associated medication changes with follow-up by outpatient provider.  4.  Crisis management planning in place.    5.  Nursing and  to review further discharge recommendations.   6.  Patient is being discharged to home with the following appointments as detailed below.      Health Care Follow-up:      [facility] [place]  [address] [place] [address] [address] [place] [address]  Appointment: virtual visit 6/20/22 @ 3:30PM after hospital follow up with Naomi Reynolds APRN CNP   [phone]     [employer]  The patient requested to contact and schedule her own therapy appointments.   [facility]   [address]  [address]  [place] [address]  Phone: [phone]   Fax: [phone]       Other Mental Health Resources & options:     [facility]  [address]  [place] [address]   [phone]  [facility] [address] , [address]  [place] [address] [phone] Phone  [phone] Fax          DISCHARGE SERVICES PROVIDED     40 minutes spent on discharge services, including:  Final examination of patient.  Review and discussion of hospital stay.  Instructions for continued outpatient care/goals.  Preparation of  discharge records.  Preparation of medications refills and new prescriptions.  Preparation of applicable referral forms.        ATTESTATION     Drea Westbrook NP       LABS THIS ADMISSION     Results for orders placed or performed during the hospital encounter of 06/11/22   Glucose by meter     Status: Abnormal   Result Value Ref Range    GLUCOSE BY METER POCT 220 (H) 70 - 99 mg/dL   Glucose by meter     Status: Abnormal   Result Value Ref Range    GLUCOSE BY METER POCT 225 (H) 70 - 99 mg/dL   Glucose by meter     Status: Abnormal   Result Value Ref Range    GLUCOSE BY METER POCT 162 (H) 70 - 99 mg/dL   Glucose by meter     Status: Normal   Result Value Ref Range    GLUCOSE BY METER POCT 76 70 - 99 mg/dL   Glucose by meter     Status: Abnormal   Result Value Ref Range    GLUCOSE BY METER POCT 115 (H) 70 - 99 mg/dL   Glucose by meter     Status: Abnormal   Result Value Ref Range    GLUCOSE BY METER POCT 100 (H) 70 - 99 mg/dL   Glucose by meter     Status: Abnormal   Result Value Ref Range    GLUCOSE BY METER POCT 193 (H) 70 - 99 mg/dL   Glucose by meter     Status: Normal   Result Value Ref Range    GLUCOSE BY METER POCT 83 70 - 99 mg/dL   Glucose by meter     Status: Abnormal   Result Value Ref Range    GLUCOSE BY METER POCT 50 (LL) 70 - 99 mg/dL   Glucose by meter     Status: Normal   Result Value Ref Range    GLUCOSE BY METER POCT 76 70 - 99 mg/dL   Glucose by meter     Status: Abnormal   Result Value Ref Range    GLUCOSE BY METER POCT 233 (H) 70 - 99 mg/dL   Glucose by meter     Status: Abnormal   Result Value Ref Range    GLUCOSE BY METER POCT 192 (H) 70 - 99 mg/dL   Glucose by meter     Status: Abnormal   Result Value Ref Range    GLUCOSE BY METER POCT 168 (H) 70 - 99 mg/dL   Glucose by meter     Status: Abnormal   Result Value Ref Range    GLUCOSE BY METER POCT 261 (H) 70 - 99 mg/dL   Glucose by meter     Status: Abnormal   Result Value Ref Range    GLUCOSE BY METER POCT 167 (H) 70 - 99 mg/dL   Glucose by  meter     Status: Abnormal   Result Value Ref Range    GLUCOSE BY METER POCT 162 (H) 70 - 99 mg/dL   Glucose by meter     Status: Abnormal   Result Value Ref Range    GLUCOSE BY METER POCT 172 (H) 70 - 99 mg/dL   Glucose by meter     Status: Abnormal   Result Value Ref Range    GLUCOSE BY METER POCT 245 (H) 70 - 99 mg/dL   Glucose by meter     Status: Abnormal   Result Value Ref Range    GLUCOSE BY METER POCT 186 (H) 70 - 99 mg/dL   Glucose by meter     Status: Abnormal   Result Value Ref Range    GLUCOSE BY METER POCT 170 (H) 70 - 99 mg/dL   Glucose by meter     Status: Abnormal   Result Value Ref Range    GLUCOSE BY METER POCT 134 (H) 70 - 99 mg/dL   Glucose by meter     Status: Abnormal   Result Value Ref Range    GLUCOSE BY METER POCT 214 (H) 70 - 99 mg/dL   Glucose by meter     Status: Abnormal   Result Value Ref Range    GLUCOSE BY METER POCT 172 (H) 70 - 99 mg/dL   Glucose by meter     Status: Abnormal   Result Value Ref Range    GLUCOSE BY METER POCT 170 (H) 70 - 99 mg/dL   Glucose by meter     Status: Abnormal   Result Value Ref Range    GLUCOSE BY METER POCT 164 (H) 70 - 99 mg/dL   Glucose by meter     Status: Abnormal   Result Value Ref Range    GLUCOSE BY METER POCT 246 (H) 70 - 99 mg/dL

## 2022-06-15 NOTE — PLAN OF CARE
Discharge Note  MARINO MCDANIELS RN  6/15/2022  9:20 AM    Patient Discharged to home on 6/15/2022 9:27 AM via Private Car accompanied by boyfriend Ramu.     Patient informed of discharge instructions in AVS. patient verbalizes understanding and denies having any questions pertaining to AVS. Patient stable at time of discharge. Patient denies SI, HI, and thoughts of self harm at time of discharge. All personal belongings returned to patient. Discharge prescriptions sent to J[facility] ROSAS via electronic communication. Psych evaluation, history and physical, AVS, and discharge summary faxed to next level of care- by Don .           MARINO MCDANIELS RN  6/15/2022  7:31 AM  Face to face shift report received from PATRICIA Gao.    0845-Pt calculated insulin pump appropriatly with her 45 carbs from breakfast.   0813-Blood sugar 164.  Pt excited about discharge.        Rounding completed, pt observed.Goal Outcome Evaluation:    Plan of Care Reviewed With: patient

## 2022-06-15 NOTE — PLAN OF CARE
Problem: Behavioral Health Plan of Care  Goal: Patient-Specific Goal (Individualization)  Description: Pt will eat at least 50% of meals.   Pt will sleep 6-8 hours per night.   Pt will be medication compliant.   Pt will attend 50% of groups.     Patient in bed with eyes closed and regular respirations noted.   0200-blood glucose was 184.  0500-blood glucose was 184.  She slept about 7 hours last night.  Face to face end of shift report communicated to oncoming RN.     Sima Reed RN  6/15/2022  6:09 AM    Outcome: Ongoing, Progressing   Goal Outcome Evaluation:

## 2022-06-15 NOTE — PROGRESS NOTES
Pt is discharging at the recommendation of the treatment team. Pt is discharging to home transported by her boyfriend Ramu. Pt denies having any thoughts of hurting themself or anyone else. Pt denies anxiety or depression. Pt has follow up with her pcp - Kaela Adamson, and she was provided with other outpatient resources for individual therpy. Discharge instructions, including; demographic sheet, psychiatric evaluation, discharge summary, and AVS were faxed to these next level of care providers.

## 2022-07-08 NOTE — PROGRESS NOTES
Hx of COPD, was treated with steroid taper recently, has lung nodule as well, recently hospitalized for pneumonia. I cannot make a treatment recommend via phone call. If he feels cough worsened or fever developed, then seek urgent care today, otherwise okay to wait until her appointment. Continue her inhaler medications.  May have ongoing cough due to recovery from her pneumonia and COPD still, she is within 6 weeks of significant pneumonia and post airway cough syndrome could be it, but again, hard to tell without exam. Patient AO4 with a flat affect. NEVILLE DOWD. LSC, equal bilaterally. Afebrile T. Normotensive. SR without ectopy. Abdomen soft, passing flatus. Making urine. Denies SOB or pain. Intermittent nausea without vomiting. Some dry heaving. Compazine effective. No appetite/PO intake overnight. No skin issues, pale, pulses palpable. Ambulating around room and to bathroom, standby assist with lines. Still on insulin and D5 1/2 NS + KCL gtt. Vanco and zosyn continued. Phos 1.2, correcting per protocol.

## 2022-07-21 ENCOUNTER — TELEPHONE (OUTPATIENT)
Dept: ENDOCRINOLOGY | Facility: CLINIC | Age: 22
End: 2022-07-21

## 2022-07-21 ENCOUNTER — MEDICAL CORRESPONDENCE (OUTPATIENT)
Dept: HEALTH INFORMATION MANAGEMENT | Facility: CLINIC | Age: 22
End: 2022-07-21

## 2022-07-21 NOTE — TELEPHONE ENCOUNTER
Form was faxed and sent to scanning.      Cammie Flores, [facility]  [place]

## 2022-07-21 NOTE — TELEPHONE ENCOUNTER
[employer] form for pump/dm/cgm supplies.    LAST OFFICE/VIRTUAL VISIT:  06/01/22    FUTURE OFFICE/VIRTUAL VISIT:  None    Lab Results   Component Value Date    A1C 8.0 06/01/2022    A1C 9.9 01/11/2022    A1C 10.3 10/26/2021    A1C 8.3 06/04/2020    A1C 7.5 01/23/2020    A1C 7.3 04/23/2015    A1C 7.9 06/18/2014    A1C 7.4 10/11/2013         Cammie Flores CMA  [facility]  [place]

## 2022-08-08 ENCOUNTER — VIRTUAL VISIT (OUTPATIENT)
Dept: PEDIATRICS | Facility: CLINIC | Age: 22
End: 2022-08-08
Payer: COMMERCIAL

## 2022-08-08 DIAGNOSIS — Z53.9 ERRONEOUS ENCOUNTER--DISREGARD: Primary | ICD-10-CM

## 2022-08-08 NOTE — PROGRESS NOTES
"Margo is a 21 year old who is being evaluated via a billable video visit.      How would you like to obtain your AVS? {AVS Preference:962299}  If the video visit is dropped, the invitation should be resent by: {video visit invitation (Optional) :282701}  Will anyone else be joining your video visit? {:153663}  {If patient encounters technical issues they should call [phone] :972424}        {PROVIDER CHARTING PREFERENCE:303232}    Subjective   Margo is a 21 year old{ACCOMPANIED BY STATEMENT (Optional):252477}, presenting for the following health issues:  No chief complaint on file.      HPI     {SUPERLIST (Optional):472167}  {additonal problems for provider to add (Optional):135417}    Review of Systems   {ROS COMP (Optional):734829}      Objective           Vitals:  No vitals were obtained today due to virtual visit.    Physical Exam   {video visit exam brief selected:377112::\"GENERAL: Healthy, alert and no distress\",\"EYES: Eyes grossly normal to inspection.  No discharge or erythema, or obvious scleral/conjunctival abnormalities.\",\"RESP: No audible wheeze, cough, or visible cyanosis.  No visible retractions or increased work of breathing.  \",\"SKIN: Visible skin clear. No significant rash, abnormal pigmentation or lesions.\",\"NEURO: Cranial nerves grossly intact.  Mentation and speech appropriate for age.\",\"PSYCH: Mentation appears normal, affect normal/bright, judgement and insight intact, normal speech and appearance well-groomed.\"}    {Diagnostic Test Results (Optional):875848}    {AMBULATORY ATTESTATION (Optional):372224}      .  ..     Phone visit ***minutes    "

## 2022-08-15 ENCOUNTER — VIRTUAL VISIT (OUTPATIENT)
Dept: PEDIATRICS | Facility: CLINIC | Age: 22
End: 2022-08-15
Payer: COMMERCIAL

## 2022-08-15 DIAGNOSIS — E10.9 TYPE 1 DIABETES, HBA1C GOAL < 8% (H): Primary | ICD-10-CM

## 2022-08-15 DIAGNOSIS — F39 MOOD DISORDER (H): ICD-10-CM

## 2022-08-15 PROBLEM — R11.2 NAUSEA WITH VOMITING: Status: RESOLVED | Noted: 2021-10-27 | Resolved: 2022-08-15

## 2022-08-15 PROBLEM — E87.20 LACTIC ACIDOSIS: Status: RESOLVED | Noted: 2021-10-27 | Resolved: 2022-08-15

## 2022-08-15 PROBLEM — E10.69: Status: RESOLVED | Noted: 2021-10-27 | Resolved: 2022-08-15

## 2022-08-15 PROBLEM — E10.65: Status: RESOLVED | Noted: 2021-10-27 | Resolved: 2022-08-15

## 2022-08-15 PROBLEM — E11.10 DKA (DIABETIC KETOACIDOSIS) (H): Status: RESOLVED | Noted: 2022-01-11 | Resolved: 2022-08-15

## 2022-08-15 PROBLEM — F29 PSYCHOSIS (H): Status: RESOLVED | Noted: 2022-06-11 | Resolved: 2022-08-15

## 2022-08-15 PROBLEM — R65.10 SIRS (SYSTEMIC INFLAMMATORY RESPONSE SYNDROME) (H): Status: RESOLVED | Noted: 2021-10-27 | Resolved: 2022-08-15

## 2022-08-15 PROBLEM — E83.39 HYPOPHOSPHATEMIA: Status: RESOLVED | Noted: 2021-10-27 | Resolved: 2022-08-15

## 2022-08-15 PROBLEM — R79.89 ELEVATED PROCALCITONIN: Status: RESOLVED | Noted: 2021-10-27 | Resolved: 2022-08-15

## 2022-08-15 PROBLEM — R10.9 FLANK PAIN: Status: RESOLVED | Noted: 2021-10-27 | Resolved: 2022-08-15

## 2022-08-15 PROBLEM — E83.41 HYPERMAGNESEMIA: Status: RESOLVED | Noted: 2022-01-11 | Resolved: 2022-08-15

## 2022-08-15 PROBLEM — E87.0: Status: RESOLVED | Noted: 2021-10-27 | Resolved: 2022-08-15

## 2022-08-15 PROBLEM — E87.29 HIGH ANION GAP METABOLIC ACIDOSIS: Status: RESOLVED | Noted: 2022-01-11 | Resolved: 2022-08-15

## 2022-08-15 PROBLEM — E10.10 DKA, TYPE 1 (H): Status: RESOLVED | Noted: 2021-10-26 | Resolved: 2022-08-15

## 2022-08-15 PROCEDURE — 99214 OFFICE O/P EST MOD 30 MIN: CPT | Mod: 95 | Performed by: NURSE PRACTITIONER

## 2022-08-15 RX ORDER — OLANZAPINE 10 MG/1
10 TABLET ORAL AT BEDTIME
Qty: 90 TABLET | Refills: 3 | Status: ON HOLD | OUTPATIENT
Start: 2022-08-15 | End: 2024-09-24

## 2022-08-15 RX ORDER — LAMOTRIGINE 25 MG/1
TABLET ORAL
Qty: 338 TABLET | Refills: 0 | Status: SHIPPED | OUTPATIENT
Start: 2022-08-15 | End: 2022-11-21

## 2022-08-15 NOTE — PROGRESS NOTES
Margo is a 21 year old who is being evaluated via a billable phone visit.      Assessment & Plan     (E10.9) Type 1 diabetes, HbA1c goal < 8% (H)  (primary encounter diagnosis)  Comment: Improved control. States sugars have been in more of a normal range since the hospital but can't recall off the top of her head. No polyuria/polydypsia.   Plan: AMB Adult Diabetes Educator Referral  -Asked her to see diabetic ed to go over sugar readings and adjust pump  -Asked her to set up follow-up appt with endo    (F39) Mood disorder (H)  Comment: Unspecified mood disorder. In the hospital was started on lamictal and zypreza, feels that has been helping significantly. Lexapro was stopped. It does seem like she still has significant symptoms-very tearful on the phone. No SI/HI  Plan: Adult Mental Health  Referral,         OLANZapine (ZYPREXA) 10 MG tablet, lamoTRIgine         (LAMICTAL) 25 MG tablet  -Increase lamictal from 50mg to 75. If tolerating, increase to 100 in 2 weeks. Discussed risks including rash and when to call/seek care  -Agrees to see collaborative care for dx clarification. Also wondering about ADHD  -Discussed supportive cares and reasons to return. Discussed reasons to seek care urgently. Psych crisis resources discussed      No follow-ups on file.    SAE Demarco [facility]    Ashwin Aragon is a 21 year old, presenting for the following health issues:  No chief complaint on file.      HPI   Review of Systems   Constitutional, HEENT, cardiovascular, pulmonary, gi and gu systems are negative, except as otherwise noted.      Objective           Vitals:  No vitals were obtained today due to virtual visit.    Physical Exam   PSYCH: Bright affect. Appropriate mentation and speech but very tearful on the phone           Telephone visit: 25 minutes. An additional 15 minutes spent in chart review and coordination of care.   .  ..

## 2022-08-16 ENCOUNTER — TELEPHONE (OUTPATIENT)
Dept: PEDIATRICS | Facility: CLINIC | Age: 22
End: 2022-08-16

## 2022-08-16 NOTE — TELEPHONE ENCOUNTER
Diabetes Education Scheduling Outreach #1:    Call to patient to schedule. Left message with phone number to call to schedule.    Also sent [employer] message for second attempt. Requested patient to call to schedule.    Margot Sherwood OnCall  Diabetes and Nutrition Scheduling

## 2022-08-25 DIAGNOSIS — F39 MOOD DISORDER (H): ICD-10-CM

## 2022-08-29 RX ORDER — LAMOTRIGINE 25 MG/1
TABLET ORAL
Qty: 338 TABLET | Refills: 0 | OUTPATIENT
Start: 2022-08-29

## 2022-09-02 ENCOUNTER — TELEPHONE (OUTPATIENT)
Dept: ENDOCRINOLOGY | Facility: CLINIC | Age: 22
End: 2022-09-02

## 2022-09-02 DIAGNOSIS — E10.9 TYPE 1 DIABETES, HBA1C GOAL < 8% (H): ICD-10-CM

## 2022-09-02 NOTE — TELEPHONE ENCOUNTER
Per notes from June:    Repeat labs in 3 months.  Recommend diabetic educator follow-up for: Carbohydrate counting, insulin pump education and starting home CGM    Please contact patient and schedule labs with follow up. And schedule with CDE    First available with Dr.Parnekar bell if patient is seeing CDE

## 2022-09-13 RX ORDER — INSULIN ASPART 100 [IU]/ML
INJECTION, SOLUTION INTRAVENOUS; SUBCUTANEOUS
Qty: 70 ML | Refills: 0 | Status: SHIPPED | OUTPATIENT
Start: 2022-09-13 | End: 2022-12-28

## 2022-09-13 NOTE — TELEPHONE ENCOUNTER
Spoke to patient, appointment scheduled for labs and Dr. Holly for Dec.   Declined to schedule with CDE.

## 2022-11-20 ENCOUNTER — HEALTH MAINTENANCE LETTER (OUTPATIENT)
Age: 22
End: 2022-11-20

## 2022-11-20 DIAGNOSIS — F39 MOOD DISORDER (H): ICD-10-CM

## 2022-11-20 NOTE — TELEPHONE ENCOUNTER
Asking for a refill of her lamotrigine.  Pharmacy send a request in earlier today.    [facility] [address] [facility] [address]  at [facility] [place].      Routed: P [id number] ENEDINA PLAM RN [place] [facility]

## 2022-11-21 RX ORDER — LAMOTRIGINE 25 MG/1
100 TABLET ORAL DAILY
Qty: 120 TABLET | Refills: 0 | Status: SHIPPED | OUTPATIENT
Start: 2022-11-21 | End: 2022-12-21

## 2022-11-21 NOTE — TELEPHONE ENCOUNTER
The pt is aware and scheduled for her upcoming appointment. Please fill. Thank you.   Sosa Flores on 11/21/2022 at 12:43 PM

## 2022-11-21 NOTE — TELEPHONE ENCOUNTER
Needs virtual visit with PCP next available and will refill after scheduled.  Please call patient and route back once scheduled.    Luanne Todd MD

## 2022-12-07 DIAGNOSIS — E10.9 TYPE 1 DIABETES, HBA1C GOAL < 8% (H): Primary | ICD-10-CM

## 2022-12-17 ENCOUNTER — LAB (OUTPATIENT)
Dept: LAB | Facility: CLINIC | Age: 22
End: 2022-12-17
Payer: COMMERCIAL

## 2022-12-17 DIAGNOSIS — E10.9 TYPE 1 DIABETES, HBA1C GOAL < 8% (H): ICD-10-CM

## 2022-12-17 LAB — HBA1C MFR BLD: 7.1 % (ref 0–5.6)

## 2022-12-17 PROCEDURE — 83036 HEMOGLOBIN GLYCOSYLATED A1C: CPT

## 2022-12-17 PROCEDURE — 36415 COLL VENOUS BLD VENIPUNCTURE: CPT

## 2022-12-20 DIAGNOSIS — F39 MOOD DISORDER (H): ICD-10-CM

## 2022-12-20 NOTE — TELEPHONE ENCOUNTER
Routing refill request to provider for review/approval because:  Mone given x1 and patient did not follow up, please advise  See last refill, pt has appointment 12/26, ok to extend?  Destiny Le RN, BSN  [facility]

## 2022-12-21 RX ORDER — LAMOTRIGINE 25 MG/1
100 TABLET ORAL DAILY
Qty: 120 TABLET | Refills: 0 | Status: SHIPPED | OUTPATIENT
Start: 2022-12-21 | End: 2023-11-20

## 2022-12-26 ENCOUNTER — VIRTUAL VISIT (OUTPATIENT)
Dept: PEDIATRICS | Facility: CLINIC | Age: 22
End: 2022-12-26
Payer: COMMERCIAL

## 2022-12-26 DIAGNOSIS — F39 MOOD DISORDER (H): Primary | ICD-10-CM

## 2022-12-26 DIAGNOSIS — E10.9 TYPE 1 DIABETES, HBA1C GOAL < 8% (H): ICD-10-CM

## 2022-12-26 PROCEDURE — 99213 OFFICE O/P EST LOW 20 MIN: CPT | Mod: 95 | Performed by: NURSE PRACTITIONER

## 2022-12-26 NOTE — PROGRESS NOTES
Margo is a 22 year old who is being evaluated via a billable telephone visit.      Assessment & Plan   (F39) Mood disorder (H)  (primary encounter diagnosis)  Comment: Feels improved onteh increased lamictal. Still taking zyprexa. Stopped seeing her therapist. No SI/HI  Plan:   -Gave her the number again for pyshc. Discussed I cannot prescribe these meds long term.  -Asked her to start seeing therapy again    (E10.9) Type 1 diabetes, HbA1c goal < 8% (H)  Comment: Upcoming endo appt    No follow-ups on file.    Naomi Reynolds, SAE CNP  [facility]    Subjective   Margo is a 22 year old}, presenting for the following health issues:  No chief complaint on file.      HPI     Review of Systems   Constitutional, HEENT, cardiovascular, pulmonary, gi and gu systems are negative, except as otherwise noted.      Objective           Vitals:  No vitals were obtained today due to virtual visit.    Physical Exam   PSYCH: Appropriate mentation. No tears    Phone call duration: 5 minutes

## 2022-12-28 ENCOUNTER — OFFICE VISIT (OUTPATIENT)
Dept: ENDOCRINOLOGY | Facility: CLINIC | Age: 22
End: 2022-12-28
Payer: COMMERCIAL

## 2022-12-28 VITALS
WEIGHT: 130.9 LBS | HEART RATE: 127 BPM | OXYGEN SATURATION: 98 % | DIASTOLIC BLOOD PRESSURE: 79 MMHG | RESPIRATION RATE: 16 BRPM | HEIGHT: 63 IN | BODY MASS INDEX: 23.2 KG/M2 | TEMPERATURE: 99.1 F | SYSTOLIC BLOOD PRESSURE: 122 MMHG

## 2022-12-28 DIAGNOSIS — E10.9 TYPE 1 DIABETES, HBA1C GOAL < 8% (H): Primary | ICD-10-CM

## 2022-12-28 DIAGNOSIS — Z96.41 INSULIN PUMP STATUS: ICD-10-CM

## 2022-12-28 PROCEDURE — 99214 OFFICE O/P EST MOD 30 MIN: CPT | Performed by: INTERNAL MEDICINE

## 2022-12-28 RX ORDER — IBUPROFEN 600 MG/1
1 TABLET ORAL PRN
Qty: 1 KIT | Refills: 1 | Status: SHIPPED | OUTPATIENT
Start: 2022-12-28

## 2022-12-28 RX ORDER — INSULIN ASPART 100 [IU]/ML
INJECTION, SOLUTION INTRAVENOUS; SUBCUTANEOUS
Qty: 90 ML | Refills: 1 | Status: SHIPPED | OUTPATIENT
Start: 2022-12-28 | End: 2023-10-17

## 2022-12-28 NOTE — PROGRESS NOTES
Endocrinology Clinic Note:  Name: Margo Cruz  Seen for follow-up of type 1 diabetes.    She is here with her mother.  HPI:  Margo Cruz is a 22 year old female who presents for the evaluation/management of type 1 diabetes.   has a past medical history of Diabetes mellitus and Seborrheic infantile dermatitis.    Noted 6/2020 hospitalization for DKA-   It is thought that the likely problem with her injection sites from her insulin pump was kinked causing decreased insulin delivery.   Was followed by endocrinology at [facility] earlier.  Available records, labs and images from outside clinic were personally reviewed.    Is on insulin pump X 7 years.  She is using 770 G and sensors.    Reports that he has long acting insulin in case of pump malfunction at home.   Glucagon kit sent.  + wt gain. She has started medication for Bipolar.  Wt Readings from Last 2 Encounters:   12/28/22 59.4 kg (130 lb 14.4 oz)   06/01/22 50.8 kg (111 lb 14.4 oz)       Insulin Pump Information  Insulin Pump Type: Medtronic 770 G with sensor.  She is using manual mode 100% of the time.    Infusion Set: Medtronic  Medtronic Infusion Set: Silhouette  CGM: No  Insulin: using Insulin aspart in pump    Currently she is using Relion meter.     1. Type 1 DM:  Orginally diagnosed: 2011 (at age 9 years)  Current Regimen:   yes:     Diabetes Medication(s)     Insulin       Insulin Aspart (INSULIN PUMP - OUTPATIENT)    Inject Subcutaneous See Admin Instructions Type of pump: Medtronic MiniMed 770G  Type of insulin: Novolog   Basal rate(s)  3524-5193: 0.9 unit/hr  7832-4928: 0.85 units/hr  3675-9003: 0.675 units/hr  7781-6861: 0.55 units/hr  ISF: 30 mg\dL  ICF (insulin to carb ratio)  8515-7111: 1unit/5g carbs  3570-1159: 1 unit/10g carbs  8240-5523: 1 unit/8g carbs  Active insulin time: 3 hrs  Target goal range:   Followed by endocrinology     insulin aspart (NOVOLOG VIAL) 100 UNITS/ML vial    USE UP TO 80 UNITS DAILY IN INSULIN  PUMP 90 DAY SUPPLY     Insulin Aspart FlexPen 100 UNIT/ML SOPN    Inject 1 Units Subcutaneous 3 times daily (with meals) Use 3x/day with meals (carb correction scale- uses 1 unit/5g carbs at breakfast, 1 unit/10 grams carbs at lunch and 1 unit/8g carbs at dinner).     insulin glargine (LANTUS PEN) 100 UNIT/ML pen    Inject 20 Units Subcutaneous 2 times daily If you have pump failure     Patient taking differently: Inject 9 Units Subcutaneous 2 times daily (Uses ~18 units/day basal in pump, so when off pump, uses ~ 9 units bid)     If you have pump failure          BS checks: 3-4 times  Average Meter Download: Blood glucose data reviewed personally. See nursing note from this encounter for details.  Few episodes of low Bg in the setting of overcorrection.  Exercise:  Last A1c: 7.1%  Symptoms of hypoglycemia (low blood sugar):   Using PUMP:  Current Regimen:   Insulin pump -   Time Rate (U/hr)   0000-  1.6   0600  1.15   1200  1.00   2200  1.05     Carbohydrate Ratio -    Time Ratio   0000-  8.0   1000  1500 12.0  10.0     Sensitivity   30   Active Insulin Time   hours   Basal  29.7 units (58 %)   Bolus   21.1 units (42%)   Total Carbohydrates/day     Total Insulin/day   50.8 units/day   Average Blood Sugar                     DM Complications:   Complications:   Diabetes Complications  Description / Detail    Diabetic Retinopathy  No   CAD / PAD  No   Neuropathy  No   Nephropathy / Microalbuminuria  No  Lab Results   Component Value Date    UMALCR 17.29 01/23/2020         Gastroparesis  No   Hypoglycemia [place]  No          2. Hypertension:  Not on medication.  3. Hyperlipidemia: Not on medication.    PMH/PSH:  Past Medical History:   Diagnosis Date     Diabetes mellitus      Seborrheic infantile dermatitis      No past surgical history on file.  Family Hx:  Family History   Problem Relation Age of Onset     Breast Cancer Mother         BRCA negative     Diabetes Father      Lung Cancer Maternal Grandfather       GERD Paternal Grandfather            DM1: Father.           Social Hx:  Social History     Socioeconomic History     Marital status: Single     Spouse name: Not on file     Number of children: Not on file     Years of education: Not on file     Highest education level: Not on file   Occupational History     Not on file   Tobacco Use     Smoking status: Former     Packs/day: 0.10     Types: Cigarettes     Smokeless tobacco: Never     Tobacco comments:     mom smokes outside   Vaping Use     Vaping Use: Never used   Substance and Sexual Activity     Alcohol use: Yes     Comment: goes out 2-3 times per week and has up to 3-4 drinks at a time     Drug use: Yes     Types: Marijuana     Comment: few times a week     Sexual activity: Yes     Partners: Male     Birth control/protection: I.U.D.   Other Topics Concern     Not on file   Social History Narrative     Not on file     Social Determinants of Health     Financial Resource Strain: Not on file   Food Insecurity: Not on file   Transportation Needs: Not on file   Physical Activity: Not on file   Stress: Not on file   Social Connections: Not on file   Intimate Partner Violence: Not on file   Housing Stability: Not on file          MEDICATIONS:  has a current medication list which includes the following prescription(s): contour next test, blood glucose monitoring, calcium carbonate, cholecalciferol, hydroxyzine, insulin aspart, insulin aspart, insulin aspart flexpen, insulin glargine, lamotrigine, multiple vitamins-minerals, olanzapine, omeprazole, and ondansetron.    ROS     ROS: 10 point ROS neg other than the symptoms noted above in the HPI.    Physical Exam   VS: There were no vitals taken for this visit.  GENERAL: healthy, alert and no distress  EYES: Eyes grossly normal to inspection, conjunctivae and sclerae normal  ENT: no nose swelling, nasal discharge.  Thyroid: no apparent thyroid nodules.  Thyroid appears normal in size and nontender.  CV: RRR, no rubs,  gallops, no murmurs  RESP: CTAB, no wheezes, rales, or ronchi  ABDO: +BS  EXTREMITIES: no hand tremors.  NEURO: Cranial nerves grossly intact, mentation intact and speech normal  SKIN: No apparent skin lesions, rash or edema seen   PSYCH: mentation appears normal, affect normal/bright, judgement and insight intact, normal speech and appearance well-groomed.    LABS:  A1c:  Lab Results   Component Value Date    A1C 7.1 12/17/2022    A1C 8.0 06/01/2022    A1C 9.9 01/11/2022    A1C 10.3 10/26/2021    A1C 8.3 06/04/2020    A1C 7.5 01/23/2020    A1C 7.3 04/23/2015    A1C 7.9 06/18/2014    A1C 7.4 10/11/2013     BMP:   Creatinine   Date Value Ref Range Status   06/09/2022 0.57 0.52 - 1.04 mg/dL Final   06/05/2020 0.64 0.50 - 1.00 mg/dL Final       Urine Micro:  Lab Results   Component Value Date    UMALCR 12.31 03/10/2022    UMALCR 17.29 01/23/2020        LFTs/Lipids:  Recent Labs   Lab Test 03/10/22  1247 01/23/20  1538   CHOL 169 180*   HDL 46* 47   * 117*   TRIG 97 79     TFTs:  TSH   Date Value Ref Range Status   01/23/2020 0.79 0.40 - 4.00 mU/L Final       Blood Glucose and pump data/ Meter reviewed.     All pertinent notes, labs, and images personally reviewed by me.       Glucometer/ insulin pump (if applicable)/ CGM data (if applicable) downloaded, Personally reviewed and interpreted.  All Blood sugar data reviewed personally and interpreted as well as discussed with pt.    All past medical, social and Family history reviewed and updated in [facility].    A/P  Ms.Alyssa JENNI Cruz is a 22 year old here for the evaluation/management of diabetes:    1. DM1 - Under fair control.  A1c 7.1.  No known complications from Diabetes.  Few episodes of low BG likely in the setting of over correction.  Using Medtronic 770 G pump with Medtronic sensor.  Using in manual mode.  Limited blood sugar data available.   Plan:  Discussed diagnosis, pathophysiology, management and treatment options of condition with pt.  I also discussed  importance of strict blood sugar control to prevent complications associated with uncontrolled diabetes.  Change in I:C ratio at lunch and dinner.  Rest settings same.  Time Ratio   0000-  8.0   1000  1500 12.0-->11.0  10.0-->9.0     Follow up with CDE in 5-6 weeks for transition to Auto mode.  Continue to use pump and sensors.  Labs in 3-4 months.  Please make a lab appointment for blood work and follow up clinic appointment in 1 week after that to discuss results.    2. Hypertension -not on medication       3. Hyperlipidemia - Not on medication. .  FLP in acceptable range.   Consider recheck once BG under better control.  4. Prevention  Ophthalmology- recommend annually.  ASA- NA 2/2 to age.  Smoking- No  Foot exam: 6/1/2022      Most Recent Immunizations   Administered Date(s) Administered     COVID-19 Vaccine 12+ (Pfizer 2022) 03/10/2022     Comvax (HIB/HepB) 01/06/2003     DTAP (<7y) 02/09/2006     DTaP, Unspecified 08/30/2013     HPV9 03/10/2022     Influenza (IIV3) PF 12/04/2006     Influenza Vaccine >6 months (Alfuria,Fluzone) 03/10/2022     MMR 02/09/2006     Meningococcal ACWY (Menactra ) 08/30/2013     Meningococcal ACWY (Menveo ) 08/30/2013     Pneumococcal (PCV 7) 01/06/2003     Pneumococcal 23 valent 01/23/2020     Poliovirus, inactivated (IPV) 02/09/2006     TDAP Vaccine (Adacel) 08/30/2013     Varicella 08/30/2013         Recommend checking blood sugars before meals and at bedtime.    If Blood glucose are low more often-> 2-3 times/week- give us a call.  The patient is advised to Make better food choices: reduce carbs, Reduce portion size, weight loss and exercise 3-4 times a week.  Discussed hypoglycemia signs and symptoms as well as management in detail.      There is some variability among people, most will usually develop symptoms suggestive of hypoglycemia when blood glucose levels are lowered to the mid 60's. The first set of symptoms are called adrenergic. Patients may experience any of  the following nervousness, sweating, intense hunger, trembling, weakness, palpitations, and difficulty speaking. When BS fall below 50 the patient is unable to talk and take oral therapy.  Would recommend Glucagon emergency kit for the patient and education for family and friends around the patient.   The acute management of hypoglycemia involves the rapid delivery of a source of easily absorbed sugar. Regular soda, juice, lifesavers, table sugar, are good options. 15 grams of glucose is the dose that is given, followed by an assessment of symptoms and a blood glucose check if possible. If after 10 minutes there is no improvement, another 10-15 grams should be given. This can be repeated up to three times. The equivalency of 10-15 grams of glucose (approximate servings) are: Four lifesavers, 4 teaspoons of sugar, or 1/2 cup or 4 oz of juice or regular pop.    Follow-up:  3 months.    Karlee Holly M.D  Endocrinology  [facility]/[place] [place]  CC: Luanne Todd      All questions were answered.  The patient indicates understanding of the above issues and agrees with the plan set forth.     Disclaimer: This note consists of symbols derived from keyboarding, dictation and/or voice recognition software. As a result, there may be errors in the script that have gone undetected. Please consider this when interpreting information found in this chart.    Addendum to above note and clinic visit:    Labs reviewed.    See result note/telephone encounter.

## 2022-12-28 NOTE — LETTER
12/28/2022         RE: Margo Cruz  [address] [place] [place] [address]        Dear Colleague,    Thank you for referring your patient, Margo Cruz, to the [facility]. Please see a copy of my visit note below.    Endocrinology Clinic Note:  Name: Margo Cruz  Seen for follow-up of type 1 diabetes.    She is here with her mother.  HPI:  Margo Cruz is a 22 year old female who presents for the evaluation/management of type 1 diabetes.   has a past medical history of Diabetes mellitus and Seborrheic infantile dermatitis.    Noted 6/2020 hospitalization for DKA-   It is thought that the likely problem with her injection sites from her insulin pump was kinked causing decreased insulin delivery.   Was followed by endocrinology at [facility] earlier.  Available records, labs and images from outside clinic were personally reviewed.    Is on insulin pump X 7 years.  She is using 770 G and sensors.    Reports that he has long acting insulin in case of pump malfunction at home.   Glucagon kit sent.  + wt gain. She has started medication for Bipolar.  Wt Readings from Last 2 Encounters:   12/28/22 59.4 kg (130 lb 14.4 oz)   06/01/22 50.8 kg (111 lb 14.4 oz)       Insulin Pump Information  Insulin Pump Type: Medtronic 770 G with sensor.  She is using manual mode 100% of the time.    Infusion Set: Medtronic  Medtronic Infusion Set: Silhouette  CGM: No  Insulin: using Insulin aspart in pump    Currently she is using Relion meter.     1. Type 1 DM:  Orginally diagnosed: 2011 (at age 9 years)  Current Regimen:   yes:     Diabetes Medication(s)     Insulin       Insulin Aspart (INSULIN PUMP - OUTPATIENT)    Inject Subcutaneous See Admin Instructions Type of pump: Medtronic MiniMed 770G  Type of insulin: Novolog   Basal rate(s)  7136-1173: 0.9 unit/hr  5269-2018: 0.85 units/hr  2171-0520: 0.675 units/hr  8057-4782: 0.55 units/hr  ISF: 30 mg\dL  ICF (insulin to carb  ratio)  9095-5359: 1unit/5g carbs  2313-3143: 1 unit/10g carbs  9792-4495: 1 unit/8g carbs  Active insulin time: 3 hrs  Target goal range:   Followed by endocrinology     insulin aspart (NOVOLOG VIAL) 100 UNITS/ML vial    USE UP TO 80 UNITS DAILY IN INSULIN PUMP 90 DAY SUPPLY     Insulin Aspart FlexPen 100 UNIT/ML SOPN    Inject 1 Units Subcutaneous 3 times daily (with meals) Use 3x/day with meals (carb correction scale- uses 1 unit/5g carbs at breakfast, 1 unit/10 grams carbs at lunch and 1 unit/8g carbs at dinner).     insulin glargine (LANTUS PEN) 100 UNIT/ML pen    Inject 20 Units Subcutaneous 2 times daily If you have pump failure     Patient taking differently: Inject 9 Units Subcutaneous 2 times daily (Uses ~18 units/day basal in pump, so when off pump, uses ~ 9 units bid)     If you have pump failure          BS checks: 3-4 times  Average Meter Download: Blood glucose data reviewed personally. See nursing note from this encounter for details.  Few episodes of low Bg in the setting of overcorrection.  Exercise:  Last A1c: 7.1%  Symptoms of hypoglycemia (low blood sugar):   Using PUMP:  Current Regimen:   Insulin pump -   Time Rate (U/hr)   0000-  1.6   0600  1.15   1200  1.00   2200  1.05     Carbohydrate Ratio -    Time Ratio   0000-  8.0   1000  1500 12.0  10.0     Sensitivity   30   Active Insulin Time   hours   Basal  29.7 units (58 %)   Bolus   21.1 units (42%)   Total Carbohydrates/day     Total Insulin/day   50.8 units/day   Average Blood Sugar                     DM Complications:   Complications:   Diabetes Complications  Description / Detail    Diabetic Retinopathy  No   CAD / PAD  No   Neuropathy  No   Nephropathy / Microalbuminuria  No  Lab Results   Component Value Date    UMALCR 17.29 01/23/2020         Gastroparesis  No   Hypoglycemia [place]  No          2. Hypertension:  Not on medication.  3. Hyperlipidemia: Not on medication.    PMH/PSH:  Past Medical History:   Diagnosis Date      Diabetes mellitus      Seborrheic infantile dermatitis      No past surgical history on file.  Family Hx:  Family History   Problem Relation Age of Onset     Breast Cancer Mother         BRCA negative     Diabetes Father      Lung Cancer Maternal Grandfather      GERD Paternal Grandfather            DM1: Father.           Social Hx:  Social History     Socioeconomic History     Marital status: Single     Spouse name: Not on file     Number of children: Not on file     Years of education: Not on file     Highest education level: Not on file   Occupational History     Not on file   Tobacco Use     Smoking status: Former     Packs/day: 0.10     Types: Cigarettes     Smokeless tobacco: Never     Tobacco comments:     mom smokes outside   Vaping Use     Vaping Use: Never used   Substance and Sexual Activity     Alcohol use: Yes     Comment: goes out 2-3 times per week and has up to 3-4 drinks at a time     Drug use: Yes     Types: Marijuana     Comment: few times a week     Sexual activity: Yes     Partners: Male     Birth control/protection: I.U.D.   Other Topics Concern     Not on file   Social History Narrative     Not on file     Social Determinants of Health     Financial Resource Strain: Not on file   Food Insecurity: Not on file   Transportation Needs: Not on file   Physical Activity: Not on file   Stress: Not on file   Social Connections: Not on file   Intimate Partner Violence: Not on file   Housing Stability: Not on file          MEDICATIONS:  has a current medication list which includes the following prescription(s): contour next test, blood glucose monitoring, calcium carbonate, cholecalciferol, hydroxyzine, insulin aspart, insulin aspart, insulin aspart flexpen, insulin glargine, lamotrigine, multiple vitamins-minerals, olanzapine, omeprazole, and ondansetron.    ROS     ROS: 10 point ROS neg other than the symptoms noted above in the HPI.    Physical Exam   VS: There were no vitals taken for this  visit.  GENERAL: healthy, alert and no distress  EYES: Eyes grossly normal to inspection, conjunctivae and sclerae normal  ENT: no nose swelling, nasal discharge.  Thyroid: no apparent thyroid nodules.  Thyroid appears normal in size and nontender.  CV: RRR, no rubs, gallops, no murmurs  RESP: CTAB, no wheezes, rales, or ronchi  ABDO: +BS  EXTREMITIES: no hand tremors.  NEURO: Cranial nerves grossly intact, mentation intact and speech normal  SKIN: No apparent skin lesions, rash or edema seen   PSYCH: mentation appears normal, affect normal/bright, judgement and insight intact, normal speech and appearance well-groomed.    LABS:  A1c:  Lab Results   Component Value Date    A1C 7.1 12/17/2022    A1C 8.0 06/01/2022    A1C 9.9 01/11/2022    A1C 10.3 10/26/2021    A1C 8.3 06/04/2020    A1C 7.5 01/23/2020    A1C 7.3 04/23/2015    A1C 7.9 06/18/2014    A1C 7.4 10/11/2013     BMP:   Creatinine   Date Value Ref Range Status   06/09/2022 0.57 0.52 - 1.04 mg/dL Final   06/05/2020 0.64 0.50 - 1.00 mg/dL Final       Urine Micro:  Lab Results   Component Value Date    UMALCR 12.31 03/10/2022    UMALCR 17.29 01/23/2020        LFTs/Lipids:  Recent Labs   Lab Test 03/10/22  1247 01/23/20  1538   CHOL 169 180*   HDL 46* 47   * 117*   TRIG 97 79     TFTs:  TSH   Date Value Ref Range Status   01/23/2020 0.79 0.40 - 4.00 mU/L Final       Blood Glucose and pump data/ Meter reviewed.     All pertinent notes, labs, and images personally reviewed by me.       Glucometer/ insulin pump (if applicable)/ CGM data (if applicable) downloaded, Personally reviewed and interpreted.  All Blood sugar data reviewed personally and interpreted as well as discussed with pt.    All past medical, social and Family history reviewed and updated in Epic.    A/P  Ms.Alyssa JENNI Cruz is a 22 year old here for the evaluation/management of diabetes:    1. DM1 - Under fair control.  A1c 7.1.  No known complications from Diabetes.  Few episodes of low BG  likely in the setting of over correction.  Using Medtronic 770 G pump with Medtronic sensor.  Using in manual mode.  Limited blood sugar data available.   Plan:  Discussed diagnosis, pathophysiology, management and treatment options of condition with pt.  I also discussed importance of strict blood sugar control to prevent complications associated with uncontrolled diabetes.  Change in I:C ratio at lunch and dinner.  Rest settings same.  Time Ratio   0000-  8.0   1000  1500 12.0-->11.0  10.0-->9.0     Follow up with CDE in 5-6 weeks for transition to Auto mode.  Continue to use pump and sensors.  Labs in 3-4 months.  Please make a lab appointment for blood work and follow up clinic appointment in 1 week after that to discuss results.    2. Hypertension -not on medication       3. Hyperlipidemia - Not on medication. .  FLP in acceptable range.   Consider recheck once BG under better control.  4. Prevention  Ophthalmology- recommend annually.  ASA- NA 2/2 to age.  Smoking- No  Foot exam: 6/1/2022      Most Recent Immunizations   Administered Date(s) Administered     COVID-19 Vaccine 12+ (Pfizer 2022) 03/10/2022     Comvax (HIB/HepB) 01/06/2003     DTAP (<7y) 02/09/2006     DTaP, Unspecified 08/30/2013     HPV9 03/10/2022     Influenza (IIV3) PF 12/04/2006     Influenza Vaccine >6 months (Alfuria,Fluzone) 03/10/2022     MMR 02/09/2006     Meningococcal ACWY (Menactra ) 08/30/2013     Meningococcal ACWY (Menveo ) 08/30/2013     Pneumococcal (PCV 7) 01/06/2003     Pneumococcal 23 valent 01/23/2020     Poliovirus, inactivated (IPV) 02/09/2006     TDAP Vaccine (Adacel) 08/30/2013     Varicella 08/30/2013         Recommend checking blood sugars before meals and at bedtime.    If Blood glucose are low more often-> 2-3 times/week- give us a call.  The patient is advised to Make better food choices: reduce carbs, Reduce portion size, weight loss and exercise 3-4 times a week.  Discussed hypoglycemia signs and symptoms  as well as management in detail.      There is some variability among people, most will usually develop symptoms suggestive of hypoglycemia when blood glucose levels are lowered to the mid 60's. The first set of symptoms are called adrenergic. Patients may experience any of the following nervousness, sweating, intense hunger, trembling, weakness, palpitations, and difficulty speaking. When BS fall below 50 the patient is unable to talk and take oral therapy.  Would recommend Glucagon emergency kit for the patient and education for family and friends around the patient.   The acute management of hypoglycemia involves the rapid delivery of a source of easily absorbed sugar. Regular soda, juice, lifesavers, table sugar, are good options. 15 grams of glucose is the dose that is given, followed by an assessment of symptoms and a blood glucose check if possible. If after 10 minutes there is no improvement, another 10-15 grams should be given. This can be repeated up to three times. The equivalency of 10-15 grams of glucose (approximate servings) are: Four lifesavers, 4 teaspoons of sugar, or 1/2 cup or 4 oz of juice or regular pop.    Follow-up:  3 months.    Karlee Holly M.D  Endocrinology  [facility]/[place] [place]  CC: Luanne Todd      All questions were answered.  The patient indicates understanding of the above issues and agrees with the plan set forth.     Disclaimer: This note consists of symbols derived from keyboarding, dictation and/or voice recognition software. As a result, there may be errors in the script that have gone undetected. Please consider this when interpreting information found in this chart.    Addendum to above note and clinic visit:    Labs reviewed.    See result note/telephone encounter.                Again, thank you for allowing me to participate in the care of your patient.        Sincerely,        Karlee Holly MD

## 2022-12-28 NOTE — NURSING NOTE
ENDOCRINOLOGY INTAKE FORM    Patient Name:  Margo Cruz  :  2000    Is patient Diabetic?   Yes: type 1  Does patient have non-diabetic or other endocrine issues?  No    Vitals: There were no vitals taken for this visit.  BMI= There is no height or weight on file to calculate BMI.    Flu vaccine:  No  Pneumonia vaccine:  Yes: 23 x 1    Smoking and Alcohol use:  Social History     Tobacco Use     Smoking status: Former     Packs/day: 0.10     Types: Cigarettes     Smokeless tobacco: Never     Tobacco comments:     mom smokes outside   Vaping Use     Vaping Use: Never used   Substance Use Topics     Alcohol use: Yes     Comment: goes out 2-3 times per week and has up to 3-4 drinks at a time     Drug use: Yes     Types: Marijuana     Comment: few times a week       Foot Exam: Yes: 22  Eye Exam:  No  Dental Exam:  Yes:   Aspirin Use:  No    Lab Results   Component Value Date    A1C 7.1 2022    A1C 8.0 2022    A1C 9.9 2022    A1C 10.3 10/26/2021    A1C 8.3 2020    A1C 7.5 2020    A1C 7.3 2015    A1C 7.9 2014    A1C 7.4 10/11/2013       Lab Results   Component Value Date    MICROL 8 03/10/2022    MICROL 66 2020     No results found for: MICROALBUMIN    Cammie Flores CMA  [facility] [place]  [phone]

## 2022-12-28 NOTE — PATIENT INSTRUCTIONS
[facility]  Dr Holly, Endocrinology Department    [facility]   [address] [facility]  [place] [address]  Appointment [phone]  Fax: [phone]  [place]: Monday - Thursday      Please check the cost coverage and copay with insurance before recommended tests, services and medications (especially if new medications are prescribed).     If ordered, please get blood work done 1 week prior to your next appointment so they will be available to Dr. Holly at your visit.    To provide the best diabetic care, please bring your blood glucose meter to each and every visit with your  Endocrinologist. Your blood glucose CGM/meter/insulin pump will be downloaded at every appointment.  Please arrive 15 minutes before your scheduled appointment. This will allow for your blood glucose CGM/meter/insulin pump  to be downloaded.  If you are wearing DEXCOM please bring  or sharing code from the Dexcom Clarity Sandy so that it can be downloaded.  If you are using FREESTYLE HOA personal sensors please bring the reader.    Change in I:C ratio at lunch and dinner.  Rest settings same.    Time Ratio   0000-  8.0   1000  1500 12.0-->11.0  10.0-->9.0     Follow up with CDE in 5-6 weeks for transition to Auto mode.  Continue to use pump and sensors.  Labs in 3-4 months.  Please make a lab appointment for blood work and follow up clinic appointment in 1 week after that to discuss results.    Recommend checking blood sugars before meals and at bedtime.    If Blood glucose are low more often-> 2-3 times/week- give us a call.  Make better food choices: reduce carbs, Reduce portion size, weight loss and exercise 3-4 times a week.    What is hypoglycemia:  Hypoglycemia is when blood sugar levels become too low - below 70 m/dl.      What causes hypoglycemia?  - using too much insulin  -taking too many diabetes pills  -not eating enough, or skipping meals or snacks  -not  eating enough carbohydrate with meals  -changing your exercise routine  -drinking alcohol in excess    It is also possible to have hypoglycemia even when you are carefully managing your blood sugar levels.    What does it feel like when blood sugars get too low?  You may feel:  - anxious  -confused  -dizzy  -hungry  -light-headed  -nervous  -shaky  -sleepy  -sweaty    You may have  -blurred or cloudy vision  -heart palpitations (heart skips a beat or races)  -tingling or numbness around the mouth and tongue  -tremors    What to do if you have symptoms of hypoglycmemia:  If you think your blood sugar is too low, check it with a glucose meter.  If its below 70 mg/dl, consume one of the following:  Fruit juice (1/2 cup)  Glucose tablets (15 grams)  Hard candy (5 to 7 pieces)  Honey or sugar (2 teaspoons)  Milk (1/2 cup)  Soft drink (non-diet, 1/2 cup)    Wait 15 minutes and check your blood glucose again.  IF it is still below 70 mg/dl, have another food item listed above. Wait another 15 minutes and repeat the blood glucose test.  Have a small meal or snack that contains some carbohydrate after your blood glucose rises above 70 mg/dl.    If you are at risk of hypoglycemia, always carry with you glucose tablets or one of the foods listed above.      To prevent Hypoglycemia:  Avoid situations that may cause hypoglycemia  Before making any change to your diet or exercise routine, discuss them with your healthcare provider  Keep a record of your blood glucose levels.  Include the time of day, diabetes medications, when you had your last meal or snack, and what you were doing at the time (e.g. Watching TV, gardening, jogging, etc).    Talk to your healthcare provider if your blood glucose levels are often low        Patient guide on hypoglycemia    http://www.hormone.org/Resources/upload/patient-guide-diagnosis-and-management-hypoglycemia-[email or web address].pdf

## 2022-12-29 ENCOUNTER — TELEPHONE (OUTPATIENT)
Dept: ENDOCRINOLOGY | Facility: CLINIC | Age: 22
End: 2022-12-29

## 2022-12-29 NOTE — TELEPHONE ENCOUNTER
Diabetes Education Scheduling Outreach #1:    Call to patient to schedule. Left message with phone number to call to schedule.    Also sent [employer] message for second attempt. Requested patient to call to schedule.    Margot Sherwood OnCall  Diabetes and Nutrition Scheduling

## 2023-04-15 ENCOUNTER — HEALTH MAINTENANCE LETTER (OUTPATIENT)
Age: 23
End: 2023-04-15

## 2023-06-27 ENCOUNTER — TELEPHONE (OUTPATIENT)
Dept: ENDOCRINOLOGY | Facility: CLINIC | Age: 23
End: 2023-06-27
Payer: COMMERCIAL

## 2023-06-27 NOTE — TELEPHONE ENCOUNTER
[employer] form for pump and dm supplies    LAST OFFICE/VIRTUAL VISIT:  12/28/22    FUTURE OFFICE/VIRTUAL VISIT:  None    Lab Results   Component Value Date    A1C 7.1 12/17/2022    A1C 8.0 06/01/2022    A1C 9.9 01/11/2022    A1C 10.3 10/26/2021    A1C 8.3 06/04/2020    A1C 7.5 01/23/2020    A1C 7.3 04/23/2015    A1C 7.9 06/18/2014    A1C 7.4 10/11/2013           Cammie Flores [facility] [place]  [phone]

## 2023-06-28 NOTE — TELEPHONE ENCOUNTER
Form was faxed and sent to scanning.      Cammie Flores, [facility]  Juan Diego/María Elena

## 2023-07-12 ENCOUNTER — TELEPHONE (OUTPATIENT)
Dept: ENDOCRINOLOGY | Facility: CLINIC | Age: 23
End: 2023-07-12
Payer: COMMERCIAL

## 2023-07-12 ENCOUNTER — MEDICAL CORRESPONDENCE (OUTPATIENT)
Dept: HEALTH INFORMATION MANAGEMENT | Facility: CLINIC | Age: 23
End: 2023-07-12

## 2023-07-12 NOTE — TELEPHONE ENCOUNTER
[employer] Rx for pump, dm and CGM supplies    LAST OFFICE/VIRTUAL VISIT:  12/28/22    FUTURE OFFICE/VIRTUAL VISIT:  None    Lab Results   Component Value Date    A1C 7.1 12/17/2022    A1C 8.0 06/01/2022    A1C 9.9 01/11/2022    A1C 10.3 10/26/2021    A1C 8.3 06/04/2020    A1C 7.5 01/23/2020    A1C 7.3 04/23/2015    A1C 7.9 06/18/2014    A1C 7.4 10/11/2013           Cammie Flores [facility] [place]  [phone]

## 2023-07-13 NOTE — TELEPHONE ENCOUNTER
Form was faxed and sent to scanning.      Cammie Flores, [facility]  [place]/María Elena

## 2023-07-19 NOTE — TELEPHONE ENCOUNTER
[employer] is stating they need new Rx for pump supplies, diabetic supplies, and guardian sensor 3.  Please send to fax [phone].

## 2023-07-20 NOTE — TELEPHONE ENCOUNTER
[facility] Call Center    Phone Message    May a detailed message be left on voicemail: yes     Reason for Call: Other: Per Lue they got the RX form but one thing missing from it is the Sensor (CGM). PLease refax the form with that info. Thank you!     Action Taken: Message routed to:  [facility] ENDO    Travel Screening: Not Applicable

## 2023-07-20 NOTE — TELEPHONE ENCOUNTER
The Guardian Sensor 3 is checked on the form.      Form was faxed and sent to scanning.      RAYMUNDO [place] [facility]  Juan Diego/María Elena

## 2023-09-08 ENCOUNTER — TELEPHONE (OUTPATIENT)
Dept: FAMILY MEDICINE | Facility: CLINIC | Age: 23
End: 2023-09-08

## 2023-09-08 ENCOUNTER — OFFICE VISIT (OUTPATIENT)
Dept: FAMILY MEDICINE | Facility: CLINIC | Age: 23
End: 2023-09-08
Payer: COMMERCIAL

## 2023-09-08 VITALS
OXYGEN SATURATION: 100 % | SYSTOLIC BLOOD PRESSURE: 124 MMHG | DIASTOLIC BLOOD PRESSURE: 80 MMHG | BODY MASS INDEX: 21 KG/M2 | TEMPERATURE: 98.5 F | HEART RATE: 106 BPM | RESPIRATION RATE: 18 BRPM | HEIGHT: 64 IN | WEIGHT: 123 LBS

## 2023-09-08 DIAGNOSIS — Z23 NEED FOR COVID-19 VACCINE: ICD-10-CM

## 2023-09-08 DIAGNOSIS — R10.84 ABDOMINAL PAIN, GENERALIZED: ICD-10-CM

## 2023-09-08 DIAGNOSIS — Z96.41 INSULIN PUMP STATUS: ICD-10-CM

## 2023-09-08 DIAGNOSIS — Z97.5 IUD (INTRAUTERINE DEVICE) IN PLACE: ICD-10-CM

## 2023-09-08 DIAGNOSIS — F41.9 ANXIETY: ICD-10-CM

## 2023-09-08 DIAGNOSIS — Z23 NEED FOR PNEUMOCOCCAL VACCINATION: ICD-10-CM

## 2023-09-08 DIAGNOSIS — H57.10 PAIN IN EYE, UNSPECIFIED LATERALITY: ICD-10-CM

## 2023-09-08 DIAGNOSIS — R20.2 PARESTHESIA: ICD-10-CM

## 2023-09-08 DIAGNOSIS — N89.8 VAGINAL DISCHARGE: ICD-10-CM

## 2023-09-08 DIAGNOSIS — E10.9 TYPE 1 DIABETES, HBA1C GOAL < 8% (H): ICD-10-CM

## 2023-09-08 DIAGNOSIS — R10.2 PELVIC PAIN IN FEMALE: ICD-10-CM

## 2023-09-08 DIAGNOSIS — F39 MOOD DISORDER (H): ICD-10-CM

## 2023-09-08 DIAGNOSIS — N64.4 BREAST TENDERNESS: ICD-10-CM

## 2023-09-08 DIAGNOSIS — Z80.3 FAMILY HISTORY OF MALIGNANT NEOPLASM OF BREAST: ICD-10-CM

## 2023-09-08 DIAGNOSIS — R19.8 ALTERNATING CONSTIPATION AND DIARRHEA: ICD-10-CM

## 2023-09-08 DIAGNOSIS — Z00.00 HEALTH CARE MAINTENANCE: ICD-10-CM

## 2023-09-08 DIAGNOSIS — Z71.89 ADVANCED DIRECTIVES, COUNSELING/DISCUSSION: ICD-10-CM

## 2023-09-08 DIAGNOSIS — Z23 NEED FOR HPV VACCINATION: ICD-10-CM

## 2023-09-08 DIAGNOSIS — Z87.19 HISTORY OF CELIAC DISEASE: ICD-10-CM

## 2023-09-08 DIAGNOSIS — Z23 NEED FOR DIPHTHERIA-TETANUS-PERTUSSIS (TDAP) VACCINE: ICD-10-CM

## 2023-09-08 DIAGNOSIS — G44.219 EPISODIC TENSION-TYPE HEADACHE, NOT INTRACTABLE: ICD-10-CM

## 2023-09-08 DIAGNOSIS — R12 HEART BURN: ICD-10-CM

## 2023-09-08 DIAGNOSIS — M79.10 MYALGIA: ICD-10-CM

## 2023-09-08 DIAGNOSIS — N93.9 VAGINAL BLEEDING: ICD-10-CM

## 2023-09-08 DIAGNOSIS — Z00.00 ROUTINE HISTORY AND PHYSICAL EXAMINATION OF ADULT: Primary | ICD-10-CM

## 2023-09-08 DIAGNOSIS — Z23 NEEDS FLU SHOT: ICD-10-CM

## 2023-09-08 PROBLEM — N17.9 AKI (ACUTE KIDNEY INJURY) (H): Status: RESOLVED | Noted: 2022-01-11 | Resolved: 2023-09-08

## 2023-09-08 LAB
ALBUMIN SERPL BCG-MCNC: 4.4 G/DL (ref 3.5–5.2)
ALP SERPL-CCNC: 60 U/L (ref 35–104)
ALT SERPL W P-5'-P-CCNC: 16 U/L (ref 0–50)
ANION GAP SERPL CALCULATED.3IONS-SCNC: 8 MMOL/L (ref 7–15)
AST SERPL W P-5'-P-CCNC: 25 U/L (ref 0–45)
BASOPHILS # BLD AUTO: 0.1 10E3/UL (ref 0–0.2)
BASOPHILS NFR BLD AUTO: 1 %
BILIRUB SERPL-MCNC: 0.3 MG/DL
BUN SERPL-MCNC: 11.2 MG/DL (ref 6–20)
CALCIUM SERPL-MCNC: 9.2 MG/DL (ref 8.6–10)
CHLORIDE SERPL-SCNC: 104 MMOL/L (ref 98–107)
CHOLEST SERPL-MCNC: 123 MG/DL
CLUE CELLS: ABNORMAL
CREAT SERPL-MCNC: 0.8 MG/DL (ref 0.51–0.95)
CREAT UR-MCNC: 65.6 MG/DL
DEPRECATED HCO3 PLAS-SCNC: 28 MMOL/L (ref 22–29)
EGFRCR SERPLBLD CKD-EPI 2021: >90 ML/MIN/1.73M2
EOSINOPHIL # BLD AUTO: 0.5 10E3/UL (ref 0–0.7)
EOSINOPHIL NFR BLD AUTO: 5 %
ERYTHROCYTE [DISTWIDTH] IN BLOOD BY AUTOMATED COUNT: 12.9 % (ref 10–15)
FERRITIN SERPL-MCNC: 196 NG/ML (ref 6–175)
GLUCOSE SERPL-MCNC: 188 MG/DL (ref 70–99)
HBA1C MFR BLD: 6.8 % (ref 0–5.6)
HCT VFR BLD AUTO: 44.4 % (ref 35–47)
HDLC SERPL-MCNC: 46 MG/DL
HGB BLD-MCNC: 14.9 G/DL (ref 11.7–15.7)
IMM GRANULOCYTES # BLD: 0 10E3/UL
IMM GRANULOCYTES NFR BLD: 0 %
IRON BINDING CAPACITY (ROCHE): 217 UG/DL (ref 240–430)
IRON SATN MFR SERPL: 32 % (ref 15–46)
IRON SERPL-MCNC: 69 UG/DL (ref 37–145)
LDLC SERPL CALC-MCNC: 49 MG/DL
LYMPHOCYTES # BLD AUTO: 2.2 10E3/UL (ref 0.8–5.3)
LYMPHOCYTES NFR BLD AUTO: 22 %
MCH RBC QN AUTO: 31.3 PG (ref 26.5–33)
MCHC RBC AUTO-ENTMCNC: 33.6 G/DL (ref 31.5–36.5)
MCV RBC AUTO: 93 FL (ref 78–100)
MICROALBUMIN UR-MCNC: <12 MG/L
MICROALBUMIN/CREAT UR: NORMAL MG/G{CREAT}
MONOCYTES # BLD AUTO: 0.7 10E3/UL (ref 0–1.3)
MONOCYTES NFR BLD AUTO: 7 %
NEUTROPHILS # BLD AUTO: 6.6 10E3/UL (ref 1.6–8.3)
NEUTROPHILS NFR BLD AUTO: 65 %
NONHDLC SERPL-MCNC: 77 MG/DL
PLATELET # BLD AUTO: 252 10E3/UL (ref 150–450)
POTASSIUM SERPL-SCNC: 4.4 MMOL/L (ref 3.4–5.3)
PROT SERPL-MCNC: 6.8 G/DL (ref 6.4–8.3)
RBC # BLD AUTO: 4.76 10E6/UL (ref 3.8–5.2)
SODIUM SERPL-SCNC: 140 MMOL/L (ref 136–145)
TRICHOMONAS, WET PREP: ABNORMAL
TRIGL SERPL-MCNC: 138 MG/DL
TSH SERPL DL<=0.005 MIU/L-ACNC: 0.89 UIU/ML (ref 0.3–4.2)
VIT B12 SERPL-MCNC: 935 PG/ML (ref 232–1245)
WBC # BLD AUTO: 10.2 10E3/UL (ref 4–11)
WBC'S/HIGH POWER FIELD, WET PREP: ABNORMAL
YEAST, WET PREP: ABNORMAL

## 2023-09-08 PROCEDURE — 83540 ASSAY OF IRON: CPT | Performed by: FAMILY MEDICINE

## 2023-09-08 PROCEDURE — 84443 ASSAY THYROID STIM HORMONE: CPT | Performed by: FAMILY MEDICINE

## 2023-09-08 PROCEDURE — 90651 9VHPV VACCINE 2/3 DOSE IM: CPT | Performed by: FAMILY MEDICINE

## 2023-09-08 PROCEDURE — 87210 SMEAR WET MOUNT SALINE/INK: CPT | Performed by: FAMILY MEDICINE

## 2023-09-08 PROCEDURE — 90715 TDAP VACCINE 7 YRS/> IM: CPT | Performed by: FAMILY MEDICINE

## 2023-09-08 PROCEDURE — 90472 IMMUNIZATION ADMIN EACH ADD: CPT | Performed by: FAMILY MEDICINE

## 2023-09-08 PROCEDURE — 99395 PREV VISIT EST AGE 18-39: CPT | Mod: 25 | Performed by: FAMILY MEDICINE

## 2023-09-08 PROCEDURE — 82043 UR ALBUMIN QUANTITATIVE: CPT | Performed by: FAMILY MEDICINE

## 2023-09-08 PROCEDURE — 99214 OFFICE O/P EST MOD 30 MIN: CPT | Mod: 25 | Performed by: FAMILY MEDICINE

## 2023-09-08 PROCEDURE — 80061 LIPID PANEL: CPT | Performed by: FAMILY MEDICINE

## 2023-09-08 PROCEDURE — 90471 IMMUNIZATION ADMIN: CPT | Performed by: FAMILY MEDICINE

## 2023-09-08 PROCEDURE — 83036 HEMOGLOBIN GLYCOSYLATED A1C: CPT | Performed by: FAMILY MEDICINE

## 2023-09-08 PROCEDURE — 82607 VITAMIN B-12: CPT | Performed by: FAMILY MEDICINE

## 2023-09-08 PROCEDURE — 83550 IRON BINDING TEST: CPT | Performed by: FAMILY MEDICINE

## 2023-09-08 PROCEDURE — 36415 COLL VENOUS BLD VENIPUNCTURE: CPT | Performed by: FAMILY MEDICINE

## 2023-09-08 PROCEDURE — 82570 ASSAY OF URINE CREATININE: CPT | Performed by: FAMILY MEDICINE

## 2023-09-08 PROCEDURE — 80053 COMPREHEN METABOLIC PANEL: CPT | Performed by: FAMILY MEDICINE

## 2023-09-08 PROCEDURE — 85025 COMPLETE CBC W/AUTO DIFF WBC: CPT | Performed by: FAMILY MEDICINE

## 2023-09-08 PROCEDURE — 82728 ASSAY OF FERRITIN: CPT | Performed by: FAMILY MEDICINE

## 2023-09-08 ASSESSMENT — ENCOUNTER SYMPTOMS
NERVOUS/ANXIOUS: 1
SORE THROAT: 0
HEMATURIA: 0
CONSTIPATION: 1
EYE PAIN: 1
ARTHRALGIAS: 1
NAUSEA: 1
BREAST MASS: 0
HEADACHES: 1
DYSURIA: 0
ABDOMINAL PAIN: 1
FREQUENCY: 1
SHORTNESS OF BREATH: 0
PARESTHESIAS: 1
HEARTBURN: 1
JOINT SWELLING: 0
WEAKNESS: 0
FEVER: 0
MYALGIAS: 1
PALPITATIONS: 0
DIARRHEA: 1
HEMATOCHEZIA: 0

## 2023-09-08 ASSESSMENT — ANXIETY QUESTIONNAIRES
2. NOT BEING ABLE TO STOP OR CONTROL WORRYING: SEVERAL DAYS
7. FEELING AFRAID AS IF SOMETHING AWFUL MIGHT HAPPEN: SEVERAL DAYS
1. FEELING NERVOUS, ANXIOUS, OR ON EDGE: SEVERAL DAYS
5. BEING SO RESTLESS THAT IT IS HARD TO SIT STILL: NOT AT ALL
GAD7 TOTAL SCORE: 6
IF YOU CHECKED OFF ANY PROBLEMS ON THIS QUESTIONNAIRE, HOW DIFFICULT HAVE THESE PROBLEMS MADE IT FOR YOU TO DO YOUR WORK, TAKE CARE OF THINGS AT HOME, OR GET ALONG WITH OTHER PEOPLE: SOMEWHAT DIFFICULT
6. BECOMING EASILY ANNOYED OR IRRITABLE: SEVERAL DAYS
GAD7 TOTAL SCORE: 6
3. WORRYING TOO MUCH ABOUT DIFFERENT THINGS: SEVERAL DAYS
4. TROUBLE RELAXING: SEVERAL DAYS

## 2023-09-08 ASSESSMENT — PATIENT HEALTH QUESTIONNAIRE - PHQ9
10. IF YOU CHECKED OFF ANY PROBLEMS, HOW DIFFICULT HAVE THESE PROBLEMS MADE IT FOR YOU TO DO YOUR WORK, TAKE CARE OF THINGS AT HOME, OR GET ALONG WITH OTHER PEOPLE: SOMEWHAT DIFFICULT
SUM OF ALL RESPONSES TO PHQ QUESTIONS 1-9: 6
SUM OF ALL RESPONSES TO PHQ QUESTIONS 1-9: 6

## 2023-09-08 ASSESSMENT — PAIN SCALES - GENERAL: PAINLEVEL: NO PAIN (0)

## 2023-09-08 NOTE — TELEPHONE ENCOUNTER
Left message for patient to call and speak to any triage nurse.    Want to clarify with patient that Dr Izaguirre can do the physical for her this afternoon, but patient had been told by her PCP to see psych to  manage her psyche meds, so will not refill those.  Magali Bergman RN  [facility]

## 2023-09-08 NOTE — NURSING NOTE
Prior to immunization administration, verified patients identity using patient s name and date of birth. Please see Immunization Activity for additional information.     Screening Questionnaire for Adult Immunization    Are you sick today?   No   Do you have allergies to medications, food, a vaccine component or latex?   No   Have you ever had a serious reaction after receiving a vaccination?   No   Do you have a long-term health problem with heart, lung, kidney, or metabolic disease (e.g., diabetes), asthma, a blood disorder, no spleen, complement component deficiency, a cochlear implant, or a spinal fluid leak?  Are you on long-term aspirin therapy?   No   Do you have cancer, leukemia, HIV/AIDS, or any other immune system problem?   No   Do you have a parent, brother, or sister with an immune system problem?   No   In the past 3 months, have you taken medications that affect  your immune system, such as prednisone, other steroids, or anticancer drugs; drugs for the treatment of rheumatoid arthritis, Crohn s disease, or psoriasis; or have you had radiation treatments?   No   Have you had a seizure, or a brain or other nervous system problem?   No   During the past year, have you received a transfusion of blood or blood    products, or been given immune (gamma) globulin or antiviral drug?   No   For women: Are you pregnant or is there a chance you could become       pregnant during the next month?   No   Have you received any vaccinations in the past 4 weeks?   No     Immunization questionnaire answers were all negative.      Patient instructed to remain in clinic for 15 minutes afterwards, and to report any adverse reactions.     Screening performed by Trina Camarillo MA on 9/8/2023 at 3:21 PM.

## 2023-09-08 NOTE — PATIENT INSTRUCTIONS
Seen today for preventive physical.  Breasts feel dense which can be normal in young women, reported tenderness could be cyclic but does not have a menstrual cycle due to IUD, thought due to wrong bra size improved with change in her bra.  Offered to do an ultrasound opted to defer for now.  Recommend a low caffeine diet avoiding too much chocolate as these can also increase breast tenderness.  Consider  referral if there is family history of breast ovarian or colon cancer.  Mom with history of breast cancer reported genetic testing in mom was negative and opted to decline referral for now.  Pap due 2025.  Mirena IUD in place reported since 2020.  It is not unusual to have a discharge with the strings of the IUD and irregular spotting presenting as bleeding with the IUD  Is considering other forms of birth control.  Recommend to discuss with gynecology given family history of breast cancer.  Would also avoid all vaping as this may increase risk of blood clots on birth control containing estrogen.  Intermittent vaginal discharge and bleeding only on intercourse could be related to local trauma, irritation of the string causing a normal physiologic discharge.  We will check wet prep today to rule out yeast.  Recommended also completing work-up with pelvic ultrasound and Chlamydia gonorrhea testing and STD testing.  Opted to not do pelvic ultrasound or STD testing for now.  Reports has had STD testing with current partner recently and was negative.  No records available.  If symptoms get worse or recurrent can refer to gynecology and consider pelvic ultrasound.    Healthcare maintenance reviewed.  Consider working on healthcare directives and honoring choices given to review.  Vaccines reviewed.  Tdap due and given today.  HPV #3 due and given today.  Recommend pneumonia vaccine will get Prevnar 20 another day.  Flu shot recommended yearly and will return for that at a later date.  Recommend to get the new  COVID vaccine once available.  We will do labs today and make further recommendations once those are reviewed.    Type 1 diabetes on an insulin pump managed by endocrine last seen by them in December.  Has a glucometer glucagon to use as needed as well as a Medtronic continuous glucometer that connects with the current Medtronic pump.  Has had no significant lows some highs recently due to diet consumed.  Hemoglobin A1c today is 6.8.  Desired Lantus to be refilled to use in an emergency has ran out.  Refilled at prior dosing 9 units twice a day further refills to come from endocrinology managing diagnosis.  Referral placed to endocrine to set up follow-up appointment with them and encouraged to make an appointment with endocrine for the next visit at every appointment with them  Recommended diabetes eye check yearly.  Reported had it  2 months ago and was unremarkable.  Foot exam recommended yearly.  Dorsalis pedis pulses were good but I forgot to check your feet with a monofilament today monitor for any tingling, numbness or sores.  Check your feet nightly and recommend getting feet checked by endocrinologist when you see them.    Has some eye pain currently felt due to sunshine.  Continue with sun protection and if having any double vision or blurry vision to return to the eye doctor.    Recent intermittent abdominal pain with alternating constipation diarrhea heartburn nausea that has since resolved felt due to consuming products that may have had some hidden gluten in it.  Does have history of celiac.  Currently appears well-hydrated no acute abdomen seen we will see what lab work shows.  Avoid gluten and can refer you to GI if it gets worse.  Consider taking a nongluten fiber supplement daily to regulate bowels as there may be some element of irritable bowel syndrome as well.    For heartburn and nausea recommend frequent smaller meals, avoid caffeine and spicy food, avoid eating 3 hours before bedtime and  consider taking Pepcid over-the-counter instead of Tums which may provide longer lasting acid relief.    For history of mood disorder and anxiety recommend continuing care with your therapist and your psychiatrist managing your medications.  Note your primary provider in [place] did mention that they were not going to be refilling your Zyprexa or Lamictal that it needed to come from your psychiatrist this was noted in December 2022.  Also note I do not prescribe these medications so if transferring care to this clinic would recommend to continue care with your psychiatrist so you do not run out of your medications and continue to be treated appropriately by them as indicated.    I am glad you are quitting vaping.  Encourage avoiding marijuana and vaping as this can rewire the brain and increase anxiety and also damage to lungs.    Episodic headaches felt related to eating food which seasoning that may have gluten in it out of your control.  Recommend staying well-hydrated and avoiding foods with gluten or unknown gluten status as much as possible.    Muscle aches tingling and numbness felt also related to when sugars are off or eating gluten and food.    Continue routine care with your primary care provider in [place]    The above note was dictated using voice recognition. Although reviewed after completion, some word and grammatical error may remain .       Preventive Health Recommendations  Female Ages 21 to 25     Yearly exam:   See your health care provider every year in order to  Review health changes.   Discuss preventive care.    Review your medicines if your doctor has prescribed any.    You should be tested each year for STDs (sexually transmitted diseases).     Talk to your provider about how often you should have cholesterol testing.    Get a Pap test every three years. If you have an abnormal result, your doctor may have you test more often.    If you are at risk for diabetes, you should have a diabetes test  (fasting glucose).     Shots:   Get a flu shot each year.   Get a tetanus shot every 10 years.   Consider getting the shot (vaccine) that prevents cervical cancer (Gardasil).    Nutrition:   Eat at least 5 servings of fruits and vegetables each day.  Eat whole-grain bread, whole-wheat pasta and brown rice instead of white grains and rice.  Get adequate Calcium and Vitamin D.     Lifestyle  Exercise at least 150 minutes a week each week (30 minutes a day, 5 days a week). This will help you control your weight and prevent disease.  Limit alcohol to one drink per day.  No smoking.   Wear sunscreen to prevent skin cancer.  See your dentist every six months for an exam and cleaning.

## 2023-09-08 NOTE — TELEPHONE ENCOUNTER
Patient called back and states she does have someone that manages her psyche meds.  Patient is diabetic and will need a refill on her Lantus Insulin.  Magali Bergman RN  [facility]

## 2023-09-08 NOTE — PROGRESS NOTES
SUBJECTIVE:   CC: Margo is an 22 year old who presents for preventive health visit.       2023     2:05 PM   Additional Questions   Roomed by Di   Accompanied by Self         2023     2:05 PM   Patient Reported Additional Medications   Patient reports taking the following new medications None     Healthy Habits:     Getting at least 3 servings of Calcium per day:  Yes    Bi-annual eye exam:  Yes    Dental care twice a year:  Yes    Sleep apnea or symptoms of sleep apnea:  Daytime drowsiness    Diet:  Diabetic and Gluten-free/reduced    Frequency of exercise:  2-3 days/week    Duration of exercise:  15-30 minutes    Taking medications regularly:  Not Applicable    Additional concerns today:  No  Answers submitted by the patient for this visit:  Patient Health Questionnaire (Submitted on 2023)  If you checked off any problems, how difficult have these problems made it for you to do your work, take care of things at home, or get along with other people?: Somewhat difficult  PHQ9 TOTAL SCORE: 6  JUSTICE-7 (Submitted on 2023)  JUSTICE 7 TOTAL SCORE: 6    22-year-old  0 para 0-0-1-0, with history of type 1 diabetes on an insulin pump and continuous glucometer with backup Lantus and NovoLog and glucagon as needed and a glucometer as needed under care of endocrine, insulin pump in place, hx of DKA in  due to pump failure, history of celiac, reported intermittent nausea, heartburn, abdominal pain, alternating constipation and diarrhea depending on foods consumed, history of episodic tension headaches, myalgia, paresthesias related to diabetes control and gluten intake, history of mood disorder on Lamictal and Zyprexa under care of therapy and psych, still cyclic breast tenderness, family history of breast cancer in mother, reported Mirena IUD in place since , wisdom tooth extraction , allergic to gluten, the care of PCP and Gail at [facility].  Last seen by them 2022 and advised  to follow-up with psychiatry for further mental health medications.  And with endocrine for type 1 diabetes.  Seen by endocrine 2022 for diabetes hemoglobin A1c 7.1 at that time was not Medtronic pump.  [place]  shows received Norco 5/325 #12 on 2023 by the dentist    Here for a physical  New to this provider in clinic.  Noting breast tenderness occurs cyclically, in past has occurred when pregnant ( prior ), but checked for pregnancy and it was negative declines rechecking today.  Feels it is cyclic though cannot tell if correlated with her menstrual cycle as does not get any menstrual bleeding due to presence of a Mirena IUD in place since .  Feels its due to the wrong bra size.  Has been wearing a better bra and feels better.  Reports no lumps bumps skin changes or nipple discharge.  Declines an ultrasound or imaging for now.  There is family history of breast cancer in mom.  Reports her tests were negative for genetic cause.  Declines need to be referred to a . Is okay to do a breast exam today    Pap due   Mirena placed .   After intercourse notices a vaginal discharge with bleeding since switch partners.  Has gotten STD testing with current partner and was negative declines repeating testing today.  Will do wet prep only to rule out yeast.  Feels has some pelvic cramping with IUD in place and light yellow-tinged discharge at times.  Is considering switching to birth control pills instead of the IUD.  Currently declines need to be referred to a gynecologist.  Last time had vaginal bleeding with discharge was a week ago    Has type 1 diabetes and an insulin pump in place.  Main concern is long-term body health.  A couple years ago blood sugars was thousand and was admitted for DKA.  Has had back and stomach pains when at high blood sugars in the past.  Finally got her sensor working again.  Was concerned about it recently fingersticks were running high last few days  but back to normal.  Tries to eat a low-carb diet past week eating mostly fruits vegan meat.  Some sandwiches.  Last night went out to eat with work and had ice cream after that and woke up blood sugar was in the 300's.  Today was out for lunch with grandparents and blood sugar spiked to 200's and then down to 58.  When is low gets an anxious feeling.  Has glucometer and glucagon and carries sugary stuff in case there is a low.  Is under care of an endocrinologist has not seen them since December.  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  Recently ran out of her Lantus and would like that refilled.  Did not contact her endocrinologist about it.  She contacted her team and was told she needed an appointment for new prescription appointment made with this provider as could not get in with endocrinologist.    Noted a pain in the eye and review of systems.  Reports due to sun exposure and not wearing sunglasses.  Denies any double vision or blurry vision.  Reported did get an eye exam 2 months ago and was prescribed glasses to wear for driving at night but told was not needed and has not been using.  Understood that diabetes has not affected the back of her eyes.  No records available.    Doing intermittent abdominal discomfort alternating constipation and diarrhea heartburn occasional nausea due to history of being gluten sensitive and having celiac may have consumed foods that have been inappropriately labeled as gluten free and use beauty products with gluten in them.  Weight was 140 in the winter but felt had been a couch potato during the winter and now down to 123 with activities during the summer.  Feels does not have unintentional weight loss.  Once when dehydrated and constipated had some bright red blood on wiping but has not had any recurrence of this.   Is considering taking a fiber supplement.  Currently denies any acute symptoms and declines need to get a referral to GI.    History of mood disorder and anxiety on Lamictal 300 and Zyprexa 10 mg daily.  Prescribed by primary care provider but last noted in December to follow-up with psychiatry.  Reports does not have a psychiatrist that therapist also prescribes medications and PCP was managing.  Understands this provider does not manage those medications and needs to return to psychiatry.    Tension headaches only after eating food with seasonings that she has no control of that may contain gluten.    Muscle aches and tingling numbness only occurs again if sugars are off for eating food containing gluten.    Notes is trying to quit vaping has had episodic marijuana use in the past has been making anxiety worse so is trying to come off that.    Healthcare maintenance reviewed.  No healthcare directives on file.  Tdap due and will get today.  HPV #3 due and will get today.  Discussed considering Prevnar 20 will get another day.  Discussed flu shot will wait to get that later in the flu season.  Discussed getting the new COVID-vaccine once available in the fall.    Today's PHQ-9 Score:       9/8/2023     1:50 PM   PHQ-9 SCORE   PHQ-9 Total Score MyChart 6 (Mild depression)   PHQ-9 Total Score 6     Social History     Tobacco Use    Smoking status: Former     Packs/day: 0.10     Types: Cigarettes    Smokeless tobacco: Never    Tobacco comments:     mom smokes outside   Substance Use Topics    Alcohol use: Yes     Comment: goes out 2-3 times per week and has up to 3-4 drinks at a time             9/8/2023     1:57 PM   Alcohol Use   Prescreen: >3 drinks/day or >7 drinks/week? No          No data to display              Reviewed orders with patient.  Reviewed health maintenance and updated orders accordingly - Yes  BP Readings from Last 3 Encounters:   09/08/23 124/80   12/28/22 122/79   06/11/22 (!) 128/94    Wt  Readings from Last 3 Encounters:   09/08/23 55.8 kg (123 lb)   12/28/22 59.4 kg (130 lb 14.4 oz)   06/01/22 50.8 kg (111 lb 14.4 oz)                  Patient Active Problem List   Diagnosis    Type 1 diabetes, HbA1c goal < 8% (H)    Celiac sprue    Insulin pump status    IUD (intrauterine device) in place    Mood disorder (H)    Family history of malignant neoplasm of breast     Past Surgical History:   Procedure Laterality Date    WISDOM TOOTH EXTRACTION  2023       Social History     Tobacco Use    Smoking status: Former     Packs/day: 0.10     Types: Cigarettes, Vaping Device    Smokeless tobacco: Never    Tobacco comments:     mom smokes outside   Substance Use Topics    Alcohol use: Yes     Comment: rare     Family History   Problem Relation Age of Onset    Breast Cancer Mother         BRCA negative    Diabetes Father     Lung Cancer Maternal Grandfather     Cirrhosis Maternal Grandfather     GERD Paternal Grandfather          Current Outpatient Medications   Medication Sig Dispense Refill    blood glucose (CONTOUR NEXT TEST) test strip Use to test blood sugar 5-6 times daily or as directed. 150 each 4    blood glucose monitoring (TYE MICROLET) lancets Use to test blood sugar 4-5 times daily or as directed. 100 each 4    calcium carbonate (TUMS) 500 MG chewable tablet Take 1 chew tab by mouth as needed for heartburn      Glucagon, rDNA, (GLUCAGON EMERGENCY) 1 MG KIT Inject 1 mg as directed as needed (low BG) 1 kit 1    Insulin Aspart (INSULIN PUMP - OUTPATIENT) Inject Subcutaneous See Admin Instructions Type of pump: Medtronic MiniMed 770G  Type of insulin: Novolog   Basal rate(s)  0816-4481: 0.9 unit/hr  3888-5972: 0.85 units/hr  5132-5996: 0.675 units/hr  2062-4094: 0.55 units/hr  ISF: 30 mg\dL  ICF (insulin to carb ratio)  6025-1759: 1unit/5g carbs  4336-4652: 1 unit/10g carbs  6377-8451: 1 unit/8g carbs  Active insulin time: 3 hrs  Target goal range:   Followed by endocrinology      insulin aspart  (NOVOLOG VIAL) 100 UNITS/ML vial USE UP TO 70 UNITS DAILY IN INSULIN PUMP 90 DAY SUPPLY 90 mL 1    Insulin Aspart FlexPen 100 UNIT/ML SOPN Inject 1 Units Subcutaneous 3 times daily (with meals) Use 3x/day with meals (carb correction scale- uses 1 unit/5g carbs at breakfast, 1 unit/10 grams carbs at lunch and 1 unit/8g carbs at dinner). 15 mL 1    insulin glargine (LANTUS PEN) 100 UNIT/ML pen Inject 9 Units Subcutaneous 2 times daily for 60 days (Uses ~18 units/day basal in pump, so when off pump, uses ~ 9 units bid) only if has pump failure, further refills by endocrine dr abrams 10.8 mL 0    lamoTRIgine (LAMICTAL) 25 MG tablet Take 4 tablets (100 mg) by mouth daily 120 tablet 0    Multiple Vitamins-Minerals (MULTIVITAMIN GUMMIES ADULT PO) Take 1 Dose by mouth daily      OLANZapine (ZYPREXA) 10 MG tablet Take 1 tablet (10 mg) by mouth At Bedtime 90 tablet 3    STATIN NOT PRESCRIBED (INTENTIONAL) Please choose reason not prescribed from choices below.      ASPIRIN NOT PRESCRIBED (INTENTIONAL) Please choose reason not prescribed from choices below. (Patient not taking: Reported on 9/8/2023)      cholecalciferol (VITAMIN D3) 25 mcg (1000 units) capsule Take 1 capsule by mouth daily (Patient not taking: Reported on 9/8/2023)       Allergies   Allergen Reactions    Gluten Meal      Other reaction(s): *Unknown    No Known Drug Allergy      Recent Labs   Lab Test 09/08/23  1527 12/17/22  1107 06/09/22  2334 06/08/22  1007 06/08/22  0156 06/07/22 2023 06/01/22  1219 03/10/22  1247 01/12/22  1153 01/12/22  0533 10/26/21  1109 06/05/20  0537 06/04/20  1801 06/04/20  1058 01/23/20  1538   A1C 6.8* 7.1*  --   --   --   --  8.0*  --   --   --    < >  --   --    < > 7.5*   LDL  --   --   --   --   --   --   --  104*  --   --   --   --   --   --  117*   HDL  --   --   --   --   --   --   --  46*  --   --   --   --   --   --  47   TRIG  --   --   --   --   --   --   --  97  --   --   --   --   --   --  79   ALT  --   --   --    --   --  14  --  18  --  29   < > 16  --    < > 15   CR  --   --  0.57 0.60   < > 0.65  --  0.59   < > 0.75   < > 0.64 0.62   < > 0.71   GFRESTIMATED  --   --  >90 >90   < > >90  --  >90   < > >90   < > >90 >90   < > >90   GFRESTBLACK  --   --   --   --   --   --   --   --   --   --   --  >90 >90   < > >90   POTASSIUM  --   --  3.5 4.0   < > 4.1  --  4.1   < > 3.3*   < > 3.9 4.2   < > 3.8   TSH  --   --   --   --   --   --   --   --   --   --   --   --   --   --  0.79    < > = values in this interval not displayed.        Breast Cancer Screening:        History of abnormal Pap smear: NO - age 21-29 PAP every 3 years recommended  Last 3 Pap and HPV Results:       3/10/2022    12:48 PM   PAP / HPV   PAP Negative for Intraepithelial Lesion or Malignancy (NILM)          3/10/2022    12:48 PM   PAP / HPV   PAP Negative for Intraepithelial Lesion or Malignancy (NILM)      Reviewed and updated as needed this visit by clinical staff   Tobacco  Allergies  Meds              Reviewed and updated as needed this visit by Provider                 Past Medical History:   Diagnosis Date    LESLIE (acute kidney injury) (H) 01/11/2022    Diabetes mellitus     Seborrheic infantile dermatitis       Past Surgical History:   Procedure Laterality Date    WISDOM TOOTH EXTRACTION  2023     OB History   No obstetric history on file.       Review of Systems   Constitutional:  Negative for fever.   HENT:  Negative for ear pain, hearing loss and sore throat.    Eyes:  Positive for pain. Negative for visual disturbance.   Respiratory:  Negative for shortness of breath.    Cardiovascular:  Negative for chest pain, palpitations and peripheral edema.   Gastrointestinal:  Positive for abdominal pain, constipation, diarrhea, heartburn and nausea. Negative for hematochezia.   Breasts:  Positive for tenderness. Negative for breast mass and discharge.   Genitourinary:  Positive for frequency, pelvic pain, urgency, vaginal bleeding and vaginal discharge.  "Negative for dysuria, genital sores and hematuria.   Musculoskeletal:  Positive for arthralgias and myalgias. Negative for joint swelling.   Skin:  Negative for rash.   Neurological:  Positive for headaches and paresthesias. Negative for weakness.   Psychiatric/Behavioral:  Negative for mood changes. The patient is nervous/anxious.      OBJECTIVE:   /80 (BP Location: Right arm, Patient Position: Sitting, Cuff Size: Adult Regular)   Pulse 106   Temp 98.5  F (36.9  C) (Temporal)   Resp 18   Ht 1.616 m (5' 3.62\")   Wt 55.8 kg (123 lb)   SpO2 100%   BMI 21.36 kg/m    Physical Exam  GENERAL: healthy, alert and no distress  EYES: Eyes grossly normal to inspection, PERRL and conjunctivae and sclerae normal  HENT: ear canals and TM's normal, nose and mouth without ulcers or lesions  NECK: no adenopathy, no asymmetry, masses, or scars and thyroid normal to palpation  RESP: lungs clear to auscultation - no rales, rhonchi or wheezes  BREAST: normal without masses, tenderness or nipple discharge and no palpable axillary masses or adenopathy  CV: regular rate and rhythm, normal S1 S2, no S3 or S4, no murmur, click or rub, no peripheral edema and peripheral pulses strong  ABDOMEN: soft, nontender, no hepatosplenomegaly, no masses and bowel sounds normal, saline pump in place left lower quadrant  MS: no gross musculoskeletal defects noted, no edema, Medtronic continuous glucometer taped to right upper arm  SKIN: no suspicious lesions or rashes, resolving postinflammatory hyperpigmentation where last had continuous glucometer left upper arm  NEURO: Normal strength and tone, mentation intact and speech normal  PSYCH: mentation appears normal, affect normal/bright  LYMPH: no cervical, supraclavicular, axillary, or inguinal adenopathy    Diagnostic Test Results:  Labs reviewed in Epic  Results for orders placed or performed in visit on 09/08/23 (from the past 24 hour(s))   Hemoglobin A1c   Result Value Ref Range    " Hemoglobin A1C 6.8 (H) 0.0 - 5.6 %   CBC with platelets and differential    Narrative    The following orders were created for panel order CBC with platelets and differential.  Procedure                               Abnormality         Status                     ---------                               -----------         ------                     CBC with platelets and d...[[id number]]                      Final result                 Please view results for these tests on the individual orders.   Wet prep - lab collect    Specimen: Vagina; Swab   Result Value Ref Range    Trichomonas Absent Absent    Yeast Absent Absent    Clue Cells Absent Absent    WBCs/high power field 2+ (A) None   CBC with platelets and differential   Result Value Ref Range    WBC Count 10.2 4.0 - 11.0 10e3/uL    RBC Count 4.76 3.80 - 5.20 10e6/uL    Hemoglobin 14.9 11.7 - 15.7 g/dL    Hematocrit 44.4 35.0 - 47.0 %    MCV 93 78 - 100 fL    MCH 31.3 26.5 - 33.0 pg    MCHC 33.6 31.5 - 36.5 g/dL    RDW 12.9 10.0 - 15.0 %    Platelet Count 252 150 - 450 10e3/uL    % Neutrophils 65 %    % Lymphocytes 22 %    % Monocytes 7 %    % Eosinophils 5 %    % Basophils 1 %    % Immature Granulocytes 0 %    Absolute Neutrophils 6.6 1.6 - 8.3 10e3/uL    Absolute Lymphocytes 2.2 0.8 - 5.3 10e3/uL    Absolute Monocytes 0.7 0.0 - 1.3 10e3/uL    Absolute Eosinophils 0.5 0.0 - 0.7 10e3/uL    Absolute Basophils 0.1 0.0 - 0.2 10e3/uL    Absolute Immature Granulocytes 0.0 <=0.4 10e3/uL     Results for orders placed or performed in visit on 09/08/23   Hemoglobin A1c     Status: Abnormal   Result Value Ref Range    Hemoglobin A1C 6.8 (H) 0.0 - 5.6 %   CBC with platelets and differential     Status: None   Result Value Ref Range    WBC Count 10.2 4.0 - 11.0 10e3/uL    RBC Count 4.76 3.80 - 5.20 10e6/uL    Hemoglobin 14.9 11.7 - 15.7 g/dL    Hematocrit 44.4 35.0 - 47.0 %    MCV 93 78 - 100 fL    MCH 31.3 26.5 - 33.0 pg    MCHC 33.6 31.5 - 36.5 g/dL    RDW 12.9 10.0 -  15.0 %    Platelet Count 252 150 - 450 10e3/uL    % Neutrophils 65 %    % Lymphocytes 22 %    % Monocytes 7 %    % Eosinophils 5 %    % Basophils 1 %    % Immature Granulocytes 0 %    Absolute Neutrophils 6.6 1.6 - 8.3 10e3/uL    Absolute Lymphocytes 2.2 0.8 - 5.3 10e3/uL    Absolute Monocytes 0.7 0.0 - 1.3 10e3/uL    Absolute Eosinophils 0.5 0.0 - 0.7 10e3/uL    Absolute Basophils 0.1 0.0 - 0.2 10e3/uL    Absolute Immature Granulocytes 0.0 <=0.4 10e3/uL   Wet prep - lab collect     Status: Abnormal    Specimen: Vagina; Swab   Result Value Ref Range    Trichomonas Absent Absent    Yeast Absent Absent    Clue Cells Absent Absent    WBCs/high power field 2+ (A) None   CBC with platelets and differential     Status: None    Narrative    The following orders were created for panel order CBC with platelets and differential.  Procedure                               Abnormality         Status                     ---------                               -----------         ------                     CBC with platelets and d...[[id number]]                      Final result                 Please view results for these tests on the individual orders.       ASSESSMENT/PLAN:       ICD-10-CM    1. Routine history and physical examination of adult  Z00.00 HPV9 (GARDASIL 9)      2. Breast tenderness  N64.4       3. Family history of malignant neoplasm of breast  Z80.3       4. IUD (intrauterine device) in place  Z97.5       5. Vaginal discharge  N89.8 Wet prep - lab collect     Wet prep - lab collect      6. Vaginal bleeding  N93.9       7. Pelvic pain in female  R10.2       8. Type 1 diabetes, HbA1c goal < 8% (H)  E10.9 Comprehensive metabolic panel (BMP + Alb, Alk Phos, ALT, AST, Total. Bili, TP)     Lipid Profile (Chol, Trig, HDL, LDL calc)     TSH with free T4 reflex     Hemoglobin A1c     Albumin Random Urine Quantitative with Creat Ratio     Adult Endocrinology  Referral     insulin glargine (LANTUS PEN) 100 UNIT/ML  pen     Albumin Random Urine Quantitative with Creat Ratio     Hemoglobin A1c     Comprehensive metabolic panel (BMP + Alb, Alk Phos, ALT, AST, Total. Bili, TP)     Lipid Profile (Chol, Trig, HDL, LDL calc)     TSH with free T4 reflex     CANCELED: Hemoglobin A1c     CANCELED: Albumin Random Urine Quantitative with Creat Ratio      9. Insulin pump status  Z96.41 Hemoglobin A1c     Albumin Random Urine Quantitative with Creat Ratio     Hemoglobin A1c     CANCELED: Hemoglobin A1c      10. Pain in eye, unspecified laterality  H57.10       11. Abdominal pain, generalized  R10.84       12. Alternating constipation and diarrhea  R19.8       13. Heart burn  R12     nausea      14. History of celiac disease  Z87.19 CBC with platelets and differential     TSH with free T4 reflex     CBC with platelets and differential     TSH with free T4 reflex      15. Mood disorder (H)  F39 TSH with free T4 reflex     TSH with free T4 reflex      16. Anxiety  F41.9       17. Episodic tension-type headache, not intractable  G44.219       18. Myalgia  M79.10       19. Paresthesia  R20.2 Ferritin     Iron and iron binding capacity     Vitamin B12     Ferritin     Iron and iron binding capacity     Vitamin B12      20. Health care maintenance  Z00.00 REVIEW OF HEALTH MAINTENANCE PROTOCOL ORDERS      21. Advanced directives, counseling/discussion  Z71.89       22. Need for diphtheria-tetanus-pertussis (Tdap) vaccine  Z23       23. Need for HPV vaccination  Z23 HPV9 (GARDASIL 9)      24. Need for pneumococcal vaccination  Z23       25. Needs flu shot  Z23       26. Need for COVID-19 vaccine  Z23         Seen today for preventive physical.  Breasts feel dense which can be normal in young women, reported tenderness could be cyclic but does not have a menstrual cycle due to IUD, thought due to wrong bra size improved with change in her bra.  Offered to do an ultrasound opted to defer for now.  Recommend a low caffeine diet avoiding too much  chocolate as these can also increase breast tenderness.  Consider  referral if there is family history of breast ovarian or colon cancer.  Mom with history of breast cancer reported genetic testing in mom was negative and opted to decline referral for now.  Declined need to do pregnancy testing reported a home pregnancy test was negative.  Pap due 2025.  Mirena IUD in place reported since 2020.  Is considering other forms of birth control.  Recommend to discuss with gynecology given family history of breast cancer.  Would also avoid all vaping as this may increase risk of blood clots on birth control containing estrogen.Is considering other forms of birth control.  Intermittent vaginal discharge and bleeding only on intercourse could be related to local trauma, irritation of the string causing a normal physiologic discharge.  It is not unusual to have a discharge with the strings of the IUD and irregular spotting presenting as bleeding with the IUD.   We will check wet prep today to rule out yeast.  Recommended also completing work-up with pelvic ultrasound and Chlamydia gonorrhea testing and STD testing.  Opted to not do pelvic ultrasound or STD testing for now.  Reports has had STD testing with current partner recently and was negative.  No records available.  If symptoms get worse or recurrent can refer to gynecology and consider pelvic ultrasound.    Healthcare maintenance reviewed.  Consider working on healthcare directives and honoring choices given to review.  Vaccines reviewed.  Tdap due and given today.  HPV #3 due and given today.  Recommend pneumonia vaccine will get Prevnar 20 another day.  Flu shot recommended yearly and will return for that at a later date.  Recommend to get the new COVID vaccine once available.  We will do labs today and make further recommendations once those are reviewed.    Type 1 diabetes on an insulin pump managed by endocrine last seen by them in December.  Has a  glucometer  &glucagon to use as needed as well as a Medtronic continuous glucometer that connects with the current Medtronic pump.  Has had no significant lows some highs recently due to diet consumed.  Hemoglobin A1c today is 6.8.  Desired Lantus to be refilled to use in an emergency as ran out.  Refilled at prior dosing 9 units twice a day further refills to come from endocrinology managing diagnosis.  Referral placed to endocrine to set up follow-up appointment with them and encouraged to make an appointment with endocrine for the next visit at every appointment with them  Recommended diabetes eye check yearly.  Reported had it 2 months ago and was unremarkable.  Foot exam recommended yearly.  Dorsalis pedis pulses were good but I forgot to check  feet with a monofilament today, monitor for any tingling, numbness or sores.  Check feet nightly and recommend getting feet checked by endocrinologist when you see them.    Has some eye pain currently felt due to sunshine.  Continue with sun protection and if having any double vision or blurry vision to return to the eye doctor.    Recent intermittent abdominal pain with alternating constipation diarrhea heartburn nausea that has since resolved felt due to consuming products that may have had some hidden gluten in it.  Does have history of celiac.  Currently appears well-hydrated no acute abdomen seen we will see what lab work shows.  Avoid gluten and can refer to GI if it gets worse.  Consider taking a non gluten fiber supplement daily to regulate bowels as there may be some element of irritable bowel syndrome as well.    For heartburn and nausea recommend frequent smaller meals, avoid caffeine and spicy food, avoid eating 3 hours before bedtime and consider taking Pepcid over-the-counter instead of Tums which may provide longer lasting acid relief.    For history of mood disorder and anxiety recommend continuing care with her therapist and psychiatrist managing  medications.  It was noted to her her primary provider in [place] did mention that they were not going to be refilling her Zyprexa or Lamictal & that her mental health meds needed to come from her psychiatrist, this was noted in December 2022.  Also it was explained at visit today that I do not prescribe these medications so if transferring care to this clinic would recommend to continue care with her psychiatrist so she does not run out of her medications and continue to be treated appropriately by them as indicated.    I am glad she is quitting vaping.  Encouraged avoiding marijuana and vaping as this can rewire the brain and increase anxiety and also damage to lungs.    Episodic headaches felt related to eating food with seasoning that may have gluten in it out of her control.  Recommend staying well-hydrated and avoiding foods with gluten or unknown gluten status as as much as possible.    Muscle aches tingling and numbness felt also related to when sugars are off or eating gluten and food.  Currently no concerning findings seen is hemodynamically stable we will check labs and go from there.    She is to continue routine care with her primary care provider in [place]    After the visit some labs came back showing  -Normal red blood cell (Hgb) levels, normal white blood cell count and normal platelet levels.  -A1C (test of diabetes control the last 2-3 months) is at goal. To recheck her A1C test in 3 months with her endocrinologist specially if having more frequent lows  & dosing might need to be adjusted by them.  All diabetes meds and supplies should be coming from her endocrinologist.  Wet prep showed No signs of bacteria or yeast vaginal infections.    Patient has been advised of split billing requirements and indicates understanding: Yes    COUNSELING:  Reviewed preventive health counseling, as reflected in patient instructions       Regular exercise       Healthy diet/nutrition       Vision screening        Pneumococcal Vaccine        Immunizations  Vaccinated for: Human Papillomavirus and TDAP and Declined: Influenza and Pneumococcal due to Other will get another day           Alcohol Use       Contraception       Family planning       Osteoporosis prevention/bone health       Safe sex practices/STD prevention       Consider Hep C screening for all patients one time for ages 18-79 years       Syphilis screening for high risk patients        HIV screeninx in teen years, 1x in adult years, and at intervals if high risk       Consider lung cancer screening for ages 55-80 years (77 for Medicare) and 20 pack-year smoking history        The ASCVD Risk score (Rachna TEJADA, et al., 2019) failed to calculate for the following reasons:    The 2019 ASCVD risk score is only valid for ages 40 to 79       Advance Care Planning    She reports that she has quit smoking. Her smoking use included cigarettes. She smoked an average of .1 packs per day. She has never used smokeless tobacco.          Hanna Izaguirre MD  [facility]    The above note was dictated using voice recognition. Although reviewed after completion, some word and grammatical error may remain .

## 2023-09-10 NOTE — RESULT ENCOUNTER NOTE
Margo    Recently done endocrinology lab test/ imaging test showed:  Lab Results       Component                Value               Date                       A1C                      6.8                 09/08/2023                 A1C                      7.1                 12/17/2022              Here is a copy for your records.  Follow up as discussed in last clinic visit.    Please call endocrinology clinic ( [phone]) if questions.    Karlee Holly MD  Endocrinology   [facility]/[place] [place]  September 9, 2023

## 2023-10-09 NOTE — PROGRESS NOTES
Outcome for 10/09/23 7:42 AM: [employer] message sent  Psuhpa Thomason MA  Outcome for 10/13/23 2:46 PM:  Spoke with pt regarding pump/cgm upload - pt prefers to go to clinic for upload. Declined scheduling nurse visit. Per pt, will try to stop by clinic for walk in appt.  Pushpa Thomason MA  Outcome for 10/17/23 3:39 PM: Carelink emailed to provider  Pushpa Thomason MA

## 2023-10-13 ENCOUNTER — TELEPHONE (OUTPATIENT)
Dept: ENDOCRINOLOGY | Facility: CLINIC | Age: 23
End: 2023-10-13
Payer: COMMERCIAL

## 2023-10-13 NOTE — TELEPHONE ENCOUNTER
Spoke with pt regarding pump/cgm data for upcoming virtual visit. Pt prefers to go to clinic for upload. Pt declined scheduling a nurse visit for upload. Per pt, will stop by clinic for walk in upload on Monday.    Pushpa Thomason MA

## 2023-10-16 NOTE — CONFIDENTIAL NOTE
Pump uploaded.    I received a error message when I tried to upload her meter.    Cammie Flores, [facility]  [phone]

## 2023-10-17 ENCOUNTER — VIRTUAL VISIT (OUTPATIENT)
Dept: ENDOCRINOLOGY | Facility: CLINIC | Age: 23
End: 2023-10-17
Payer: COMMERCIAL

## 2023-10-17 DIAGNOSIS — Z96.41 INSULIN PUMP STATUS: ICD-10-CM

## 2023-10-17 DIAGNOSIS — E10.9 TYPE 1 DIABETES, HBA1C GOAL < 8% (H): Primary | ICD-10-CM

## 2023-10-17 PROCEDURE — 99214 OFFICE O/P EST MOD 30 MIN: CPT | Mod: VID | Performed by: INTERNAL MEDICINE

## 2023-10-17 RX ORDER — INSULIN ASPART 100 [IU]/ML
INJECTION, SOLUTION INTRAVENOUS; SUBCUTANEOUS
Qty: 90 ML | Refills: 1 | OUTPATIENT
Start: 2023-10-17 | End: 2023-10-17

## 2023-10-17 RX ORDER — INSULIN ASPART 100 [IU]/ML
INJECTION, SOLUTION INTRAVENOUS; SUBCUTANEOUS
Qty: 90 ML | Refills: 1 | Status: SHIPPED | OUTPATIENT
Start: 2023-10-17

## 2023-10-17 NOTE — NURSING NOTE
Is the patient currently in the state of [place]? YES    Visit mode:VIDEO    If the visit is dropped, the patient can be reconnected by: VIDEO VISIT: Text to cell phone: [phone]    Will anyone else be joining the visit? NO    How would you like to obtain your AVS? MyChart    Are changes needed to the allergy or medication list? NO    Reason for visit:   Chief Complaint   Patient presents with    RECHECK     Type 1 Diabetes       Pushpa Thomason MA

## 2023-10-17 NOTE — PROGRESS NOTES
"THIS IS A VIDEO VISIT:    Phone call visit/virtual visit encounter:    Name of patient: Margo Cruz    Date of encounter: 10/17/2023    Time of start of video visit: 3:56    Video started: 4:05    Video ended: 4:27    Provider location: working from home/ [facility]    Patient location: patients home.    Mode of transmission: [employer] video/ [email or web address]    Verbal consent: obtained before starting visit. Pt is agreeable.      The patient has been notified of following:      \"This VIDEO visit will be conducted via a call between you and your physician/provider. We have found that certain health care needs can be provided without the need for a physical exam.  This service lets us provide the care you need with a short phone conversation.  If a prescription is necessary we can send it directly to your pharmacy.  If lab work is needed we can place an order for that and you can then stop by our lab to have the test done at a later time.     With new updates with corona virus patient might be billed as clinic visit.     If during the course of the call the physician/provider feels a telephone visit is not appropriate, you will not be charged for this service.\"      Past medical history, social history, family history, allergy and medications were reviewed and updated as appropriate.  Reviewed pertinent labs, notes, imaging studies personally.    Endocrinology Clinic Note:  Name: Margo Cruz  Seen for follow-up of type 1 diabetes.    HPI:  Margo Cruz is a 22 year old female who presents for the evaluation/management of type 1 diabetes.   has a past medical history of LESLIE (acute kidney injury) (H24) (01/11/2022), Diabetes mellitus, and Seborrheic infantile dermatitis.    Noted 6/2020 hospitalization for DKA-   It is thought that the likely problem with her injection sites from her insulin pump was kinked causing decreased insulin delivery.   Was followed by endocrinology at [facility] " earlier.  Available records, labs and images from outside clinic were personally reviewed.    Is on Medtronic 770 G insulin pump X 7 years.  She is using 770 G and sensors.    She reports overnight hypoglycemia events.  Following that she changed pump settings.  For last 15 days no major episodes of hypoglycemia noted overnight.    Reports that he has long acting insulin in case of pump malfunction at home.   + wt gain. She has started medication for Bipolar.  Wt Readings from Last 2 Encounters:   09/08/23 55.8 kg (123 lb)   12/28/22 59.4 kg (130 lb 14.4 oz)       Insulin Pump Information  Insulin Pump Type: Medtronic 770 G with sensor.  She is using auto mode 50 % of the time.    Infusion Set: Medtronic  Medtronic Infusion Set: Silhouette  CGM: No  Insulin: using Insulin aspart in pump    Currently she is using Relion meter.     1. Type 1 DM:  Orginally diagnosed: 2011 (at age 9 years)  Current Regimen:   yes:     Diabetes Medication(s)       Diabetic Other       Glucagon, rDNA, (GLUCAGON EMERGENCY) 1 MG KIT    Inject 1 mg as directed as needed (low BG)      Insulin       Insulin Aspart (INSULIN PUMP - OUTPATIENT)    Inject Subcutaneous See Admin Instructions Type of pump: Medtronic MiniMed 770G  Type of insulin: Novolog   Basal rate(s)  2571-9958: 0.9 unit/hr  6436-5671: 0.85 units/hr  2263-3605: 0.675 units/hr  6711-0160: 0.55 units/hr  ISF: 30 mg\dL  ICF (insulin to carb ratio)  7260-4187: 1unit/5g carbs  5874-3963: 1 unit/10g carbs  1881-6675: 1 unit/8g carbs  Active insulin time: 3 hrs  Target goal range:   Followed by endocrinology     insulin aspart (NOVOLOG VIAL) 100 UNITS/ML vial    USE UP TO 70 UNITS DAILY IN INSULIN PUMP 90 DAY SUPPLY     Insulin Aspart FlexPen 100 UNIT/ML SOPN    Inject 1 Units Subcutaneous 3 times daily (with meals) Use 3x/day with meals (carb correction scale- uses 1 unit/5g carbs at breakfast, 1 unit/10 grams carbs at lunch and 1 unit/8g carbs at dinner).     Patient not  taking: Reported on 10/17/2023     insulin glargine (LANTUS PEN) 100 UNIT/ML pen    Inject 9 Units Subcutaneous 2 times daily for 60 days (Uses ~18 units/day basal in pump, so when off pump, uses ~ 9 units bid) only if has pump failure, further refills by endocrine dr abrams     Patient not taking: Reported on 10/17/2023            BS checks: 3-4 times  Average Meter Download: Blood glucose data reviewed personally. See nursing note from this encounter for details.  Few episodes of low Bg in the setting of overcorrection.  Exercise:  Last A1c: 7.1%  Symptoms of hypoglycemia (low blood sugar):   Using PUMP:  Current Regimen:   Insulin pump - basal 1  Time Rate (U/hr)   0000-  0.775   0600  0.825   1200  0.625   2200  0.550     Carbohydrate Ratio -    Time Ratio   0000-  8.0   1000  1500 11.0  12.0             DM Complications:   Complications:   Diabetes Complications  Description / Detail    Diabetic Retinopathy  No   CAD / PAD  No   Neuropathy  No   Nephropathy / Microalbuminuria  No  Lab Results   Component Value Date    UMALCR 17.29 01/23/2020         Gastroparesis  No   Hypoglycemia [place]  No          2. Hypertension:  Not on medication.  3. Hyperlipidemia: Not on medication.    PMH/PSH:  Past Medical History:   Diagnosis Date    LESLIE (acute kidney injury) (H24) 01/11/2022    Diabetes mellitus     Seborrheic infantile dermatitis      Past Surgical History:   Procedure Laterality Date    WISDOM TOOTH EXTRACTION  2023     Family Hx:  Family History   Problem Relation Age of Onset    Breast Cancer Mother         BRCA negative    Diabetes Father     Lung Cancer Maternal Grandfather     Cirrhosis Maternal Grandfather     GERD Paternal Grandfather            DM1: Father.           Social Hx:  Social History     Socioeconomic History    Marital status: Single     Spouse name: Not on file    Number of children: Not on file    Years of education: Not on file    Highest education level: Not on file   Occupational  History    Not on file   Tobacco Use    Smoking status: Former     Packs/day: .1     Types: Cigarettes, Vaping Device    Smokeless tobacco: Never    Tobacco comments:     mom smokes outside   Vaping Use    Vaping Use: Some days    Substances: Nicotine    Devices: Disposable   Substance and Sexual Activity    Alcohol use: Yes     Comment: rare    Drug use: Yes     Types: Marijuana     Comment: few times a week    Sexual activity: Yes     Partners: Male     Birth control/protection: I.U.D.   Other Topics Concern    Not on file   Social History Narrative    9/2023: lives with sig other, pets in house,      Social Determinants of Health     Financial Resource Strain: Not on file   Food Insecurity: Not on file   Transportation Needs: Not on file   Physical Activity: Not on file   Stress: Not on file   Social Connections: Not on file   Interpersonal Safety: Not on file   Housing Stability: Not on file          MEDICATIONS:  has a current medication list which includes the following prescription(s): contour next test, blood glucose monitoring, calcium carbonate, glucagon emergency, insulin aspart, insulin aspart, lamotrigine, multiple vitamins-minerals, olanzapine, reason aspirin not prescribed (intentional), cholecalciferol, insulin aspart flexpen, insulin glargine, and statin not prescribed.    ROS     ROS: 10 point ROS neg other than the symptoms noted above in the HPI.    Physical Exam   VS: There were no vitals taken for this visit.  GENERAL: healthy, alert and no distress  EYES: Eyes grossly normal to inspection, conjunctivae and sclerae normal  ENT: no nose swelling, nasal discharge.  Thyroid: no apparent thyroid nodules  RESP: no audible wheeze, cough, or visible cyanosis.  No visible retractions or increased work of breathing.  Able to speak fully in complete sentences.  ABDO: not evaluated.  EXTREMITIES: no hand tremors.  NEURO: Cranial nerves grossly intact, mentation intact and speech normal  SKIN: No apparent  skin lesions, rash or edema seen   PSYCH: mentation appears normal, affect normal/bright, judgement and insight intact, normal speech and appearance well-groomed    LABS:  A1c:  Lab Results   Component Value Date    A1C 6.8 09/08/2023    A1C 7.1 12/17/2022    A1C 8.0 06/01/2022    A1C 9.9 01/11/2022    A1C 10.3 10/26/2021    A1C 8.3 06/04/2020    A1C 7.5 01/23/2020    A1C 7.3 04/23/2015    A1C 7.9 06/18/2014    A1C 7.4 10/11/2013     BMP:   Creatinine   Date Value Ref Range Status   09/08/2023 0.80 0.51 - 0.95 mg/dL Final   06/05/2020 0.64 0.50 - 1.00 mg/dL Final       Urine Micro:  Lab Results   Component Value Date    UMALCR  09/08/2023      Comment:      Unable to calculate, urine albumin and/or urine creatinine is outside detectable limits.  Microalbuminuria is defined as an albumin:creatinine ratio of 17 to 299 for males and 25 to 299 for females. A ratio of albumin:creatinine of 300 or higher is indicative of overt proteinuria.  Due to biologic variability, positive results should be confirmed by a second, first-morning random or 24-hour timed urine specimen. If there is discrepancy, a third specimen is recommended. When 2 out of 3 results are in the microalbuminuria range, this is evidence for incipient nephropathy and warrants increased efforts at glucose control, blood pressure control, and institution of therapy with an angiotensin-converting-enzyme (ACE) inhibitor (if the patient can tolerate it).      UMALCR 12.31 03/10/2022    UMALCR 17.29 01/23/2020       LFTs/Lipids:  Recent Labs   Lab Test 09/08/23  1527 03/10/22  1247   CHOL 123 169   HDL 46* 46*   LDL 49 104*   TRIG 138 97     TFTs:  TSH   Date Value Ref Range Status   09/08/2023 0.89 0.30 - 4.20 uIU/mL Final   01/23/2020 0.79 0.40 - 4.00 mU/L Final       Blood Glucose and pump data/ Meter reviewed.     All pertinent notes, labs, and images personally reviewed by me.       Glucometer/ insulin pump (if applicable)/ CGM data (if applicable)  downloaded, Personally reviewed and interpreted.  All Blood sugar data reviewed personally and interpreted as well as discussed with pt.    All past medical, social and Family history reviewed and updated in Epic.    A/P  Ms.Alyssa JENNI Cruz is a 22 year old here for the evaluation/management of diabetes:    1. DM1 - Under fair control.  A1c 6.8%.  No known complications from Diabetes.  Using Medtronic 770 G pump with Medtronic sensor.  Using auto mode 50% of time.    Plan:  Discussed diagnosis, pathophysiology, management and treatment options of condition with pt.  I also discussed importance of strict blood sugar control to prevent complications associated with uncontrolled diabetes.    Change basal rate as noted below (when in manual mode)  Rest settings same.  New manual basal settings:  Time Rate (U/hr)   0000-  0.775-->0.675   0600  0.825   1200  0.625   2200  0.550     Recommend to use auto mode as much as possible.  Take bolus before each meal.  Continue to use Medtronic 770 insulin pump and sensors.  Check with Medtronic 780 insulin pump is covered.  Repeat labs and follow-up in 3-4 months.  Please make a lab appointment for blood work and follow up clinic appointment in 1 week after that to discuss results.      2. Hypertension -not on medication       3. Hyperlipidemia - Not on medication. LDL 49.  4. Prevention  Ophthalmology- recommend annually.  ASA- NA 2/2 to age.  Smoking- No  Foot exam: 6/1/2022      Most Recent Immunizations   Administered Date(s) Administered    COVID-19 Monovalent 12+ (Pfizer 2022) 03/10/2022    Comvax (HIB/HepB) 01/06/2003    DTAP (<7y) 02/09/2006    DTaP, Unspecified 08/30/2013    HPV9 09/08/2023    Influenza (IIV3) PF 12/04/2006    Influenza Vaccine >6 months (Alfuria,Fluzone) 03/10/2022    MMR 02/09/2006    Meningococcal ACWY (Menactra ) 08/30/2013    Meningococcal ACWY (Menveo ) 08/30/2013    Pneumococcal (PCV 7) 01/06/2003    Pneumococcal 23 valent 01/23/2020    Poliovirus,  inactivated (IPV) 02/09/2006    TDAP (Adacel,Boostrix) 09/08/2023    TDAP Vaccine (Adacel) 08/30/2013    Varicella 08/30/2013         Recommend checking blood sugars before meals and at bedtime.    If Blood glucose are low more often-> 2-3 times/week- give us a call.  The patient is advised to Make better food choices: reduce carbs, Reduce portion size, weight loss and exercise 3-4 times a week.  Discussed hypoglycemia signs and symptoms as well as management in detail.      There is some variability among people, most will usually develop symptoms suggestive of hypoglycemia when blood glucose levels are lowered to the mid 60's. The first set of symptoms are called adrenergic. Patients may experience any of the following nervousness, sweating, intense hunger, trembling, weakness, palpitations, and difficulty speaking. When BS fall below 50 the patient is unable to talk and take oral therapy.  Would recommend Glucagon emergency kit for the patient and education for family and friends around the patient.   The acute management of hypoglycemia involves the rapid delivery of a source of easily absorbed sugar. Regular soda, juice, lifesavers, table sugar, are good options. 15 grams of glucose is the dose that is given, followed by an assessment of symptoms and a blood glucose check if possible. If after 10 minutes there is no improvement, another 10-15 grams should be given. This can be repeated up to three times. The equivalency of 10-15 grams of glucose (approximate servings) are: Four lifesavers, 4 teaspoons of sugar, or 1/2 cup or 4 oz of juice or regular pop.    Follow-up:  As noted in AVS    Karlee Holly M.D  Endocrinology  [facility]  CC: Luanne Todd      All questions were answered.  The patient indicates understanding of the above issues and agrees with the plan set forth.     Disclaimer: This note consists of symbols derived from keyboarding, dictation and/or voice recognition  software. As a result, there may be errors in the script that have gone undetected. Please consider this when interpreting information found in this chart.    Addendum to above note and clinic visit:    Labs reviewed.    See result note/telephone encounter.

## 2023-10-17 NOTE — PATIENT INSTRUCTIONS
[facility]  Dr Holly, Endocrinology Department    [facility]   [address] [facility]  [place] [address]  Appointment [phone]  Fax: [phone]  [place]: Monday - Thursday      To provide the best diabetic care, please bring your blood glucose meter to each and every visit with your Endocrinologist.  Your blood glucose meter/insulin pump will be downloaded at every appointment.    Please arrive 15 minutes before your scheduled appointment.  This will allow for your blood glucose meter/insulin pump to be downloaded.  If you are wearing DEXCOM please bring  or sharing code so that it can be downloaded.  If you are using freestyle bob personal sensors please bring the reader.  If you are using TANDEM insulin pump please have your username and password to get info from Tandem website.    Change basal rate as noted below (when in manual mode)  Rest settings same.  New manual basal settings:  Time Rate (U/hr)   0000-  0.775-->0.675   0600  0.825   1200  0.625   2200  0.550     Recommend to use auto mode as much as possible.  Take bolus before each meal.  Continue to use Medtronic 770 insulin pump and sensors.  Check with Medtronic 780 insulin pump is covered.  Repeat labs and follow-up in 3-4 months.  Please make a lab appointment for blood work and follow up clinic appointment in 1 week after that to discuss results.      Recommend checking blood sugars before meals and at bedtime.    If Blood glucose are low more often-> 2-3 times/week- give us a call.  Make better food choices: reduce carbs, Reduce portion size, weight loss and exercise 3-4 times a week.    What is hypoglycemia:  Hypoglycemia is when blood sugar levels become too low - below 70 m/dl.      What causes hypoglycemia?  - using too much insulin  -taking too many diabetes pills  -not eating enough, or skipping meals or snacks  -not eating enough carbohydrate with meals  -changing  your exercise routine  -drinking alcohol in excess    It is also possible to have hypoglycemia even when you are carefully managing your blood sugar levels.    What does it feel like when blood sugars get too low?  You may feel:  - anxious  -confused  -dizzy  -hungry  -light-headed  -nervous  -shaky  -sleepy  -sweaty    You may have  -blurred or cloudy vision  -heart palpitations (heart skips a beat or races)  -tingling or numbness around the mouth and tongue  -tremors    What to do if you have symptoms of hypoglycmemia:  If you think your blood sugar is too low, check it with a glucose meter.  If its below 70 mg/dl, consume one of the following:  Fruit juice (1/2 cup)  Glucose tablets (15 grams)  Hard candy (5 to 7 pieces)  Honey or sugar (2 teaspoons)  Milk (1/2 cup)  Soft drink (non-diet, 1/2 cup)    Wait 15 minutes and check your blood glucose again.  IF it is still below 70 mg/dl, have another food item listed above. Wait another 15 minutes and repeat the blood glucose test.  Have a small meal or snack that contains some carbohydrate after your blood glucose rises above 70 mg/dl.    If you are at risk of hypoglycemia, always carry with you glucose tablets or one of the foods listed above.      To prevent Hypoglycemia:  Avoid situations that may cause hypoglycemia  Before making any change to your diet or exercise routine, discuss them with your healthcare provider  Keep a record of your blood glucose levels.  Include the time of day, diabetes medications, when you had your last meal or snack, and what you were doing at the time (e.g. Watching TV, gardening, jogging, etc).    Talk to your healthcare provider if your blood glucose levels are often low        Patient guide on hypoglycemia    http://www.hormone.org/Resources/upload/patient-guide-diagnosis-and-management-hypoglycemia-[email or web address]

## 2023-10-17 NOTE — LETTER
"    10/17/2023         RE: Margo Cruz  [address]  [facility] [address]        Dear Colleague,    Thank you for referring your patient, Margo Cruz, to the [facility]. Please see a copy of my visit note below.    Outcome for 10/09/23 7:42 AM: [email or web address] message sent  Pushpa Thomason MA  Outcome for 10/13/23 2:46 PM:  Spoke with pt regarding pump/cgm upload - pt prefers to go to clinic for upload. Declined scheduling nurse visit. Per pt, will try to stop by clinic for walk in appt.  Pushpa Thomason MA  Outcome for 10/17/23 3:39 PM: Carelink emailed to provider  Pushpa Thomason MA      THIS IS A VIDEO VISIT:    Phone call visit/virtual visit encounter:    Name of patient: Margo Cruz    Date of encounter: 10/17/2023    Time of start of video visit: 3:56    Video started: 4:05    Video ended: 4:27    Provider location: working from home/ [facility]    Patient location: patients home.    Mode of transmission: [employer] video/ [employer]    Verbal consent: obtained before starting visit. Pt is agreeable.      The patient has been notified of following:      \"This VIDEO visit will be conducted via a call between you and your physician/provider. We have found that certain health care needs can be provided without the need for a physical exam.  This service lets us provide the care you need with a short phone conversation.  If a prescription is necessary we can send it directly to your pharmacy.  If lab work is needed we can place an order for that and you can then stop by our lab to have the test done at a later time.     With new updates with corona virus patient might be billed as clinic visit.     If during the course of the call the physician/provider feels a telephone visit is not appropriate, you will not be charged for this service.\"      Past medical history, social history, family history, allergy and medications were reviewed and updated as appropriate.  Reviewed " pertinent labs, notes, imaging studies personally.    Endocrinology Clinic Note:  Name: Margo Cruz  Seen for follow-up of type 1 diabetes.    HPI:  Margo Cruz is a 22 year old female who presents for the evaluation/management of type 1 diabetes.   has a past medical history of LESLIE (acute kidney injury) (H24) (01/11/2022), Diabetes mellitus, and Seborrheic infantile dermatitis.    Noted 6/2020 hospitalization for DKA-   It is thought that the likely problem with her injection sites from her insulin pump was kinked causing decreased insulin delivery.   Was followed by endocrinology at [facility] earlier.  Available records, labs and images from outside clinic were personally reviewed.    Is on Medtronic 770 G insulin pump X 7 years.  She is using 770 G and sensors.    She reports overnight hypoglycemia events.  Following that she changed pump settings.  For last 15 days no major episodes of hypoglycemia noted overnight.    Reports that he has long acting insulin in case of pump malfunction at home.   + wt gain. She has started medication for Bipolar.  Wt Readings from Last 2 Encounters:   09/08/23 55.8 kg (123 lb)   12/28/22 59.4 kg (130 lb 14.4 oz)       Insulin Pump Information  Insulin Pump Type: Medtronic 770 G with sensor.  She is using auto mode 50 % of the time.    Infusion Set: Medtronic  Medtronic Infusion Set: Silhouette  CGM: No  Insulin: using Insulin aspart in pump    Currently she is using Relion meter.     1. Type 1 DM:  Orginally diagnosed: 2011 (at age 9 years)  Current Regimen:   yes:     Diabetes Medication(s)       Diabetic Other       Glucagon, rDNA, (GLUCAGON EMERGENCY) 1 MG KIT    Inject 1 mg as directed as needed (low BG)      Insulin       Insulin Aspart (INSULIN PUMP - OUTPATIENT)    Inject Subcutaneous See Admin Instructions Type of pump: Medtronic MiniMed 770G  Type of insulin: Novolog   Basal rate(s)  4749-7922: 0.9 unit/hr  3717-7632: 0.85 units/hr  2478-9701: 0.675  units/hr  5658-9977: 0.55 units/hr  ISF: 30 mg\dL  ICF (insulin to carb ratio)  7963-1657: 1unit/5g carbs  7228-2050: 1 unit/10g carbs  4816-8430: 1 unit/8g carbs  Active insulin time: 3 hrs  Target goal range:   Followed by endocrinology     insulin aspart (NOVOLOG VIAL) 100 UNITS/ML vial    USE UP TO 70 UNITS DAILY IN INSULIN PUMP 90 DAY SUPPLY     Insulin Aspart FlexPen 100 UNIT/ML SOPN    Inject 1 Units Subcutaneous 3 times daily (with meals) Use 3x/day with meals (carb correction scale- uses 1 unit/5g carbs at breakfast, 1 unit/10 grams carbs at lunch and 1 unit/8g carbs at dinner).     Patient not taking: Reported on 10/17/2023     insulin glargine (LANTUS PEN) 100 UNIT/ML pen    Inject 9 Units Subcutaneous 2 times daily for 60 days (Uses ~18 units/day basal in pump, so when off pump, uses ~ 9 units bid) only if has pump failure, further refills by endocrine dr abrams     Patient not taking: Reported on 10/17/2023            BS checks: 3-4 times  Average Meter Download: Blood glucose data reviewed personally. See nursing note from this encounter for details.  Few episodes of low Bg in the setting of overcorrection.  Exercise:  Last A1c: 7.1%  Symptoms of hypoglycemia (low blood sugar):   Using PUMP:  Current Regimen:   Insulin pump - basal 1  Time Rate (U/hr)   0000-  0.775   0600  0.825   1200  0.625   2200  0.550     Carbohydrate Ratio -    Time Ratio   0000-  8.0   1000  1500 11.0  12.0             DM Complications:   Complications:   Diabetes Complications  Description / Detail    Diabetic Retinopathy  No   CAD / PAD  No   Neuropathy  No   Nephropathy / Microalbuminuria  No  Lab Results   Component Value Date    UMALCR 17.29 01/23/2020         Gastroparesis  No   Hypoglycemia [place]  No          2. Hypertension:  Not on medication.  3. Hyperlipidemia: Not on medication.    PMH/PSH:  Past Medical History:   Diagnosis Date     LESLIE (acute kidney injury) (H24) 01/11/2022     Diabetes mellitus       Seborrheic infantile dermatitis      Past Surgical History:   Procedure Laterality Date     WISDOM TOOTH EXTRACTION  2023     Family Hx:  Family History   Problem Relation Age of Onset     Breast Cancer Mother         BRCA negative     Diabetes Father      Lung Cancer Maternal Grandfather      Cirrhosis Maternal Grandfather      GERD Paternal Grandfather            DM1: Father.           Social Hx:  Social History     Socioeconomic History     Marital status: Single     Spouse name: Not on file     Number of children: Not on file     Years of education: Not on file     Highest education level: Not on file   Occupational History     Not on file   Tobacco Use     Smoking status: Former     Packs/day: .1     Types: Cigarettes, Vaping Device     Smokeless tobacco: Never     Tobacco comments:     mom smokes outside   Vaping Use     Vaping Use: Some days     Substances: Nicotine     Devices: Disposable   Substance and Sexual Activity     Alcohol use: Yes     Comment: rare     Drug use: Yes     Types: Marijuana     Comment: few times a week     Sexual activity: Yes     Partners: Male     Birth control/protection: I.U.D.   Other Topics Concern     Not on file   Social History Narrative    9/2023: lives with sig other, pets in house,      Social Determinants of Health     Financial Resource Strain: Not on file   Food Insecurity: Not on file   Transportation Needs: Not on file   Physical Activity: Not on file   Stress: Not on file   Social Connections: Not on file   Interpersonal Safety: Not on file   Housing Stability: Not on file          MEDICATIONS:  has a current medication list which includes the following prescription(s): contour next test, blood glucose monitoring, calcium carbonate, glucagon emergency, insulin aspart, insulin aspart, lamotrigine, multiple vitamins-minerals, olanzapine, reason aspirin not prescribed (intentional), cholecalciferol, insulin aspart flexpen, insulin glargine, and statin not  prescribed.    ROS     ROS: 10 point ROS neg other than the symptoms noted above in the HPI.    Physical Exam   VS: There were no vitals taken for this visit.  GENERAL: healthy, alert and no distress  EYES: Eyes grossly normal to inspection, conjunctivae and sclerae normal  ENT: no nose swelling, nasal discharge.  Thyroid: no apparent thyroid nodules  RESP: no audible wheeze, cough, or visible cyanosis.  No visible retractions or increased work of breathing.  Able to speak fully in complete sentences.  ABDO: not evaluated.  EXTREMITIES: no hand tremors.  NEURO: Cranial nerves grossly intact, mentation intact and speech normal  SKIN: No apparent skin lesions, rash or edema seen   PSYCH: mentation appears normal, affect normal/bright, judgement and insight intact, normal speech and appearance well-groomed    LABS:  A1c:  Lab Results   Component Value Date    A1C 6.8 09/08/2023    A1C 7.1 12/17/2022    A1C 8.0 06/01/2022    A1C 9.9 01/11/2022    A1C 10.3 10/26/2021    A1C 8.3 06/04/2020    A1C 7.5 01/23/2020    A1C 7.3 04/23/2015    A1C 7.9 06/18/2014    A1C 7.4 10/11/2013     BMP:   Creatinine   Date Value Ref Range Status   09/08/2023 0.80 0.51 - 0.95 mg/dL Final   06/05/2020 0.64 0.50 - 1.00 mg/dL Final       Urine Micro:  Lab Results   Component Value Date    UMALCR  09/08/2023      Comment:      Unable to calculate, urine albumin and/or urine creatinine is outside detectable limits.  Microalbuminuria is defined as an albumin:creatinine ratio of 17 to 299 for males and 25 to 299 for females. A ratio of albumin:creatinine of 300 or higher is indicative of overt proteinuria.  Due to biologic variability, positive results should be confirmed by a second, first-morning random or 24-hour timed urine specimen. If there is discrepancy, a third specimen is recommended. When 2 out of 3 results are in the microalbuminuria range, this is evidence for incipient nephropathy and warrants increased efforts at glucose control,  blood pressure control, and institution of therapy with an angiotensin-converting-enzyme (ACE) inhibitor (if the patient can tolerate it).      UMALCR 12.31 03/10/2022    UMALCR 17.29 01/23/2020       LFTs/Lipids:  Recent Labs   Lab Test 09/08/23  1527 03/10/22  1247   CHOL 123 169   HDL 46* 46*   LDL 49 104*   TRIG 138 97     TFTs:  TSH   Date Value Ref Range Status   09/08/2023 0.89 0.30 - 4.20 uIU/mL Final   01/23/2020 0.79 0.40 - 4.00 mU/L Final       Blood Glucose and pump data/ Meter reviewed.     All pertinent notes, labs, and images personally reviewed by me.       Glucometer/ insulin pump (if applicable)/ CGM data (if applicable) downloaded, Personally reviewed and interpreted.  All Blood sugar data reviewed personally and interpreted as well as discussed with pt.    All past medical, social and Family history reviewed and updated in [facility].    A/P  Ms.Alyssa JENNI Cruz is a 22 year old here for the evaluation/management of diabetes:    1. DM1 - Under fair control.  A1c 6.8%.  No known complications from Diabetes.  Using Medtronic 770 G pump with Medtronic sensor.  Using auto mode 50% of time.    Plan:  Discussed diagnosis, pathophysiology, management and treatment options of condition with pt.  I also discussed importance of strict blood sugar control to prevent complications associated with uncontrolled diabetes.    Change basal rate as noted below (when in manual mode)  Rest settings same.  New manual basal settings:  Time Rate (U/hr)   0000-  0.775-->0.675   0600  0.825   1200  0.625   2200  0.550     Recommend to use auto mode as much as possible.  Take bolus before each meal.  Continue to use Medtronic 770 insulin pump and sensors.  Check with Medtronic 780 insulin pump is covered.  Repeat labs and follow-up in 3-4 months.  Please make a lab appointment for blood work and follow up clinic appointment in 1 week after that to discuss results.      2. Hypertension -not on medication       3. Hyperlipidemia -  Not on medication. LDL 49.  4. Prevention  Ophthalmology- recommend annually.  ASA- NA 2/2 to age.  Smoking- No  Foot exam: 6/1/2022      Most Recent Immunizations   Administered Date(s) Administered     COVID-19 Monovalent 12+ (Pfizer 2022) 03/10/2022     Comvax (HIB/HepB) 01/06/2003     DTAP (<7y) 02/09/2006     DTaP, Unspecified 08/30/2013     HPV9 09/08/2023     Influenza (IIV3) PF 12/04/2006     Influenza Vaccine >6 months (Alfuria,Fluzone) 03/10/2022     MMR 02/09/2006     Meningococcal ACWY (Menactra ) 08/30/2013     Meningococcal ACWY (Menveo ) 08/30/2013     Pneumococcal (PCV 7) 01/06/2003     Pneumococcal 23 valent 01/23/2020     Poliovirus, inactivated (IPV) 02/09/2006     TDAP (Adacel,Boostrix) 09/08/2023     TDAP Vaccine (Adacel) 08/30/2013     Varicella 08/30/2013         Recommend checking blood sugars before meals and at bedtime.    If Blood glucose are low more often-> 2-3 times/week- give us a call.  The patient is advised to Make better food choices: reduce carbs, Reduce portion size, weight loss and exercise 3-4 times a week.  Discussed hypoglycemia signs and symptoms as well as management in detail.      There is some variability among people, most will usually develop symptoms suggestive of hypoglycemia when blood glucose levels are lowered to the mid 60's. The first set of symptoms are called adrenergic. Patients may experience any of the following nervousness, sweating, intense hunger, trembling, weakness, palpitations, and difficulty speaking. When BS fall below 50 the patient is unable to talk and take oral therapy.  Would recommend Glucagon emergency kit for the patient and education for family and friends around the patient.   The acute management of hypoglycemia involves the rapid delivery of a source of easily absorbed sugar. Regular soda, juice, lifesavers, table sugar, are good options. 15 grams of glucose is the dose that is given, followed by an assessment of symptoms and a blood  glucose check if possible. If after 10 minutes there is no improvement, another 10-15 grams should be given. This can be repeated up to three times. The equivalency of 10-15 grams of glucose (approximate servings) are: Four lifesavers, 4 teaspoons of sugar, or 1/2 cup or 4 oz of juice or regular pop.    Follow-up:  As noted in AVS    Karlee Holly M.D  Endocrinology  [facility]  CC: Luanne Todd      All questions were answered.  The patient indicates understanding of the above issues and agrees with the plan set forth.     Disclaimer: This note consists of symbols derived from keyboarding, dictation and/or voice recognition software. As a result, there may be errors in the script that have gone undetected. Please consider this when interpreting information found in this chart.    Addendum to above note and clinic visit:    Labs reviewed.    See result note/telephone encounter.                Again, thank you for allowing me to participate in the care of your patient.        Sincerely,        Karlee Holly MD

## 2023-11-06 NOTE — TELEPHONE ENCOUNTER
Denied lamotrigine  Ordered 8/15/22 338, 0   Mariana ROSA RN      Photo Preface (Leave Blank If You Do Not Want): Photographs were obtained today Detail Level: Simple

## 2023-11-14 ENCOUNTER — MYC REFILL (OUTPATIENT)
Dept: PEDIATRICS | Facility: CLINIC | Age: 23
End: 2023-11-14
Payer: COMMERCIAL

## 2023-11-14 DIAGNOSIS — F39 MOOD DISORDER (H): ICD-10-CM

## 2023-11-15 RX ORDER — LAMOTRIGINE 25 MG/1
100 TABLET ORAL DAILY
Qty: 120 TABLET | Refills: 0 | OUTPATIENT
Start: 2023-11-15

## 2023-11-15 NOTE — TELEPHONE ENCOUNTER
Hi there    I haven't seen this pt in quite some time. Could you address, as you just did her physical and addressed this issue? Thanks so much!

## 2023-11-16 NOTE — TELEPHONE ENCOUNTER
Please address whether there was a true break in meds. If so, will need to restart gradual increase  MD who did her physical isn't willing to fill. I can fill short term rx but needs phone follow-up with me

## 2023-11-17 NOTE — TELEPHONE ENCOUNTER
Called and spoke with patient.  She states she cannot talk now - will call back later - asked her to speak with nurse about her refill request.  See below notes from provider.  Elena Rudolph, CMA

## 2023-11-20 RX ORDER — LAMOTRIGINE 25 MG/1
100 TABLET ORAL DAILY
Qty: 120 TABLET | Refills: 0 | Status: SHIPPED | OUTPATIENT
Start: 2023-11-20

## 2023-11-20 NOTE — TELEPHONE ENCOUNTER
Pt called to find out the status of her refill  Pt has a had a break in her medication- does not know how long  Pt has some anxiety- with medical appts    Appt made for 12/4/23 virtual with Naomi    Thank you  Memo Kyle RN on 11/20/2023 at 1:13 PM

## 2023-12-21 DIAGNOSIS — F39 MOOD DISORDER (H): ICD-10-CM

## 2023-12-21 NOTE — TELEPHONE ENCOUNTER
Last year I discussed with her that I could not fill long term, would have to come from psych. I gave her the number for psych. Has she scheduled? I will not refill until that appointment is scheduled with psychiatry. Please let me know if she needs referral (please daniela it up

## 2023-12-21 NOTE — TELEPHONE ENCOUNTER
Called and spoke with patient. Patient requesting call back tomorrow before noon as she is at work right now and can't talk. Postponing until tomorrow.     Eleazar JONES RN 12/21/2023 at 3:31 PM

## 2023-12-22 RX ORDER — LAMOTRIGINE 25 MG/1
TABLET ORAL
Qty: 120 TABLET | Refills: 0 | OUTPATIENT
Start: 2023-12-22

## 2023-12-22 NOTE — TELEPHONE ENCOUNTER
RN called and spoke with patient. Relayed provider message about lamoTRIgine (LAMICTAL) 25 MG tablet .    Pt verbalized understanding. Pt has not set up appointment with psychiatry yet. Unsure if she needs referral. States she will get that set up.    Will Howie up referral  for provider    Patient instructed to call back once she has made appt to update us. No further questions at this time.    Krystal TIWARI RN on 12/22/2023 at 8:19 AM

## 2024-01-28 ENCOUNTER — HEALTH MAINTENANCE LETTER (OUTPATIENT)
Age: 24
End: 2024-01-28

## 2024-02-16 NOTE — PROGRESS NOTES
Outcome for 02/16/24 7:33 AM: [employer] message sent  Kate Joyce LPN   Outcome for 02/23/24 10:31 AM: Left Voicemail   Kate Joyce LPN   Outcome for 02/26/24 8:56 AM: Left Voicemail   Kate Joyce LPN   Outcome for 02/26/24 6:06 PM: Carelink emailed to provider  Chio Aguirre MA

## 2024-02-23 ENCOUNTER — TELEPHONE (OUTPATIENT)
Dept: ENDOCRINOLOGY | Facility: CLINIC | Age: 24
End: 2024-02-23
Payer: COMMERCIAL

## 2024-02-23 NOTE — TELEPHONE ENCOUNTER
Called patient and left voicemail. Patient has an appointment on 2/27/24. Need patient to upload their Medtronic device to site for provider to review prior to their appointment.    Kate Joyce LPN 02/23/24 10:31 AM

## 2024-02-26 ENCOUNTER — MEDICAL CORRESPONDENCE (OUTPATIENT)
Dept: HEALTH INFORMATION MANAGEMENT | Facility: CLINIC | Age: 24
End: 2024-02-26
Payer: COMMERCIAL

## 2024-02-26 NOTE — TELEPHONE ENCOUNTER
Called patient and left voicemail. Patient has an appointment on 2/27/24. Need patient to upload their Medtronic device to site for provider to review prior to their appointment.    Kate Joyce LPN 02/26/24 8:55 AM

## 2024-02-27 ENCOUNTER — VIRTUAL VISIT (OUTPATIENT)
Dept: ENDOCRINOLOGY | Facility: CLINIC | Age: 24
End: 2024-02-27
Payer: COMMERCIAL

## 2024-02-27 DIAGNOSIS — E10.9 TYPE 1 DIABETES, HBA1C GOAL < 8% (H): Primary | ICD-10-CM

## 2024-02-27 DIAGNOSIS — Z96.41 INSULIN PUMP STATUS: ICD-10-CM

## 2024-02-27 PROCEDURE — G2211 COMPLEX E/M VISIT ADD ON: HCPCS | Mod: 95 | Performed by: INTERNAL MEDICINE

## 2024-02-27 PROCEDURE — 99214 OFFICE O/P EST MOD 30 MIN: CPT | Mod: 95 | Performed by: INTERNAL MEDICINE

## 2024-02-27 ASSESSMENT — PAIN SCALES - GENERAL: PAINLEVEL: NO PAIN (0)

## 2024-02-27 NOTE — PATIENT INSTRUCTIONS
-[facility]  Dr Holly, Endocrinology Department    [facility]   [address]  [place] [address]  Appointment [phone]  Fax: [phone]  [place]: Monday - Thursday         Recommend to use auto mode as much as possible.  Take bolus before each meal.  Check with your insurance to see if tandem insulin pump or OmniPod insulin pump is covered.  It is compatible with Dexcom.  After that please inform us.  If needed, diabetic educator referral can be placed to get you started on the new pump.    Continue current pump settings except change I:C ratio at dinnertime as noted below.    Time Ratio   0000-  8.0   1000  1500 11.0  12.0-->11.0       Follow up with Lita COBOS in 3-4 months with labs prior.  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  Please make a lab appointment for blood work and follow up clinic appointment in 1 week after that to discuss results.    Recommend checking blood sugars before meals and at bedtime.    If Blood glucose are low more often-> 2-3 times/week- give us a call.  Make better food choices: reduce carbs, Reduce portion size, weight loss and exercise 3-4 times a week.    What is hypoglycemia:  Hypoglycemia is when blood sugar levels become too low - below 70 m/dl.      What causes hypoglycemia?  - using too much insulin  -taking too many diabetes pills  -not eating enough, or skipping meals or snacks  -not eating enough carbohydrate with meals  -changing your exercise routine  -drinking alcohol in excess    It is also possible to have hypoglycemia even when you are carefully managing your blood sugar levels.    What does it feel like when blood sugars get too low?  You may feel:  - anxious  -confused  -dizzy  -hungry  -light-headed  -nervous  -shaky  -sleepy  -sweaty    You  may have  -blurred or cloudy vision  -heart palpitations (heart skips a beat or races)  -tingling or numbness around the mouth and tongue  -tremors    What to do if you have symptoms of hypoglycmemia:  If you think your blood sugar is too low, check it with a glucose meter.  If its below 70 mg/dl, consume one of the following:  Fruit juice (1/2 cup)  Glucose tablets (15 grams)  Hard candy (5 to 7 pieces)  Honey or sugar (2 teaspoons)  Milk (1/2 cup)  Soft drink (non-diet, 1/2 cup)    Wait 15 minutes and check your blood glucose again.  IF it is still below 70 mg/dl, have another food item listed above. Wait another 15 minutes and repeat the blood glucose test.  Have a small meal or snack that contains some carbohydrate after your blood glucose rises above 70 mg/dl.    If you are at risk of hypoglycemia, always carry with you glucose tablets or one of the foods listed above.      To prevent Hypoglycemia:  Avoid situations that may cause hypoglycemia  Before making any change to your diet or exercise routine, discuss them with your healthcare provider  Keep a record of your blood glucose levels.  Include the time of day, diabetes medications, when you had your last meal or snack, and what you were doing at the time (e.g. Watching TV, gardening, jogging, etc).    Talk to your healthcare provider if your blood glucose levels are often low        Patient guide on hypoglycemia    http://[email or web address].org/Resources/upload/patient-guide-diagnosis-[email or web address]-management-[email or web address]

## 2024-02-27 NOTE — PROGRESS NOTES
"THIS IS A VIDEO VISIT:    Phone call visit/virtual visit encounter:    Name of patient: Margo Cruz    Date of encounter: 2/27/2024    Time of start of video visit: 3:04    Video started: 3:12    Video ended: 3:23    Provider location: working from home/ [facility]    Patient location: patients home.    Mode of transmission: [employer] [place] video/ [employer]    Verbal consent: obtained before starting visit. Pt is agreeable.      The patient has been notified of following:      \"This VIDEO visit will be conducted via a call between you and your physician/provider. We have found that certain health care needs can be provided without the need for a physical exam.  This service lets us provide the care you need with a short phone conversation.  If a prescription is necessary we can send it directly to your pharmacy.  If lab work is needed we can place an order for that and you can then stop by our lab to have the test done at a later time.     With new updates with corona virus patient might be billed as clinic visit.     If during the course of the call the physician/provider feels a telephone visit is not appropriate, you will not be charged for this service.\"      Past medical history, social history, family history, allergy and medications were reviewed and updated as appropriate.  Reviewed pertinent labs, notes, imaging studies personally.    Endocrinology Clinic Note:  Name: Margo Cruz  Seen for follow-up of type 1 diabetes.    HPI:  Margo Cruz is a 22 year old female who presents for the evaluation/management of type 1 diabetes.   has a past medical history of LESLIE (acute kidney injury) (H24) (01/11/2022), Diabetes mellitus, and Seborrheic infantile dermatitis.    Noted 6/2020 hospitalization for DKA-   It is thought that the likely problem with her injection sites from her insulin pump was kinked causing decreased insulin delivery.   Was [facility].  Available " records, labs and images from outside clinic were personally reviewed.    Is on Medtronic 770 G insulin pump.  She is using 770 G and sensors.  Medtronic has contacted her for upgrade to 780.  She is interested to switch to pump which is compatible with Dexcom like Tandem or Omnipod.    Using novolog in pump    She reports overnight hypoglycemia events.  Following that she changed pump settings.  For last 15 days no major episodes of hypoglycemia noted overnight.    She works as PCA and works in shift.  Shift changes.    Reports that he has long acting insulin in case of pump malfunction at home.   + wt gain. She has started medication for Bipolar.  Wt Readings from Last 2 Encounters:   09/08/23 55.8 kg (123 lb)   12/28/22 59.4 kg (130 lb 14.4 oz)       Insulin Pump Information  Insulin Pump Type: Medtronic 770 G with sensor.  She is using auto mode 75% of the time.    Infusion Set: Medtronic  Insulin: using Insulin aspart in pump         1. Type 1 DM:  Orginally diagnosed: 2011 (at age 9 years)  Current Regimen:   yes:     Diabetes Medication(s)       Diabetic Other       Glucagon, rDNA, (GLUCAGON EMERGENCY) 1 MG KIT Inject 1 mg as directed as needed (low BG)       Insulin       Insulin Aspart (INSULIN PUMP - OUTPATIENT) Inject Subcutaneous See Admin Instructions Type of pump: Medtronic MiniMed 770G  Type of insulin: Novolog   Basal rate(s)  5499-0217: 0.9 unit/hr  3478-6833: 0.85 units/hr  3102-0108: 0.675 units/hr  4498-8260: 0.55 units/hr  ISF: 30 mg\dL  ICF (insulin to carb ratio)  2756-5614: 1unit/5g carbs  3433-8910: 1 unit/10g carbs  3425-5260: 1 unit/8g carbs  Active insulin time: 3 hrs  Target goal range:   Followed by endocrinology     insulin aspart (NOVOLOG VIAL) 100 UNITS/ML vial USE UP TO 70 UNITS DAILY IN INSULIN PUMP 90 DAY SUPPLY     Insulin Aspart FlexPen 100 UNIT/ML SOPN Inject 1 Units Subcutaneous 3 times daily (with meals) Use 3x/day with meals (carb correction scale- uses 1 unit/5g carbs  at breakfast, 1 unit/10 grams carbs at lunch and 1 unit/8g carbs at dinner).     Patient not taking: Reported on 10/17/2023     insulin glargine (LANTUS PEN) 100 UNIT/ML pen Inject 9 Units Subcutaneous 2 times daily for 60 days (Uses ~18 units/day basal in pump, so when off pump, uses ~ 9 units bid) only if has pump failure, further refills by endocrine dr abrams     Patient not taking: Reported on 10/17/2023            BS checks: 3-4 times  Average Meter Download: Blood glucose data reviewed personally. See nursing note from this encounter for details.  Few episodes of low Bg in the setting of overcorrection.  Exercise:  Last A1c: 7.1%  Symptoms of hypoglycemia (low blood sugar):   Using PUMP:  Current Regimen:   Insulin pump - basal 1  Time Rate (U/hr)   0000-  0.775   0600  0.825   1200  0.625   2200  0.550     Carbohydrate Ratio -    Time Ratio   0000-  8.0   1000  1500 11.0  12.0             DM Complications:   Complications:   Diabetes Complications  Description / Detail    Diabetic Retinopathy  No   CAD / PAD  No   Neuropathy  No   Nephropathy / Microalbuminuria  No  Lab Results   Component Value Date    UMALCR 17.29 01/23/2020         Gastroparesis  No   Hypoglycemia [place]  No          2. Hypertension:  Not on medication.  3. Hyperlipidemia: Not on medication.    PMH/PSH:  Past Medical History:   Diagnosis Date    LESLIE (acute kidney injury) (H24) 01/11/2022    Diabetes mellitus     Seborrheic infantile dermatitis      Past Surgical History:   Procedure Laterality Date    WISDOM TOOTH EXTRACTION  2023     Family Hx:  Family History   Problem Relation Age of Onset    Breast Cancer Mother         BRCA negative    Diabetes Father     Lung Cancer Maternal Grandfather     Cirrhosis Maternal Grandfather     GERD Paternal Grandfather            DM1: Father.           Social Hx:  Social History     Socioeconomic History    Marital status: Single     Spouse name: Not on file    Number of children: Not on file     Years of education: Not on file    Highest education level: Not on file   Occupational History    Not on file   Tobacco Use    Smoking status: Former     Packs/day: .1     Types: Cigarettes, Vaping Device    Smokeless tobacco: Never    Tobacco comments:     mom smokes outside   Vaping Use    Vaping Use: Some days    Substances: Nicotine    Devices: Disposable   Substance and Sexual Activity    Alcohol use: Yes     Comment: rare    Drug use: Yes     Types: Marijuana     Comment: few times a week    Sexual activity: Yes     Partners: Male     Birth control/protection: I.U.D.   Other Topics Concern    Not on file   Social History Narrative    9/2023: lives with sig other, pets in house,      Social Determinants of Health     Financial Resource Strain: Not on file   Food Insecurity: Not on file   Transportation Needs: Not on file   Physical Activity: Not on file   Stress: Not on file   Social Connections: Not on file   Interpersonal Safety: Not on file   Housing Stability: Not on file          MEDICATIONS:  has a current medication list which includes the following prescription(s): contour next test, blood glucose monitoring, calcium carbonate, glucagon emergency, insulin aspart, insulin aspart, lamotrigine, multiple vitamins-minerals, olanzapine, reason aspirin not prescribed (intentional), cholecalciferol, insulin aspart flexpen, insulin glargine, and statin not prescribed.    ROS     ROS: 10 point ROS neg other than the symptoms noted above in the HPI.    Physical Exam   VS: There were no vitals taken for this visit.  GENERAL: healthy, alert and no distress  EYES: Eyes grossly normal to inspection, conjunctivae and sclerae normal  ENT: no nose swelling, nasal discharge.  Thyroid: no apparent thyroid nodules  RESP: no audible wheeze, cough, or visible cyanosis.  No visible retractions or increased work of breathing.  Able to speak fully in complete sentences.  ABDO: not evaluated.  EXTREMITIES: no hand  tremors.  NEURO: Cranial nerves grossly intact, mentation intact and speech normal  SKIN: No apparent skin lesions, rash or edema seen   PSYCH: mentation appears normal, affect normal/bright, judgement and insight intact, normal speech and appearance well-groomed    LABS:  A1c:  Lab Results   Component Value Date    A1C 6.8 09/08/2023    A1C 7.1 12/17/2022    A1C 8.0 06/01/2022    A1C 9.9 01/11/2022    A1C 10.3 10/26/2021    A1C 8.3 06/04/2020    A1C 7.5 01/23/2020    A1C 7.3 04/23/2015    A1C 7.9 06/18/2014    A1C 7.4 10/11/2013     BMP:   Creatinine   Date Value Ref Range Status   09/08/2023 0.80 0.51 - 0.95 mg/dL Final   06/05/2020 0.64 0.50 - 1.00 mg/dL Final       Urine Micro:  Lab Results   Component Value Date    UMALCR  09/08/2023      Comment:      Unable to calculate, urine albumin and/or urine creatinine is outside detectable limits.  Microalbuminuria is defined as an albumin:creatinine ratio of 17 to 299 for males and 25 to 299 for females. A ratio of albumin:creatinine of 300 or higher is indicative of overt proteinuria.  Due to biologic variability, positive results should be confirmed by a second, first-morning random or 24-hour timed urine specimen. If there is discrepancy, a third specimen is recommended. When 2 out of 3 results are in the microalbuminuria range, this is evidence for incipient nephropathy and warrants increased efforts at glucose control, blood pressure control, and institution of therapy with an angiotensin-converting-enzyme (ACE) inhibitor (if the patient can tolerate it).      UMALCR 12.31 03/10/2022    UMALCR 17.29 01/23/2020       LFTs/Lipids:  Recent Labs   Lab Test 09/08/23  1527 03/10/22  1247   CHOL 123 169   HDL 46* 46*   LDL 49 104*   TRIG 138 97     TFTs:  TSH   Date Value Ref Range Status   09/08/2023 0.89 0.30 - 4.20 uIU/mL Final   01/23/2020 0.79 0.40 - 4.00 mU/L Final       Blood Glucose and pump data/ Meter reviewed.     All pertinent notes, labs, and images  personally reviewed by me.       Glucometer/ insulin pump (if applicable)/ CGM data (if applicable) downloaded, Personally reviewed and interpreted.  All Blood sugar data reviewed personally and interpreted as well as discussed with pt.    All past medical, social and Family history reviewed and updated in [facility].    A/P  Ms.Alyssa JENNI Cruz is a 22 year old here for the evaluation/management of diabetes:    1. DM1 - Under fair control.  A1c 6.8%.  No known complications from Diabetes.  Using Medtronic 770 G pump with StudyEgg sensor.  Using auto mode 50% of time.    Plan:  Discussed diagnosis, pathophysiology, management and treatment options of condition with pt.  I also discussed importance of strict blood sugar control to prevent complications associated with uncontrolled diabetes.    Continue current pump settings except change I:C ratio at dinnertime as noted below.  Time Ratio   0000-  8.0   1000  1500 11.0  12.0-->11.0     Recommend to use auto mode as much as possible.  Take bolus before each meal.  Check with your insurance to see if tandem insulin pump or OmniPod insulin pump is covered.  It is compatible with Dexcom.  After that please inform us.  If needed, diabetic educator referral can be placed to get you started on the new pump.    Follow up with Lita COBOS in 3-4 months with labs prior.  Repeat labs and follow up with  in 6-7 months.  Please make a lab appointment for blood work and follow up clinic appointment in 1 week after that to discuss results.      2. Hypertension -not on medication       3. Hyperlipidemia - Not on medication. LDL 49.  4. Prevention  Ophthalmology- recommend annually.  ASA- NA 2/2 to age.  Smoking- No  Foot exam: 6/1/2022      Most Recent Immunizations   Administered Date(s) Administered    COVID-19 Monovalent 12+ (Pfizer 2022) 03/10/2022    Comvax (HIB/HepB) 01/06/2003    DTAP (<7y) 02/09/2006    DTaP, Unspecified 08/30/2013    HPV9 09/08/2023    Influenza  (IIV3) PF 12/04/2006    Influenza Vaccine >6 months,quad, PF 03/10/2022    MMR 02/09/2006    Meningococcal ACWY (Menactra ) 08/30/2013    Meningococcal ACWY (Menveo ) 08/30/2013    Pneumococcal (PCV 7) 01/06/2003    Pneumococcal 23 valent 01/23/2020    Poliovirus, inactivated (IPV) 02/09/2006    TDAP (Adacel,Boostrix) 09/08/2023    TDAP Vaccine (Adacel) 08/30/2013    Varicella 08/30/2013         Recommend checking blood sugars before meals and at bedtime.    If Blood glucose are low more often-> 2-3 times/week- give us a call.  The patient is advised to Make better food choices: reduce carbs, Reduce portion size, weight loss and exercise 3-4 times a week.  Discussed hypoglycemia signs and symptoms as well as management in detail.      There is some variability among people, most will usually develop symptoms suggestive of hypoglycemia when blood glucose levels are lowered to the mid 60's. The first set of symptoms are called adrenergic. Patients may experience any of the following nervousness, sweating, intense hunger, trembling, weakness, palpitations, and difficulty speaking. When BS fall below 50 the patient is unable to talk and take oral therapy.  Would recommend Glucagon emergency kit for the patient and education for family and friends around the patient.   The acute management of hypoglycemia involves the rapid delivery of a source of easily absorbed sugar. Regular soda, juice, lifesavers, table sugar, are good options. 15 grams of glucose is the dose that is given, followed by an assessment of symptoms and a blood glucose check if possible. If after 10 minutes there is no improvement, another 10-15 grams should be given. This can be repeated up to three times. The equivalency of 10-15 grams of glucose (approximate servings) are: Four lifesavers, 4 teaspoons of sugar, or 1/2 cup or 4 oz of juice or regular pop.    Discussed indications, risks and benefits of all medications prescribed, and answered questions  to patient's satisfaction.  The longitudinal plan of care for the diagnosis(es)/condition(s) as documented were addressed during this visit. Due to the added complexity in care, I will continue to support Margo in the subsequent management and with ongoing continuity of care.  All questions were answered.  The patient indicates understanding of the above issues and agrees with the plan set forth.      Follow-up:  As noted in AVS    Karlee Holly M.D  Endocrinology  [facility]  CC: Luanne Todd    Disclaimer: This note consists of symbols derived from keyboarding, dictation and/or voice recognition software. As a result, there may be errors in the script that have gone undetected. Please consider this when interpreting information found in this chart.    Addendum to above note and clinic visit:    Labs reviewed.    See result note/telephone encounter.

## 2024-02-27 NOTE — NURSING NOTE
Is the patient currently in the state of [place]? YES    Visit mode:VIDEO    If the visit is dropped, the patient can be reconnected by: VIDEO VISIT: Text to cell phone:   Telephone Information:   Mobile [phone]       Will anyone else be joining the visit? NO  (If patient encounters technical issues they should call [phone]    How would you like to obtain your AVS? MyChart    Are changes needed to the allergy or medication list? No    Reason for visit: RECHECK Shelby Kocher VVF

## 2024-02-27 NOTE — LETTER
"    2/27/2024         RE: Margo Cruz  [address]  [facility] [address]        Dear Colleague,    Thank you for referring your patient, Margo Cruz, to the [facility]. Please see a copy of my visit note below.    Outcome for 02/16/24 7:33 AM: Daleeli message sent  Kate Joyce LPN   Outcome for 02/23/24 10:31 AM: Left Voicemail   Kate Joyce LPN   Outcome for 02/26/24 8:56 AM: Left Voicemail   Kate Joyce LPN   Outcome for 02/26/24 6:06 PM: Carelink emailed to provider  Chio Aguirre MA            THIS IS A VIDEO VISIT:    Phone call visit/virtual visit encounter:    Name of patient: Margo Cruz    Date of encounter: 2/27/2024    Time of start of video visit: 3:04    Video started: 3:12    Video ended: 3:23    Provider location: working from home/ [facility]    Patient location: patients home.    Mode of transmission: Tuan800 video/ [employer]    Verbal consent: obtained before starting visit. Pt is agreeable.      The patient has been notified of following:      \"This VIDEO visit will be conducted via a call between you and your physician/provider. We have found that certain health care needs can be provided without the need for a physical exam.  This service lets us provide the care you need with a short phone conversation.  If a prescription is necessary we can send it directly to your pharmacy.  If lab work is needed we can place an order for that and you can then stop by our lab to have the test done at a later time.     With new updates with corona virus patient might be billed as clinic visit.     If during the course of the call the physician/provider feels a telephone visit is not appropriate, you will not be charged for this service.\"      Past medical history, social history, family history, allergy and medications were reviewed and updated as appropriate.  Reviewed pertinent labs, notes, imaging studies personally.    Endocrinology Clinic Note:  Name: " Margo Cruz  Seen for follow-up of type 1 diabetes.    HPI:  Margo Cruz is a 22 year old female who presents for the evaluation/management of type 1 diabetes.   has a past medical history of LESLIE (acute kidney injury) (H24) (01/11/2022), Diabetes mellitus, and Seborrheic infantile dermatitis.    Noted 6/2020 hospitalization for DKA-   It is thought that the likely problem with her injection sites from her insulin pump was kinked causing decreased insulin delivery.   Was followed by endocrinology at [facility] earlier.  Available records, labs and images from outside clinic were personally reviewed.    Is on Medtronic 770 G insulin pump.  She is using 770 G and sensors.  Medtronic has contacted her for upgrade to 780.  She is interested to switch to pump which is compatible with Dexcom like Tandem or Omnipod.    Using novolog in pump    She reports overnight hypoglycemia events.  Following that she changed pump settings.  For last 15 days no major episodes of hypoglycemia noted overnight.    She works as PCA and works in shift.  Shift changes.    Reports that he has long acting insulin in case of pump malfunction at home.   + wt gain. She has started medication for Bipolar.  Wt Readings from Last 2 Encounters:   09/08/23 55.8 kg (123 lb)   12/28/22 59.4 kg (130 lb 14.4 oz)       Insulin Pump Information  Insulin Pump Type: Medtronic 770 G with sensor.  She is using auto mode 75% of the time.    Infusion Set: Medtronic  Insulin: using Insulin aspart in pump         1. Type 1 DM:  Orginally diagnosed: 2011 (at age 9 years)  Current Regimen:   yes:     Diabetes Medication(s)       Diabetic Other       Glucagon, rDNA, (GLUCAGON EMERGENCY) 1 MG KIT Inject 1 mg as directed as needed (low BG)       Insulin       Insulin Aspart (INSULIN PUMP - OUTPATIENT) Inject Subcutaneous See Admin Instructions Type of pump: Medtronic MiniMed 770G  Type of insulin: Novolog   Basal rate(s)  2443-6173: 0.9  unit/hr  3974-5998: 0.85 units/hr  5379-8612: 0.675 units/hr  3606-6995: 0.55 units/hr  ISF: 30 mg\dL  ICF (insulin to carb ratio)  2471-3396: 1unit/5g carbs  2816-8284: 1 unit/10g carbs  5842-1142: 1 unit/8g carbs  Active insulin time: 3 hrs  Target goal range:   Followed by endocrinology     insulin aspart (NOVOLOG VIAL) 100 UNITS/ML vial USE UP TO 70 UNITS DAILY IN INSULIN PUMP 90 DAY SUPPLY     Insulin Aspart FlexPen 100 UNIT/ML SOPN Inject 1 Units Subcutaneous 3 times daily (with meals) Use 3x/day with meals (carb correction scale- uses 1 unit/5g carbs at breakfast, 1 unit/10 grams carbs at lunch and 1 unit/8g carbs at dinner).     Patient not taking: Reported on 10/17/2023     insulin glargine (LANTUS PEN) 100 UNIT/ML pen Inject 9 Units Subcutaneous 2 times daily for 60 days (Uses ~18 units/day basal in pump, so when off pump, uses ~ 9 units bid) only if has pump failure, further refills by endocrine dr abrams     Patient not taking: Reported on 10/17/2023            BS checks: 3-4 times  Average Meter Download: Blood glucose data reviewed personally. See nursing note from this encounter for details.  Few episodes of low Bg in the setting of overcorrection.  Exercise:  Last A1c: 7.1%  Symptoms of hypoglycemia (low blood sugar):   Using PUMP:  Current Regimen:   Insulin pump - basal 1  Time Rate (U/hr)   0000-  0.775   0600  0.825   1200  0.625   2200  0.550     Carbohydrate Ratio -    Time Ratio   0000-  8.0   1000  1500 11.0  12.0             DM Complications:   Complications:   Diabetes Complications  Description / Detail    Diabetic Retinopathy  No   CAD / PAD  No   Neuropathy  No   Nephropathy / Microalbuminuria  No  Lab Results   Component Value Date    UMALCR 17.29 01/23/2020         Gastroparesis  No   Hypoglycemia [place]  No          2. Hypertension:  Not on medication.  3. Hyperlipidemia: Not on medication.    PMH/PSH:  Past Medical History:   Diagnosis Date     LESLIE (acute kidney injury)  (H24) 01/11/2022     Diabetes mellitus      Seborrheic infantile dermatitis      Past Surgical History:   Procedure Laterality Date     WISDOM TOOTH EXTRACTION  2023     Family Hx:  Family History   Problem Relation Age of Onset     Breast Cancer Mother         BRCA negative     Diabetes Father      Lung Cancer Maternal Grandfather      Cirrhosis Maternal Grandfather      GERD Paternal Grandfather            DM1: Father.           Social Hx:  Social History     Socioeconomic History     Marital status: Single     Spouse name: Not on file     Number of children: Not on file     Years of education: Not on file     Highest education level: Not on file   Occupational History     Not on file   Tobacco Use     Smoking status: Former     Packs/day: .1     Types: Cigarettes, Vaping Device     Smokeless tobacco: Never     Tobacco comments:     mom smokes outside   Vaping Use     Vaping Use: Some days     Substances: Nicotine     Devices: Disposable   Substance and Sexual Activity     Alcohol use: Yes     Comment: rare     Drug use: Yes     Types: Marijuana     Comment: few times a week     Sexual activity: Yes     Partners: Male     Birth control/protection: I.U.D.   Other Topics Concern     Not on file   Social History Narrative    9/2023: lives with sig other, pets in house,      Social Determinants of Health     Financial Resource Strain: Not on file   Food Insecurity: Not on file   Transportation Needs: Not on file   Physical Activity: Not on file   Stress: Not on file   Social Connections: Not on file   Interpersonal Safety: Not on file   Housing Stability: Not on file          MEDICATIONS:  has a current medication list which includes the following prescription(s): contour next test, blood glucose monitoring, calcium carbonate, glucagon emergency, insulin aspart, insulin aspart, lamotrigine, multiple vitamins-minerals, olanzapine, reason aspirin not prescribed (intentional), cholecalciferol, insulin aspart flexpen,  insulin glargine, and statin not prescribed.    ROS     ROS: 10 point ROS neg other than the symptoms noted above in the HPI.    Physical Exam   VS: There were no vitals taken for this visit.  GENERAL: healthy, alert and no distress  EYES: Eyes grossly normal to inspection, conjunctivae and sclerae normal  ENT: no nose swelling, nasal discharge.  Thyroid: no apparent thyroid nodules  RESP: no audible wheeze, cough, or visible cyanosis.  No visible retractions or increased work of breathing.  Able to speak fully in complete sentences.  ABDO: not evaluated.  EXTREMITIES: no hand tremors.  NEURO: Cranial nerves grossly intact, mentation intact and speech normal  SKIN: No apparent skin lesions, rash or edema seen   PSYCH: mentation appears normal, affect normal/bright, judgement and insight intact, normal speech and appearance well-groomed    LABS:  A1c:  Lab Results   Component Value Date    A1C 6.8 09/08/2023    A1C 7.1 12/17/2022    A1C 8.0 06/01/2022    A1C 9.9 01/11/2022    A1C 10.3 10/26/2021    A1C 8.3 06/04/2020    A1C 7.5 01/23/2020    A1C 7.3 04/23/2015    A1C 7.9 06/18/2014    A1C 7.4 10/11/2013     BMP:   Creatinine   Date Value Ref Range Status   09/08/2023 0.80 0.51 - 0.95 mg/dL Final   06/05/2020 0.64 0.50 - 1.00 mg/dL Final       Urine Micro:  Lab Results   Component Value Date    UMALCR  09/08/2023      Comment:      Unable to calculate, urine albumin and/or urine creatinine is outside detectable limits.  Microalbuminuria is defined as an albumin:creatinine ratio of 17 to 299 for males and 25 to 299 for females. A ratio of albumin:creatinine of 300 or higher is indicative of overt proteinuria.  Due to biologic variability, positive results should be confirmed by a second, first-morning random or 24-hour timed urine specimen. If there is discrepancy, a third specimen is recommended. When 2 out of 3 results are in the microalbuminuria range, this is evidence for incipient nephropathy and warrants  increased efforts at glucose control, blood pressure control, and institution of therapy with an angiotensin-converting-enzyme (ACE) inhibitor (if the patient can tolerate it).      UMALCR 12.31 03/10/2022    UMALCR 17.29 01/23/2020       LFTs/Lipids:  Recent Labs   Lab Test 09/08/23  1527 03/10/22  1247   CHOL 123 169   HDL 46* 46*   LDL 49 104*   TRIG 138 97     TFTs:  TSH   Date Value Ref Range Status   09/08/2023 0.89 0.30 - 4.20 uIU/mL Final   01/23/2020 0.79 0.40 - 4.00 mU/L Final       Blood Glucose and pump data/ Meter reviewed.     All pertinent notes, labs, and images personally reviewed by me.       Glucometer/ insulin pump (if applicable)/ CGM data (if applicable) downloaded, Personally reviewed and interpreted.  All Blood sugar data reviewed personally and interpreted as well as discussed with pt.    All past medical, social and Family history reviewed and updated in [facility].    A/P  Ms.Alyssa JENNI Cruz is a 22 year old here for the evaluation/management of diabetes:    1. DM1 - Under fair control.  A1c 6.8%.  No known complications from Diabetes.  Using Medtronic 770 G pump with Medtronic sensor.  Using auto mode 50% of time.    Plan:  Discussed diagnosis, pathophysiology, management and treatment options of condition with pt.  I also discussed importance of strict blood sugar control to prevent complications associated with uncontrolled diabetes.    Continue current pump settings except change I:C ratio at dinnertime as noted below.  Time Ratio   0000-  8.0   1000  1500 11.0  12.0-->11.0     Recommend to use auto mode as much as possible.  Take bolus before each meal.  Check with your insurance to see if tandem insulin pump or OmniPod insulin pump is covered.  It is compatible with Dexcom.  After that please inform us.  If needed, diabetic educator referral can be placed to get you started on the new pump.    Follow up with Lita COBOS in 3-4 months with labs prior.  Repeat labs and follow up with   in 6-7 months.  Please make a lab appointment for blood work and follow up clinic appointment in 1 week after that to discuss results.      2. Hypertension -not on medication       3. Hyperlipidemia - Not on medication. LDL 49.  4. Prevention  Ophthalmology- recommend annually.  ASA- NA 2/2 to age.  Smoking- No  Foot exam: 6/1/2022      Most Recent Immunizations   Administered Date(s) Administered     COVID-19 Monovalent 12+ (Pfizer 2022) 03/10/2022     Comvax (HIB/HepB) 01/06/2003     DTAP (<7y) 02/09/2006     DTaP, Unspecified 08/30/2013     HPV9 09/08/2023     Influenza (IIV3) PF 12/04/2006     Influenza Vaccine >6 months,quad, PF 03/10/2022     MMR 02/09/2006     Meningococcal ACWY (Menactra ) 08/30/2013     Meningococcal ACWY (Menveo ) 08/30/2013     Pneumococcal (PCV 7) 01/06/2003     Pneumococcal 23 valent 01/23/2020     Poliovirus, inactivated (IPV) 02/09/2006     TDAP (Adacel,Boostrix) 09/08/2023     TDAP Vaccine (Adacel) 08/30/2013     Varicella 08/30/2013         Recommend checking blood sugars before meals and at bedtime.    If Blood glucose are low more often-> 2-3 times/week- give us a call.  The patient is advised to Make better food choices: reduce carbs, Reduce portion size, weight loss and exercise 3-4 times a week.  Discussed hypoglycemia signs and symptoms as well as management in detail.      There is some variability among people, most will usually develop symptoms suggestive of hypoglycemia when blood glucose levels are lowered to the mid 60's. The first set of symptoms are called adrenergic. Patients may experience any of the following nervousness, sweating, intense hunger, trembling, weakness, palpitations, and difficulty speaking. When BS fall below 50 the patient is unable to talk and take oral therapy.  Would recommend Glucagon emergency kit for the patient and education for family and friends around the patient.   The acute management of hypoglycemia involves the rapid  delivery of a source of easily absorbed sugar. Regular soda, juice, lifesavers, table sugar, are good options. 15 grams of glucose is the dose that is given, followed by an assessment of symptoms and a blood glucose check if possible. If after 10 minutes there is no improvement, another 10-15 grams should be given. This can be repeated up to three times. The equivalency of 10-15 grams of glucose (approximate servings) are: Four lifesavers, 4 teaspoons of sugar, or 1/2 cup or 4 oz of juice or regular pop.    Discussed indications, risks and benefits of all medications prescribed, and answered questions to patient's satisfaction.  The longitudinal plan of care for the diagnosis(es)/condition(s) as documented were addressed during this visit. Due to the added complexity in care, I will continue to support Margo in the subsequent management and with ongoing continuity of care.  All questions were answered.  The patient indicates understanding of the above issues and agrees with the plan set forth.      Follow-up:  As noted in AVS    Karlee Holly M.D  Endocrinology  [facility]  CC: Luanne Todd    Disclaimer: This note consists of symbols derived from keyboarding, dictation and/or voice recognition software. As a result, there may be errors in the script that have gone undetected. Please consider this when interpreting information found in this chart.    Addendum to above note and clinic visit:    Labs reviewed.    See result note/telephone encounter.                    Again, thank you for allowing me to participate in the care of your patient.        Sincerely,        Karlee Holly MD

## 2024-06-16 ENCOUNTER — HEALTH MAINTENANCE LETTER (OUTPATIENT)
Age: 24
End: 2024-06-16

## 2024-07-01 ENCOUNTER — TRANSFERRED RECORDS (OUTPATIENT)
Dept: MULTI SPECIALTY CLINIC | Facility: CLINIC | Age: 24
End: 2024-07-01

## 2024-07-01 LAB — RETINOPATHY: NORMAL

## 2024-08-09 ENCOUNTER — PATIENT OUTREACH (OUTPATIENT)
Dept: CARE COORDINATION | Facility: CLINIC | Age: 24
End: 2024-08-09
Payer: COMMERCIAL

## 2024-08-23 ENCOUNTER — PATIENT OUTREACH (OUTPATIENT)
Dept: CARE COORDINATION | Facility: CLINIC | Age: 24
End: 2024-08-23
Payer: COMMERCIAL

## 2024-09-22 ENCOUNTER — HOSPITAL ENCOUNTER (INPATIENT)
Facility: CLINIC | Age: 24
LOS: 3 days | Discharge: HOME | End: 2024-09-25
Attending: EMERGENCY MEDICINE | Admitting: INTERNAL MEDICINE
Payer: COMMERCIAL

## 2024-09-22 ENCOUNTER — APPOINTMENT (OUTPATIENT)
Dept: CT IMAGING | Facility: CLINIC | Age: 24
End: 2024-09-22
Attending: EMERGENCY MEDICINE
Payer: COMMERCIAL

## 2024-09-22 DIAGNOSIS — E86.0 SEVERE DEHYDRATION: ICD-10-CM

## 2024-09-22 DIAGNOSIS — K29.20 ACUTE ALCOHOLIC GASTRITIS WITHOUT HEMORRHAGE: ICD-10-CM

## 2024-09-22 DIAGNOSIS — R11.0 NAUSEA: Primary | ICD-10-CM

## 2024-09-22 DIAGNOSIS — R11.2 INTRACTABLE NAUSEA AND VOMITING: ICD-10-CM

## 2024-09-22 DIAGNOSIS — K20.90 ESOPHAGITIS: ICD-10-CM

## 2024-09-22 DIAGNOSIS — E10.65 TYPE 1 DIABETES MELLITUS WITH HYPERGLYCEMIA (H): ICD-10-CM

## 2024-09-22 PROBLEM — E10.10 DKA, TYPE 1 (H): Status: ACTIVE | Noted: 2024-09-22

## 2024-09-22 LAB
ALBUMIN SERPL BCG-MCNC: 3.8 G/DL (ref 3.5–5.2)
ALBUMIN SERPL BCG-MCNC: 4.9 G/DL (ref 3.5–5.2)
ALBUMIN UR-MCNC: 20 MG/DL
ALLEN'S TEST: YES
ALP SERPL-CCNC: 135 U/L (ref 40–150)
ALP SERPL-CCNC: 98 U/L (ref 40–150)
ALT SERPL W P-5'-P-CCNC: 22 U/L (ref 0–50)
ALT SERPL W P-5'-P-CCNC: 31 U/L (ref 0–50)
ANION GAP SERPL CALCULATED.3IONS-SCNC: 11 MMOL/L (ref 7–15)
ANION GAP SERPL CALCULATED.3IONS-SCNC: 18 MMOL/L (ref 7–15)
ANION GAP SERPL CALCULATED.3IONS-SCNC: 19 MMOL/L (ref 7–15)
ANION GAP SERPL CALCULATED.3IONS-SCNC: 30 MMOL/L (ref 7–15)
APPEARANCE UR: ABNORMAL
AST SERPL W P-5'-P-CCNC: 22 U/L (ref 0–45)
AST SERPL W P-5'-P-CCNC: 30 U/L (ref 0–45)
B-OH-BUTYR SERPL-SCNC: 3.06 MMOL/L
B-OH-BUTYR SERPL-SCNC: 7.56 MMOL/L
BACTERIA #/AREA URNS HPF: ABNORMAL /HPF
BASE EXCESS BLDA CALC-SCNC: -6.9 MMOL/L (ref -3–3)
BASE EXCESS BLDV CALC-SCNC: -9.9 MMOL/L (ref -3–3)
BASOPHILS # BLD AUTO: 0.1 10E3/UL (ref 0–0.2)
BASOPHILS NFR BLD AUTO: 0 %
BILIRUB DIRECT SERPL-MCNC: 0.26 MG/DL (ref 0–0.3)
BILIRUB SERPL-MCNC: 1.2 MG/DL
BILIRUB SERPL-MCNC: 1.3 MG/DL
BILIRUB UR QL STRIP: NEGATIVE
BUN SERPL-MCNC: 12.2 MG/DL (ref 6–20)
BUN SERPL-MCNC: 15.3 MG/DL (ref 6–20)
BUN SERPL-MCNC: 23.3 MG/DL (ref 6–20)
BUN SERPL-MCNC: 30.6 MG/DL (ref 6–20)
CALCIUM SERPL-MCNC: 7.5 MG/DL (ref 8.8–10.4)
CALCIUM SERPL-MCNC: 7.8 MG/DL (ref 8.8–10.4)
CALCIUM SERPL-MCNC: 7.8 MG/DL (ref 8.8–10.4)
CALCIUM SERPL-MCNC: 9.4 MG/DL (ref 8.8–10.4)
CHLORIDE SERPL-SCNC: 102 MMOL/L (ref 98–107)
CHLORIDE SERPL-SCNC: 104 MMOL/L (ref 98–107)
CHLORIDE SERPL-SCNC: 85 MMOL/L (ref 98–107)
CHLORIDE SERPL-SCNC: 97 MMOL/L (ref 98–107)
COHGB MFR BLD: 98.6 % (ref 96–97)
COLOR UR AUTO: ABNORMAL
CREAT SERPL-MCNC: 0.63 MG/DL (ref 0.51–0.95)
CREAT SERPL-MCNC: 0.68 MG/DL (ref 0.51–0.95)
CREAT SERPL-MCNC: 0.69 MG/DL (ref 0.51–0.95)
CREAT SERPL-MCNC: 0.84 MG/DL (ref 0.51–0.95)
EGFRCR SERPLBLD CKD-EPI 2021: >90 ML/MIN/1.73M2
EOSINOPHIL # BLD AUTO: 0 10E3/UL (ref 0–0.7)
EOSINOPHIL NFR BLD AUTO: 0 %
ERYTHROCYTE [DISTWIDTH] IN BLOOD BY AUTOMATED COUNT: 12.1 % (ref 10–15)
EST. AVERAGE GLUCOSE BLD GHB EST-MCNC: 263 MG/DL
ETHANOL SERPL-MCNC: <0.01 G/DL
GLUCOSE BLDC GLUCOMTR-MCNC: 127 MG/DL (ref 70–99)
GLUCOSE BLDC GLUCOMTR-MCNC: 134 MG/DL (ref 70–99)
GLUCOSE BLDC GLUCOMTR-MCNC: 135 MG/DL (ref 70–99)
GLUCOSE BLDC GLUCOMTR-MCNC: 140 MG/DL (ref 70–99)
GLUCOSE BLDC GLUCOMTR-MCNC: 168 MG/DL (ref 70–99)
GLUCOSE BLDC GLUCOMTR-MCNC: 169 MG/DL (ref 70–99)
GLUCOSE BLDC GLUCOMTR-MCNC: 172 MG/DL (ref 70–99)
GLUCOSE BLDC GLUCOMTR-MCNC: 185 MG/DL (ref 70–99)
GLUCOSE BLDC GLUCOMTR-MCNC: 255 MG/DL (ref 70–99)
GLUCOSE BLDC GLUCOMTR-MCNC: 273 MG/DL (ref 70–99)
GLUCOSE BLDC GLUCOMTR-MCNC: 285 MG/DL (ref 70–99)
GLUCOSE BLDC GLUCOMTR-MCNC: 350 MG/DL (ref 70–99)
GLUCOSE BLDC GLUCOMTR-MCNC: 86 MG/DL (ref 70–99)
GLUCOSE SERPL-MCNC: 103 MG/DL (ref 70–99)
GLUCOSE SERPL-MCNC: 122 MG/DL (ref 70–99)
GLUCOSE SERPL-MCNC: 163 MG/DL (ref 70–99)
GLUCOSE SERPL-MCNC: 335 MG/DL (ref 70–99)
GLUCOSE UR STRIP-MCNC: 500 MG/DL
HBA1C MFR BLD: 10.8 %
HCG SERPL QL: NEGATIVE
HCO3 BLD-SCNC: 16 MMOL/L (ref 21–28)
HCO3 BLDV-SCNC: 14 MMOL/L (ref 21–28)
HCO3 SERPL-SCNC: 11 MMOL/L (ref 22–29)
HCO3 SERPL-SCNC: 15 MMOL/L (ref 22–29)
HCO3 SERPL-SCNC: 16 MMOL/L (ref 22–29)
HCO3 SERPL-SCNC: 20 MMOL/L (ref 22–29)
HCT VFR BLD AUTO: 50.7 % (ref 35–47)
HGB BLD-MCNC: 17.8 G/DL (ref 11.7–15.7)
HGB UR QL STRIP: NEGATIVE
HOLD SPECIMEN: NORMAL
HYALINE CASTS: 6 /LPF
IMM GRANULOCYTES # BLD: 0.1 10E3/UL
IMM GRANULOCYTES NFR BLD: 1 %
KETONES UR STRIP-MCNC: >150 MG/DL
LACTATE SERPL-SCNC: 1.7 MMOL/L (ref 0.7–2)
LEUKOCYTE ESTERASE UR QL STRIP: ABNORMAL
LIPASE SERPL-CCNC: 14 U/L (ref 13–60)
LYMPHOCYTES # BLD AUTO: 1.1 10E3/UL (ref 0.8–5.3)
LYMPHOCYTES NFR BLD AUTO: 5 %
MAGNESIUM SERPL-MCNC: 2.1 MG/DL (ref 1.7–2.3)
MAGNESIUM SERPL-MCNC: 2.3 MG/DL (ref 1.7–2.3)
MCH RBC QN AUTO: 31 PG (ref 26.5–33)
MCHC RBC AUTO-ENTMCNC: 35.1 G/DL (ref 31.5–36.5)
MCV RBC AUTO: 88 FL (ref 78–100)
MONOCYTES # BLD AUTO: 1.4 10E3/UL (ref 0–1.3)
MONOCYTES NFR BLD AUTO: 7 %
MUCOUS THREADS #/AREA URNS LPF: PRESENT /LPF
NEUTROPHILS # BLD AUTO: 18.3 10E3/UL (ref 1.6–8.3)
NEUTROPHILS NFR BLD AUTO: 87 %
NITRATE UR QL: NEGATIVE
NRBC # BLD AUTO: 0 10E3/UL
NRBC BLD AUTO-RTO: 0 /100
O2/TOTAL GAS SETTING VFR VENT: 0 %
O2/TOTAL GAS SETTING VFR VENT: 21 %
OXYHGB MFR BLDV: 75 % (ref 70–75)
PCO2 BLD: 27 MM HG (ref 35–45)
PCO2 BLDV: 25 MM HG (ref 40–50)
PH BLD: 7.39 [PH] (ref 7.35–7.45)
PH BLDV: 7.34 [PH] (ref 7.32–7.43)
PH UR STRIP: 6 [PH] (ref 5–7)
PHOSPHATE SERPL-MCNC: 0.6 MG/DL (ref 2.5–4.5)
PHOSPHATE SERPL-MCNC: 1 MG/DL (ref 2.5–4.5)
PHOSPHATE SERPL-MCNC: 1.1 MG/DL (ref 2.5–4.5)
PLATELET # BLD AUTO: 327 10E3/UL (ref 150–450)
PO2 BLD: 95 MM HG (ref 80–105)
PO2 BLDV: 40 MM HG (ref 25–47)
POTASSIUM SERPL-SCNC: 2.6 MMOL/L (ref 3.4–5.3)
POTASSIUM SERPL-SCNC: 3.2 MMOL/L (ref 3.4–5.3)
POTASSIUM SERPL-SCNC: 3.6 MMOL/L (ref 3.4–5.3)
POTASSIUM SERPL-SCNC: 5.5 MMOL/L (ref 3.4–5.3)
PROT SERPL-MCNC: 6.7 G/DL (ref 6.4–8.3)
PROT SERPL-MCNC: 8.9 G/DL (ref 6.4–8.3)
RBC # BLD AUTO: 5.75 10E6/UL (ref 3.8–5.2)
RBC URINE: 3 /HPF
SAO2 % BLDA: 97 % (ref 92–100)
SAO2 % BLDV: 76.4 % (ref 70–75)
SODIUM SERPL-SCNC: 126 MMOL/L (ref 135–145)
SODIUM SERPL-SCNC: 131 MMOL/L (ref 135–145)
SODIUM SERPL-SCNC: 135 MMOL/L (ref 135–145)
SODIUM SERPL-SCNC: 136 MMOL/L (ref 135–145)
SP GR UR STRIP: 1.01 (ref 1–1.03)
SQUAMOUS EPITHELIAL: 6 /HPF
UROBILINOGEN UR STRIP-MCNC: NORMAL MG/DL
WBC # BLD AUTO: 21 10E3/UL (ref 4–11)
WBC URINE: 20 /HPF

## 2024-09-22 PROCEDURE — 82805 BLOOD GASES W/O2 SATURATION: CPT | Performed by: EMERGENCY MEDICINE

## 2024-09-22 PROCEDURE — 99223 1ST HOSP IP/OBS HIGH 75: CPT | Performed by: STUDENT IN AN ORGANIZED HEALTH CARE EDUCATION/TRAINING PROGRAM

## 2024-09-22 PROCEDURE — 82077 ASSAY SPEC XCP UR&BREATH IA: CPT | Performed by: EMERGENCY MEDICINE

## 2024-09-22 PROCEDURE — 96374 THER/PROPH/DIAG INJ IV PUSH: CPT | Mod: 59

## 2024-09-22 PROCEDURE — 74177 CT ABD & PELVIS W/CONTRAST: CPT

## 2024-09-22 PROCEDURE — 96361 HYDRATE IV INFUSION ADD-ON: CPT

## 2024-09-22 PROCEDURE — 83690 ASSAY OF LIPASE: CPT | Performed by: EMERGENCY MEDICINE

## 2024-09-22 PROCEDURE — 250N000011 HC RX IP 250 OP 636: Performed by: STUDENT IN AN ORGANIZED HEALTH CARE EDUCATION/TRAINING PROGRAM

## 2024-09-22 PROCEDURE — 250N000011 HC RX IP 250 OP 636: Performed by: EMERGENCY MEDICINE

## 2024-09-22 PROCEDURE — 83735 ASSAY OF MAGNESIUM: CPT | Performed by: STUDENT IN AN ORGANIZED HEALTH CARE EDUCATION/TRAINING PROGRAM

## 2024-09-22 PROCEDURE — 82805 BLOOD GASES W/O2 SATURATION: CPT | Performed by: STUDENT IN AN ORGANIZED HEALTH CARE EDUCATION/TRAINING PROGRAM

## 2024-09-22 PROCEDURE — 84100 ASSAY OF PHOSPHORUS: CPT | Performed by: STUDENT IN AN ORGANIZED HEALTH CARE EDUCATION/TRAINING PROGRAM

## 2024-09-22 PROCEDURE — 93005 ELECTROCARDIOGRAM TRACING: CPT

## 2024-09-22 PROCEDURE — 250N000009 HC RX 250: Performed by: EMERGENCY MEDICINE

## 2024-09-22 PROCEDURE — 82962 GLUCOSE BLOOD TEST: CPT

## 2024-09-22 PROCEDURE — 250N000013 HC RX MED GY IP 250 OP 250 PS 637: Performed by: STUDENT IN AN ORGANIZED HEALTH CARE EDUCATION/TRAINING PROGRAM

## 2024-09-22 PROCEDURE — 36416 COLLJ CAPILLARY BLOOD SPEC: CPT | Performed by: STUDENT IN AN ORGANIZED HEALTH CARE EDUCATION/TRAINING PROGRAM

## 2024-09-22 PROCEDURE — 36415 COLL VENOUS BLD VENIPUNCTURE: CPT | Performed by: STUDENT IN AN ORGANIZED HEALTH CARE EDUCATION/TRAINING PROGRAM

## 2024-09-22 PROCEDURE — 96375 TX/PRO/DX INJ NEW DRUG ADDON: CPT

## 2024-09-22 PROCEDURE — 87086 URINE CULTURE/COLONY COUNT: CPT | Performed by: EMERGENCY MEDICINE

## 2024-09-22 PROCEDURE — 84703 CHORIONIC GONADOTROPIN ASSAY: CPT | Performed by: EMERGENCY MEDICINE

## 2024-09-22 PROCEDURE — 36415 COLL VENOUS BLD VENIPUNCTURE: CPT | Performed by: EMERGENCY MEDICINE

## 2024-09-22 PROCEDURE — 258N000003 HC RX IP 258 OP 636: Performed by: STUDENT IN AN ORGANIZED HEALTH CARE EDUCATION/TRAINING PROGRAM

## 2024-09-22 PROCEDURE — 82010 KETONE BODYS QUAN: CPT | Performed by: EMERGENCY MEDICINE

## 2024-09-22 PROCEDURE — 80053 COMPREHEN METABOLIC PANEL: CPT | Performed by: EMERGENCY MEDICINE

## 2024-09-22 PROCEDURE — 83605 ASSAY OF LACTIC ACID: CPT | Performed by: EMERGENCY MEDICINE

## 2024-09-22 PROCEDURE — 83036 HEMOGLOBIN GLYCOSYLATED A1C: CPT | Performed by: STUDENT IN AN ORGANIZED HEALTH CARE EDUCATION/TRAINING PROGRAM

## 2024-09-22 PROCEDURE — S5010 5% DEXTROSE AND 0.45% SALINE: HCPCS | Performed by: STUDENT IN AN ORGANIZED HEALTH CARE EDUCATION/TRAINING PROGRAM

## 2024-09-22 PROCEDURE — 81001 URINALYSIS AUTO W/SCOPE: CPT | Performed by: EMERGENCY MEDICINE

## 2024-09-22 PROCEDURE — 36600 WITHDRAWAL OF ARTERIAL BLOOD: CPT

## 2024-09-22 PROCEDURE — 80048 BASIC METABOLIC PNL TOTAL CA: CPT | Performed by: EMERGENCY MEDICINE

## 2024-09-22 PROCEDURE — 85025 COMPLETE CBC W/AUTO DIFF WBC: CPT | Performed by: EMERGENCY MEDICINE

## 2024-09-22 PROCEDURE — S5010 5% DEXTROSE AND 0.45% SALINE: HCPCS | Performed by: EMERGENCY MEDICINE

## 2024-09-22 PROCEDURE — 258N000003 HC RX IP 258 OP 636: Performed by: EMERGENCY MEDICINE

## 2024-09-22 PROCEDURE — 120N000001 HC R&B MED SURG/OB

## 2024-09-22 PROCEDURE — 96376 TX/PRO/DX INJ SAME DRUG ADON: CPT

## 2024-09-22 PROCEDURE — 99285 EMERGENCY DEPT VISIT HI MDM: CPT | Mod: 25

## 2024-09-22 PROCEDURE — 80048 BASIC METABOLIC PNL TOTAL CA: CPT | Performed by: STUDENT IN AN ORGANIZED HEALTH CARE EDUCATION/TRAINING PROGRAM

## 2024-09-22 PROCEDURE — 83735 ASSAY OF MAGNESIUM: CPT | Performed by: EMERGENCY MEDICINE

## 2024-09-22 PROCEDURE — 250N000009 HC RX 250: Performed by: STUDENT IN AN ORGANIZED HEALTH CARE EDUCATION/TRAINING PROGRAM

## 2024-09-22 PROCEDURE — 999N000157 HC STATISTIC RCP TIME EA 10 MIN

## 2024-09-22 RX ORDER — LIDOCAINE 40 MG/G
CREAM TOPICAL
Status: DISCONTINUED | OUTPATIENT
Start: 2024-09-22 | End: 2024-09-25 | Stop reason: HOSPADM

## 2024-09-22 RX ORDER — POTASSIUM CHLORIDE 7.45 MG/ML
10 INJECTION INTRAVENOUS ONCE
Status: COMPLETED | OUTPATIENT
Start: 2024-09-22 | End: 2024-09-22

## 2024-09-22 RX ORDER — PANTOPRAZOLE SODIUM 40 MG/1
40 TABLET, DELAYED RELEASE ORAL
Status: DISCONTINUED | OUTPATIENT
Start: 2024-09-23 | End: 2024-09-25 | Stop reason: HOSPADM

## 2024-09-22 RX ORDER — LAMOTRIGINE 100 MG/1
100 TABLET ORAL DAILY
Status: DISCONTINUED | OUTPATIENT
Start: 2024-09-22 | End: 2024-09-25 | Stop reason: HOSPADM

## 2024-09-22 RX ORDER — ONDANSETRON 2 MG/ML
4 INJECTION INTRAMUSCULAR; INTRAVENOUS EVERY 30 MIN PRN
Status: COMPLETED | OUTPATIENT
Start: 2024-09-22 | End: 2024-09-22

## 2024-09-22 RX ORDER — IOPAMIDOL 755 MG/ML
500 INJECTION, SOLUTION INTRAVASCULAR ONCE
Status: COMPLETED | OUTPATIENT
Start: 2024-09-22 | End: 2024-09-22

## 2024-09-22 RX ORDER — ASPIRIN 325 MG
TABLET ORAL DAILY
Status: DISCONTINUED | OUTPATIENT
Start: 2024-09-22 | End: 2024-09-22

## 2024-09-22 RX ORDER — LIDOCAINE HYDROCHLORIDE 20 MG/ML
5 SOLUTION OROPHARYNGEAL ONCE
Status: COMPLETED | OUTPATIENT
Start: 2024-09-22 | End: 2024-09-22

## 2024-09-22 RX ORDER — POTASSIUM CHLORIDE 1500 MG/1
40 TABLET, EXTENDED RELEASE ORAL ONCE
Status: COMPLETED | OUTPATIENT
Start: 2024-09-22 | End: 2024-09-22

## 2024-09-22 RX ORDER — SODIUM CHLORIDE 9 MG/ML
INJECTION, SOLUTION INTRAVENOUS CONTINUOUS
Status: DISCONTINUED | OUTPATIENT
Start: 2024-09-22 | End: 2024-09-24

## 2024-09-22 RX ORDER — PROCHLORPERAZINE 25 MG
25 SUPPOSITORY, RECTAL RECTAL EVERY 12 HOURS PRN
Status: DISCONTINUED | OUTPATIENT
Start: 2024-09-22 | End: 2024-09-25 | Stop reason: HOSPADM

## 2024-09-22 RX ORDER — LIDOCAINE HYDROCHLORIDE 20 MG/ML
5 SOLUTION OROPHARYNGEAL
Status: DISCONTINUED | OUTPATIENT
Start: 2024-09-22 | End: 2024-09-22

## 2024-09-22 RX ORDER — POTASSIUM CHLORIDE 7.45 MG/ML
10 INJECTION INTRAVENOUS
Status: COMPLETED | OUTPATIENT
Start: 2024-09-23 | End: 2024-09-23

## 2024-09-22 RX ORDER — CALCIUM CARBONATE 500 MG/1
500 TABLET, CHEWABLE ORAL 2 TIMES DAILY PRN
Status: DISCONTINUED | OUTPATIENT
Start: 2024-09-22 | End: 2024-09-25 | Stop reason: HOSPADM

## 2024-09-22 RX ORDER — DEXTROSE MONOHYDRATE 25 G/50ML
25-50 INJECTION, SOLUTION INTRAVENOUS
Status: DISCONTINUED | OUTPATIENT
Start: 2024-09-22 | End: 2024-09-25 | Stop reason: HOSPADM

## 2024-09-22 RX ORDER — NICOTINE POLACRILEX 4 MG
15-30 LOZENGE BUCCAL
Status: DISCONTINUED | OUTPATIENT
Start: 2024-09-22 | End: 2024-09-25 | Stop reason: HOSPADM

## 2024-09-22 RX ORDER — DEXTROSE MONOHYDRATE AND SODIUM CHLORIDE 5; .45 G/100ML; G/100ML
INJECTION, SOLUTION INTRAVENOUS CONTINUOUS
Status: DISCONTINUED | OUTPATIENT
Start: 2024-09-22 | End: 2024-09-24

## 2024-09-22 RX ORDER — PROCHLORPERAZINE MALEATE 5 MG
5 TABLET ORAL EVERY 6 HOURS PRN
Status: DISCONTINUED | OUTPATIENT
Start: 2024-09-22 | End: 2024-09-25 | Stop reason: HOSPADM

## 2024-09-22 RX ORDER — LIDOCAINE HYDROCHLORIDE 20 MG/ML
5 SOLUTION OROPHARYNGEAL
Status: DISCONTINUED | OUTPATIENT
Start: 2024-09-22 | End: 2024-09-25 | Stop reason: HOSPADM

## 2024-09-22 RX ORDER — ONDANSETRON 2 MG/ML
4 INJECTION INTRAMUSCULAR; INTRAVENOUS EVERY 6 HOURS PRN
Status: DISCONTINUED | OUTPATIENT
Start: 2024-09-22 | End: 2024-09-25 | Stop reason: HOSPADM

## 2024-09-22 RX ORDER — DEXTROSE MONOHYDRATE AND SODIUM CHLORIDE 5; .45 G/100ML; G/100ML
1000 INJECTION, SOLUTION INTRAVENOUS CONTINUOUS PRN
Status: DISCONTINUED | OUTPATIENT
Start: 2024-09-22 | End: 2024-09-25 | Stop reason: HOSPADM

## 2024-09-22 RX ORDER — POTASSIUM CHLORIDE 7.45 MG/ML
10 INJECTION INTRAVENOUS ONCE
Status: DISCONTINUED | OUTPATIENT
Start: 2024-09-22 | End: 2024-09-22

## 2024-09-22 RX ORDER — METOCLOPRAMIDE HYDROCHLORIDE 5 MG/ML
10 INJECTION INTRAMUSCULAR; INTRAVENOUS ONCE
Status: COMPLETED | OUTPATIENT
Start: 2024-09-22 | End: 2024-09-22

## 2024-09-22 RX ADMIN — ONDANSETRON 4 MG: 2 INJECTION INTRAMUSCULAR; INTRAVENOUS at 11:39

## 2024-09-22 RX ADMIN — PROCHLORPERAZINE EDISYLATE 5 MG: 5 INJECTION INTRAMUSCULAR; INTRAVENOUS at 19:38

## 2024-09-22 RX ADMIN — SODIUM CHLORIDE 55 ML: 9 INJECTION, SOLUTION INTRAVENOUS at 10:18

## 2024-09-22 RX ADMIN — INSULIN HUMAN 3 UNITS/HR: 1 INJECTION, SOLUTION INTRAVENOUS at 16:20

## 2024-09-22 RX ADMIN — METOCLOPRAMIDE 10 MG: 5 INJECTION, SOLUTION INTRAMUSCULAR; INTRAVENOUS at 13:26

## 2024-09-22 RX ADMIN — SODIUM PHOSPHATE, MONOBASIC, MONOHYDRATE AND SODIUM PHOSPHATE, DIBASIC, ANHYDROUS 15 MMOL: 142; 276 INJECTION, SOLUTION INTRAVENOUS at 21:22

## 2024-09-22 RX ADMIN — SODIUM CHLORIDE 1000 ML: 9 INJECTION, SOLUTION INTRAVENOUS at 10:32

## 2024-09-22 RX ADMIN — DEXTROSE AND SODIUM CHLORIDE: 5; 450 INJECTION, SOLUTION INTRAVENOUS at 17:19

## 2024-09-22 RX ADMIN — DEXTROSE AND SODIUM CHLORIDE: 5; 450 INJECTION, SOLUTION INTRAVENOUS at 16:05

## 2024-09-22 RX ADMIN — LIDOCAINE HYDROCHLORIDE 5 ML: 20 SOLUTION ORAL at 11:50

## 2024-09-22 RX ADMIN — IOPAMIDOL 61 ML: 755 INJECTION, SOLUTION INTRAVENOUS at 10:18

## 2024-09-22 RX ADMIN — PANTOPRAZOLE SODIUM 40 MG: 40 INJECTION, POWDER, FOR SOLUTION INTRAVENOUS at 10:03

## 2024-09-22 RX ADMIN — POTASSIUM CHLORIDE 10 MEQ: 7.46 INJECTION, SOLUTION INTRAVENOUS at 22:40

## 2024-09-22 RX ADMIN — SODIUM CHLORIDE 1000 ML: 9 INJECTION, SOLUTION INTRAVENOUS at 09:32

## 2024-09-22 RX ADMIN — ONDANSETRON 4 MG: 2 INJECTION INTRAMUSCULAR; INTRAVENOUS at 09:35

## 2024-09-22 RX ADMIN — SODIUM CHLORIDE: 9 INJECTION, SOLUTION INTRAVENOUS at 23:25

## 2024-09-22 RX ADMIN — POTASSIUM CHLORIDE 40 MEQ: 1500 TABLET, EXTENDED RELEASE ORAL at 22:00

## 2024-09-22 RX ADMIN — DEXTROSE AND SODIUM CHLORIDE 1000 ML: 5; 450 INJECTION, SOLUTION INTRAVENOUS at 12:55

## 2024-09-22 RX ADMIN — DEXTROSE AND SODIUM CHLORIDE: 5; 450 INJECTION, SOLUTION INTRAVENOUS at 21:59

## 2024-09-22 RX ADMIN — DEXTROSE AND SODIUM CHLORIDE: 5; 450 INJECTION, SOLUTION INTRAVENOUS at 15:36

## 2024-09-22 RX ADMIN — ONDANSETRON 4 MG: 2 INJECTION INTRAMUSCULAR; INTRAVENOUS at 10:31

## 2024-09-22 ASSESSMENT — COLUMBIA-SUICIDE SEVERITY RATING SCALE - C-SSRS
1. IN THE PAST MONTH, HAVE YOU WISHED YOU WERE DEAD OR WISHED YOU COULD GO TO SLEEP AND NOT WAKE UP?: NO
2. HAVE YOU ACTUALLY HAD ANY THOUGHTS OF KILLING YOURSELF IN THE PAST MONTH?: NO
6. HAVE YOU EVER DONE ANYTHING, STARTED TO DO ANYTHING, OR PREPARED TO DO ANYTHING TO END YOUR LIFE?: NO

## 2024-09-22 ASSESSMENT — ENCOUNTER SYMPTOMS
DYSURIA: 0
CHEST TIGHTNESS: 0
FEVER: 0
HEMATURIA: 0
APPETITE CHANGE: 1
BLOOD IN STOOL: 0
DIAPHORESIS: 0
DIFFICULTY URINATING: 0
FATIGUE: 1
SHORTNESS OF BREATH: 0
DIARRHEA: 0
CHILLS: 0
CONSTIPATION: 0
ABDOMINAL PAIN: 1
LIGHT-HEADEDNESS: 0
VOMITING: 1
NAUSEA: 1

## 2024-09-22 ASSESSMENT — ACTIVITIES OF DAILY LIVING (ADL)
ADLS_ACUITY_SCORE: 37
ADLS_ACUITY_SCORE: 37
ADLS_ACUITY_SCORE: 21
ADLS_ACUITY_SCORE: 37
ADLS_ACUITY_SCORE: 21
ADLS_ACUITY_SCORE: 37
ADLS_ACUITY_SCORE: 21
ADLS_ACUITY_SCORE: 20
ADLS_ACUITY_SCORE: 37
ADLS_ACUITY_SCORE: 20

## 2024-09-22 NOTE — ED TRIAGE NOTES
Pt arrives to the ED due to lower abd pain and nausea/vomiting that has been present for past couple days. Pt states she went out with friends on Friday and had a couple mixed drinks. Pt type 1 diabetic and states her BG has been reading high at home. Pt has not been able to take her normal medications due to the vomiting. Actively vomiting in triage.

## 2024-09-22 NOTE — H&P
Margo Cruz is an 23 year old female with past medical history of diabetes mellitus type 1, celiac disease, and bipolar disorder who is being admitted today with acute DKA.  Patient states she was in her normal state of health until late Friday evening.  She had been out drinking with her friends and reports consuming several vodka cranberries.  Subsequently developed generalized abdominal pain, decreased appetite, nausea and vomiting which has persisted throughout the weekend.  Patient is accompanied by her stepfather who tells me her blood sugars yesterday were consistently been above the detection range of 600 on her insulin pump. After making dose adjustments they were able to get her levels in the 160s but they quickly rebounded back into the 300s . patient denies any accompanying fever/chills, dyspnea, chest pain, manage easy, melena, constipation/diarrhea, or urinary symptoms.  She states she has been hospitalized approximately 3 times in the past with issues related to her diabetes but has not had any hospitalizations in the past year or so.     Workup in the emergency department demonstrated hyponatremia with a corrected sodium of 130, leukocytosis (21), anion gap of 30, bicarb of 11, glucose of 335, and ketone levels 7.56.  lipase and lactic acid within normal limits. VBG demonstrated a compensated metabolic acidosis.  UA positive for glucose, ketones, leukocyte esterase, WBC, and bacteria. UC pending and, again, pt denies any urinary symptoms.  CT abdomen pelvis with IV contrast consistent with esophagitis but no other acute findings.  Patient received 2 L IV fluid, viscous lidocaine, and Reglan with some symptomatic improvement.    Past Medical History:   Diagnosis Date    LESLIE (acute kidney injury) (H24) 01/11/2022    Diabetes mellitus     Seborrheic infantile dermatitis      Social history:  -   Patient currently works at a group home.  She vapes.  She drinks rarely     Allergies:   Allergies  "  Allergen Reactions    Gluten Meal      Other reaction(s): *Unknown    No Known Drug Allergy      Active Problems:    Esophagitis    Severe dehydration    Type 1 diabetes mellitus with hyperglycemia (H)    Intractable nausea and vomiting    Acute alcoholic gastritis without hemorrhage    DKA, type 1 (H)    Blood pressure 126/73, pulse 87, temperature 98  F (36.7  C), temperature source Oral, resp. rate 17, height 1.6 m (5' 3\"), weight 55.5 kg (122 lb 5.7 oz), SpO2 100%, not currently breastfeeding.    Review of Systems   Constitutional:  Positive for appetite change and fatigue. Negative for chills, diaphoresis and fever.   Respiratory:  Negative for chest tightness and shortness of breath.    Cardiovascular:  Negative for chest pain and leg swelling.   Gastrointestinal:  Positive for abdominal pain, nausea and vomiting. Negative for blood in stool, constipation and diarrhea.   Genitourinary:  Negative for difficulty urinating, dysuria, enuresis and hematuria.   Neurological:  Negative for light-headedness.       Physical Exam  Constitutional:       Comments: Patient is arousable and answers questions appropriately however is quite sleepy.    HENT:      Head: Normocephalic and atraumatic.   Cardiovascular:      Rate and Rhythm: Normal rate and regular rhythm.      Pulses: Normal pulses.      Heart sounds: No murmur heard.  Pulmonary:      Effort: Pulmonary effort is normal. No respiratory distress.      Breath sounds: Normal breath sounds. No wheezing or rales.      Comments: She is not tachypneic.  Breathing is not labored  Abdominal:      Comments: Hypoactive bowel sounds with generalized abdominal discomfort.  Nonrigid nondistended   Musculoskeletal:      Right lower leg: No edema.      Left lower leg: No edema.   Skin:     General: Skin is warm and dry.       Assessment/Plan:  #1 Diabetes mellitus type 1  #2 Diabetic Acidosis  #3 Hyponatremia  #4 Hyperkalemia  - likely exacerbated by binge drinking.  No acute " intra-abdominal process on CT, lipase normal.  No chest pain or dyspnea suggestive of MI.  UA is positive for leukocyte esterase and bacteria with UC pending. will hold antibiotics in the absence of any urinary symptoms while awaiting cultures   -hold insulin pump for now  - Initiate insulin infusion per DKA protocol   -scheduled q 4 hours electrolytes with plans to transition off insulin patient once anion gap closed x 2  -Electrolyte replacement as indicated  -Patient received 2 L IV fluid in the emergency department.  we will continue to aggressively rehydrate              - 200  NS when BG > 200              - 200 1/2 NS/D5% when BG >200                -Initially corrected sodium of 130 with improvement after fluid resuscitation.  -Maintain n.p.o. status while on insulin infusion    #4 Esophagitis   - As needed viscous lidocaine available  - pantoprazole received in ED. okay to continue if patient feels that this is helping    #5 Abnormal UA  -Urine culture pending.  Will hold antibiotics in the absence of any urinary symptoms.    #6 Bipolar disorder  -Continue home regimen of lamotrigine. Hold home Zyprexa for now    Susan Crouch PA-C  9/22/2024

## 2024-09-22 NOTE — LETTER
[facility] [address] [facility]  [address] [place] [address]  Phone: [phone]  Fax: [phone]    September 25, 2024        Margo Cruz  [address] [place] [address]          To whom it may concern:    RE: Margo N Anthony    Patient was seen and treated at [facility] from 9/22/24-9/25/24 . She may return to work on 9/26/24 without restrictions.    Please contact me for questions or concerns.      Sincerely,      Torsten Babin MD

## 2024-09-22 NOTE — ED NOTES
[facility]  ED Nurse Handoff Report    ED Chief complaint: Abdominal Pain, Nausea & Vomiting, and Hyperglycemia  . ED Diagnosis:   Final diagnoses:   None       Allergies:   Allergies   Allergen Reactions    Gluten Meal      Other reaction(s): *Unknown    No Known Drug Allergy        Code Status: Full Code    Activity level - Baseline/Home:  independent.  Activity Level - Current:   standby.   Lift room needed: No.   Bariatric: No   Needed: No   Isolation: No.   Infection: Not Applicable.     Respiratory status: Room air    Vital Signs (within 30 minutes):   Vitals:    09/22/24 1208 09/22/24 1223 09/22/24 1238 09/22/24 1253   BP:       Pulse:       Resp:       Temp:       TempSrc:       SpO2: 100% 100% 100% 99%   Weight:       Height:           Cardiac Rhythm:  ,      Pain level:    Patient confused: No.   Patient Falls Risk: nonskid shoes/slippers when out of bed, patient and family education, and activity supervised.   Elimination Status: Has voided     Patient Report - Initial Complaint: Hyperglycemia.   Focused Assessment: Per provider note:  Margo Cruz is a 23 year old female with history of type 1 diabetes who presents to the ED with her step dad for evaluation of abdominal pain, nausea, vomiting, and hyperglycemia. The patient reports that on Friday she went out with works friends for some drinks and starts drinking at 1530. She states she drank a lot and isn't sure how much, but it was not consistently and she stopped at 2230. Once she got home she began vomiting and thought it was from the alcohol. She did not check her sugar at this time. The next morning, patient was vomiting every 30 minutes and this morning her sugar was 370. Patient was able to self correct sometime between 0600 and 0700 which got her down to 113. Step dad adds that the patient's vomit is a black/deep green color, and that she didn't eat anything yesterday. Patient has not eaten today and has only  been able to keep down a little water and Gatorade. Denies fever. Margo notes that she has an insulin pump and an IUD.     Abnormal Results:   Labs Ordered and Resulted from Time of ED Arrival to Time of ED Departure   COMPREHENSIVE METABOLIC PANEL - Abnormal       Result Value    Sodium 126 (*)     Potassium 3.6      Carbon Dioxide (CO2) 11 (*)     Anion Gap 30 (*)     Urea Nitrogen 30.6 (*)     Creatinine 0.84      GFR Estimate >90      Calcium 9.4      Chloride 85 (*)     Glucose 335 (*)     Alkaline Phosphatase 135      AST 30      ALT 31      Protein Total 8.9 (*)     Albumin 4.9      Bilirubin Total 1.3 (*)    KETONE BETA-HYDROXYBUTYRATE QUANTITATIVE, RAPID - Abnormal    Ketone (Beta-Hydroxybutyrate) Quantitative 7.56 (*)    BLOOD GAS VENOUS - Abnormal    pH Venous 7.34      pCO2 Venous 25 (*)     pO2 Venous 40      Bicarbonate Venous 14 (*)     Base Excess/Deficit Venous -9.9 (*)     FIO2 0      Oxyhemoglobin Venous 75      O2 Sat, Venous 76.4 (*)    ROUTINE UA WITH MICROSCOPIC REFLEX TO CULTURE - Abnormal    Color Urine Light Yellow      Appearance Urine Slightly Cloudy (*)     Glucose Urine 500 (*)     Bilirubin Urine Negative      Ketones Urine >150 (*)     Specific Gravity Urine 1.015      Blood Urine Negative      pH Urine 6.0      Protein Albumin Urine 20 (*)     Urobilinogen Urine Normal      Nitrite Urine Negative      Leukocyte Esterase Urine Moderate (*)     Bacteria Urine Few (*)     Mucus Urine Present (*)     RBC Urine 3 (*)     WBC Urine 20 (*)     Squamous Epithelials Urine 6 (*)     Hyaline Casts Urine 6 (*)    GLUCOSE BY METER - Abnormal    GLUCOSE BY METER POCT 350 (*)    CBC WITH PLATELETS AND DIFFERENTIAL - Abnormal    WBC Count 21.0 (*)     RBC Count 5.75 (*)     Hemoglobin 17.8 (*)     Hematocrit 50.7 (*)     MCV 88      MCH 31.0      MCHC 35.1      RDW 12.1      Platelet Count 327      % Neutrophils 87      % Lymphocytes 5      % Monocytes 7      % Eosinophils 0      % Basophils 0       % Immature Granulocytes 1      NRBCs per 100 WBC 0      Absolute Neutrophils 18.3 (*)     Absolute Lymphocytes 1.1      Absolute Monocytes 1.4 (*)     Absolute Eosinophils 0.0      Absolute Basophils 0.1      Absolute Immature Granulocytes 0.1      Absolute NRBCs 0.0     GLUCOSE BY METER - Abnormal    GLUCOSE BY METER POCT 255 (*)    BASIC METABOLIC PANEL - Abnormal    Sodium 131 (*)     Potassium 5.5 (*)     Chloride 97 (*)     Carbon Dioxide (CO2) 15 (*)     Anion Gap 19 (*)     Urea Nitrogen 23.3 (*)     Creatinine 0.68      GFR Estimate >90      Calcium 7.5 (*)     Glucose 163 (*)    GLUCOSE BY METER - Abnormal    GLUCOSE BY METER POCT 134 (*)    GLUCOSE BY METER - Abnormal    GLUCOSE BY METER POCT 168 (*)    LIPASE - Normal    Lipase 14     MAGNESIUM - Normal    Magnesium 2.3     LACTIC ACID WHOLE BLOOD WITH 1X REPEAT IN 2 HR WHEN >2 - Normal    Lactic Acid, Initial 1.7     ETHYL ALCOHOL LEVEL - Normal    Alcohol ethyl <0.01     HCG QUALITATIVE PREGNANCY - Normal    hCG Serum Qualitative Negative     KETONE BETA-HYDROXYBUTYRATE QUANTITATIVE, RAPID   URINE CULTURE        CT Abdomen Pelvis w Contrast   Final Result   IMPRESSION:    Study limitation: Motion degradation.      1.  Mild circumferential wall thickening of the distal esophagus probably inflammatory (i.e. esophagitis).   2.  Otherwise, no acute findings within the abdomen or pelvis.             Treatments provided: See MAR  Family Comments: Alyx at bedside  OBS brochure/video discussed/provided to patient:  No  ED Medications:   Medications   dextrose 5% and 0.45% NaCl infusion (1,000 mLs Intravenous $New Bag 9/22/24 1255)   sodium chloride 0.9% BOLUS 1,000 mL (0 mLs Intravenous Stopped 9/22/24 1035)   ondansetron (ZOFRAN) injection 4 mg (4 mg Intravenous $Given 9/22/24 1139)   pantoprazole (PROTONIX) IV push injection 40 mg (40 mg Intravenous $Given 9/22/24 1003)   CT Scan Flush (55 mLs Intravenous $Given 9/22/24 1018)   iopamidol (ISOVUE-370)  solution 500 mL (61 mLs Intravenous $Given 9/22/24 1018)   sodium chloride 0.9% BOLUS 1,000 mL (0 mLs Intravenous Stopped 9/22/24 1151)   lidocaine (viscous) (XYLOCAINE) 2 % solution 5 mL (5 mLs Mouth/Throat $Given 9/22/24 1150)   metoclopramide (REGLAN) injection 10 mg (10 mg Intravenous $Given 9/22/24 1326)       Drips infusing:  No  For the majority of the shift this patient was Green.   Interventions performed were NA.    Sepsis treatment initiated: No    Cares/treatment/interventions/medications to be completed following ED care: ED cares complete. See inpatient orders.    ED Nurse Name: Mercedes Pulido RN  1:53 PM     RECEIVING UNIT ED HANDOFF REVIEW    Above ED Nurse Handoff Report was reviewed: Yes  Reviewed by: Lashawn Carlson RN on September 22, 2024 at 5:29 PM   ALIVIA Barreto called the ED to inform them the note was read: Yes

## 2024-09-22 NOTE — PHARMACY-ADMISSION MEDICATION HISTORY
Pharmacist Admission Medication History    Admission medication history is complete. The information provided in this note is only as accurate as the sources available at the time of the update.    Information Source(s): Patient via in-person    Pertinent Information: Margo was not familiar with olanzapine despite chart notes including it therefore I flagged to MD to review with patient. She was unable to pull up her insulin pump settings therefore I was unable to update PTA settings information. She stated she has had Lantus and insulin aspart on hand in the past in case of pump failure but hasn't used them recently.    Changes made to PTA medication list:  Added: None  Deleted: None  Changed: None    Allergies reviewed with patient and updates made in EHR: yes    Medication History Completed By: Alex Farr [facility] 9/22/2024 6:00 PM    PTA Med List   Medication Sig Last Dose    calcium carbonate (TUMS) 500 MG chewable tablet Take 1 chew tab by mouth as needed for heartburn Unknown    cholecalciferol (VITAMIN D3) 25 mcg (1000 units) capsule Take 1 capsule by mouth daily. Unknown    Glucagon, rDNA, (GLUCAGON EMERGENCY) 1 MG KIT Inject 1 mg as directed as needed (low BG) Unknown    Insulin Aspart (INSULIN PUMP - OUTPATIENT) Inject Subcutaneous See Admin Instructions Type of pump: Medtronic MiniMed 770G  Type of insulin: Novolog   Basal rate(s)  7636-4569: 0.9 unit/hr  1709-3623: 0.85 units/hr  1308-6249: 0.675 units/hr  1645-6038: 0.55 units/hr  ISF: 30 mg\dL  ICF (insulin to carb ratio)  8566-4910: 1unit/5g carbs  8804-8207: 1 unit/10g carbs  2256-7073: 1 unit/8g carbs  Active insulin time: 3 hrs  Target goal range:   Followed by endocrinology 9/22/2024    Insulin Aspart FlexPen 100 UNIT/ML SOPN Inject 1 Units Subcutaneous 3 times daily (with meals) Use 3x/day with meals (carb correction scale- uses 1 unit/5g carbs at breakfast, 1 unit/10 grams carbs at lunch and 1 unit/8g carbs at dinner). Unknown     lamoTRIgine (LAMICTAL) 25 MG tablet Take 4 tablets (100 mg) by mouth daily 9/20/2024 at AM    Multiple Vitamins-Minerals (MULTIVITAMIN GUMMIES ADULT PO) Take 1 Dose by mouth daily Unknown

## 2024-09-22 NOTE — ED PROVIDER NOTES
Emergency Department Note      History of Present Illness     Chief Complaint   Abdominal Pain, Nausea & Vomiting, and Hyperglycemia    HPI   Margo Cruz is a 23 year old female with history of type 1 diabetes who presents to the ED with her step dad for evaluation of abdominal pain, nausea, vomiting, and hyperglycemia. The patient reports that on Friday she went out with works friends for some drinks and starts drinking at 1530. She states she drank a lot and isn't sure how much, but it was not consistently and she stopped at 2230. Once she got home she began vomiting and thought it was from the alcohol. She did not check her sugar at this time. The next morning, patient was vomiting every 30 minutes and this morning her sugar was 370. Patient was able to self correct sometime between 0600 and 0700 which got her down to 113. Step lisa adds that the patient's vomit is a black/deep green color, and that she didn't eat anything yesterday. Patient has not eaten today and has only been able to keep down a little water and Gatorade. Denies fever. Margo notes that she has an insulin pump and an IUD.    Independent Historian   Step dad as detailed above.    Review of External Notes   none    Past Medical History     Medical History and Problem List   Acute kidney injury  Type 1 diabetes  Seborrheic infantile dermatitis  Celiac sprue  Mood disorder    Medications   Novolog  Lamictal  Zyprexa    Surgical History   The patient has no pertinent past surgical history.     Physical Exam     Patient Vitals for the past 24 hrs:   BP Temp Temp src Pulse Resp SpO2 Height Weight   09/22/24 1400 126/73 -- -- 87 -- 100 % -- --   09/22/24 1330 124/77 -- -- 107 -- 100 % -- --   09/22/24 1300 (!) 139/96 -- -- 105 -- 99 % -- --   09/22/24 1253 -- -- -- -- -- 99 % -- --   09/22/24 1238 -- -- -- -- -- 100 % -- --   09/22/24 1223 -- -- -- -- -- 100 % -- --   09/22/24 1208 -- -- -- -- -- 100 % -- --   09/22/24 1153 -- -- -- -- -- 100 %  "-- --   09/22/24 1138 (!) 135/92 -- -- 96 -- -- -- --   09/22/24 1115 124/81 -- -- 95 17 100 % -- --   09/22/24 1100 125/81 -- -- 87 18 100 % -- --   09/22/24 1045 128/84 -- -- 84 18 100 % -- --   09/22/24 1000 (!) 132/93 -- -- 88 20 100 % -- --   09/22/24 0945 (!) 123/94 -- -- 95 24 100 % -- --   09/22/24 0910 -- 98  F (36.7  C) Oral -- -- -- -- --   09/22/24 0907 135/84 -- -- (!) 152 24 97 % 1.6 m (5' 3\") 55.5 kg (122 lb 5.7 oz)     Physical Exam  Constitutional:       Appearance: She is well-developed. She is ill-appearing and diaphoretic.   HENT:      Right Ear: External ear normal.      Left Ear: External ear normal.      Mouth/Throat:      Mouth: Mucous membranes are dry.      Pharynx: Oropharynx is clear. No oropharyngeal exudate or posterior oropharyngeal erythema.   Eyes:      General: No scleral icterus.     Extraocular Movements: Extraocular movements intact.      Conjunctiva/sclera: Conjunctivae normal.      Pupils: Pupils are equal, round, and reactive to light.   Cardiovascular:      Rate and Rhythm: Regular rhythm. Tachycardia present.      Heart sounds: Normal heart sounds. No murmur heard.     No friction rub. No gallop.   Pulmonary:      Effort: Pulmonary effort is normal. No respiratory distress.      Breath sounds: Normal breath sounds. No stridor. No wheezing, rhonchi or rales.   Abdominal:      General: Bowel sounds are normal. There is no distension.      Palpations: Abdomen is soft. There is no mass.      Tenderness: There is abdominal tenderness. There is no right CVA tenderness or left CVA tenderness.      Comments: Diffuse upper abd TTP, worst in epigastric region   Musculoskeletal:         General: Normal range of motion.      Cervical back: Normal range of motion and neck supple.   Skin:     General: Skin is warm.      Capillary Refill: Capillary refill takes less than 2 seconds.      Findings: No rash.   Neurological:      General: No focal deficit present.      Mental Status: She is " alert.           Diagnostics     Lab Results   Labs Ordered and Resulted from Time of ED Arrival to Time of ED Departure   COMPREHENSIVE METABOLIC PANEL - Abnormal       Result Value    Sodium 126 (*)     Potassium 3.6      Carbon Dioxide (CO2) 11 (*)     Anion Gap 30 (*)     Urea Nitrogen 30.6 (*)     Creatinine 0.84      GFR Estimate >90      Calcium 9.4      Chloride 85 (*)     Glucose 335 (*)     Alkaline Phosphatase 135      AST 30      ALT 31      Protein Total 8.9 (*)     Albumin 4.9      Bilirubin Total 1.3 (*)    KETONE BETA-HYDROXYBUTYRATE QUANTITATIVE, RAPID - Abnormal    Ketone (Beta-Hydroxybutyrate) Quantitative 7.56 (*)    BLOOD GAS VENOUS - Abnormal    pH Venous 7.34      pCO2 Venous 25 (*)     pO2 Venous 40      Bicarbonate Venous 14 (*)     Base Excess/Deficit Venous -9.9 (*)     FIO2 0      Oxyhemoglobin Venous 75      O2 Sat, Venous 76.4 (*)    ROUTINE UA WITH MICROSCOPIC REFLEX TO CULTURE - Abnormal    Color Urine Light Yellow      Appearance Urine Slightly Cloudy (*)     Glucose Urine 500 (*)     Bilirubin Urine Negative      Ketones Urine >150 (*)     Specific Gravity Urine 1.015      Blood Urine Negative      pH Urine 6.0      Protein Albumin Urine 20 (*)     Urobilinogen Urine Normal      Nitrite Urine Negative      Leukocyte Esterase Urine Moderate (*)     Bacteria Urine Few (*)     Mucus Urine Present (*)     RBC Urine 3 (*)     WBC Urine 20 (*)     Squamous Epithelials Urine 6 (*)     Hyaline Casts Urine 6 (*)    GLUCOSE BY METER - Abnormal    GLUCOSE BY METER POCT 350 (*)    CBC WITH PLATELETS AND DIFFERENTIAL - Abnormal    WBC Count 21.0 (*)     RBC Count 5.75 (*)     Hemoglobin 17.8 (*)     Hematocrit 50.7 (*)     MCV 88      MCH 31.0      MCHC 35.1      RDW 12.1      Platelet Count 327      % Neutrophils 87      % Lymphocytes 5      % Monocytes 7      % Eosinophils 0      % Basophils 0      % Immature Granulocytes 1      NRBCs per 100 WBC 0      Absolute Neutrophils 18.3 (*)      Absolute Lymphocytes 1.1      Absolute Monocytes 1.4 (*)     Absolute Eosinophils 0.0      Absolute Basophils 0.1      Absolute Immature Granulocytes 0.1      Absolute NRBCs 0.0     GLUCOSE BY METER - Abnormal    GLUCOSE BY METER POCT 255 (*)    BASIC METABOLIC PANEL - Abnormal    Sodium 131 (*)     Potassium 5.5 (*)     Chloride 97 (*)     Carbon Dioxide (CO2) 15 (*)     Anion Gap 19 (*)     Urea Nitrogen 23.3 (*)     Creatinine 0.68      GFR Estimate >90      Calcium 7.5 (*)     Glucose 163 (*)    KETONE BETA-HYDROXYBUTYRATE QUANTITATIVE, RAPID - Abnormal    Ketone (Beta-Hydroxybutyrate) Quantitative 3.06 (*)    GLUCOSE BY METER - Abnormal    GLUCOSE BY METER POCT 134 (*)    GLUCOSE BY METER - Abnormal    GLUCOSE BY METER POCT 168 (*)    LIPASE - Normal    Lipase 14     MAGNESIUM - Normal    Magnesium 2.3     LACTIC ACID WHOLE BLOOD WITH 1X REPEAT IN 2 HR WHEN >2 - Normal    Lactic Acid, Initial 1.7     ETHYL ALCOHOL LEVEL - Normal    Alcohol ethyl <0.01     HCG QUALITATIVE PREGNANCY - Normal    hCG Serum Qualitative Negative     BLOOD GAS ARTERIAL   BASIC METABOLIC PANEL   MAGNESIUM   URINE CULTURE     Imaging   CT Abdomen Pelvis w Contrast   Final Result   IMPRESSION:    Study limitation: Motion degradation.      1.  Mild circumferential wall thickening of the distal esophagus probably inflammatory (i.e. esophagitis).   2.  Otherwise, no acute findings within the abdomen or pelvis.           EKG   ECG taken at 0930, ECG read at 0940  Sinus tachycardia  Left axis deviation  Possible inferior infarct, age undetermined   Rate 108 bpm. W[place] interval 156 ms. QRS duration 88 ms. QT/QTc 330/442 ms. P-R-T axes 55 -46 47.    Independent Interpretation   None    ED Course      Medications Administered   Medications   dextrose 5% and 0.45% NaCl infusion (1,000 mLs Intravenous $New Bag 9/22/24 1255)   sodium chloride 0.9% BOLUS 1,000 mL (0 mLs Intravenous Stopped 9/22/24 1035)   ondansetron (ZOFRAN) injection 4 mg (4 mg  Intravenous $Given 9/22/24 1139)   pantoprazole (PROTONIX) IV push injection 40 mg (40 mg Intravenous $Given 9/22/24 1003)   CT Scan Flush (55 mLs Intravenous $Given 9/22/24 1018)   iopamidol (ISOVUE-370) solution 500 mL (61 mLs Intravenous $Given 9/22/24 1018)   sodium chloride 0.9% BOLUS 1,000 mL (0 mLs Intravenous Stopped 9/22/24 1151)   lidocaine (viscous) (XYLOCAINE) 2 % solution 5 mL (5 mLs Mouth/Throat $Given 9/22/24 1150)   metoclopramide (REGLAN) injection 10 mg (10 mg Intravenous $Given 9/22/24 1326)     Procedures   None      Discussion of Management   Admitting Hospitalist, Susan Crouch PA-C for Dr. Rolle    ED Course   ED Course as of 09/22/24 1435   Sun Sep 22, 2024   0917 I obtained history and examined the patient as noted above.    1039 I rechecked and updated the patient. Her nausea is better but not gone.   1230 I rechecked and updated the patient.    1404 I consulted with hospitalist, Susan Crouch PA-C for Dr. Rolle, regarding admission. Discussed patient's presentation and work-up.     Additional Documentation  None    Medical Decision Making / Diagnosis     CMS Diagnoses: None    MIPS       None    [facility]   Margo Cruz is a 23 year old female with insulin-dependent diabetes who presents with severe nausea and vomiting and inability keep p.o. down.  On examination, patient appears to be severely dehydrated.  IV fluids initiated and antiemetics provided.  Initial VBG did not show acute DKA.  Most likely this is severe dehydration and starvation ketosis.  She also has evidence of esophagitis on her CT which is presenting with severe throat pain.  She did receive oral viscous lidocaine which seemed to help somewhat.  IV Protonix provided for presumed alcoholic gastritis as well.  Patient required multiple rounds of antiemetics.  She seemed to do better with Reglan.  2 L of fluids provided.  We then started maintenance fluids with D5 half-normal saline at 250 an hour.  Patient has nausea is  still ongoing intermittently.  Vomiting has stopped.  Patient will require IV hydration and admission given that she is taking a very small amount of oral.  Her repeat electrolytes and ketones are improving.  Repeat bicarb is improving as well.  Initial sodium was 126 and corrected is 130.  I discussed plan for admission with patient and family and they are in agreement.  Patient admitted to Dr. Rolle.    Disposition   The patient was admitted to the hospital.     Diagnosis     ICD-10-CM    1. Severe dehydration  E86.0       2. Esophagitis  K20.90       3. Acute alcoholic gastritis without hemorrhage  K29.20       4. Intractable nausea and vomiting  R11.2       5. Type 1 diabetes mellitus with hyperglycemia (H)  E10.65              Scribe Disclosure:  RONY BUNCH, am serving as a scribe at 9:15 AM on 9/22/2024 to document services personally performed by Zurdo Billings MD based on my observations and the provider's statements to me.      Zurdo Billings MD  09/22/24 [id number]

## 2024-09-22 NOTE — PROGRESS NOTES
An ABG was completed on the pt's left radial artery @ 1445 on an FIO2 of 21% (room air).  Pressure was held until bleeding stopped.  No complications noted during the procedure.

## 2024-09-23 ENCOUNTER — APPOINTMENT (OUTPATIENT)
Dept: GENERAL RADIOLOGY | Facility: CLINIC | Age: 24
End: 2024-09-23
Attending: INTERNAL MEDICINE
Payer: COMMERCIAL

## 2024-09-23 LAB
ANION GAP SERPL CALCULATED.3IONS-SCNC: 12 MMOL/L (ref 7–15)
ANION GAP SERPL CALCULATED.3IONS-SCNC: 14 MMOL/L (ref 7–15)
ANION GAP SERPL CALCULATED.3IONS-SCNC: 15 MMOL/L (ref 7–15)
ANION GAP SERPL CALCULATED.3IONS-SCNC: 20 MMOL/L (ref 7–15)
ATRIAL RATE - MUSE: 108 BPM
BACTERIA UR CULT: ABNORMAL
BACTERIA UR CULT: ABNORMAL
BUN SERPL-MCNC: 11.3 MG/DL (ref 6–20)
BUN SERPL-MCNC: 4.1 MG/DL (ref 6–20)
BUN SERPL-MCNC: 6.4 MG/DL (ref 6–20)
BUN SERPL-MCNC: 9.2 MG/DL (ref 6–20)
CA-I BLD-MCNC: 3.9 MG/DL (ref 4.4–5.2)
CALCIUM SERPL-MCNC: 7 MG/DL (ref 8.8–10.4)
CALCIUM SERPL-MCNC: 7.1 MG/DL (ref 8.8–10.4)
CALCIUM SERPL-MCNC: 7.3 MG/DL (ref 8.8–10.4)
CALCIUM SERPL-MCNC: 7.4 MG/DL (ref 8.8–10.4)
CHLORIDE SERPL-SCNC: 101 MMOL/L (ref 98–107)
CHLORIDE SERPL-SCNC: 104 MMOL/L (ref 98–107)
CHLORIDE SERPL-SCNC: 106 MMOL/L (ref 98–107)
CHLORIDE SERPL-SCNC: 107 MMOL/L (ref 98–107)
CREAT SERPL-MCNC: 0.5 MG/DL (ref 0.51–0.95)
CREAT SERPL-MCNC: 0.52 MG/DL (ref 0.51–0.95)
CREAT SERPL-MCNC: 0.53 MG/DL (ref 0.51–0.95)
CREAT SERPL-MCNC: 0.57 MG/DL (ref 0.51–0.95)
DIASTOLIC BLOOD PRESSURE - MUSE: NORMAL MMHG
EGFRCR SERPLBLD CKD-EPI 2021: >90 ML/MIN/1.73M2
GLUCOSE BLDC GLUCOMTR-MCNC: 106 MG/DL (ref 70–99)
GLUCOSE BLDC GLUCOMTR-MCNC: 122 MG/DL (ref 70–99)
GLUCOSE BLDC GLUCOMTR-MCNC: 123 MG/DL (ref 70–99)
GLUCOSE BLDC GLUCOMTR-MCNC: 126 MG/DL (ref 70–99)
GLUCOSE BLDC GLUCOMTR-MCNC: 131 MG/DL (ref 70–99)
GLUCOSE BLDC GLUCOMTR-MCNC: 136 MG/DL (ref 70–99)
GLUCOSE BLDC GLUCOMTR-MCNC: 141 MG/DL (ref 70–99)
GLUCOSE BLDC GLUCOMTR-MCNC: 145 MG/DL (ref 70–99)
GLUCOSE BLDC GLUCOMTR-MCNC: 155 MG/DL (ref 70–99)
GLUCOSE BLDC GLUCOMTR-MCNC: 167 MG/DL (ref 70–99)
GLUCOSE BLDC GLUCOMTR-MCNC: 168 MG/DL (ref 70–99)
GLUCOSE BLDC GLUCOMTR-MCNC: 174 MG/DL (ref 70–99)
GLUCOSE BLDC GLUCOMTR-MCNC: 180 MG/DL (ref 70–99)
GLUCOSE BLDC GLUCOMTR-MCNC: 182 MG/DL (ref 70–99)
GLUCOSE BLDC GLUCOMTR-MCNC: 202 MG/DL (ref 70–99)
GLUCOSE BLDC GLUCOMTR-MCNC: 209 MG/DL (ref 70–99)
GLUCOSE BLDC GLUCOMTR-MCNC: 217 MG/DL (ref 70–99)
GLUCOSE BLDC GLUCOMTR-MCNC: 235 MG/DL (ref 70–99)
GLUCOSE BLDC GLUCOMTR-MCNC: 249 MG/DL (ref 70–99)
GLUCOSE BLDC GLUCOMTR-MCNC: 266 MG/DL (ref 70–99)
GLUCOSE BLDC GLUCOMTR-MCNC: 308 MG/DL (ref 70–99)
GLUCOSE BLDC GLUCOMTR-MCNC: 345 MG/DL (ref 70–99)
GLUCOSE BLDC GLUCOMTR-MCNC: 80 MG/DL (ref 70–99)
GLUCOSE BLDC GLUCOMTR-MCNC: 97 MG/DL (ref 70–99)
GLUCOSE SERPL-MCNC: 145 MG/DL (ref 70–99)
GLUCOSE SERPL-MCNC: 207 MG/DL (ref 70–99)
GLUCOSE SERPL-MCNC: 219 MG/DL (ref 70–99)
GLUCOSE SERPL-MCNC: 353 MG/DL (ref 70–99)
HCO3 SERPL-SCNC: 12 MMOL/L (ref 22–29)
HCO3 SERPL-SCNC: 17 MMOL/L (ref 22–29)
HCO3 SERPL-SCNC: 17 MMOL/L (ref 22–29)
HCO3 SERPL-SCNC: 18 MMOL/L (ref 22–29)
HOLD SPECIMEN: NORMAL
INTERPRETATION ECG - MUSE: NORMAL
MAGNESIUM SERPL-MCNC: 1.8 MG/DL (ref 1.7–2.3)
P AXIS - MUSE: 55 DEGREES
PHOSPHATE SERPL-MCNC: 0.8 MG/DL (ref 2.5–4.5)
PHOSPHATE SERPL-MCNC: 1 MG/DL (ref 2.5–4.5)
PHOSPHATE SERPL-MCNC: 1.1 MG/DL (ref 2.5–4.5)
PHOSPHATE SERPL-MCNC: 2.3 MG/DL (ref 2.5–4.5)
PHOSPHATE SERPL-MCNC: 2.3 MG/DL (ref 2.5–4.5)
POTASSIUM SERPL-SCNC: 3.1 MMOL/L (ref 3.4–5.3)
POTASSIUM SERPL-SCNC: 3.1 MMOL/L (ref 3.4–5.3)
POTASSIUM SERPL-SCNC: 3.2 MMOL/L (ref 3.4–5.3)
POTASSIUM SERPL-SCNC: 3.2 MMOL/L (ref 3.4–5.3)
POTASSIUM SERPL-SCNC: 4.4 MMOL/L (ref 3.4–5.3)
PR INTERVAL - MUSE: 156 MS
QRS DURATION - MUSE: 88 MS
QT - MUSE: 330 MS
QTC - MUSE: 442 MS
R AXIS - MUSE: -46 DEGREES
SODIUM SERPL-SCNC: 133 MMOL/L (ref 135–145)
SODIUM SERPL-SCNC: 136 MMOL/L (ref 135–145)
SODIUM SERPL-SCNC: 136 MMOL/L (ref 135–145)
SODIUM SERPL-SCNC: 138 MMOL/L (ref 135–145)
SYSTOLIC BLOOD PRESSURE - MUSE: NORMAL MMHG
T AXIS - MUSE: 47 DEGREES
VENTRICULAR RATE- MUSE: 108 BPM

## 2024-09-23 PROCEDURE — 84100 ASSAY OF PHOSPHORUS: CPT | Performed by: INTERNAL MEDICINE

## 2024-09-23 PROCEDURE — 258N000003 HC RX IP 258 OP 636: Performed by: STUDENT IN AN ORGANIZED HEALTH CARE EDUCATION/TRAINING PROGRAM

## 2024-09-23 PROCEDURE — 82565 ASSAY OF CREATININE: CPT | Performed by: STUDENT IN AN ORGANIZED HEALTH CARE EDUCATION/TRAINING PROGRAM

## 2024-09-23 PROCEDURE — 250N000009 HC RX 250: Performed by: INTERNAL MEDICINE

## 2024-09-23 PROCEDURE — 258N000003 HC RX IP 258 OP 636: Performed by: INTERNAL MEDICINE

## 2024-09-23 PROCEDURE — 250N000009 HC RX 250: Performed by: STUDENT IN AN ORGANIZED HEALTH CARE EDUCATION/TRAINING PROGRAM

## 2024-09-23 PROCEDURE — 36415 COLL VENOUS BLD VENIPUNCTURE: CPT | Performed by: INTERNAL MEDICINE

## 2024-09-23 PROCEDURE — 84100 ASSAY OF PHOSPHORUS: CPT | Performed by: STUDENT IN AN ORGANIZED HEALTH CARE EDUCATION/TRAINING PROGRAM

## 2024-09-23 PROCEDURE — S5010 5% DEXTROSE AND 0.45% SALINE: HCPCS | Performed by: STUDENT IN AN ORGANIZED HEALTH CARE EDUCATION/TRAINING PROGRAM

## 2024-09-23 PROCEDURE — 71045 X-RAY EXAM CHEST 1 VIEW: CPT

## 2024-09-23 PROCEDURE — 250N000013 HC RX MED GY IP 250 OP 250 PS 637: Performed by: STUDENT IN AN ORGANIZED HEALTH CARE EDUCATION/TRAINING PROGRAM

## 2024-09-23 PROCEDURE — 82330 ASSAY OF CALCIUM: CPT | Performed by: STUDENT IN AN ORGANIZED HEALTH CARE EDUCATION/TRAINING PROGRAM

## 2024-09-23 PROCEDURE — 250N000011 HC RX IP 250 OP 636: Performed by: STUDENT IN AN ORGANIZED HEALTH CARE EDUCATION/TRAINING PROGRAM

## 2024-09-23 PROCEDURE — 83735 ASSAY OF MAGNESIUM: CPT | Performed by: STUDENT IN AN ORGANIZED HEALTH CARE EDUCATION/TRAINING PROGRAM

## 2024-09-23 PROCEDURE — 120N000013 HC R&B IMCU

## 2024-09-23 PROCEDURE — 84132 ASSAY OF SERUM POTASSIUM: CPT | Performed by: INTERNAL MEDICINE

## 2024-09-23 PROCEDURE — 250N000013 HC RX MED GY IP 250 OP 250 PS 637: Performed by: INTERNAL MEDICINE

## 2024-09-23 PROCEDURE — 36415 COLL VENOUS BLD VENIPUNCTURE: CPT | Performed by: STUDENT IN AN ORGANIZED HEALTH CARE EDUCATION/TRAINING PROGRAM

## 2024-09-23 PROCEDURE — 80048 BASIC METABOLIC PNL TOTAL CA: CPT | Performed by: STUDENT IN AN ORGANIZED HEALTH CARE EDUCATION/TRAINING PROGRAM

## 2024-09-23 PROCEDURE — 82374 ASSAY BLOOD CARBON DIOXIDE: CPT | Performed by: STUDENT IN AN ORGANIZED HEALTH CARE EDUCATION/TRAINING PROGRAM

## 2024-09-23 PROCEDURE — 99233 SBSQ HOSP IP/OBS HIGH 50: CPT | Performed by: INTERNAL MEDICINE

## 2024-09-23 RX ORDER — ACETAMINOPHEN 325 MG/1
650 TABLET ORAL EVERY 4 HOURS PRN
Status: DISCONTINUED | OUTPATIENT
Start: 2024-09-23 | End: 2024-09-25 | Stop reason: HOSPADM

## 2024-09-23 RX ORDER — POTASSIUM CHLORIDE 1500 MG/1
40 TABLET, EXTENDED RELEASE ORAL ONCE
Status: COMPLETED | OUTPATIENT
Start: 2024-09-23 | End: 2024-09-23

## 2024-09-23 RX ORDER — METOCLOPRAMIDE HYDROCHLORIDE 5 MG/ML
10 INJECTION INTRAMUSCULAR; INTRAVENOUS EVERY 6 HOURS PRN
Status: DISCONTINUED | OUTPATIENT
Start: 2024-09-23 | End: 2024-09-25 | Stop reason: HOSPADM

## 2024-09-23 RX ORDER — POTASSIUM CHLORIDE 1.5 G/1.58G
40 POWDER, FOR SOLUTION ORAL ONCE
Status: COMPLETED | OUTPATIENT
Start: 2024-09-23 | End: 2024-09-23

## 2024-09-23 RX ADMIN — PROCHLORPERAZINE EDISYLATE 5 MG: 5 INJECTION INTRAMUSCULAR; INTRAVENOUS at 14:58

## 2024-09-23 RX ADMIN — POTASSIUM CHLORIDE 10 MEQ: 7.46 INJECTION, SOLUTION INTRAVENOUS at 02:03

## 2024-09-23 RX ADMIN — POTASSIUM CHLORIDE 40 MEQ: 1500 TABLET, EXTENDED RELEASE ORAL at 08:23

## 2024-09-23 RX ADMIN — SODIUM PHOSPHATE, MONOBASIC, MONOHYDRATE AND SODIUM PHOSPHATE, DIBASIC, ANHYDROUS 15 MMOL: 142; 276 INJECTION, SOLUTION INTRAVENOUS at 14:10

## 2024-09-23 RX ADMIN — LAMOTRIGINE 100 MG: 100 TABLET ORAL at 08:47

## 2024-09-23 RX ADMIN — POTASSIUM CHLORIDE 40 MEQ: 1.5 POWDER, FOR SOLUTION ORAL at 23:55

## 2024-09-23 RX ADMIN — DEXTROSE AND SODIUM CHLORIDE: 5; 450 INJECTION, SOLUTION INTRAVENOUS at 07:05

## 2024-09-23 RX ADMIN — DEXTROSE AND SODIUM CHLORIDE: 5; 450 INJECTION, SOLUTION INTRAVENOUS at 11:30

## 2024-09-23 RX ADMIN — INSULIN HUMAN: 1 INJECTION, SOLUTION INTRAVENOUS at 12:07

## 2024-09-23 RX ADMIN — SODIUM PHOSPHATE, MONOBASIC, MONOHYDRATE AND SODIUM PHOSPHATE, DIBASIC, ANHYDROUS 15 MMOL: 142; 276 INJECTION, SOLUTION INTRAVENOUS at 11:36

## 2024-09-23 RX ADMIN — POTASSIUM CHLORIDE 10 MEQ: 7.46 INJECTION, SOLUTION INTRAVENOUS at 03:06

## 2024-09-23 RX ADMIN — PROCHLORPERAZINE EDISYLATE 5 MG: 5 INJECTION INTRAMUSCULAR; INTRAVENOUS at 08:23

## 2024-09-23 RX ADMIN — POTASSIUM CHLORIDE 10 MEQ: 7.46 INJECTION, SOLUTION INTRAVENOUS at 00:07

## 2024-09-23 RX ADMIN — PROCHLORPERAZINE EDISYLATE 5 MG: 5 INJECTION INTRAMUSCULAR; INTRAVENOUS at 01:32

## 2024-09-23 RX ADMIN — POTASSIUM CHLORIDE 10 MEQ: 7.46 INJECTION, SOLUTION INTRAVENOUS at 00:59

## 2024-09-23 RX ADMIN — SODIUM PHOSPHATE, MONOBASIC, MONOHYDRATE AND SODIUM PHOSPHATE, DIBASIC, ANHYDROUS 15 MMOL: 142; 276 INJECTION, SOLUTION INTRAVENOUS at 23:56

## 2024-09-23 RX ADMIN — DEXTROSE AND SODIUM CHLORIDE: 5; 450 INJECTION, SOLUTION INTRAVENOUS at 16:12

## 2024-09-23 RX ADMIN — POTASSIUM CHLORIDE 40 MEQ: 1500 TABLET, EXTENDED RELEASE ORAL at 17:21

## 2024-09-23 RX ADMIN — DEXTROSE AND SODIUM CHLORIDE: 5; 450 INJECTION, SOLUTION INTRAVENOUS at 20:57

## 2024-09-23 RX ADMIN — PANTOPRAZOLE SODIUM 40 MG: 40 TABLET, DELAYED RELEASE ORAL at 08:23

## 2024-09-23 RX ADMIN — SODIUM PHOSPHATE, MONOBASIC, MONOHYDRATE AND SODIUM PHOSPHATE, DIBASIC, ANHYDROUS 30 MMOL: 142; 276 INJECTION, SOLUTION INTRAVENOUS at 01:25

## 2024-09-23 RX ADMIN — SODIUM CHLORIDE: 9 INJECTION, SOLUTION INTRAVENOUS at 01:24

## 2024-09-23 ASSESSMENT — ACTIVITIES OF DAILY LIVING (ADL)
ADLS_ACUITY_SCORE: 22
ADLS_ACUITY_SCORE: 21
ADLS_ACUITY_SCORE: 21
ADLS_ACUITY_SCORE: 22
ADLS_ACUITY_SCORE: 21
ADLS_ACUITY_SCORE: 22
ADLS_ACUITY_SCORE: 22
ADLS_ACUITY_SCORE: 21
ADLS_ACUITY_SCORE: 21
ADLS_ACUITY_SCORE: 22
ADLS_ACUITY_SCORE: 21

## 2024-09-23 NOTE — PLAN OF CARE
"Temp: 98.1  F (36.7  C) Temp src: Oral BP: 115/70 Pulse: 98   Resp: 16 SpO2: 99 % O2 Device: None (Room air)       Orientation:  A&O x4  VS: VSS  Pain:  Denies pain  Tele:  ST  Activity: A1 SBA   Resp:  RA. Cough present  GI:  Nausea present. PRN given  : Voiding without difficulty.  Skin:  WDL  Lines: PIV infusing  Diet: Gluten Free. Ate jello. Tolerated well  Labs:  Potassium, Mag, and Phosphorus   Plan: Home with mom  Discharge:  Pending    Problem: Adult Inpatient Plan of Care  Goal: Plan of Care Review  Description: The Plan of Care Review/Shift note should be completed every shift.  The Outcome Evaluation is a brief statement about your assessment that the patient is improving, declining, or no change.  This information will be displayed automatically on your shift  note.  Outcome: Progressing  Flowsheets (Taken 9/23/2024 1313)  Outcome Evaluation: Insulin Gtt on. K and Phos Replaced. Nausea present. PRN given.  Plan of Care Reviewed With:   patient   parent  Overall Patient Progress: improving  Goal: Patient-Specific Goal (Individualized)  Description: You can add care plan individualizations to a care plan. Examples of Individualization might be:  \"Parent requests to be called daily at 9am for status\", \"I have a hard time hearing out of my right ear\", or \"Do not touch me to wake me up as it startles  me\".  Outcome: Progressing  Goal: Absence of Hospital-Acquired Illness or Injury  Outcome: Progressing  Intervention: Identify and Manage Fall Risk  Recent Flowsheet Documentation  Taken 9/23/2024 1046 by Lopez Sigala RN  Safety Promotion/Fall Prevention: safety round/check completed  Intervention: Prevent Infection  Recent Flowsheet Documentation  Taken 9/23/2024 1046 by Lopez Sigala RN  Infection Prevention: hand hygiene promoted  Goal: Optimal Comfort and Wellbeing  Outcome: Progressing  Goal: Readiness for Transition of Care  Outcome: Progressing       Goal Outcome Evaluation:      " Plan of Care Reviewed With: patient, parent    Overall Patient Progress: improvingOverall Patient Progress: improving    Outcome Evaluation: Insulin Gtt on. K and Phos Replaced. Nausea present. PRN given.

## 2024-09-23 NOTE — PROVIDER NOTIFICATION
"Paged MD: \"Hi, lab called with critical. Phosphorus 0.6. Pt has phosphorus replacement infusing via IV right now. Thanks!\"    MD ordered additional Phosphorus replacement  "

## 2024-09-23 NOTE — PLAN OF CARE
"VSS on RA. A&Ox4. Denies pain. Denies SOB. LS: clear. K, Mg, Phos protocol. K and Phos replaced overnight. On insulin gtt. IMC status. Given PRN Compazine. 2 episodes of emesis. IVF infusing. Neuros intact, no deficits noted. Tele: ST. SBA/Independent. Urine cultures pending. Discharge TBD.    Goal Outcome Evaluation:      Plan of Care Reviewed With: patient    Overall Patient Progress: improving    Outcome Evaluation: On insulin gtt. K and Phos replaced.    Problem: Adult Inpatient Plan of Care  Goal: Plan of Care Review  Description: The Plan of Care Review/Shift note should be completed every shift.  The Outcome Evaluation is a brief statement about your assessment that the patient is improving, declining, or no change.  This information will be displayed automatically on your shift  note.  9/23/2024 0635 by Rina Hedrick RN  Outcome: Progressing  Flowsheets (Taken 9/23/2024 0635)  Outcome Evaluation: On insulin gtt. K and Phos replaced.  Plan of Care Reviewed With: patient  Overall Patient Progress: improving  9/22/2024 2136 by Rnia Hedrick RN  Outcome: Progressing  Goal: Patient-Specific Goal (Individualized)  Description: You can add care plan individualizations to a care plan. Examples of Individualization might be:  \"Parent requests to be called daily at 9am for status\", \"I have a hard time hearing out of my right ear\", or \"Do not touch me to wake me up as it startles  me\".  9/23/2024 0635 by Rina Hedrick RN  Outcome: Progressing  9/22/2024 2136 by Rina Hedrick RN  Outcome: Progressing  Goal: Absence of Hospital-Acquired Illness or Injury  9/23/2024 0635 by Rina Hedrick RN  Outcome: Progressing  9/22/2024 2136 by Rina Hedrick RN  Outcome: Progressing  Intervention: Identify and Manage Fall Risk  Recent Flowsheet Documentation  Taken 9/23/2024 0434 by Rina Hedrick RN  Safety Promotion/Fall Prevention: safety round/check completed  Taken 9/23/2024 0220 by Ryley, " Rina J, RN  Safety Promotion/Fall Prevention: safety round/check completed  Taken 9/23/2024 0135 by Rina Hedrick RN  Safety Promotion/Fall Prevention: safety round/check completed  Taken 9/22/2024 2027 by Rina Hedrick RN  Safety Promotion/Fall Prevention:   assistive device/personal items within reach   clutter free environment maintained   increased rounding and observation   increase visualization of patient   lighting adjusted   mobility aid in reach   nonskid shoes/slippers when out of bed   patient and family education   room organization consistent   safety round/check completed   treat reversible contributory factors   treat underlying cause  Intervention: Prevent Skin Injury  Recent Flowsheet Documentation  Taken 9/22/2024 2027 by Rina Hedrick RN  Body Position: position changed independently  Intervention: Prevent and Manage VTE (Venous Thromboembolism) Risk  Recent Flowsheet Documentation  Taken 9/22/2024 2027 by Rina Hedrick RN  VTE Prevention/Management: (ambulatory) SCDs off (sequential compression devices)  Intervention: Prevent Infection  Recent Flowsheet Documentation  Taken 9/22/2024 2027 by Rina Hedrick RN  Infection Prevention:   hand hygiene promoted   rest/sleep promoted   single patient room provided  Goal: Optimal Comfort and Wellbeing  9/23/2024 0635 by Rina Hedrick RN  Outcome: Progressing  9/22/2024 2136 by Rina Hedrick RN  Outcome: Progressing  Goal: Readiness for Transition of Care  9/23/2024 0635 by Rina Hedrick RN  Outcome: Progressing  9/22/2024 2136 by Rina Hedrick RN  Outcome: Progressing     Problem: Diabetic Ketoacidosis  Goal: Optimal Coping  9/23/2024 0635 by Rina Hedrick RN  Outcome: Progressing  9/22/2024 2136 by Rina Hedrick RN  Outcome: Progressing  Goal: Fluid and Electrolyte Balance with Absence of Ketosis  9/23/2024 0635 by Rina Hedrick RN  Outcome: Progressing  9/22/2024 2136 by Rina Hedrick  RN  Outcome: Progressing     Problem: Comorbidity Management  Goal: Blood Glucose Levels Within Targeted Range  Outcome: Progressing

## 2024-09-23 NOTE — PROGRESS NOTES
Received patient at 1830. Oriented to room and hospital protocols. Compazine given for nausea. On Insulin drip and D5+1/2NS.

## 2024-09-23 NOTE — PROVIDER NOTIFICATION
"Paged MD: \"Hi, lab called with critical. Potassium 2.6. Pt got oral K replacement and IV replacement is infusing\"    Per Dr. Crouch, verbal order for IV Potassium replacement, 4 bags of 10 mEq Potassium Chloride for a total of 40 mEq    Per Dr. Crouch, turn off the insulin drip until Potassium > 3.2. Switch fluids to normal saline.   "

## 2024-09-23 NOTE — PROVIDER NOTIFICATION
"Paged MD: \"Hi, lab called with critical. Phosphorus 0.6. Pt has phosphorus replacement infusing via IV right now. Thanks!\"  "

## 2024-09-23 NOTE — PROGRESS NOTES
[facility]    Medicine Progress Note - Hospitalist Service    Date of Admission:  9/22/2024    Primary Care Physician   DWAYNE DOTY  CONSULTANTS: none     Assessment & Plan     Margo Cruz is an 23 year old female with past medical history of diabetes mellitus type 1, celiac disease, and bipolar disorder who is being admitted today with acute DKA.  Patient states she was in her normal state of health until late Friday evening.  She had been out drinking with her friends and reports consuming several vodka cranberries.  Subsequently developed generalized abdominal pain, decreased appetite, nausea and vomiting which has persisted throughout the weekend.  Patient is accompanied by her stepfather who tells me her blood sugars yesterday were consistently been above the detection range of 600 on her insulin pump. After making dose adjustments they were able to get her levels in the 160s but they quickly rebounded back into the 300s . patient denies any accompanying fever/chills, dyspnea, chest pain, manage easy, melena, constipation/diarrhea, or urinary symptoms.  She states she has been hospitalized approximately 3 times in the past with issues related to her diabetes but has not had any hospitalizations in the past year or so.      Workup in the emergency department demonstrated hyponatremia with a corrected sodium of 130, leukocytosis (21), anion gap of 30, bicarb of 11, glucose of 335, and ketone levels 7.56.  lipase and lactic acid within normal limits. VBG demonstrated a compensated metabolic acidosis.  UA positive for glucose, ketones, leukocyte esterase, WBC, and bacteria. UC pending and, again, pt denies any urinary symptoms.  CT abdomen pelvis with IV contrast consistent with esophagitis but no other acute findings.  Patient received 2 L IV fluid, viscous lidocaine, and Reglan with some symptomatic improvement.       Diabetes mellitus type 1, DKA, hyponatremia,  hypokalemia, hypophosphatemia  Patient is presenting with DKA with a bicarb down to 11, sodium 126, BUN elevated at 30, ketones elevated 7.56, and a venous pH of 7.34.  This is likely exacerbated by patient's binge drinking.  No acute intra-abdominal process on CT, lipase normal.  No chest pain or dyspnea suggestive of MI.  UA is positive for leukocyte esterase but otherwise negative and has no urinary symptoms. UC pending. Not on antibiotics.  Patient normally is on an insulin pump.  Patient was placed on insulin drip here until resolution of her acidosis.  Will hold antibiotics in the absence of any urinary symptoms while awaiting cultures.  Patient normally is on an insulin drip at home and was placed on insulin drip on admission while following her electrolytes and replacing them per protocol.  Patient's bicarb still is low at 17.  Continues on IV fluids.  Has been n.p.o. but would like to try clear liquids at this point.  Patient will transition to subcu insulin once her acidosis has resolved which likely will happen next 24 hours.  Patient needs to do better job at home controlling her sugars as her hemoglobin A1c is 10.8.    Esophagitis   Seen on CT scan.  Patient has GI cocktail available along with PPI.          Abnormal UA  Patient's urinalysis is not impressive does have some leukocytes but otherwise negative.  Holding off on any antibiotics that she does not have any symptoms of urinary tract infection.  Culture has been sent.          Bipolar disorder  Continue home regimen of lamotrigine. Hold home Zyprexa for now    Celiac disease  Needs to be on gluten-free diet when eating.        Discussed plan of care with nursing, social work, case management      Diet: Gluten Free Diet    DVT Prophylaxis: Pneumatic Compression Devices  Johnson Catheter: Not present  Lines: None     Cardiac Monitoring: ACTIVE order. Indication: Electrolyte Imbalance (24 hours)- Magnesium <1.3 mg/ml; Potassium < =2.8 or > 5.5  mg/ml    RESTRAINTS: Not indicated  Code Status: Full Code         This document was created using voice recognition technology.  Please excuse any typographical errors that may have occurred.  Please call with any questions.       # Hypokalemia: Lowest K = 2.6 mmol/L in last 2 days, will replace as needed  # Hyperkalemia: Highest K = 5.5 mmol/L in last 2 days, will monitor as appropriate  # Hyponatremia: Lowest Na = 126 mmol/L in last 2 days, will monitor as appropriate  # Hypocalcemia: Lowest Ca = 7 mg/dL in last 2 days, will monitor and replace as appropriate    # Anion Gap Metabolic Acidosis: Highest Anion Gap = 30 mmol/L in last 2 days, will monitor and treat as appropriate                         Disposition Plan      Expected Discharge Date: 09/24/2024                  Barrier to discharge: DKA  Medically Ready for Discharge: Anticipated in 2-4 Days      Torsten Babin MD  Hospitalist Service  [facility]  Securely message with [employer] (more info)  Text page via [employer] Paging/Directory   ______________________________________________________________________    Interval History   Patient is new to me.  Patient looks ill lying in bed but she says she is overall feeling better.  Has had some nausea and retching overnight.  Treated with antiemetics.  She has no new complaints.  Denies any dysuria or urinary frequency.  Patient would like to try clear liquids.  She thinks Jell-O would go down okay.      ROS: A comprehensive review of systems was negative except for items noted in the HPI.  Patient currently denies any fever, chills, sweats, nausea, vomiting, diarrhea, shortness of breath, or chest pain.      Physical Exam   Vital Signs: Temp: 98.2  F (36.8  C) Temp src: Oral BP: 126/69 Pulse: 102   Resp: 16 SpO2: 98 % O2 Device: None (Room air)    Weight: 126 lbs 4.8 oz      General appearance: Patient is alert and oriented x3, appears ill and in mild distress, pleasant and conversing  normally, speaking in full sentences, appears stated age, lying in bed  HEENT:   Mucous membranes are moist  RESPIRATORY: Clear to auscultation bilateral, good air movement  CARDIOVASCULAR: Regular  rhythm and tachycardic, normal S1/S2, no murmurs    GASTROINTESTINAL: Non-distended, non-tender, soft, bowel sounds present throughout  NEUROLOGIC:  Cranial nerves II-XII intact, without any focal deficits, strength 5/5 throughout  EXTREMITIES:  Moves all extremities, no clubbing, cyanosis, nor edema  :  Johnson not present         Data     I have personally reviewed the following data over the past 24 hrs:    21.0 (H)  \   17.8 (H)   / 327     138 107 9.2 /  155 (H)   3.1 (L) 17 (L) 0.53 \     ALT: 22 AST: 22 AP: 98 TBILI: 1.2   ALB: 3.8 TOT PROTEIN: 6.7 LIPASE: 14     TSH: N/A T4: N/A A1C: 10.8 (H)     Procal: N/A CRP: N/A Lactic Acid: 1.7         Imaging:   Results for orders placed or performed during the hospital encounter of 09/22/24   CT Abdomen Pelvis w Contrast    Narrative    EXAM: CT ABDOMEN AND PELVIS WITH CONTRAST  LOCATION: [facility]  DATE: 09/22/2024    INDICATION: Upper abdomen pain.  COMPARISON: CT abdomen and pelvis 01/11/2022.  TECHNIQUE: CT scan of the abdomen and pelvis was performed following injection of IV contrast. Multiplanar reformats were obtained. Dose reduction techniques were used.  CONTRAST: 61 mL Isovue 370.    FINDINGS:   Study limitation: Motion degradation.    LOWER CHEST: Mild circumferential wall thickening of the distal esophagus.    HEPATOBILIARY: Similar posterior right hepatic lobe subcentimeter hypodensity that is too small to characterize. No calcified gallstone.    PANCREAS: No gross abnormality.    SPLEEN: Normal.    ADRENAL GLANDS: Normal.    KIDNEYS/BLADDER: Normal.    BOWEL: No obstruction or inflammatory change. Appendix appears normal.    LYMPH NODES: Normal.    VASCULATURE: Normal.    PELVIC ORGANS: Uterine fundal IUD. No pelvic  mass.    MUSCULOSKELETAL: No aggressive osseous lesion.      Impression    IMPRESSION:   Study limitation: Motion degradation.    1.  Mild circumferential wall thickening of the distal esophagus probably inflammatory (i.e. esophagitis).  2.  Otherwise, no acute findings within the abdomen or pelvis.       Procedures: none   I have personally have reviewed the patient's most up to date radiologic exams,   labs, orders, and medications myself

## 2024-09-24 LAB
ANION GAP SERPL CALCULATED.3IONS-SCNC: 13 MMOL/L (ref 7–15)
BUN SERPL-MCNC: 1.8 MG/DL (ref 6–20)
CALCIUM SERPL-MCNC: 7.6 MG/DL (ref 8.8–10.4)
CHLORIDE SERPL-SCNC: 102 MMOL/L (ref 98–107)
CREAT SERPL-MCNC: 0.48 MG/DL (ref 0.51–0.95)
EGFRCR SERPLBLD CKD-EPI 2021: >90 ML/MIN/1.73M2
ERYTHROCYTE [DISTWIDTH] IN BLOOD BY AUTOMATED COUNT: 11.9 % (ref 10–15)
GLUCOSE BLDC GLUCOMTR-MCNC: 105 MG/DL (ref 70–99)
GLUCOSE BLDC GLUCOMTR-MCNC: 106 MG/DL (ref 70–99)
GLUCOSE BLDC GLUCOMTR-MCNC: 112 MG/DL (ref 70–99)
GLUCOSE BLDC GLUCOMTR-MCNC: 128 MG/DL (ref 70–99)
GLUCOSE BLDC GLUCOMTR-MCNC: 139 MG/DL (ref 70–99)
GLUCOSE BLDC GLUCOMTR-MCNC: 143 MG/DL (ref 70–99)
GLUCOSE BLDC GLUCOMTR-MCNC: 148 MG/DL (ref 70–99)
GLUCOSE BLDC GLUCOMTR-MCNC: 150 MG/DL (ref 70–99)
GLUCOSE BLDC GLUCOMTR-MCNC: 160 MG/DL (ref 70–99)
GLUCOSE BLDC GLUCOMTR-MCNC: 172 MG/DL (ref 70–99)
GLUCOSE BLDC GLUCOMTR-MCNC: 192 MG/DL (ref 70–99)
GLUCOSE BLDC GLUCOMTR-MCNC: 192 MG/DL (ref 70–99)
GLUCOSE BLDC GLUCOMTR-MCNC: 200 MG/DL (ref 70–99)
GLUCOSE BLDC GLUCOMTR-MCNC: 219 MG/DL (ref 70–99)
GLUCOSE BLDC GLUCOMTR-MCNC: 94 MG/DL (ref 70–99)
GLUCOSE BLDC GLUCOMTR-MCNC: 97 MG/DL (ref 70–99)
GLUCOSE SERPL-MCNC: 115 MG/DL (ref 70–99)
HCO3 SERPL-SCNC: 22 MMOL/L (ref 22–29)
HCT VFR BLD AUTO: 36.5 % (ref 35–47)
HGB BLD-MCNC: 12.6 G/DL (ref 11.7–15.7)
MAGNESIUM SERPL-MCNC: 1.6 MG/DL (ref 1.7–2.3)
MCH RBC QN AUTO: 31.1 PG (ref 26.5–33)
MCHC RBC AUTO-ENTMCNC: 34.5 G/DL (ref 31.5–36.5)
MCV RBC AUTO: 90 FL (ref 78–100)
PHOSPHATE SERPL-MCNC: 1.5 MG/DL (ref 2.5–4.5)
PHOSPHATE SERPL-MCNC: 2 MG/DL (ref 2.5–4.5)
PHOSPHATE SERPL-MCNC: 2 MG/DL (ref 2.5–4.5)
PLATELET # BLD AUTO: 160 10E3/UL (ref 150–450)
POTASSIUM SERPL-SCNC: 3 MMOL/L (ref 3.4–5.3)
POTASSIUM SERPL-SCNC: 3 MMOL/L (ref 3.4–5.3)
POTASSIUM SERPL-SCNC: 3.3 MMOL/L (ref 3.4–5.3)
POTASSIUM SERPL-SCNC: 3.7 MMOL/L (ref 3.4–5.3)
RBC # BLD AUTO: 4.05 10E6/UL (ref 3.8–5.2)
SODIUM SERPL-SCNC: 137 MMOL/L (ref 135–145)
WBC # BLD AUTO: 9.4 10E3/UL (ref 4–11)

## 2024-09-24 PROCEDURE — 258N000003 HC RX IP 258 OP 636: Performed by: STUDENT IN AN ORGANIZED HEALTH CARE EDUCATION/TRAINING PROGRAM

## 2024-09-24 PROCEDURE — 250N000011 HC RX IP 250 OP 636: Performed by: STUDENT IN AN ORGANIZED HEALTH CARE EDUCATION/TRAINING PROGRAM

## 2024-09-24 PROCEDURE — 84132 ASSAY OF SERUM POTASSIUM: CPT | Performed by: INTERNAL MEDICINE

## 2024-09-24 PROCEDURE — 84100 ASSAY OF PHOSPHORUS: CPT | Performed by: INTERNAL MEDICINE

## 2024-09-24 PROCEDURE — S5010 5% DEXTROSE AND 0.45% SALINE: HCPCS | Performed by: STUDENT IN AN ORGANIZED HEALTH CARE EDUCATION/TRAINING PROGRAM

## 2024-09-24 PROCEDURE — 120N000001 HC R&B MED SURG/OB

## 2024-09-24 PROCEDURE — 36415 COLL VENOUS BLD VENIPUNCTURE: CPT | Performed by: INTERNAL MEDICINE

## 2024-09-24 PROCEDURE — S5010 5% DEXTROSE AND 0.45% SALINE: HCPCS | Performed by: EMERGENCY MEDICINE

## 2024-09-24 PROCEDURE — 250N000013 HC RX MED GY IP 250 OP 250 PS 637: Performed by: INTERNAL MEDICINE

## 2024-09-24 PROCEDURE — 250N000009 HC RX 250: Performed by: INTERNAL MEDICINE

## 2024-09-24 PROCEDURE — 85018 HEMOGLOBIN: CPT | Performed by: INTERNAL MEDICINE

## 2024-09-24 PROCEDURE — 99233 SBSQ HOSP IP/OBS HIGH 50: CPT | Performed by: INTERNAL MEDICINE

## 2024-09-24 PROCEDURE — 258N000003 HC RX IP 258 OP 636: Performed by: EMERGENCY MEDICINE

## 2024-09-24 PROCEDURE — 80048 BASIC METABOLIC PNL TOTAL CA: CPT | Performed by: INTERNAL MEDICINE

## 2024-09-24 PROCEDURE — 83735 ASSAY OF MAGNESIUM: CPT | Performed by: INTERNAL MEDICINE

## 2024-09-24 PROCEDURE — 258N000003 HC RX IP 258 OP 636: Performed by: INTERNAL MEDICINE

## 2024-09-24 PROCEDURE — 250N000013 HC RX MED GY IP 250 OP 250 PS 637: Performed by: STUDENT IN AN ORGANIZED HEALTH CARE EDUCATION/TRAINING PROGRAM

## 2024-09-24 RX ORDER — POTASSIUM CHLORIDE 1500 MG/1
40 TABLET, EXTENDED RELEASE ORAL ONCE
Status: COMPLETED | OUTPATIENT
Start: 2024-09-24 | End: 2024-09-24

## 2024-09-24 RX ORDER — INSULIN ASPART 100 [IU]/ML
1 INJECTION, SOLUTION INTRAVENOUS; SUBCUTANEOUS
Status: DISCONTINUED | OUTPATIENT
Start: 2024-09-24 | End: 2024-09-24

## 2024-09-24 RX ORDER — NICOTINE POLACRILEX 4 MG
15-30 LOZENGE BUCCAL
Status: DISCONTINUED | OUTPATIENT
Start: 2024-09-24 | End: 2024-09-24

## 2024-09-24 RX ORDER — POTASSIUM CHLORIDE 1500 MG/1
20 TABLET, EXTENDED RELEASE ORAL ONCE
Status: COMPLETED | OUTPATIENT
Start: 2024-09-24 | End: 2024-09-24

## 2024-09-24 RX ORDER — OLANZAPINE 2.5 MG/1
10 TABLET, FILM COATED ORAL AT BEDTIME
Status: DISCONTINUED | OUTPATIENT
Start: 2024-09-24 | End: 2024-09-24

## 2024-09-24 RX ORDER — DEXTROSE MONOHYDRATE 25 G/50ML
25-50 INJECTION, SOLUTION INTRAVENOUS
Status: DISCONTINUED | OUTPATIENT
Start: 2024-09-24 | End: 2024-09-24

## 2024-09-24 RX ADMIN — DEXTROSE AND SODIUM CHLORIDE 1000 ML: 5; 450 INJECTION, SOLUTION INTRAVENOUS at 09:09

## 2024-09-24 RX ADMIN — SODIUM PHOSPHATE, MONOBASIC, MONOHYDRATE AND SODIUM PHOSPHATE, DIBASIC, ANHYDROUS 15 MMOL: 142; 276 INJECTION, SOLUTION INTRAVENOUS at 12:18

## 2024-09-24 RX ADMIN — POTASSIUM CHLORIDE 40 MEQ: 1500 TABLET, EXTENDED RELEASE ORAL at 04:40

## 2024-09-24 RX ADMIN — PROCHLORPERAZINE EDISYLATE 5 MG: 5 INJECTION INTRAMUSCULAR; INTRAVENOUS at 11:52

## 2024-09-24 RX ADMIN — POTASSIUM CHLORIDE 20 MEQ: 1500 TABLET, EXTENDED RELEASE ORAL at 07:12

## 2024-09-24 RX ADMIN — DEXTROSE AND SODIUM CHLORIDE: 5; 450 INJECTION, SOLUTION INTRAVENOUS at 05:06

## 2024-09-24 RX ADMIN — DEXTROSE AND SODIUM CHLORIDE: 5; 450 INJECTION, SOLUTION INTRAVENOUS at 00:57

## 2024-09-24 RX ADMIN — Medication 1 LOZENGE: at 08:40

## 2024-09-24 RX ADMIN — POTASSIUM CHLORIDE 40 MEQ: 1500 TABLET, EXTENDED RELEASE ORAL at 13:21

## 2024-09-24 RX ADMIN — ACETAMINOPHEN 325MG 650 MG: 325 TABLET ORAL at 00:43

## 2024-09-24 RX ADMIN — ACETAMINOPHEN 325MG 650 MG: 325 TABLET ORAL at 23:40

## 2024-09-24 RX ADMIN — PROCHLORPERAZINE EDISYLATE 5 MG: 5 INJECTION INTRAMUSCULAR; INTRAVENOUS at 23:41

## 2024-09-24 RX ADMIN — POTASSIUM & SODIUM PHOSPHATES POWDER PACK 280-160-250 MG 1 PACKET: 280-160-250 PACK at 23:40

## 2024-09-24 RX ADMIN — LAMOTRIGINE 100 MG: 100 TABLET ORAL at 08:40

## 2024-09-24 RX ADMIN — POTASSIUM & SODIUM PHOSPHATES POWDER PACK 280-160-250 MG 1 PACKET: 280-160-250 PACK at 19:18

## 2024-09-24 RX ADMIN — PANTOPRAZOLE SODIUM 40 MG: 40 TABLET, DELAYED RELEASE ORAL at 07:12

## 2024-09-24 ASSESSMENT — ACTIVITIES OF DAILY LIVING (ADL)
ADLS_ACUITY_SCORE: 22
ADLS_ACUITY_SCORE: 24
ADLS_ACUITY_SCORE: 21
ADLS_ACUITY_SCORE: 22
ADLS_ACUITY_SCORE: 22
ADLS_ACUITY_SCORE: 21
ADLS_ACUITY_SCORE: 22
ADLS_ACUITY_SCORE: 22
ADLS_ACUITY_SCORE: 21
ADLS_ACUITY_SCORE: 22
ADLS_ACUITY_SCORE: 22
ADLS_ACUITY_SCORE: 24
ADLS_ACUITY_SCORE: 22
ADLS_ACUITY_SCORE: 22
ADLS_ACUITY_SCORE: 21
ADLS_ACUITY_SCORE: 24
ADLS_ACUITY_SCORE: 21
ADLS_ACUITY_SCORE: 23
ADLS_ACUITY_SCORE: 22
ADLS_ACUITY_SCORE: 22
ADLS_ACUITY_SCORE: 24

## 2024-09-24 NOTE — PLAN OF CARE
"VSS on RA. A&Ox4. Denies pain. Given PRN Tylenol for temp of 100.2. Denies SOB. Still has cough. LS: clear, dim in bases. CXR completed- see results. K, Mg, Phos protocol. K and Phos replaced overnight. K recheck around 1130. On insulin gtt. IMC status. IVF infusing. Neuros intact, no deficits noted. Tele: SR/ST. Tolerating diet, denies nausea. SBA/Independent. Discharge TBD.    Goal Outcome Evaluation:      Plan of Care Reviewed With: patient    Overall Patient Progress: improving    Outcome Evaluation: On insulin gtt. IVF infusing. Denies nausea.    Problem: Adult Inpatient Plan of Care  Goal: Plan of Care Review  Description: The Plan of Care Review/Shift note should be completed every shift.  The Outcome Evaluation is a brief statement about your assessment that the patient is improving, declining, or no change.  This information will be displayed automatically on your shift  note.  Outcome: Progressing  Flowsheets (Taken 9/24/2024 0358)  Outcome Evaluation: On insulin gtt. IVF infusing. Denies nausea.  Plan of Care Reviewed With: patient  Overall Patient Progress: improving  Goal: Patient-Specific Goal (Individualized)  Description: You can add care plan individualizations to a care plan. Examples of Individualization might be:  \"Parent requests to be called daily at 9am for status\", \"I have a hard time hearing out of my right ear\", or \"Do not touch me to wake me up as it startles  me\".  Outcome: Progressing  Goal: Absence of Hospital-Acquired Illness or Injury  Outcome: Progressing  Intervention: Identify and Manage Fall Risk  Recent Flowsheet Documentation  Taken 9/24/2024 0004 by Rina Hedrick, RN  Safety Promotion/Fall Prevention:   activity supervised   assistive device/personal items within reach   clutter free environment maintained   increased rounding and observation   increase visualization of patient   lighting adjusted   mobility aid in reach   nonskid shoes/slippers when out of bed   patient " and family education   room organization consistent   safety round/check completed   supervised activity   treat reversible contributory factors   treat underlying cause  Intervention: Prevent Skin Injury  Recent Flowsheet Documentation  Taken 9/24/2024 0004 by Rina Hedrick RN  Body Position: position changed independently  Intervention: Prevent and Manage VTE (Venous Thromboembolism) Risk  Recent Flowsheet Documentation  Taken 9/24/2024 0004 by Rina Hedrick RN  VTE Prevention/Management: (ambulatory) SCDs off (sequential compression devices)  Intervention: Prevent Infection  Recent Flowsheet Documentation  Taken 9/24/2024 0004 by Rina Hedrick RN  Infection Prevention:   hand hygiene promoted   rest/sleep promoted   single patient room provided  Goal: Optimal Comfort and Wellbeing  Outcome: Progressing  Goal: Readiness for Transition of Care  Outcome: Progressing     Problem: Diabetic Ketoacidosis  Goal: Optimal Coping  Outcome: Progressing  Goal: Fluid and Electrolyte Balance with Absence of Ketosis  Outcome: Progressing     Problem: Comorbidity Management  Goal: Blood Glucose Levels Within Targeted Range  Outcome: Progressing  Intervention: Monitor and Manage Glycemia  Recent Flowsheet Documentation  Taken 9/24/2024 0004 by Rina Hedrick RN  Medication Review/Management: medications reviewed

## 2024-09-24 NOTE — PLAN OF CARE
"Shift Summary (1500 - 0167):    [facility] [place] Mild elevated temp otherwise VSS. Denies pain, dizziness, SOB. Nausea improved. Lytes replete as ordered. Tele: SR. Monitor BG q1h. Poor appetite, no dinner. Ambulates hurd 1x. Continue POC.    Goal Outcome Evaluation:      Plan of Care Reviewed With: patient, parent    Overall Patient Progress: no change    Outcome Evaluation: Continues insulin gtt overnight per MD. K and Phos replaced, check lab orders for recheck time. IVF infusing.      Problem: Adult Inpatient Plan of Care  Goal: Plan of Care Review  Description: The Plan of Care Review/Shift note should be completed every shift.  The Outcome Evaluation is a brief statement about your assessment that the patient is improving, declining, or no change.  This information will be displayed automatically on your shift  note.  Outcome: Progressing  Flowsheets (Taken 9/23/2024 4826)  Outcome Evaluation: Continues insulin gtt overnight per MD. K and Phos replaced, check lab orders for recheck time. IVF infusing.  Plan of Care Reviewed With:   patient   parent  Overall Patient Progress: no change  Goal: Patient-Specific Goal (Individualized)  Description: You can add care plan individualizations to a care plan. Examples of Individualization might be:  \"Parent requests to be called daily at 9am for status\", \"I have a hard time hearing out of my right ear\", or \"Do not touch me to wake me up as it startles  me\".  Outcome: Progressing  Goal: Absence of Hospital-Acquired Illness or Injury  Outcome: Progressing  Intervention: Identify and Manage Fall Risk  Recent Flowsheet Documentation  Taken 9/23/2024 1614 by Nguyen Salgado, RN  Safety Promotion/Fall Prevention:   activity supervised   clutter free environment maintained   increased rounding and observation   nonskid shoes/slippers when out of bed   patient and family education   room door open   room near nurse's station   room organization consistent   safety round/check " completed   supervised activity  Intervention: Prevent Skin Injury  Recent Flowsheet Documentation  Taken 9/23/2024 1614 by Nguyen Salgado RN  Body Position: position changed independently  Intervention: Prevent and Manage VTE (Venous Thromboembolism) Risk  Recent Flowsheet Documentation  Taken 9/23/2024 1614 by Nguyen Salgado RN  VTE Prevention/Management: (pt ambulatory)   SCDs off (sequential compression devices)   other (see comments)  Intervention: Prevent Infection  Recent Flowsheet Documentation  Taken 9/23/2024 1614 by Nguyen Salgado RN  Infection Prevention:   cohorting utilized   hand hygiene promoted   single patient room provided  Goal: Optimal Comfort and Wellbeing  Outcome: Progressing  Goal: Readiness for Transition of Care  Outcome: Progressing     Problem: Diabetic Ketoacidosis  Goal: Optimal Coping  Outcome: Progressing  Goal: Fluid and Electrolyte Balance with Absence of Ketosis  Outcome: Progressing     Problem: Comorbidity Management  Goal: Blood Glucose Levels Within Targeted Range  Outcome: Progressing  Intervention: Monitor and Manage Glycemia  Recent Flowsheet Documentation  Taken 9/23/2024 1614 by Nguyen Salgado RN  Medication Review/Management: medications reviewed

## 2024-09-24 NOTE — PHARMACY-ADMISSION MEDICATION HISTORY
Addendum to medication history     Update to patient's insulin pump settings    PTA Med List   Medication Sig Last Dose                      Insulin Aspart (INSULIN PUMP - OUTPATIENT) Inject subcutaneously See Admin Instructions. Type of pump: Medtronic MiniMed 770G  Type of insulin: Novolog   Basal rate(s)  8883-6027: 0.775 unit/hr  2631-1675: 0.825 units/hr  3604-3506: 0.625 units/hr  1789-0285: 0.55 units/hr  ISF: 30 mg\dL  ICF (insulin to carb ratio)  5044-2046: 1unit/8g carbs  7167-7762: 1 unit/11g carbs  Active insulin time: 3 hrs  Target goal range:   Followed by endocrinology 9/22/2024

## 2024-09-24 NOTE — PROGRESS NOTES
[facility]    Medicine Progress Note - Hospitalist Service    Date of Admission:  9/22/2024    Primary Care Physician   DWAYNE DOTY  CONSULTANTS: none     Assessment & Plan     Margo Cruz is an 23 year old female with past medical history of diabetes mellitus type 1, celiac disease, and bipolar disorder who is being admitted today with acute DKA.  Patient states she was in her normal state of health until late Friday evening.  She had been out drinking with her friends and reports consuming several vodka cranberries.  Subsequently developed generalized abdominal pain, decreased appetite, nausea and vomiting which has persisted throughout the weekend.  Patient is accompanied by her stepfather who tells me her blood sugars yesterday were consistently been above the detection range of 600 on her insulin pump. After making dose adjustments they were able to get her levels in the 160s but they quickly rebounded back into the 300s . patient denies any accompanying fever/chills, dyspnea, chest pain, manage easy, melena, constipation/diarrhea, or urinary symptoms.  She states she has been hospitalized approximately 3 times in the past with issues related to her diabetes but has not had any hospitalizations in the past year or so.      Workup in the emergency department demonstrated hyponatremia with a corrected sodium of 130, leukocytosis (21), anion gap of 30, bicarb of 11, glucose of 335, and ketone levels 7.56.  lipase and lactic acid within normal limits. VBG demonstrated a compensated metabolic acidosis.  UA positive for glucose, ketones, leukocyte esterase, WBC, and bacteria. UC pending and, again, pt denies any urinary symptoms.  CT abdomen pelvis with IV contrast consistent with esophagitis but no other acute findings.  Patient received 2 L IV fluid, viscous lidocaine, and Reglan with some symptomatic improvement.       Diabetes mellitus type 1, DKA, hyponatremia,  hypokalemia, hypophosphatemia  Patient is presenting with DKA with a bicarb down to 11, sodium 126, BUN elevated at 30, ketones elevated 7.56, and a venous pH of 7.34.  This is likely exacerbated by patient's binge drinking.  No acute intra-abdominal process on CT, lipase normal.  No chest pain or dyspnea suggestive of MI.  UA is positive for leukocyte esterase but otherwise negative and has no urinary symptoms. UC pending. Not on antibiotics.  Patient normally is on an insulin pump.  Patient was placed on insulin drip here until resolution of her acidosis.  Will hold antibiotics in the absence of any urinary symptoms while awaiting cultures.  Patient normally is on an insulin drip at home and was placed on insulin drip on admission while following her electrolytes and replacing them per protocol.  Patient's bicarb still is low at 17.  Continues on IV fluids.  Has been n.p.o. but would like to try clear liquids at this point.  Patient will transition to subcu insulin once her acidosis has resolved which likely will happen next 24 hours.  Patient needs to do better job at home controlling her sugars as her hemoglobin A1c is 10.8.  At this point the patient's bicarb is up to 22 continues to have a low phosphorus, magnesium, and potassium all of which have to be replaced per protocol.  Patient will advance her diet to diabetic diet and we will transition her to her home insulin pump and follow her sugars today making sure that they remain stable.    Esophagitis   Seen on CT scan.  Patient has GI cocktail available along with PPI.          Abnormal UA  Patient's urinalysis is not impressive does have some leukocytes but otherwise negative.  Holding off on any antibiotics that she does not have any symptoms of urinary tract infection.  Culture has been sent.  Showing only 10-50,000 group B strep.  Patient has no signs or symptoms of UTI so no antibiotics will be used.         Bipolar disorder  Continue home regimen of  lamotrigine.  Will resume her home Zyprexa     Celiac disease  Needs to be on gluten-free diet when eating.        Discussed plan of care with nursing, social work, case management      Diet: Moderate Consistent Carb (60 g CHO per Meal) Diet    DVT Prophylaxis: Pneumatic Compression Devices  Johnson Catheter: Not present  Lines: None     Cardiac Monitoring: ACTIVE order. Indication: Electrolyte Imbalance (24 hours)- Magnesium <1.3 mg/ml; Potassium < =2.8 or > 5.5 mg/ml    RESTRAINTS: Not indicated  Code Status: Full Code         This document was created using voice recognition technology.  Please excuse any typographical errors that may have occurred.  Please call with any questions.       # Hypokalemia: Lowest K = 2.6 mmol/L in last 2 days, will replace as needed  # Hyperkalemia: Highest K = 5.5 mmol/L in last 2 days, will monitor as appropriate  # Hyponatremia: Lowest Na = 126 mmol/L in last 2 days, will monitor as appropriate  # Hypocalcemia: Lowest Ca = 7 mg/dL in last 2 days, will monitor and replace as appropriate   # Hypomagnesemia: Lowest Mg = 1.6 mg/dL in last 2 days, will replace as needed  # Anion Gap Metabolic Acidosis: Highest Anion Gap = 30 mmol/L in last 2 days, will monitor and treat as appropriate                           Disposition Plan     Expected Discharge Date: 09/24/2024                  Barrier to discharge: DKA  Medically Ready for Discharge: Anticipated in 2-4 Days      Torsten Babin MD  Hospitalist Service  [facility]  Securely message with [employer] (more info)  Text page via [facility] Paging/Directory   ______________________________________________________________________    Interval History   Patient is lying in bed feeling much better today.  Still having a slight cough though her chest x-ray did not show any significant lobar infiltrates.  She was able to ambulate the halls.  Wants to try and eat more.  Her acidosis has resolved and the goal is to transition back  to her insulin pump making sure her sugars are well-controlled before sending her home.  Electrolytes still need to be replaced.    ROS: A comprehensive review of systems was negative except for items noted in the HPI.  Patient currently denies any fever, chills, sweats, nausea, vomiting, diarrhea, shortness of breath, or chest pain.      Physical Exam   Vital Signs: Temp: 98  F (36.7  C) Temp src: Oral BP: 111/66 Pulse: 90   Resp: 18 SpO2: 95 % O2 Device: None (Room air)    Weight: 126 lbs 4.8 oz      General appearance: Patient is alert and oriented x3, appears much less ill and in no current distress, pleasant and conversing normally, speaking in full sentences, appears stated age, lying on her left side in bed  HEENT:   Mucous membranes are moist  RESPIRATORY: Clear to auscultation bilateral, good air movement  CARDIOVASCULAR: Regular  rhythm and tachycardic, normal S1/S2, no murmurs    GASTROINTESTINAL: Non-distended, non-tender, soft, bowel sounds present throughout  NEUROLOGIC:  Cranial nerves II-XII intact, without any focal deficits, strength 5/5 throughout  EXTREMITIES:  Moves all extremities, no clubbing, cyanosis, nor edema  :  Johnson not present         Data     I have personally reviewed the following data over the past 24 hrs:    9.4  \   12.6   / 160     137 102 1.8 (L) /  106 (H)   3.0 (L); 3.0 (L) 22 0.48 (L) \       Imaging:   Results for orders placed or performed during the hospital encounter of 09/22/24   CT Abdomen Pelvis w Contrast    Narrative    EXAM: CT ABDOMEN AND PELVIS WITH CONTRAST  LOCATION: [facility]  DATE: 09/22/2024    INDICATION: Upper abdomen pain.  COMPARISON: CT abdomen and pelvis 01/11/2022.  TECHNIQUE: CT scan of the abdomen and pelvis was performed following injection of IV contrast. Multiplanar reformats were obtained. Dose reduction techniques were used.  CONTRAST: 61 mL Isovue 370.    FINDINGS:   Study limitation: Motion degradation.    LOWER  CHEST: Mild circumferential wall thickening of the distal esophagus.    HEPATOBILIARY: Similar posterior right hepatic lobe subcentimeter hypodensity that is too small to characterize. No calcified gallstone.    PANCREAS: No gross abnormality.    SPLEEN: Normal.    ADRENAL GLANDS: Normal.    KIDNEYS/BLADDER: Normal.    BOWEL: No obstruction or inflammatory change. Appendix appears normal.    LYMPH NODES: Normal.    VASCULATURE: Normal.    PELVIC ORGANS: Uterine fundal IUD. No pelvic mass.    MUSCULOSKELETAL: No aggressive osseous lesion.      Impression    IMPRESSION:   Study limitation: Motion degradation.    1.  Mild circumferential wall thickening of the distal esophagus probably inflammatory (i.e. esophagitis).  2.  Otherwise, no acute findings within the abdomen or pelvis.       Procedures: none   I have personally have reviewed the patient's most up to date radiologic exams,   labs, orders, and medications myself

## 2024-09-24 NOTE — PLAN OF CARE
"Temp: 97.5  F (36.4  C) Temp src: Oral BP: 131/79 Pulse: 101   Resp: 18 SpO2: 95 % O2 Device: None (Room air)       Orientation:  A&O x4  VS: VSS  Pain:  Denies pain  Tele:  ST  Activity: A1 SBA   Resp:  RA. Cough present  GI:  Nausea present. PRN given. Effective  : Voiding without difficulty.  Skin:  WDL  Lines: PIV SL. Personal insulin pump on.  Diet: Gluten Free.   Labs:  Potassium, Mag, and Phosphorus   Plan: Home with mom  Discharge:  Pending    Problem: Adult Inpatient Plan of Care  Goal: Plan of Care Review  Description: The Plan of Care Review/Shift note should be completed every shift.  The Outcome Evaluation is a brief statement about your assessment that the patient is improving, declining, or no change.  This information will be displayed automatically on your shift  note.  Outcome: Progressing  Flowsheets (Taken 9/24/2024 1226)  Outcome Evaluation: Personal pump on. Walked in hallways. Ate a light breakfast.  Plan of Care Reviewed With:   patient   parent  Overall Patient Progress: improving  Goal: Patient-Specific Goal (Individualized)  Description: You can add care plan individualizations to a care plan. Examples of Individualization might be:  \"Parent requests to be called daily at 9am for status\", \"I have a hard time hearing out of my right ear\", or \"Do not touch me to wake me up as it startles  me\".  Outcome: Progressing  Goal: Absence of Hospital-Acquired Illness or Injury  Outcome: Progressing  Intervention: Identify and Manage Fall Risk  Recent Flowsheet Documentation  Taken 9/24/2024 1008 by Lopez Sigala RN  Safety Promotion/Fall Prevention: safety round/check completed  Intervention: Prevent Infection  Recent Flowsheet Documentation  Taken 9/24/2024 1008 by Lopez Sigala RN  Infection Prevention: single patient room provided  Goal: Optimal Comfort and Wellbeing  Outcome: Progressing  Goal: Readiness for Transition of Care  Outcome: Progressing       Goal Outcome " Evaluation:      Plan of Care Reviewed With: patient, parent    Overall Patient Progress: improvingOverall Patient Progress: improving    Outcome Evaluation: Personal pump on. Walked in hallways. Ate a light breakfast.

## 2024-09-25 VITALS
BODY MASS INDEX: 22.38 KG/M2 | DIASTOLIC BLOOD PRESSURE: 80 MMHG | HEIGHT: 63 IN | SYSTOLIC BLOOD PRESSURE: 123 MMHG | TEMPERATURE: 98.7 F | OXYGEN SATURATION: 96 % | RESPIRATION RATE: 18 BRPM | WEIGHT: 126.3 LBS | HEART RATE: 88 BPM

## 2024-09-25 LAB
ANION GAP SERPL CALCULATED.3IONS-SCNC: 15 MMOL/L (ref 7–15)
BUN SERPL-MCNC: 3.3 MG/DL (ref 6–20)
CALCIUM SERPL-MCNC: 8.8 MG/DL (ref 8.8–10.4)
CHLORIDE SERPL-SCNC: 99 MMOL/L (ref 98–107)
CREAT SERPL-MCNC: 0.48 MG/DL (ref 0.51–0.95)
EGFRCR SERPLBLD CKD-EPI 2021: >90 ML/MIN/1.73M2
GLUCOSE BLDC GLUCOMTR-MCNC: 138 MG/DL (ref 70–99)
GLUCOSE BLDC GLUCOMTR-MCNC: 189 MG/DL (ref 70–99)
GLUCOSE SERPL-MCNC: 178 MG/DL (ref 70–99)
HCO3 SERPL-SCNC: 23 MMOL/L (ref 22–29)
MAGNESIUM SERPL-MCNC: 2 MG/DL (ref 1.7–2.3)
PHOSPHATE SERPL-MCNC: 2.8 MG/DL (ref 2.5–4.5)
POTASSIUM SERPL-SCNC: 4 MMOL/L (ref 3.4–5.3)
SODIUM SERPL-SCNC: 137 MMOL/L (ref 135–145)

## 2024-09-25 PROCEDURE — 250N000013 HC RX MED GY IP 250 OP 250 PS 637: Performed by: INTERNAL MEDICINE

## 2024-09-25 PROCEDURE — 99238 HOSP IP/OBS DSCHRG MGMT 30/<: CPT | Performed by: INTERNAL MEDICINE

## 2024-09-25 PROCEDURE — 250N000011 HC RX IP 250 OP 636: Performed by: STUDENT IN AN ORGANIZED HEALTH CARE EDUCATION/TRAINING PROGRAM

## 2024-09-25 PROCEDURE — 83735 ASSAY OF MAGNESIUM: CPT | Performed by: INTERNAL MEDICINE

## 2024-09-25 PROCEDURE — 84100 ASSAY OF PHOSPHORUS: CPT | Performed by: INTERNAL MEDICINE

## 2024-09-25 PROCEDURE — 36415 COLL VENOUS BLD VENIPUNCTURE: CPT | Performed by: INTERNAL MEDICINE

## 2024-09-25 PROCEDURE — 80048 BASIC METABOLIC PNL TOTAL CA: CPT | Performed by: INTERNAL MEDICINE

## 2024-09-25 PROCEDURE — 250N000013 HC RX MED GY IP 250 OP 250 PS 637: Performed by: STUDENT IN AN ORGANIZED HEALTH CARE EDUCATION/TRAINING PROGRAM

## 2024-09-25 RX ORDER — PROCHLORPERAZINE MALEATE 5 MG
5 TABLET ORAL EVERY 6 HOURS PRN
Qty: 20 TABLET | Refills: 0 | Status: SHIPPED | OUTPATIENT
Start: 2024-09-25

## 2024-09-25 RX ADMIN — POTASSIUM & SODIUM PHOSPHATES POWDER PACK 280-160-250 MG 1 PACKET: 280-160-250 PACK at 03:53

## 2024-09-25 RX ADMIN — PANTOPRAZOLE SODIUM 40 MG: 40 TABLET, DELAYED RELEASE ORAL at 07:52

## 2024-09-25 RX ADMIN — PROCHLORPERAZINE EDISYLATE 5 MG: 5 INJECTION INTRAMUSCULAR; INTRAVENOUS at 07:52

## 2024-09-25 RX ADMIN — LAMOTRIGINE 100 MG: 100 TABLET ORAL at 07:52

## 2024-09-25 ASSESSMENT — ACTIVITIES OF DAILY LIVING (ADL)
ADLS_ACUITY_SCORE: 20

## 2024-09-25 NOTE — PLAN OF CARE
"Shift Summary (1500 - 3810):    Mild elevated temp otherwise VSS, on RA. Denies pain. Intermittent nausea, no interventions needed this shift. Tele: ST. K/Mg/Phos protocols, Phos replaced, all three are AM recheck. Pt showered today. 2 PIVs on right arm WNL. Up independently in room. Plan to discharge home TBD.     Goal Outcome Evaluation:      Plan of Care Reviewed With: patient, significant other, parent    Overall Patient Progress: improving    Outcome Evaluation: IMC discontinued. Pt self manages own insulin pump. Monitor BG ACHS. Appetite improves.      Problem: Adult Inpatient Plan of Care  Goal: Plan of Care Review  Description: The Plan of Care Review/Shift note should be completed every shift.  The Outcome Evaluation is a brief statement about your assessment that the patient is improving, declining, or no change.  This information will be displayed automatically on your shift  note.  Outcome: Progressing  Flowsheets (Taken 9/24/2024 6796)  Outcome Evaluation: IMC discontinued. Pt self manages own insulin pump. Monitor BG ACHS. Appetite improves.  Plan of Care Reviewed With:   patient   significant other   parent  Overall Patient Progress: improving  Goal: Patient-Specific Goal (Individualized)  Description: You can add care plan individualizations to a care plan. Examples of Individualization might be:  \"Parent requests to be called daily at 9am for status\", \"I have a hard time hearing out of my right ear\", or \"Do not touch me to wake me up as it startles  me\".  Outcome: Progressing  Goal: Absence of Hospital-Acquired Illness or Injury  Outcome: Progressing  Intervention: Identify and Manage Fall Risk  Recent Flowsheet Documentation  Taken 9/24/2024 1622 by Nguyen Salgado, RN  Safety Promotion/Fall Prevention:   activity supervised   clutter free environment maintained   nonskid shoes/slippers when out of bed   room door open   room near nurse's station   room organization consistent   safety round/check " completed   supervised activity  Intervention: Prevent Skin Injury  Recent Flowsheet Documentation  Taken 9/24/2024 1622 by Nguyen Salgado RN  Body Position: position changed independently  Intervention: Prevent and Manage VTE (Venous Thromboembolism) Risk  Recent Flowsheet Documentation  Taken 9/24/2024 1622 by Nguyen Salgado RN  VTE Prevention/Management: SCDs off (sequential compression devices)  Intervention: Prevent Infection  Recent Flowsheet Documentation  Taken 9/24/2024 1622 by Nguyen Salgado RN  Infection Prevention:   hand hygiene promoted   cohorting utilized  Goal: Optimal Comfort and Wellbeing  Outcome: Progressing  Goal: Readiness for Transition of Care  Outcome: Progressing     Problem: Diabetic Ketoacidosis  Goal: Optimal Coping  Outcome: Progressing  Goal: Fluid and Electrolyte Balance with Absence of Ketosis  Outcome: Progressing     Problem: Comorbidity Management  Goal: Blood Glucose Levels Within Targeted Range  Outcome: Progressing  Intervention: Monitor and Manage Glycemia  Recent Flowsheet Documentation  Taken 9/24/2024 1622 by Nguyen Salgado RN  Medication Review/Management: medications reviewed

## 2024-09-25 NOTE — PROGRESS NOTES
Pt discharged at 0900 with Mom. Writer went over discharge summary with pt. All concerns addressed. Pt verbalized understanding of discharge summary. Pt discharge with all belongings.

## 2024-09-25 NOTE — PLAN OF CARE
"Pt A&ox4, VSS, Tele SR /ST. BG overnight 138, manages her own pump / insulin. On potassium, mag, and phos protocol, lab rechecks in morning. 2 PIV SL. Independent in room. Mod card gluten free diet. Independent in room.       Goal Outcome Evaluation:      Plan of Care Reviewed With: patient    Overall Patient Progress: improvingOverall Patient Progress: improving    Outcome Evaluation: BG overnight 138, Phos replacement done.        Problem: Adult Inpatient Plan of Care  Goal: Plan of Care Review  Description: The Plan of Care Review/Shift note should be completed every shift.  The Outcome Evaluation is a brief statement about your assessment that the patient is improving, declining, or no change.  This information will be displayed automatically on your shift  note.  Outcome: Progressing  Flowsheets (Taken 9/25/2024 0440)  Outcome Evaluation: BG overnight 138, Phos replacement done.  Plan of Care Reviewed With: patient  Overall Patient Progress: improving  Goal: Patient-Specific Goal (Individualized)  Description: You can add care plan individualizations to a care plan. Examples of Individualization might be:  \"Parent requests to be called daily at 9am for status\", \"I have a hard time hearing out of my right ear\", or \"Do not touch me to wake me up as it startles  me\".  Outcome: Progressing  Goal: Absence of Hospital-Acquired Illness or Injury  Outcome: Progressing  Intervention: Identify and Manage Fall Risk  Recent Flowsheet Documentation  Taken 9/24/2024 2357 by Severson, Amy C, RN  Safety Promotion/Fall Prevention: safety round/check completed  Intervention: Prevent Skin Injury  Recent Flowsheet Documentation  Taken 9/24/2024 2357 by Severson, Amy C, RN  Body Position: position changed independently  Intervention: Prevent and Manage VTE (Venous Thromboembolism) Risk  Recent Flowsheet Documentation  Taken 9/24/2024 2357 by Severson, Amy C, RN  VTE Prevention/Management: (pt ambulating in room) SCDs off " (sequential compression devices)  Intervention: Prevent Infection  Recent Flowsheet Documentation  Taken 9/24/2024 2357 by Severson, Amy C, RN  Infection Prevention:   hand hygiene promoted   single patient room provided   rest/sleep promoted  Goal: Optimal Comfort and Wellbeing  Outcome: Progressing  Goal: Readiness for Transition of Care  Outcome: Progressing     Problem: Diabetic Ketoacidosis  Goal: Optimal Coping  Outcome: Progressing  Goal: Fluid and Electrolyte Balance with Absence of Ketosis  Outcome: Progressing  Intervention: Monitor and Manage Ketoacidosis  Recent Flowsheet Documentation  Taken 9/24/2024 2357 by Severson, Amy C, RN  Fluid/Electrolyte Management: fluids provided     Problem: Comorbidity Management  Goal: Blood Glucose Levels Within Targeted Range  Outcome: Progressing  Intervention: Monitor and Manage Glycemia  Recent Flowsheet Documentation  Taken 9/24/2024 2357 by Severson, Amy C, RN  Medication Review/Management: medications reviewed     Problem: Comorbidity Management  Goal: Blood Glucose Levels Within Targeted Range  Outcome: Progressing  Intervention: Monitor and Manage Glycemia  Recent Flowsheet Documentation  Taken 9/24/2024 2357 by Severson, Amy C, RN  Medication Review/Management: medications reviewed     Problem: Comorbidity Management  Goal: Blood Glucose Levels Within Targeted Range  Intervention: Monitor and Manage Glycemia  Recent Flowsheet Documentation  Taken 9/24/2024 2357 by Severson, Amy C, RN  Medication Review/Management: medications reviewed

## 2024-09-25 NOTE — DISCHARGE SUMMARY
[facility]  Hospitalist Discharge Summary      Date of Admission:  9/22/2024  Date of Discharge:  9/25/2024  9:02 AM  Discharging Provider: Torsten Babin MD  Discharge Service: Hospitalist Service  Primary Care Physician   DWAYNE DOTY    Discharge Diagnoses   Diabetic ketoacidosis  Type 1 diabetes  Hyponatremia  Hypokalemia  Hypophosphatemia  Nausea  Esophagitis  Bipolar disorder  Celiac disease    Hospital Course   Margo Cruz is an 23 year old female with past medical history of diabetes mellitus type 1, celiac disease, and bipolar disorder who is being admitted today with acute DKA.  Patient states she was in her normal state of health until late Friday evening.  She had been out drinking with her friends and reports consuming several vodka cranberries.  Subsequently developed generalized abdominal pain, decreased appetite, nausea and vomiting which has persisted throughout the weekend.  Patient is accompanied by her stepfather who tells me her blood sugars yesterday were consistently been above the detection range of 600 on her insulin pump. After making dose adjustments they were able to get her levels in the 160s but they quickly rebounded back into the 300s. Patient denied any accompanying fever/chills, dyspnea, chest pain, manage easy, melena, constipation/diarrhea, or urinary symptoms.  She states she has been hospitalized approximately 3 times in the past with issues related to her diabetes but has not had any hospitalizations in the past year or so.      Workup in the emergency department demonstrated hyponatremia with a corrected sodium of 130, leukocytosis (21), anion gap of 30, bicarb of 11, glucose of 335, and ketone levels 7.56.  lipase and lactic acid within normal limits. VBG demonstrated a compensated metabolic acidosis.  UA positive for glucose, ketones, leukocyte esterase, WBC, and bacteria. UC pending and, again, pt denies any urinary symptoms.  CT  abdomen pelvis with IV contrast consistent with esophagitis but no other acute findings.  Patient received 2 L IV fluid, viscous lidocaine, and Reglan with some symptomatic improvement.       Diabetes mellitus type 1, DKA, hyponatremia, hypokalemia, hypophosphatemia  Patient presented with DKA with a bicarb down to 11, sodium 126, BUN elevated at 30, ketones elevated 7.56, and a venous pH of 7.34.  This is likely exacerbated by patient's recent binge drinking.  No acute intra-abdominal process on CT, lipase normal.  No chest pain or dyspnea suggestive of MI.  UA is positive for leukocyte esterase but otherwise negative and has no urinary symptoms. UC negative.  Not on antibiotics.  Patient normally is on an insulin pump.  Patient was placed on insulin drip here until resolution of her acidosis.   Patient normally is on an insulin drip at home and was placed on insulin drip on admission while following her electrolytes and replacing them per protocol.  Patient had a bicarb followed and she continued on IV fluids.  She was able to transition from an insulin drip back to her insulin pump with good blood sugar control.  Her acidosis resolved.  Patient was able to discharge back home on her normal regiment.  Patient's electrolytes were replaced per protocol      Esophagitis, nausea  Seen on CT scan.  Patient has GI cocktail available along with proton pump inhibitor while hospitalized and she was treated with Compazine for nausea.      Abnormal UA  Patient's urinalysis is not impressive does have some leukocytes but otherwise negative.  Holding off on any antibiotics that she does not have any symptoms of urinary tract infection.  Culture has been sent.  Showing only 10-50,000 group B strep.  Patient has no signs or symptoms of UTI so no antibiotics will be used.         Bipolar disorder  Continue home regimen of lamotrigine.  Patient denies being on Zyprexa.     Celiac disease  Needs to be on gluten-free diet when  eating.    Clinically Significant Risk Factors        # Hypokalemia: Lowest K = 2.6 mmol/L in last 2 days, will replace as needed  # Hyperkalemia: Highest K = 5.5 mmol/L in last 2 days, will monitor as appropriate  # Hyponatremia: Lowest Na = 126 mmol/L in last 2 days, will monitor as appropriate  # Hypocalcemia: Lowest Ca = 7 mg/dL in last 2 days, will monitor and replace as appropriate   # Hypomagnesemia: Lowest Mg = 1.6 mg/dL in last 2 days, will replace as needed  # Anion Gap Metabolic Acidosis: Highest Anion Gap = 30 mmol/L in last 2 days, will monitor and treat as appropriate             Significant Results and Procedures   Most Recent 3 CBC's:  Recent Labs   Lab Test 09/24/24  0705 09/22/24  0930 09/08/23  1527   WBC 9.4 21.0* 10.2   HGB 12.6 17.8* 14.9   MCV 90 88 93    327 252     Most Recent 3 BMP's:  Recent Labs   Lab Test 09/25/24  0726 09/25/24  0622 09/25/24  0343 09/24/24  2151 09/24/24  1809 09/24/24  1137 09/24/24  1125 09/24/24  0431 09/24/24  0334 09/23/24  1607 09/23/24  1455   NA  --  137  --   --   --   --   --   --  137  --  136   POTASSIUM  --  4.0  --   --  3.7  --  3.3*  --  3.0*  3.0*   < > 3.1*   CHLORIDE  --  99  --   --   --   --   --   --  102  --  104   CO2  --  23  --   --   --   --   --   --  22  --  17*   BUN  --  3.3*  --   --   --   --   --   --  1.8*  --  4.1*   CR  --  0.48*  --   --   --   --   --   --  0.48*  --  0.52   ANIONGAP  --  15  --   --   --   --   --   --  13  --  15   BEN  --  8.8  --   --   --   --   --   --  7.6*  --  7.1*   * 178* 138*   < >  --    < >  --    < > 115*   < > 219*    < > = values in this interval not displayed.   ,   Results for orders placed or performed during the hospital encounter of 09/22/24   CT Abdomen Pelvis w Contrast    Narrative    EXAM: CT ABDOMEN AND PELVIS WITH CONTRAST  LOCATION: [facility]  DATE: 09/22/2024    INDICATION: Upper abdomen pain.  COMPARISON: CT abdomen and pelvis  01/11/2022.  TECHNIQUE: CT scan of the abdomen and pelvis was performed following injection of IV contrast. Multiplanar reformats were obtained. Dose reduction techniques were used.  CONTRAST: 61 mL Isovue 370.    FINDINGS:   Study limitation: Motion degradation.    LOWER CHEST: Mild circumferential wall thickening of the distal esophagus.    HEPATOBILIARY: Similar posterior right hepatic lobe subcentimeter hypodensity that is too small to characterize. No calcified gallstone.    PANCREAS: No gross abnormality.    SPLEEN: Normal.    ADRENAL GLANDS: Normal.    KIDNEYS/BLADDER: Normal.    BOWEL: No obstruction or inflammatory change. Appendix appears normal.    LYMPH NODES: Normal.    VASCULATURE: Normal.    PELVIC ORGANS: Uterine fundal IUD. No pelvic mass.    MUSCULOSKELETAL: No aggressive osseous lesion.      Impression    IMPRESSION:   Study limitation: Motion degradation.    1.  Mild circumferential wall thickening of the distal esophagus probably inflammatory (i.e. esophagitis).  2.  Otherwise, no acute findings within the abdomen or pelvis.     XR Chest Port 1 View    Narrative    EXAM: XR CHEST PORT 1 VIEW  LOCATION: [facility]  DATE: 9/23/2024    INDICATION: cough with dka, rule out infiltrate  COMPARISON: 7/8/2023      Impression    IMPRESSION: Streaky retrocardiac opacities could represent subsegmental atelectasis or early pneumonia. No pleural effusion or pneumothorax. Normal cardiomediastinal silhouette.            Follow up/instructions: Patient will follow-up with her primary care provider week make sure that her symptoms continue to be resolved.  Her blood sugars will need to be followed closely in her diabetic regimen to be controlled with her primary care provider.  Patient overall needs better diabetic control given her elevated hemoglobin A1c    Pending test results at discharge:     Unresulted Labs Ordered in the Past 30 Days of this Admission       No orders found from  8/23/2024 to 9/23/2024.            Discharge Orders      Reason for your hospital stay    DKA     Follow-up and recommended labs and tests     Follow up with primary care provider, DWAYNE DOTY, within 7 days for hospital follow- up.  No follow up labs or test are needed.     Activity    Your activity upon discharge: activity as tolerated     Diet    Follow this diet upon discharge: Current Diet:Orders Placed This Encounter      Moderate Consistent Carb (60 g CHO per Meal) Diet       Discharge Disposition   Discharged to home  Condition at discharge: Stable      Consultations This Hospital Stay   None    Code Status   Full Code    Time Spent on this Encounter   I, Torsten Babin MD, personally saw the patient today and spent less than or equal to 30 minutes discharging this patient.  Discussed with nursing, social work/case management          This document was created using voice recognition technology.  Please excuse any typographical errors that may have occurred.  Please call with any questions.       Torsten Babin MD  [facility] MEDICAL SURGICAL  [address] [place] [address]  Phone: [phone]  Fax: [phone]  ______________________________________________________________________    Physical Exam   Vital Signs: Temp: 98.7  F (37.1  C) Temp src: Oral BP: 123/80 Pulse: 88   Resp: 18 SpO2: 96 % O2 Device: None (Room air)    Weight: 126 lbs 4.8 oz    Exam is improved, patient is much less ill  General appearance: Patient is alert and oriented x3, in no current distress, does not appear ill today, pleasant and conversing normally, speaking in full sentences, appears stated age, sitting up in bed, interactive  HEENT:   Mucous membranes are moist  RESPIRATORY: Clear to auscultation bilateral, good air movement  CARDIOVASCULAR: Regular  rhythm and no longer tachycardic, normal S1/S2, no murmurs    GASTROINTESTINAL: Non-distended, non-tender, soft, bowel sounds  present throughout  NEUROLOGIC:  Cranial nerves II-XII intact, without any focal deficits, strength 5/5 throughout  EXTREMITIES:  Moves all extremities, no clubbing, cyanosis, nor edema  :  Johnson not present       Discharge Medications   Discharge Medication List as of 9/25/2024  8:21 AM        START taking these medications    Details   prochlorperazine (COMPAZINE) 5 MG tablet Take 1 tablet (5 mg) by mouth every 6 hours as needed for nausea or vomiting., Disp-20 tablet, R-0, E-Prescribe           CONTINUE these medications which have NOT CHANGED    Details   calcium carbonate (TUMS) 500 MG chewable tablet Take 1 chew tab by mouth as needed for heartburn, Historical      cholecalciferol (VITAMIN D3) 25 mcg (1000 units) capsule Take 1 capsule by mouth daily., Historical      Glucagon, rDNA, (GLUCAGON EMERGENCY) 1 MG KIT Inject 1 mg as directed as needed (low BG), Disp-1 kit, R-1, E-Prescribe      Insulin Aspart (INSULIN PUMP - OUTPATIENT) Inject subcutaneously See Admin Instructions. Type of pump: Medtronic MiniMed 770G  Type of insulin: Novolog   Basal rate(s)  1169-9836: 0.775 unit/hr  6595-5774: 0.825 units/hr  6376-3816: 0.625 units/hr  3054-0149: 0.55 units/hr  ISF: 30 mg  ICF (in sulin to carb ratio)  2644-3079: 1unit/8g carbs  5033-3094: 1 unit/11g carbs  Active insulin time: 3 hrs  Target goal range:   Followed by endocrinology, Historical      Insulin Aspart FlexPen 100 UNIT/ML SOPN Inject 1 Units Subcutaneous 3 times daily (with meals) Use 3x/day with meals (carb correction scale- uses 1 unit/5g carbs at breakfast, 1 unit/10 grams carbs at lunch and 1 unit/8g carbs at dinner)., Disp-15 mL, R-1, E-Prescribe      lamoTRIgine (LAMICTAL) 25 MG tablet Take 4 tablets (100 mg) by mouth daily, Disp-120 tablet, R-0, E-Prescribe      Multiple Vitamins-Minerals (MULTIVITAMIN GUMMIES ADULT PO) Take 1 Dose by mouth daily, Historical      blood glucose (CONTOUR NEXT TEST) test strip Use to test blood sugar 5-6  times daily or as directed., Disp-150 each,R-4, E-Prescribe      blood glucose monitoring (TYE MICROLET) lancets Use to test blood sugar 4-5 times daily or as directed., Disp-100 each, R-4, E-Prescribe      insulin aspart (NOVOLOG VIAL) 100 UNITS/ML vial USE UP TO 70 UNITS DAILY IN INSULIN PUMP 90 DAY SUPPLY, Disp-90 mL, R-1, E-Prescribe      STATIN NOT PRESCRIBED (INTENTIONAL) Historical           STOP taking these medications       ASPIRIN NOT PRESCRIBED (INTENTIONAL) Comments:   Reason for Stopping:         insulin glargine (LANTUS PEN) 100 UNIT/ML pen Comments:   Reason for Stopping:         OLANZapine (ZYPREXA) 10 MG tablet Comments:   Reason for Stopping:             Allergies   Allergies   Allergen Reactions    Gluten Meal      Other reaction(s): *Unknown    No Known Drug Allergy

## 2024-09-26 ENCOUNTER — PATIENT OUTREACH (OUTPATIENT)
Dept: PEDIATRICS | Facility: CLINIC | Age: 24
End: 2024-09-26
Payer: COMMERCIAL

## 2024-09-26 NOTE — TELEPHONE ENCOUNTER
RN called and spoke with patient.   - patient will call back to schedule hospital follow up within the next week.      Transitions of Care Outreach  Chief Complaint   Patient presents with    Hospital F/U       Most Recent Admission Date: 9/22/2024   Most Recent Admission Diagnosis: Esophagitis - K20.90  Severe dehydration - E86.0  Type 1 diabetes mellitus with hyperglycemia - E10.65  Intractable nausea and vomiting - R11.2  Acute alcoholic gastritis without hemorrhage - K29.20     Most Recent Discharge Date: 9/25/2024   Most Recent Discharge Diagnosis: Severe dehydration - E86.0  Esophagitis - K20.90  Acute alcoholic gastritis without hemorrhage - K29.20  Intractable nausea and vomiting - R11.2  Type 1 diabetes mellitus with hyperglycemia - E10.65  Nausea - R11.0     Transitions of Care Assessment    Discharge Assessment  How are you doing now that you are home?: doing a lot better. taking it slow  How are your symptoms? (Red Flag symptoms escalate to triage hotline per guidelines): Improved  Do you know how to contact your clinic care team if you have future questions or changes to your health status? : Yes  Does the patient have their discharge instructions? : Yes  Does the patient have questions regarding their discharge instructions? : No  Were you started on any new medications or were there changes to any of your previous medications? : Yes  Does the patient have all of their medications?: Yes  Do you have questions regarding any of your medications? : No  Do you have all of your needed medical supplies or equipment (DME)?  (i.e. oxygen tank, CPAP, cane, etc.):  (n/a)    Follow up Plan     Discharge Follow-Up  Discharge follow up appointment scheduled in alignment with recommended follow up timeframe or Transitions of Risk Category? (Low = within 30 days; Moderate= within 14 days; High= within 7 days):  (patient will call back to schedule.)    Future Appointments   Date Time Provider Department Center    11/11/2024  9:00 AM Karlee Holly MD [facility]       Outpatient Plan as outlined on AVS reviewed with patient.    For any urgent concerns, please contact our 24 hour nurse triage line: [phone] ([phone])       Krystal Zaragoza RN

## 2024-09-26 NOTE — TELEPHONE ENCOUNTER
Call patient for hospital follow up.  Most Recent Admission Date: 9/22/2024   Most Recent Admission Diagnosis: Esophagitis - K20.90  Severe dehydration - E86.0  Type 1 diabetes mellitus with hyperglycemia - E10.65  Intractable nausea and vomiting - R11.2  Acute alcoholic gastritis without hemorrhage - K29.20     Most Recent Discharge Date: 9/25/2024   Most Recent Discharge Diagnosis: Severe dehydration - E86.0  Esophagitis - K20.90  Acute alcoholic gastritis without hemorrhage - K29.20  Intractable nausea and vomiting - R11.2  Type 1 diabetes mellitus with hyperglycemia - E10.65  Nausea - R11.0     Med changes:   Add: prochlorperazine (COMPAZINE)    Stop: insulin glargine 100 UNIT/ML pen (LANTUS PEN)  OLANZapine 10 MG tablet (zyPREXA)    After Visit Summary follow up recommendations: Follow up with primary care provider, DWAYNE DOTY,  within 7 days for hospital follow- up.    Primary care appointment needed within 7 days    Primary care hospital follow up appointment has not been made.    lFora Damon RN

## 2024-10-03 DIAGNOSIS — E10.9 TYPE 1 DIABETES, HBA1C GOAL < 8% (H): ICD-10-CM

## 2024-10-03 RX ORDER — INSULIN ASPART 100 [IU]/ML
1 INJECTION, SOLUTION INTRAVENOUS; SUBCUTANEOUS
Qty: 15 ML | Refills: 0 | Status: SHIPPED | OUTPATIENT
Start: 2024-10-03

## 2024-10-03 NOTE — TELEPHONE ENCOUNTER
Please find out how she is doing since hospitalization. When, exactly, does she run out of insulin? I'd prefer not to fill until she does in person follow-up. Can use FRANCESCA with me or see extender. Has no showed many times and have not seen her in 2 years (vrt) and 5 years (in person)

## 2024-10-03 NOTE — TELEPHONE ENCOUNTER
Requested Prescriptions   Signed Prescriptions Disp Refills    Insulin Aspart FlexPen 100 UNIT/ML SOPN 15 mL 0     Sig: Inject 1 Units subcutaneously 3 times daily (with meals). Use 3x/day with meals (carb correction scale- uses 1 unit/5g carbs at breakfast, 1 unit/10 grams carbs at lunch and 1 unit/8g carbs at dinner). Max daily dose 40 units       Insulin Protocol Failed - 10/3/2024 12:03 PM        Failed - Recent (6 mo) or future (90 days) visit within the authorizing provider's specialty     The patient must have completed an in-person or virtual visit within the past 6 months or has a future visit scheduled within the next 90 days with the authorizing provider s specialty.  Urgent care and e-visits do not quality as an office visit for this protocol.          Failed - Chart Review Required     Review Chart.    Do not approve if insulin is used in a pump.  Instead, direct refill request to the patient's endocrinologist.  If the patient doesn't have an endocrinologist, then send the refill to the patient's PCP for review            Passed - Medication is active on med list        Passed - Has GFR on file in past 12 months and most recent value is normal        Passed - Medication indicated for associated diagnosis     Medication is associated with one or more of the following diagnoses:   - Type 1 diabetes mellitus  - Type 2 diabetes mellitus  - Diabetic nephropathy; Prophylaxis  - Neuropathy due to diabetes mellitus; Prophylaxis  - Retinopathy due to diabetes mellitus; Prophylaxis  - Diabetes mellitus associated with cystic fibrosis  - Disorder of cardiovascular system; Prophylaxis - Type 1 diabetes mellitus   - Disorder of cardiovascular system; Prophylaxis - Type 2 diabetes mellitus            Passed - Patient is 18 years of age or older

## 2024-10-03 NOTE — TELEPHONE ENCOUNTER
"Please see 9/26/24 outreach for update from [facility] stay.    Called and spoke to patient. Her DM is managed by Endocrinology. Advised patient will send refill request to endo to review.    Dr. Holly: Patient would like lantus added back to her medication as a \"back-up plan\" if her pump fails.     Scheduled patient to see PCP in January for AWV. Patient has not been seen since 2022. Will send her current refill requests to Endocrinology for review.    Thanks!  Faustina VASQUEZ RN, BSN  Clinic RN  [facility]          "

## 2024-11-07 ENCOUNTER — TELEPHONE (OUTPATIENT)
Dept: ENDOCRINOLOGY | Facility: CLINIC | Age: 24
End: 2024-11-07
Payer: COMMERCIAL

## 2024-11-07 NOTE — TELEPHONE ENCOUNTER
Spoke with patient. Pt states she will stop into clinic to have device uploaded. Chio Aguirre CMA on 11/7/2024 at 1:12 PM

## 2024-11-23 NOTE — TELEPHONE ENCOUNTER
"Patient undergoing chemo for colon cancer and has had vomiting for a few days. Dispo provided- go to ER now. Instructed to call back with additional questions or worsening of symptoms. Patient verbalized understanding.     Reason for Disposition   [1] Neutropenia known or suspected (e.g., recent cancer chemotherapy) AND [2] vomiting    Additional Information   Negative: Shock suspected (e.g., cold/pale/clammy skin, too weak to stand, low BP, rapid pulse)   Negative: Difficult to awaken or acting confused (e.g., disoriented, slurred speech)   Negative: Sounds like a life-threatening emergency to the triager   Negative: [1] Vomiting AND [2] contains red blood or black ("coffee ground") material  (Exception: Few red streaks in vomit that only happened once.)   Negative: [1] Vomiting AND [2] recent head injury (within last 3 days)   Negative: [1] Vomiting AND [2] recent abdominal injury (within last 3 days)   Negative: [1] Vomiting AND [2] insulin-dependent diabetes (Type I) AND [3] glucose > 400 mg/dL (22 mmol/L)   Negative: [1] Neutropenia known or suspected (e.g., recent cancer chemotherapy) AND [2] fever > 100.4 F (38.0 C)    Protocols used: Cancer - Nausea and Vomiting-A-    " Called pt. No answer LVM to call clinic back or respond to Submitnet message. Sent Submitnet message.    If pt calls back:  Update PHQ-9  Ask if pt is doing counseling  Schedule follow up/physical    Tristan Albarado, EMT at 3:16 PM on August 31, 2021   [facility]   [phone]

## 2024-12-29 ENCOUNTER — HEALTH MAINTENANCE LETTER (OUTPATIENT)
Age: 24
End: 2024-12-29

## 2025-01-31 PROBLEM — K20.90 ESOPHAGITIS: Status: RESOLVED | Noted: 2024-09-22 | Resolved: 2025-01-31

## 2025-01-31 PROBLEM — K29.20 ACUTE ALCOHOLIC GASTRITIS WITHOUT HEMORRHAGE: Status: RESOLVED | Noted: 2024-09-22 | Resolved: 2025-01-31

## 2025-01-31 PROBLEM — R11.2 INTRACTABLE NAUSEA AND VOMITING: Status: RESOLVED | Noted: 2024-09-22 | Resolved: 2025-01-31

## 2025-01-31 PROBLEM — E10.10 DKA, TYPE 1 (H): Status: RESOLVED | Noted: 2024-09-22 | Resolved: 2025-01-31

## 2025-01-31 PROBLEM — E86.0 SEVERE DEHYDRATION: Status: RESOLVED | Noted: 2024-09-22 | Resolved: 2025-01-31

## 2025-05-05 ENCOUNTER — PATIENT OUTREACH (OUTPATIENT)
Dept: CARE COORDINATION | Facility: CLINIC | Age: 25
End: 2025-05-05
Payer: COMMERCIAL

## 2025-05-07 ENCOUNTER — PATIENT OUTREACH (OUTPATIENT)
Dept: CARE COORDINATION | Facility: CLINIC | Age: 25
End: 2025-05-07
Payer: COMMERCIAL

## 2025-05-10 ENCOUNTER — HEALTH MAINTENANCE LETTER (OUTPATIENT)
Age: 25
End: 2025-05-10

## 2025-06-10 ENCOUNTER — MYC MEDICAL ADVICE (OUTPATIENT)
Dept: ENDOCRINOLOGY | Facility: CLINIC | Age: 25
End: 2025-06-10
Payer: COMMERCIAL

## 2025-06-10 DIAGNOSIS — E10.65 TYPE 1 DIABETES MELLITUS WITH HYPERGLYCEMIA (H): ICD-10-CM

## 2025-06-11 RX ORDER — INSULIN ASPART 100 [IU]/ML
INJECTION, SOLUTION INTRAVENOUS; SUBCUTANEOUS
Qty: 70 ML | Refills: 0 | Status: SHIPPED | OUTPATIENT
Start: 2025-06-11

## 2025-06-11 RX ORDER — INSULIN ASPART 100 [IU]/ML
INJECTION, SOLUTION INTRAVENOUS; SUBCUTANEOUS
Qty: 70 ML | Refills: 0 | Status: SHIPPED | OUTPATIENT
Start: 2025-06-11 | End: 2025-06-11

## 2025-06-11 NOTE — TELEPHONE ENCOUNTER
Insulin does not hit our msot, looks like E-PA was started by the computer      Angle [facility]  Endo Clinic Liaison  [facility] Specialty  [email or web address]   [email or web address]  Phone: [phone]  Fax: [phone]

## 2025-06-19 SDOH — HEALTH STABILITY: PHYSICAL HEALTH: ON AVERAGE, HOW MANY MINUTES DO YOU ENGAGE IN EXERCISE AT THIS LEVEL?: 30 MIN

## 2025-06-19 SDOH — HEALTH STABILITY: PHYSICAL HEALTH: ON AVERAGE, HOW MANY DAYS PER WEEK DO YOU ENGAGE IN MODERATE TO STRENUOUS EXERCISE (LIKE A BRISK WALK)?: 2 DAYS

## 2025-06-19 ASSESSMENT — SOCIAL DETERMINANTS OF HEALTH (SDOH): HOW OFTEN DO YOU GET TOGETHER WITH FRIENDS OR RELATIVES?: ONCE A WEEK

## 2025-06-24 ENCOUNTER — OFFICE VISIT (OUTPATIENT)
Dept: PEDIATRICS | Facility: CLINIC | Age: 25
End: 2025-06-24
Payer: COMMERCIAL

## 2025-06-24 VITALS
WEIGHT: 122.8 LBS | DIASTOLIC BLOOD PRESSURE: 72 MMHG | HEART RATE: 84 BPM | RESPIRATION RATE: 20 BRPM | SYSTOLIC BLOOD PRESSURE: 110 MMHG | TEMPERATURE: 97.7 F | HEIGHT: 64 IN | OXYGEN SATURATION: 99 % | BODY MASS INDEX: 20.96 KG/M2

## 2025-06-24 DIAGNOSIS — Z00.00 ROUTINE GENERAL MEDICAL EXAMINATION AT A HEALTH CARE FACILITY: Primary | ICD-10-CM

## 2025-06-24 DIAGNOSIS — Z12.4 CERVICAL CANCER SCREENING: ICD-10-CM

## 2025-06-24 DIAGNOSIS — E10.65 TYPE 1 DIABETES MELLITUS WITH HYPERGLYCEMIA (H): ICD-10-CM

## 2025-06-24 DIAGNOSIS — Z80.3 FAMILY HISTORY OF MALIGNANT NEOPLASM OF BREAST: ICD-10-CM

## 2025-06-24 PROCEDURE — 99395 PREV VISIT EST AGE 18-39: CPT | Mod: 25 | Performed by: NURSE PRACTITIONER

## 2025-06-24 PROCEDURE — 3074F SYST BP LT 130 MM HG: CPT | Performed by: NURSE PRACTITIONER

## 2025-06-24 PROCEDURE — 3078F DIAST BP <80 MM HG: CPT | Performed by: NURSE PRACTITIONER

## 2025-06-24 PROCEDURE — 1126F AMNT PAIN NOTED NONE PRSNT: CPT | Performed by: NURSE PRACTITIONER

## 2025-06-24 PROCEDURE — 90677 PCV20 VACCINE IM: CPT | Performed by: NURSE PRACTITIONER

## 2025-06-24 PROCEDURE — 90471 IMMUNIZATION ADMIN: CPT | Performed by: NURSE PRACTITIONER

## 2025-06-24 ASSESSMENT — PAIN SCALES - GENERAL: PAINLEVEL_OUTOF10: NO PAIN (0)

## 2025-06-24 NOTE — PROGRESS NOTES
Preventive Care Visit  [facility] SAE FOWLER CNP, Family Medicine  Jun 24, 2025      Assessment & Plan     Routine general medical examination at a health care facility  -Declines chlamydia    Cervical cancer screening  - Pap Screen Only - Recommended Age 21 - 24 Years    Type 1 diabetes mellitus with hyperglycemia (H)  Has upcoming endo appt  Recommended diabetic eye exam    Family history of malignant neoplasm of breast  Pt will find out if mom or paternal grandmother were BRCA positive.             Counseling  Appropriate preventive services were addressed with this patient via screening, questionnaire, or discussion as appropriate for fall prevention, nutrition, physical activity, Tobacco-use cessation, social engagement, weight loss and cognition.  Checklist reviewing preventive services available has been given to the patient.  Reviewed patient's diet, addressing concerns and/or questions.   She is at risk for lack of exercise and has been provided with information to increase physical activity for the benefit of her well-being.   The patient was instructed to see the dentist every 6 months.             Ashwin Aragon is a 24 year old, presenting for the following:  Physical        6/24/2025    11:30 AM   Additional Questions   Roomed by Jacinto LOWERY   Accompanied by N/A         6/24/2025    11:30 AM   Patient Reported Additional Medications   Patient reports taking the following new medications No          HPI           Advance Care Planning    Discussed advance care planning with patient; informed AVS has link to [facility] [employer].        6/19/2025   General Health   How would you rate your overall physical health? (!) FAIR   Feel stress (tense, anxious, or unable to sleep) Not at all         6/19/2025   Nutrition   Three or more servings of calcium each day? Yes   Diet: Carbohydrate counting    Gluten-free/reduced   How many servings of fruit and vegetables per  day? (!) 2-3   How many sweetened beverages each day? 0-1       Multiple values from one day are sorted in reverse-chronological order         6/19/2025   Exercise   Days per week of moderate/strenous exercise 2 days   Average minutes spent exercising at this level 30 min   (!) EXERCISE CONCERN      6/19/2025   Social Factors   Frequency of gathering with friends or relatives Once a week   Worry food won't last until get money to buy more No   Food not last or not have enough money for food? No   Do you have housing? (Housing is defined as stable permanent housing and does not include staying outside in a car, in a tent, in an abandoned building, in an overnight shelter, or couch-surfing.) No   Are you worried about losing your housing? No   Lack of transportation? Patient declined   Unable to get utilities (heat,electricity)? No   Want help with housing or utility concern? No   (!) HOUSING CONCERN PRESENT      6/19/2025   Dental   Dentist two times every year? (!) NO         Today's PHQ-2 Score:       6/23/2025     5:09 PM   PHQ-2 ( 1999 Pfizer)   Q1: Little interest or pleasure in doing things 0   Q2: Feeling down, depressed or hopeless 0   PHQ-2 Score 0    Q1: Little interest or pleasure in doing things Not at all   Q2: Feeling down, depressed or hopeless Not at all   PHQ-2 Score 0       Patient-reported           6/19/2025   Substance Use   Alcohol more than 3/day or more than 7/wk No   Do you use any other substances recreationally? (!) DECLINE     Social History     Tobacco Use    Smoking status: Former     Current packs/day: 0.10     Types: Cigarettes, Vaping Device    Smokeless tobacco: Never    Tobacco comments:     mom smokes outside   Vaping Use    Vaping status: Some Days    Substances: Nicotine    Devices: Disposable   Substance Use Topics    Alcohol use: Yes     Comment: rare    Drug use: Yes     Types: Marijuana     Comment: few times a week           6/19/2025   STI Screening   New sexual partner(s)  "since last STI/HIV test? No     History of abnormal Pap smear: No - age 21-29 PAP every 3 years recommended        3/10/2022    12:48 PM   PAP / HPV   PAP Negative for Intraepithelial Lesion or Malignancy (NILM)            6/19/2025   Contraception/Family Planning   Questions about contraception or family planning (!) YES    What are your periods like? Not currently having periods        Reviewed and updated as needed this visit by Provider                          Review of Systems  Constitutional, HEENT, cardiovascular, pulmonary, GI, , musculoskeletal, neuro, skin, endocrine and psych systems are negative, except as otherwise noted.     Objective    Exam  /72 (BP Location: Right arm, Patient Position: Sitting, Cuff Size: Adult Regular)   Pulse 84   Temp 97.7  F (36.5  C) (Temporal)   Resp 20   Ht 1.614 m (5' 3.54\")   Wt 55.7 kg (122 lb 12.8 oz)   SpO2 99%   BMI 21.38 kg/m     Estimated body mass index is 21.38 kg/m  as calculated from the following:    Height as of this encounter: 1.614 m (5' 3.54\").    Weight as of this encounter: 55.7 kg (122 lb 12.8 oz).    Physical Exam  GENERAL: alert and no distress  EYES: Eyes grossly normal to inspection, PERRL and conjunctivae and sclerae normal  HENT: ear canals and TM's normal, nose and mouth without ulcers or lesions  NECK: no adenopathy, no asymmetry, masses, or scars  RESP: lungs clear to auscultation - no rales, rhonchi or wheezes  CV: regular rate and rhythm, normal S1 S2, no S3 or S4, no murmur, click or rub, no peripheral edema  ABDOMEN: soft, nontender, no hepatosplenomegaly, no masses and bowel sounds normal   (female) w/bimanual: normal female external genitalia, normal urethral meatus, normal vaginal mucosa, and normal cervix/adnexa/uterus without masses or discharge  MS: no gross musculoskeletal defects noted, no edema  SKIN: no suspicious lesions or rashes  NEURO: Normal strength and tone, mentation intact and speech normal  PSYCH: " mentation appears normal, affect normal/bright        Signed Electronically by: SAE OREILLY CNP

## 2025-06-30 ENCOUNTER — RESULTS FOLLOW-UP (OUTPATIENT)
Dept: OBGYN | Facility: CLINIC | Age: 25
End: 2025-06-30

## 2025-07-08 ENCOUNTER — TELEPHONE (OUTPATIENT)
Dept: PEDIATRICS | Facility: CLINIC | Age: 25
End: 2025-07-08
Payer: COMMERCIAL

## 2025-07-08 NOTE — TELEPHONE ENCOUNTER
Patient Quality Outreach    Patient is due for the following:   Diabetes -  A1C, Eye Exam, Diabetic Follow-Up Visit, and Foot Exam      Topic Date Due    COVID-19 Vaccine (2 - 2024-25 season) 09/01/2024       Action(s) Taken:   Schedule a office visit for Diabeties    Type of outreach:    Sent [employer] message.    Questions for provider review:    None         Jacinto Baconcuco  Chart routed to None.

## 2025-07-21 ENCOUNTER — LAB (OUTPATIENT)
Dept: LAB | Facility: CLINIC | Age: 25
End: 2025-07-21
Payer: COMMERCIAL

## 2025-07-21 DIAGNOSIS — E10.65 TYPE 1 DIABETES MELLITUS WITH HYPERGLYCEMIA (H): ICD-10-CM

## 2025-07-21 LAB
EST. AVERAGE GLUCOSE BLD GHB EST-MCNC: 174 MG/DL
HBA1C MFR BLD: 7.7 % (ref 0–5.6)

## 2025-07-21 PROCEDURE — 83036 HEMOGLOBIN GLYCOSYLATED A1C: CPT

## 2025-07-21 PROCEDURE — 36415 COLL VENOUS BLD VENIPUNCTURE: CPT

## 2025-07-22 ENCOUNTER — VIRTUAL VISIT (OUTPATIENT)
Dept: ENDOCRINOLOGY | Facility: CLINIC | Age: 25
End: 2025-07-22
Attending: NURSE PRACTITIONER
Payer: COMMERCIAL

## 2025-07-22 DIAGNOSIS — E10.65 TYPE 1 DIABETES MELLITUS WITH HYPERGLYCEMIA (H): Primary | ICD-10-CM

## 2025-07-22 DIAGNOSIS — Z96.41 INSULIN PUMP STATUS: ICD-10-CM

## 2025-07-22 PROCEDURE — 3051F HG A1C>EQUAL 7.0%<8.0%: CPT | Mod: 95 | Performed by: INTERNAL MEDICINE

## 2025-07-22 PROCEDURE — 98006 SYNCH AUDIO-VIDEO EST MOD 30: CPT | Performed by: INTERNAL MEDICINE

## 2025-07-22 PROCEDURE — 1126F AMNT PAIN NOTED NONE PRSNT: CPT | Mod: 95 | Performed by: INTERNAL MEDICINE

## 2025-07-22 NOTE — LETTER
"7/22/2025      Margo Cruz  [address] [address] [facility] [id number]      Dear Colleague,    Thank you for referring your patient, Margo Cruz, to the [facility]. Please see a copy of my visit note below.    THIS IS A VIDEO VISIT:    Phone call visit/virtual visit encounter:    Name of patient: Margo Cruz    Date of encounter: 7/22/2025    Time of start of video visit: 1:00    Video started: 1:05    Video ended: 1:27    Provider location: working from home/ [facility]    Patient location: patients home.    Mode of transmission: [employer] video/ [employer]    Verbal consent: obtained before starting visit. Pt is agreeable.      The patient has been notified of following:      \"This VIDEO visit will be conducted via a call between you and your physician/provider. We have found that certain health care needs can be provided without the need for a physical exam.  This service lets us provide the care you need with a short phone conversation.  If a prescription is necessary we can send it directly to your pharmacy.  If lab work is needed we can place an order for that and you can then stop by our lab to have the test done at a later time.     With new updates with corona virus patient might be billed as clinic visit.     If during the course of the call the physician/provider feels a telephone visit is not appropriate, you will not be charged for this service.\"      Past medical history, social history, family history, allergy and medications were reviewed and updated as appropriate.  Reviewed pertinent labs, notes, imaging studies personally.    Endocrinology Clinic Note:  Name: Margo Cruz  Seen for follow-up of type 1 diabetes.    HPI:  Margo Cruz is a 24 year old female who presents for the evaluation/management of type 1 diabetes.   has a past medical history of LESLIE (acute kidney injury) (01/11/2022), Diabetes mellitus, and Seborrheic infantile dermatitis.    Noted " 6/2020 hospitalization for DKA-   It is thought that the likely problem with her injection sites from her insulin pump was kinked causing decreased insulin delivery.   Was followed by endocrinology at [facility] earlier.  Available records, labs and images from outside clinic were personally reviewed.    Is on Medtronic 770 G insulin pump.  She is using 770 G only and NOT using sensors. ( It broke down)  She is interested to switch to pump which is compatible with Dexcom like Tandem or Omnipod.    Using novolog in pump    Reports needs long acting insulin in case of pump malfunction at home. Margo  has glucagon kit at home for emergency.    She works at group home.  Brekfast is low carb (cheese stick), lunch at 11:00 AM and dinner at 6:00 PM.  + wt fluctuates.   Wt Readings from Last 2 Encounters:   06/24/25 55.7 kg (122 lb 12.8 oz)   01/31/25 56.5 kg (124 lb 9.6 oz)       Insulin Pump Information  Insulin Pump Type: Medtronic 770 G   Infusion Set: Medtronic  Insulin: using Insulin aspart in pump       1. Type 1 DM:  Orginally diagnosed: 2011 (at age 9 years)  Current Regimen:   yes:     Diabetes Medication(s)       Diabetic Other       Glucagon, rDNA, (GLUCAGON EMERGENCY) 1 MG KIT Inject 1 mg as directed as needed (low BG)       Insulin       Insulin Aspart (INSULIN PUMP - OUTPATIENT) Inject subcutaneously See Admin Instructions. Type of pump: Medtronic MiniMed 770G  Type of insulin: Novolog   Basal rate(s)  5281-4429: 0.775 unit/hr  1941-6478: 0.825 units/hr  3822-4552: 0.625 units/hr  0157-5030: 0.55 units/hr  ISF: 30 mg\dL  ICF (insulin to carb ratio)  5524-3207: 1unit/8g carbs  1600-8969: 1 unit/11g carbs  Active insulin time: 3 hrs  Target goal range:   Followed by endocrinology     Insulin Aspart FlexPen 100 UNIT/ML SOPN Inject 1 Units subcutaneously 3 times daily (with meals). Use 3x/day with meals (carb correction scale- uses 1 unit/5g carbs at breakfast, 1 unit/10 grams carbs at lunch and 1  unit/8g carbs at dinner). Max daily dose 40 units     NOVOLOG VIAL 100 UNIT/ML soln USE UP TO 70 UNITS DAILY IN INSULIN PUMP 90 DAY SUPPLY,            BS checks: 3-4 times  Average Meter Download: Blood glucose data reviewed personally. See nursing note from this encounter for details.  Few episodes of low Bg in the setting of overcorrection.  Exercise:  Last A1c: 7.1%  Symptoms of hypoglycemia (low blood sugar):   Using PUMP:      DM Complications:   Complications:   Diabetes Complications  Description / Detail    Diabetic Retinopathy  No   CAD / PAD  No   Neuropathy  No   Nephropathy / Microalbuminuria  No   Gastroparesis  No   Hypoglycemia [place]  No          2. Hypertension:  Not on medication.  3. Hyperlipidemia: Not on medication.    PMH/PSH:  Past Medical History:   Diagnosis Date     LESLIE (acute kidney injury) 01/11/2022     Diabetes mellitus      Seborrheic infantile dermatitis      Past Surgical History:   Procedure Laterality Date     WISDOM TOOTH EXTRACTION  2023     Family Hx:  Family History   Problem Relation Age of Onset     Breast Cancer Mother 50        BRCA negative     Diabetes Father      Lung Cancer Maternal Grandfather      Cirrhosis Maternal Grandfather      Breast Cancer Paternal Grandmother         BRCA positive?     GERD Paternal Grandfather            DM1: Father.           Social Hx:  Social History     Socioeconomic History     Marital status: Single     Spouse name: Not on file     Number of children: Not on file     Years of education: Not on file     Highest education level: Not on file   Occupational History     Not on file   Tobacco Use     Smoking status: Former     Current packs/day: 0.10     Types: Cigarettes, Vaping Device     Smokeless tobacco: Never     Tobacco comments:     mom smokes outside   Vaping Use     Vaping status: Some Days     Substances: Nicotine     Devices: Disposable   Substance and Sexual Activity     Alcohol use: Yes     Comment: rare     Drug use: Yes      Types: Marijuana     Comment: few times a week     Sexual activity: Yes     Partners: Male     Birth control/protection: I.U.D.   Other Topics Concern     Not on file   Social History Narrative    9/2023: lives with sig other, pets in house,      Social Drivers of Health     Financial Resource Strain: Low Risk  (6/19/2025)    Financial Resource Strain      Within the past 12 months, have you or your family members you live with been unable to get utilities (heat, electricity) when it was really needed?: No   Food Insecurity: Low Risk  (6/19/2025)    Food Insecurity      Within the past 12 months, did you worry that your food would run out before you got money to buy more?: No      Within the past 12 months, did the food you bought just not last and you didn t have money to get more?: No   Transportation Needs: Unknown (6/19/2025)    Transportation Needs      Within the past 12 months, has lack of transportation kept you from medical appointments, getting your medicines, non-medical meetings or appointments, work, or from getting things that you need?: Patient declined   Physical Activity: Insufficiently Active (6/19/2025)    Exercise Vital Sign      Days of Exercise per Week: 2 days      Minutes of Exercise per Session: 30 min   Stress: No Stress Concern Present (6/19/2025)    [place] [employer] - Occupational Stress Questionnaire      Feeling of Stress : Not at all   Social Connections: Unknown (6/19/2025)    Social Connection and Isolation Panel [NHANES]      Frequency of Communication with Friends and Family: Not on file      Frequency of Social Gatherings with Friends and Family: Once a week      Attends Hinduism Services: Not on file      Active Member of Clubs or Organizations: Not on file      Attends Club or Organization Meetings: Not on file      Marital Status: Not on file   Interpersonal Safety: Low Risk  (6/24/2025)    Interpersonal Safety      Do you feel physically and  emotionally safe where you currently live?: Yes      Within the past 12 months, have you been hit, slapped, kicked or otherwise physically hurt by someone?: No      Within the past 12 months, have you been humiliated or emotionally abused in other ways by your partner or ex-partner?: No   Housing Stability: High Risk (6/19/2025)    Housing Stability      Do you have housing? : No      Are you worried about losing your housing?: No          MEDICATIONS:  has a current medication list which includes the following prescription(s): contour next test, blood glucose monitoring, calcium carbonate, cholecalciferol, glucagon emergency, insulin aspart, insulin aspart flexpen, multiple vitamins-minerals, novolog vial, and prochlorperazine.    ROS     ROS: 10 point ROS neg other than the symptoms noted above in the HPI.    Physical Exam   VS: There were no vitals taken for this visit.  GENERAL: healthy, alert and no distress  EYES: Eyes grossly normal to inspection, conjunctivae and sclerae normal  ENT: no nose swelling, nasal discharge.  Thyroid: no apparent thyroid nodules  RESP: no audible wheeze, cough, or visible cyanosis.  No visible retractions or increased work of breathing.  Able to speak fully in complete sentences.  ABDO: not evaluated.  EXTREMITIES: no hand tremors.  NEURO: Cranial nerves grossly intact, mentation intact and speech normal  SKIN: No apparent skin lesions, rash or edema seen   PSYCH: mentation appears normal, affect normal/bright, judgement and insight intact, normal speech and appearance well-groomed    LABS:  A1c:  Lab Results   Component Value Date    A1C 7.7 07/21/2025    A1C 9.1 01/31/2025    A1C 10.8 09/22/2024    A1C 6.8 09/08/2023    A1C 7.1 12/17/2022    A1C 8.3 06/04/2020    A1C 7.5 01/23/2020    A1C 7.3 04/23/2015    A1C 7.9 06/18/2014    A1C 7.4 10/11/2013       BMP:   Creatinine   Date Value Ref Range Status   01/31/2025 0.72 0.51 - 0.95 mg/dL Final   06/05/2020 0.64 0.50 - 1.00 mg/dL  Final       Urine Micro:  Lab Results   Component Value Date    UMALCR  01/31/2025      Comment:      Unable to calculate, urine albumin and/or urine creatinine is outside detectable limits.  Microalbuminuria is defined as an albumin:creatinine ratio of 17 to 299 for males and 25 to 299 for females. A ratio of albumin:creatinine of 300 or higher is indicative of overt proteinuria.  Due to biologic variability, positive results should be confirmed by a second, first-morning random or 24-hour timed urine specimen. If there is discrepancy, a third specimen is recommended. When 2 out of 3 results are in the microalbuminuria range, this is evidence for incipient nephropathy and warrants increased efforts at glucose control, blood pressure control, and institution of therapy with an angiotensin-converting-enzyme (ACE) inhibitor (if the patient can tolerate it).      UMALCR 12.31 03/10/2022    UMALCR 17.29 01/23/2020           LFTs/Lipids:  Recent Labs   Lab Test 01/31/25  1015 09/08/23  1527   CHOL 182 123   HDL 57 46*   * 49   TRIG 82 138     TFTs:  TSH   Date Value Ref Range Status   09/08/2023 0.89 0.30 - 4.20 uIU/mL Final   01/23/2020 0.79 0.40 - 4.00 mU/L Final       Blood Glucose and pump data/ Meter reviewed.     All pertinent notes, labs, and images personally reviewed by me.       Glucometer/ insulin pump (if applicable)/ CGM data (if applicable) downloaded, Personally reviewed and interpreted.  All Blood sugar data reviewed personally and interpreted as well as discussed with pt.    All past medical, social and Family history [facility].    A/P  Ms.Alyssa JENNI Cruz is a 24 year old here for the evaluation/management of diabetes:    1. DM1 - Under fair control.  A1c 7.7%.  No known complications from Diabetes.  Using Medtronic 770 G pump without Medtronic sensor.  Using manual mode 100%.   Limited blood sugar data available as she is not putting blood sugars in the pump with each meal.  In  general blood sugar appears to be on higher side especially after meals.  She denies any major episodes of hypoglycemia.  Plan:  Discussed diagnosis, pathophysiology, management and treatment options of condition with pt.  I also discussed importance of strict blood sugar control to prevent complications associated with uncontrolled diabetes.  Continue current pump settings except change I:C ratio as noted below. (To help with postmeal hyperglycemia)  Time Ratio   0000-  8.0   1000  1500 11.0-->10.0  10.0-->9:00     Follow-up with diabetes educator to transition to a new pump--tandem and Dexcom.  Referral in place.  While you are using current 770 G pump without sensor--continue to monitor blood sugar closely and check blood sugar before each meal and put blood sugar before each meal in pump.  Follow up with Lita COBOS in 3 months with labs prior.  Repeat labs and follow up with  in 3 month after that.  Please make a lab appointment for blood work and follow up clinic appointment in 1 week after that to discuss results.      2. Hypertension -not on medication       3. Hyperlipidemia - Not on medication. LDL 49.  4. Prevention  Ophthalmology- recommend annually.  ASA- NA 2/2 to age.  Smoking- No  Foot exam:     Most Recent Immunizations   Administered Date(s) Administered     COVID-19 Monovalent 12+ (Pfizer 2022) 03/10/2022     Comvax (HIB/HepB) 01/06/2003     DTAP (<7y) 02/09/2006     DTaP, Unspecified 08/30/2013     HPV9 (Gardasil) 09/08/2023     Influenza (IIV3) PF 12/04/2006     Influenza Vaccine >6 months,quad, PF 03/10/2022     Influenza, Split Virus, Trivalent, Pf (Fluzone\Fluarix) 01/31/2025     MMR (MMRII) 02/09/2006     Meningococcal ACWY (Menactra ) 08/30/2013     Meningococcal ACWY (Menveo ) 08/30/2013     Pneumococcal (PCV 7) 01/06/2003     Pneumococcal 20 valent Conjugate (Prevnar 20) 06/24/2025     Pneumococcal 23 valent 01/23/2020     Poliovirus, inactivated (IPV) 02/09/2006     TDAP  (Adacel,Boostrix) 09/08/2023     TDAP Vaccine (Adacel) 08/30/2013     Varicella (Varivax) 08/30/2013       Plan: insulin glargine (LANTUS PEN) 100 UNIT/ML pen,         Adult Diabetes Education  Referral,         Hemoglobin A1c, TSH with free T4 reflex        Recommend checking blood sugars before meals and at bedtime.    If Blood glucose are low more often-> 2-3 times/week- give us a call.  The patient is advised to Make better food choices: reduce carbs, Reduce portion size, weight loss and exercise 3-4 times a week.  Discussed hypoglycemia signs and symptoms as well as management in detail.      There is some variability among people, most will usually develop symptoms suggestive of hypoglycemia when blood glucose levels are lowered to the mid 60's. The first set of symptoms are called adrenergic. Patients may experience any of the following nervousness, sweating, intense hunger, trembling, weakness, palpitations, and difficulty speaking. When BS fall below 50 the patient is unable to talk and take oral therapy.  Would recommend Glucagon emergency kit for the patient and education for family and friends around the patient.   The acute management of hypoglycemia involves the rapid delivery of a source of easily absorbed sugar. Regular soda, juice, lifesavers, table sugar, are good options. 15 grams of glucose is the dose that is given, followed by an assessment of symptoms and a blood glucose check if possible. If after 10 minutes there is no improvement, another 10-15 grams should be given. This can be repeated up to three times. The equivalency of 10-15 grams of glucose (approximate servings) are: Four lifesavers, 4 teaspoons of sugar, or 1/2 cup or 4 oz of juice or regular pop.    Discussed indications, risks and benefits of all medications prescribed, and answered questions to patient's satisfaction.  The longitudinal plan of care for the diagnosis(es)/condition(s) as documented were addressed during  this visit. Due to the added complexity in care, I will continue to support Margo in the subsequent management and with ongoing continuity of care.  All questions were answered.  The patient indicates understanding of the above issues and agrees with the plan set forth.      Follow-up:  As noted in AVS    Karlee Holly M.D  Endocrinology  [facility]/[place] [place]  CC: Luanne Todd    Disclaimer: This note consists of symbols derived from keyboarding, dictation and/or voice recognition software. As a result, there may be errors in the script that have gone undetected. Please consider this when interpreting information found in this chart.    Addendum to above note and clinic visit:    Labs reviewed.    See result note/telephone encounter.                    Again, thank you for allowing me to participate in the care of your patient.        Sincerely,        Karlee Holly MD    Electronically signed

## 2025-07-22 NOTE — PROGRESS NOTES
"THIS IS A VIDEO VISIT:    Phone call visit/virtual visit encounter:    Name of patient: Margo Cruz    Date of encounter: 7/22/2025    Time of start of video visit: 1:00    Video started: 1:05    Video ended: 1:27    Provider location: working from home/ [facility]    Patient location: patients home.    Mode of transmission: [employer] video/ [employer]    Verbal consent: obtained before starting visit. Pt is agreeable.      The patient has been notified of following:      \"This VIDEO visit will be conducted via a call between you and your physician/provider. We have found that certain health care needs can be provided without the need for a physical exam.  This service lets us provide the care you need with a short phone conversation.  If a prescription is necessary we can send it directly to your pharmacy.  If lab work is needed we can place an order for that and you can then stop by our lab to have the test done at a later time.     With new updates with corona virus patient might be billed as clinic visit.     If during the course of the call the physician/provider feels a telephone visit is not appropriate, you will not be charged for this service.\"      Past medical history, social history, family history, allergy and medications were reviewed and updated as appropriate.  Reviewed pertinent labs, notes, imaging studies personally.    Endocrinology Clinic Note:  Name: Margo Cruz  Seen for follow-up of type 1 diabetes.    HPI:  Margo Cruz is a 24 year old female who presents for the evaluation/management of type 1 diabetes.   has a past medical history of LESLIE (acute kidney injury) (01/11/2022), Diabetes mellitus, and Seborrheic infantile dermatitis.    Noted 6/2020 hospitalization for DKA-   It is thought that the likely problem with her injection sites from her insulin pump was kinked causing decreased insulin delivery.   Was followed by endocrinology at [facility] earlier.  Available " Patient informed of the decrease in her hemoglobin from 12 5 to 10 2  She is to continue with her iron supplements  She has an appt scheduled for insertion a Mirena Thursday of this week  records, labs and images from outside clinic were personally reviewed.    Is on Medtronic 770 G insulin pump.  She is using 770 G only and NOT using sensors. ( It broke down)  She is interested to switch to pump which is compatible with Dexcom like Tandem or Omnipod.    Using novolog in pump    Reports needs long acting insulin in case of pump malfunction at home. Margo  has glucagon kit at home for emergency.    She works at group home.  Brekfast is low carb (cheese stick), lunch at 11:00 AM and dinner at 6:00 PM.  + wt fluctuates.   Wt Readings from Last 2 Encounters:   06/24/25 55.7 kg (122 lb 12.8 oz)   01/31/25 56.5 kg (124 lb 9.6 oz)       Insulin Pump Information  Insulin Pump Type: Medtronic 770 G   Infusion Set: Medtronic  Insulin: using Insulin aspart in pump       1. Type 1 DM:  Orginally diagnosed: 2011 (at age 9 years)  Current Regimen:   yes:     Diabetes Medication(s)       Diabetic Other       Glucagon, rDNA, (GLUCAGON EMERGENCY) 1 MG KIT Inject 1 mg as directed as needed (low BG)       Insulin       Insulin Aspart (INSULIN PUMP - OUTPATIENT) Inject subcutaneously See Admin Instructions. Type of pump: Medtronic MiniMed 770G  Type of insulin: Novolog   Basal rate(s)  4586-4385: 0.775 unit/hr  6018-2324: 0.825 units/hr  7236-7197: 0.625 units/hr  5474-4981: 0.55 units/hr  ISF: 30 mg\dL  ICF (insulin to carb ratio)  6694-2564: 1unit/8g carbs  1400-0670: 1 unit/11g carbs  Active insulin time: 3 hrs  Target goal range:   Followed by endocrinology     Insulin Aspart FlexPen 100 UNIT/ML SOPN Inject 1 Units subcutaneously 3 times daily (with meals). Use 3x/day with meals (carb correction scale- uses 1 unit/5g carbs at breakfast, 1 unit/10 grams carbs at lunch and 1 unit/8g carbs at dinner). Max daily dose 40 units     NOVOLOG VIAL 100 UNIT/ML soln USE UP TO 70 UNITS DAILY IN INSULIN PUMP 90 DAY SUPPLY,            BS checks: 3-4 times  Average Meter Download: Blood glucose data reviewed personally. See  nursing note from this encounter for details.  Few episodes of low Bg in the setting of overcorrection.  Exercise:  Last A1c: 7.1%  Symptoms of hypoglycemia (low blood sugar):   Using PUMP:      DM Complications:   Complications:   Diabetes Complications  Description / Detail    Diabetic Retinopathy  No   CAD / PAD  No   Neuropathy  No   Nephropathy / Microalbuminuria  No   Gastroparesis  No   Hypoglycemia [place]  No          2. Hypertension:  Not on medication.  3. Hyperlipidemia: Not on medication.    PMH/PSH:  Past Medical History:   Diagnosis Date    LESLIE (acute kidney injury) 01/11/2022    Diabetes mellitus     Seborrheic infantile dermatitis      Past Surgical History:   Procedure Laterality Date    WISDOM TOOTH EXTRACTION  2023     Family Hx:  Family History   Problem Relation Age of Onset    Breast Cancer Mother 50        BRCA negative    Diabetes Father     Lung Cancer Maternal Grandfather     Cirrhosis Maternal Grandfather     Breast Cancer Paternal Grandmother         BRCA positive?    GERD Paternal Grandfather            DM1: Father.           Social Hx:  Social History     Socioeconomic History    Marital status: Single     Spouse name: Not on file    Number of children: Not on file    Years of education: Not on file    Highest education level: Not on file   Occupational History    Not on file   Tobacco Use    Smoking status: Former     Current packs/day: 0.10     Types: Cigarettes, Vaping Device    Smokeless tobacco: Never    Tobacco comments:     mom smokes outside   Vaping Use    Vaping status: Some Days    Substances: Nicotine    Devices: Disposable   Substance and Sexual Activity    Alcohol use: Yes     Comment: rare    Drug use: Yes     Types: Marijuana     Comment: few times a week    Sexual activity: Yes     Partners: Male     Birth control/protection: I.U.D.   Other Topics Concern    Not on file   Social History Narrative    9/2023: lives with sig other, pets in house,      Social Drivers of  Health     Financial Resource Strain: Low Risk  (6/19/2025)    Financial Resource Strain     Within the past 12 months, have you or your family members you live with been unable to get utilities (heat, electricity) when it was really needed?: No   Food Insecurity: Low Risk  (6/19/2025)    Food Insecurity     Within the past 12 months, did you worry that your food would run out before you got money to buy more?: No     Within the past 12 months, did the food you bought just not last and you didn t have money to get more?: No   Transportation Needs: Unknown (6/19/2025)    Transportation Needs     Within the past 12 months, has lack of transportation kept you from medical appointments, getting your medicines, non-medical meetings or appointments, work, or from getting things that you need?: Patient declined   Physical Activity: Insufficiently Active (6/19/2025)    Exercise Vital Sign     Days of Exercise per Week: 2 days     Minutes of Exercise per Session: 30 min   Stress: No Stress Concern Present (6/19/2025)    [place] [employer] - Occupational Stress Questionnaire     Feeling of Stress : Not at all   Social Connections: Unknown (6/19/2025)    Social Connection and Isolation Panel [NHANES]     Frequency of Communication with Friends and Family: Not on file     Frequency of Social Gatherings with Friends and Family: Once a week     Attends Hoahaoism Services: Not on file     Active Member of Clubs or Organizations: Not on file     Attends Club or Organization Meetings: Not on file     Marital Status: Not on file   Interpersonal Safety: Low Risk  (6/24/2025)    Interpersonal Safety     Do you feel physically and emotionally safe where you currently live?: Yes     Within the past 12 months, have you been hit, slapped, kicked or otherwise physically hurt by someone?: No     Within the past 12 months, have you been humiliated or emotionally abused in other ways by your partner or ex-partner?: No    Housing Stability: High Risk (6/19/2025)    Housing Stability     Do you have housing? : No     Are you worried about losing your housing?: No          MEDICATIONS:  has a current medication list which includes the following prescription(s): contour next test, blood glucose monitoring, calcium carbonate, cholecalciferol, glucagon emergency, insulin aspart, insulin aspart flexpen, multiple vitamins-minerals, novolog vial, and prochlorperazine.    ROS     ROS: 10 point ROS neg other than the symptoms noted above in the HPI.    Physical Exam   VS: There were no vitals taken for this visit.  GENERAL: healthy, alert and no distress  EYES: Eyes grossly normal to inspection, conjunctivae and sclerae normal  ENT: no nose swelling, nasal discharge.  Thyroid: no apparent thyroid nodules  RESP: no audible wheeze, cough, or visible cyanosis.  No visible retractions or increased work of breathing.  Able to speak fully in complete sentences.  ABDO: not evaluated.  EXTREMITIES: no hand tremors.  NEURO: Cranial nerves grossly intact, mentation intact and speech normal  SKIN: No apparent skin lesions, rash or edema seen   PSYCH: mentation appears normal, affect normal/bright, judgement and insight intact, normal speech and appearance well-groomed    LABS:  A1c:  Lab Results   Component Value Date    A1C 7.7 07/21/2025    A1C 9.1 01/31/2025    A1C 10.8 09/22/2024    A1C 6.8 09/08/2023    A1C 7.1 12/17/2022    A1C 8.3 06/04/2020    A1C 7.5 01/23/2020    A1C 7.3 04/23/2015    A1C 7.9 06/18/2014    A1C 7.4 10/11/2013       BMP:   Creatinine   Date Value Ref Range Status   01/31/2025 0.72 0.51 - 0.95 mg/dL Final   06/05/2020 0.64 0.50 - 1.00 mg/dL Final       Urine Micro:  Lab Results   Component Value Date    UMALCR  01/31/2025      Comment:      Unable to calculate, urine albumin and/or urine creatinine is outside detectable limits.  Microalbuminuria is defined as an albumin:creatinine ratio of 17 to 299 for males and 25 to 299 for  females. A ratio of albumin:creatinine of 300 or higher is indicative of overt proteinuria.  Due to biologic variability, positive results should be confirmed by a second, first-morning random or 24-hour timed urine specimen. If there is discrepancy, a third specimen is recommended. When 2 out of 3 results are in the microalbuminuria range, this is evidence for incipient nephropathy and warrants increased efforts at glucose control, blood pressure control, and institution of therapy with an angiotensin-converting-enzyme (ACE) inhibitor (if the patient can tolerate it).      UMALCR 12.31 03/10/2022    UMALCR 17.29 01/23/2020           LFTs/Lipids:  Recent Labs   Lab Test 01/31/25  1015 09/08/23  1527   CHOL 182 123   HDL 57 46*   * 49   TRIG 82 138     TFTs:  TSH   Date Value Ref Range Status   09/08/2023 0.89 0.30 - 4.20 uIU/mL Final   01/23/2020 0.79 0.40 - 4.00 mU/L Final       Blood Glucose and pump data/ Meter reviewed.     All pertinent notes, labs, and images personally reviewed by me.       Glucometer/ insulin pump (if applicable)/ CGM data (if applicable) downloaded, Personally reviewed and interpreted.  All Blood sugar data reviewed personally and interpreted as well as discussed with pt.    All past medical, social and Family history reviewed and updated in Epic.    A/P  Ms.Alyssa JENNI Cruz is a 24 year old here for the evaluation/management of diabetes:    1. DM1 - Under fair control.  A1c 7.7%.  No known complications from Diabetes.  Using Medtronic 770 G pump without Medtronic sensor.  Using manual mode 100%.   Limited blood sugar data available as she is not putting blood sugars in the pump with each meal.  In general blood sugar appears to be on higher side especially after meals.  She denies any major episodes of hypoglycemia.  Plan:  Discussed diagnosis, pathophysiology, management and treatment options of condition with pt.  I also discussed importance of strict blood sugar control to prevent  complications associated with uncontrolled diabetes.  Continue current pump settings except change I:C ratio as noted below. (To help with postmeal hyperglycemia)  Time Ratio   0000-  8.0   1000  1500 11.0-->10.0  10.0-->9:00     Follow-up with diabetes educator to transition to a new pump--tandem and Dexcom.  Referral in place.  While you are using current 770 G pump without sensor--continue to monitor blood sugar closely and check blood sugar before each meal and put blood sugar before each meal in pump.  Follow up with Lita COBOS in 3 months with labs prior.  Repeat labs and follow up with  in 3 month after that.  Please make a lab appointment for blood work and follow up clinic appointment in 1 week after that to discuss results.      2. Hypertension -not on medication       3. Hyperlipidemia - Not on medication. LDL 49.  4. Prevention  Ophthalmology- recommend annually.  ASA- NA 2/2 to age.  Smoking- No  Foot exam:     Most Recent Immunizations   Administered Date(s) Administered    COVID-19 Monovalent 12+ (Pfizer 2022) 03/10/2022    Comvax (HIB/HepB) 01/06/2003    DTAP (<7y) 02/09/2006    DTaP, Unspecified 08/30/2013    HPV9 (Gardasil) 09/08/2023    Influenza (IIV3) PF 12/04/2006    Influenza Vaccine >6 months,quad, PF 03/10/2022    Influenza, Split Virus, Trivalent, Pf (Fluzone\Fluarix) 01/31/2025    MMR (MMRII) 02/09/2006    Meningococcal ACWY (Menactra ) 08/30/2013    Meningococcal ACWY (Menveo ) 08/30/2013    Pneumococcal (PCV 7) 01/06/2003    Pneumococcal 20 valent Conjugate (Prevnar 20) 06/24/2025    Pneumococcal 23 valent 01/23/2020    Poliovirus, inactivated (IPV) 02/09/2006    TDAP (Adacel,Boostrix) 09/08/2023    TDAP Vaccine (Adacel) 08/30/2013    Varicella (Varivax) 08/30/2013       Plan: insulin glargine (LANTUS PEN) 100 UNIT/ML pen,         Adult Diabetes Education  Referral,         Hemoglobin A1c, TSH with free T4 reflex        Recommend checking blood sugars before  meals and at bedtime.    If Blood glucose are low more often-> 2-3 times/week- give us a call.  The patient is advised to Make better food choices: reduce carbs, Reduce portion size, weight loss and exercise 3-4 times a week.  Discussed hypoglycemia signs and symptoms as well as management in detail.      There is some variability among people, most will usually develop symptoms suggestive of hypoglycemia when blood glucose levels are lowered to the mid 60's. The first set of symptoms are called adrenergic. Patients may experience any of the following nervousness, sweating, intense hunger, trembling, weakness, palpitations, and difficulty speaking. When BS fall below 50 the patient is unable to talk and take oral therapy.  Would recommend Glucagon emergency kit for the patient and education for family and friends around the patient.   The acute management of hypoglycemia involves the rapid delivery of a source of easily absorbed sugar. Regular soda, juice, lifesavers, table sugar, are good options. 15 grams of glucose is the dose that is given, followed by an assessment of symptoms and a blood glucose check if possible. If after 10 minutes there is no improvement, another 10-15 grams should be given. This can be repeated up to three times. The equivalency of 10-15 grams of glucose (approximate servings) are: Four lifesavers, 4 teaspoons of sugar, or 1/2 cup or 4 oz of juice or regular pop.    Discussed indications, risks and benefits of all medications prescribed, and answered questions to patient's satisfaction.  The longitudinal plan of care for the diagnosis(es)/condition(s) as documented were addressed during this visit. Due to the added complexity in care, I will continue to support Margo in the subsequent management and with ongoing continuity of care.  All questions were answered.  The patient indicates understanding of the above issues and agrees with the plan set forth.      Follow-up:  As noted in  INDIO Holly M.D  Endocrinology  [facility]/Mar[facility]  CC: Luanne Todd    Disclaimer: This note consists of symbols derived from keyboarding, dictation and/or voice recognition software. As a result, there may be errors in the script that have gone undetected. Please consider this when interpreting information found in this chart.    Addendum to above note and clinic visit:    Labs reviewed.    See result note/telephone encounter.

## 2025-07-22 NOTE — NURSING NOTE
Current patient location: [place]    Is the patient currently in the state of [place]? YES    Visit mode: VIDEO    If the visit is dropped, the patient can be reconnected by:VIDEO VISIT: Text to cell phone:   Telephone Information:   Mobile [phone]       Will anyone else be joining the visit? NO  (If patient encounters technical issues they should call [phone] :[phone])    Are changes needed to the allergy or medication list? No    Are refills needed on medications prescribed by this physician? NO    Rooming Documentation:  Questionnaire(s) completed    Reason for visit: RECHECK    Carolyn CARO

## 2025-07-22 NOTE — PATIENT INSTRUCTIONS
[facility]  Dr Holly, Endocrinology Department    [facility]   [address] [facility]  [place] [address]  Appointment [phone]  Fax: [phone]  [place]: Monday - Thursday      To provide the best diabetic care, please bring your blood glucose meter to each and every visit with your Endocrinologist.  Your blood glucose meter/insulin pump will be downloaded at every appointment.    Please arrive 15 minutes before your scheduled appointment.  This will allow for your blood glucose meter/insulin pump to be downloaded.  If you are wearing DEXCOM please bring  or sharing code so that it can be downloaded.  If you are using freestyle bob personal sensors please bring the reader.  If you are using TANDEM insulin pump please have your username and password to get info from Tandem website.    Continue current pump settings except change I:C ratio as noted below.  Time Ratio   0000-  8.0   1000  1500 11.0-->10.0  10.0-->9:00     Follow-up with diabetes educator to transition to a new pump--tandem and Dexcom.  While you are using current 770 G pump without sensor--continue to monitor blood sugar closely and check blood sugar before each meal and put blood sugar before each meal in pump.  Follow up with Lita COBOS in 3 months with labs prior.  Repeat labs and follow up with  in 3 month after that.  Please make a lab appointment for blood work and follow up clinic appointment in 1 week after that to discuss results.    Your provider has referred you to Diabetes Education: For all [facility]:  Phone [phone]; Fax [phone]  Please call and make the appointment.    Recommend checking blood sugars before meals and at bedtime.    If Blood glucose are low more often-> 2-3 times/week- give us a call.  Make better food choices: reduce carbs, Reduce portion size, weight loss and exercise 3-4 times a week.    What is  hypoglycemia:  Hypoglycemia is when blood sugar levels become too low - below 70 m/dl.      What causes hypoglycemia?  - using too much insulin  -taking too many diabetes pills  -not eating enough, or skipping meals or snacks  -not eating enough carbohydrate with meals  -changing your exercise routine  -drinking alcohol in excess    It is also possible to have hypoglycemia even when you are carefully managing your blood sugar levels.    What does it feel like when blood sugars get too low?  You may feel:  - anxious  -confused  -dizzy  -hungry  -light-headed  -nervous  -shaky  -sleepy  -sweaty    You may have  -blurred or cloudy vision  -heart palpitations (heart skips a beat or races)  -tingling or numbness around the mouth and tongue  -tremors    What to do if you have symptoms of hypoglycmemia:  If you think your blood sugar is too low, check it with a glucose meter.  If its below 70 mg/dl, consume one of the following:  Fruit juice (1/2 cup)  Glucose tablets (15 grams)  Hard candy (5 to 7 pieces)  Honey or sugar (2 teaspoons)  Milk (1/2 cup)  Soft drink (non-diet, 1/2 cup)    Wait 15 minutes and check your blood glucose again.  IF it is still below 70 mg/dl, have another food item listed above. Wait another 15 minutes and repeat the blood glucose test.  Have a small meal or snack that contains some carbohydrate after your blood glucose rises above 70 mg/dl.    If you are at risk of hypoglycemia, always carry with you glucose tablets or one of the foods listed above.      To prevent Hypoglycemia:  Avoid situations that may cause hypoglycemia  Before making any change to your diet or exercise routine, discuss them with your healthcare provider  Keep a record of your blood glucose levels.  Include the time of day, diabetes medications, when you had your last meal or snack, and what you were doing at the time (e.g. Watching TV, gardening, jogging, etc).    Talk to your healthcare provider if your blood glucose levels  are often low        Patient guide on hypoglycemia    [email or web address]-[email or web address]

## 2025-07-23 ENCOUNTER — PATIENT OUTREACH (OUTPATIENT)
Dept: CARE COORDINATION | Facility: CLINIC | Age: 25
End: 2025-07-23
Payer: COMMERCIAL

## 2025-08-27 ENCOUNTER — TELEPHONE (OUTPATIENT)
Dept: ENDOCRINOLOGY | Facility: CLINIC | Age: 25
End: 2025-08-27
Payer: COMMERCIAL

## 2025-08-27 DIAGNOSIS — E10.65 TYPE 1 DIABETES MELLITUS WITH HYPERGLYCEMIA (H): ICD-10-CM

## 2025-08-28 RX ORDER — INSULIN ASPART 100 [IU]/ML
INJECTION, SOLUTION INTRAVENOUS; SUBCUTANEOUS
Qty: 70 ML | Refills: 0 | Status: SHIPPED | OUTPATIENT
Start: 2025-08-28

## 2025-08-29 ENCOUNTER — TELEPHONE (OUTPATIENT)
Dept: ENDOCRINOLOGY | Facility: CLINIC | Age: 25
End: 2025-08-29
Payer: COMMERCIAL